# Patient Record
Sex: FEMALE | Race: WHITE | NOT HISPANIC OR LATINO | Employment: OTHER | URBAN - NONMETROPOLITAN AREA
[De-identification: names, ages, dates, MRNs, and addresses within clinical notes are randomized per-mention and may not be internally consistent; named-entity substitution may affect disease eponyms.]

---

## 2017-01-01 ENCOUNTER — TELEPHONE (OUTPATIENT)
Dept: FAMILY MEDICINE CLINIC | Facility: CLINIC | Age: 62
End: 2017-01-01

## 2017-01-01 ENCOUNTER — OFFICE VISIT (OUTPATIENT)
Dept: FAMILY MEDICINE CLINIC | Facility: CLINIC | Age: 62
End: 2017-01-01

## 2017-01-01 ENCOUNTER — TRANSCRIBE ORDERS (OUTPATIENT)
Dept: LAB | Facility: HOSPITAL | Age: 62
End: 2017-01-01

## 2017-01-01 ENCOUNTER — HOSPITAL ENCOUNTER (OUTPATIENT)
Dept: PULMONOLOGY | Facility: HOSPITAL | Age: 62
Discharge: HOME | End: 2017-11-13
Attending: INTERNAL MEDICINE | Admitting: INTERNAL MEDICINE

## 2017-01-01 ENCOUNTER — OFFICE VISIT (OUTPATIENT)
Dept: CARDIOLOGY | Facility: CLINIC | Age: 62
End: 2017-01-01

## 2017-01-01 ENCOUNTER — APPOINTMENT (OUTPATIENT)
Dept: LAB | Facility: HOSPITAL | Age: 62
End: 2017-01-01

## 2017-01-01 VITALS
TEMPERATURE: 97.5 F | BODY MASS INDEX: 45.36 KG/M2 | DIASTOLIC BLOOD PRESSURE: 80 MMHG | SYSTOLIC BLOOD PRESSURE: 130 MMHG | HEIGHT: 63 IN | OXYGEN SATURATION: 91 % | WEIGHT: 256 LBS

## 2017-01-01 VITALS
HEIGHT: 63 IN | OXYGEN SATURATION: 98 % | DIASTOLIC BLOOD PRESSURE: 78 MMHG | BODY MASS INDEX: 43.23 KG/M2 | WEIGHT: 244 LBS | HEART RATE: 58 BPM | SYSTOLIC BLOOD PRESSURE: 128 MMHG

## 2017-01-01 VITALS
WEIGHT: 253 LBS | SYSTOLIC BLOOD PRESSURE: 110 MMHG | BODY MASS INDEX: 44.83 KG/M2 | DIASTOLIC BLOOD PRESSURE: 68 MMHG | HEIGHT: 63 IN

## 2017-01-01 VITALS
BODY MASS INDEX: 45.84 KG/M2 | DIASTOLIC BLOOD PRESSURE: 74 MMHG | OXYGEN SATURATION: 90 % | HEART RATE: 78 BPM | HEIGHT: 63 IN | SYSTOLIC BLOOD PRESSURE: 130 MMHG | WEIGHT: 258.7 LBS

## 2017-01-01 DIAGNOSIS — I25.10 CORONARY ARTERY DISEASE INVOLVING NATIVE CORONARY ARTERY OF NATIVE HEART WITHOUT ANGINA PECTORIS: ICD-10-CM

## 2017-01-01 DIAGNOSIS — I10 ESSENTIAL HYPERTENSION: ICD-10-CM

## 2017-01-01 DIAGNOSIS — J20.9 ACUTE BRONCHITIS, UNSPECIFIED ORGANISM: Primary | ICD-10-CM

## 2017-01-01 DIAGNOSIS — I27.20 PULMONARY HYPERTENSION (HCC): Primary | ICD-10-CM

## 2017-01-01 DIAGNOSIS — E11.22 TYPE 2 DIABETES MELLITUS WITH STAGE 3 CHRONIC KIDNEY DISEASE, WITHOUT LONG-TERM CURRENT USE OF INSULIN (HCC): ICD-10-CM

## 2017-01-01 DIAGNOSIS — R21 RASH, SKIN: Primary | ICD-10-CM

## 2017-01-01 DIAGNOSIS — I25.10 ATHEROSCLEROSIS OF NATIVE CORONARY ARTERY OF NATIVE HEART WITHOUT ANGINA PECTORIS: ICD-10-CM

## 2017-01-01 DIAGNOSIS — Z23 FLU VACCINE NEED: ICD-10-CM

## 2017-01-01 DIAGNOSIS — N18.30 STAGE 3 CHRONIC KIDNEY DISEASE (HCC): ICD-10-CM

## 2017-01-01 DIAGNOSIS — R06.09 DYSPNEA ON EXERTION: ICD-10-CM

## 2017-01-01 DIAGNOSIS — N18.30 TYPE 2 DIABETES MELLITUS WITH STAGE 3 CHRONIC KIDNEY DISEASE, WITHOUT LONG-TERM CURRENT USE OF INSULIN (HCC): ICD-10-CM

## 2017-01-01 DIAGNOSIS — R21 RASH: Primary | ICD-10-CM

## 2017-01-01 DIAGNOSIS — N18.30 CHRONIC KIDNEY DISEASE, STAGE III (MODERATE) (HCC): Primary | ICD-10-CM

## 2017-01-01 DIAGNOSIS — R05.9 COUGH: ICD-10-CM

## 2017-01-01 LAB
DEPRECATED RDW RBC AUTO: 49 FL (ref 36.4–46.3)
ERYTHROCYTE [DISTWIDTH] IN BLOOD BY AUTOMATED COUNT: 14.4 % (ref 11.5–14.5)
HCT VFR BLD AUTO: 44.6 % (ref 35–45)
HGB BLD-MCNC: 14.1 G/DL (ref 12–15.5)
MCH RBC QN AUTO: 29.6 PG (ref 26.5–34)
MCHC RBC AUTO-ENTMCNC: 31.6 G/DL (ref 31.4–36)
MCV RBC AUTO: 93.5 FL (ref 80–98)
NT-PROBNP SERPL-MCNC: 3350 PG/ML (ref 0–900)
PLATELET # BLD AUTO: 215 10*3/MM3 (ref 150–450)
PMV BLD AUTO: 12.3 FL (ref 8–12)
RBC # BLD AUTO: 4.77 10*6/MM3 (ref 3.77–5.16)
WBC NRBC COR # BLD: 10.45 10*3/MM3 (ref 3.2–9.8)

## 2017-01-01 PROCEDURE — 85027 COMPLETE CBC AUTOMATED: CPT | Performed by: INTERNAL MEDICINE

## 2017-01-01 PROCEDURE — 94010 BREATHING CAPACITY TEST: CPT

## 2017-01-01 PROCEDURE — 93000 ELECTROCARDIOGRAM COMPLETE: CPT | Performed by: INTERNAL MEDICINE

## 2017-01-01 PROCEDURE — 94727 GAS DIL/WSHOT DETER LNG VOL: CPT

## 2017-01-01 PROCEDURE — 36415 COLL VENOUS BLD VENIPUNCTURE: CPT | Performed by: INTERNAL MEDICINE

## 2017-01-01 PROCEDURE — 94010 BREATHING CAPACITY TEST: CPT | Performed by: INTERNAL MEDICINE

## 2017-01-01 PROCEDURE — 90686 IIV4 VACC NO PRSV 0.5 ML IM: CPT | Performed by: FAMILY MEDICINE

## 2017-01-01 PROCEDURE — 94729 DIFFUSING CAPACITY: CPT | Performed by: INTERNAL MEDICINE

## 2017-01-01 PROCEDURE — 83880 ASSAY OF NATRIURETIC PEPTIDE: CPT | Performed by: INTERNAL MEDICINE

## 2017-01-01 PROCEDURE — 94729 DIFFUSING CAPACITY: CPT

## 2017-01-01 PROCEDURE — 94727 GAS DIL/WSHOT DETER LNG VOL: CPT | Performed by: INTERNAL MEDICINE

## 2017-01-01 PROCEDURE — 90471 IMMUNIZATION ADMIN: CPT | Performed by: FAMILY MEDICINE

## 2017-01-01 PROCEDURE — 99214 OFFICE O/P EST MOD 30 MIN: CPT | Performed by: INTERNAL MEDICINE

## 2017-01-01 PROCEDURE — 96372 THER/PROPH/DIAG INJ SC/IM: CPT | Performed by: NURSE PRACTITIONER

## 2017-01-01 PROCEDURE — 99214 OFFICE O/P EST MOD 30 MIN: CPT | Performed by: FAMILY MEDICINE

## 2017-01-01 PROCEDURE — 99213 OFFICE O/P EST LOW 20 MIN: CPT | Performed by: NURSE PRACTITIONER

## 2017-01-01 RX ORDER — FLUCONAZOLE 150 MG/1
150 TABLET ORAL ONCE
Qty: 2 TABLET | Refills: 0 | Status: SHIPPED | OUTPATIENT
Start: 2017-01-01 | End: 2017-01-01

## 2017-01-01 RX ORDER — TRIAMCINOLONE ACETONIDE 40 MG/ML
60 INJECTION, SUSPENSION INTRA-ARTICULAR; INTRAMUSCULAR ONCE
Status: COMPLETED | OUTPATIENT
Start: 2017-01-01 | End: 2017-01-01

## 2017-01-01 RX ORDER — BISOPROLOL FUMARATE 5 MG/1
2.5 TABLET, FILM COATED ORAL DAILY
Qty: 30 TABLET | Refills: 6 | Status: SHIPPED | OUTPATIENT
Start: 2017-01-01 | End: 2018-01-01 | Stop reason: SDUPTHER

## 2017-01-01 RX ORDER — SILDENAFIL CITRATE 20 MG/1
20 TABLET ORAL 3 TIMES DAILY
Qty: 90 TABLET | Refills: 6 | Status: SHIPPED | OUTPATIENT
Start: 2017-01-01 | End: 2018-01-01 | Stop reason: SDUPTHER

## 2017-01-01 RX ORDER — GUAIFENESIN 1200 MG/1
TABLET, EXTENDED RELEASE ORAL
Qty: 60 EACH | Refills: 1 | Status: SHIPPED | OUTPATIENT
Start: 2017-01-01 | End: 2017-01-01 | Stop reason: SDUPTHER

## 2017-01-01 RX ORDER — ALBUTEROL SULFATE 90 UG/1
AEROSOL, METERED RESPIRATORY (INHALATION)
Qty: 18 G | Refills: 11 | Status: SHIPPED | OUTPATIENT
Start: 2017-01-01 | End: 2018-01-01 | Stop reason: SDUPTHER

## 2017-01-01 RX ORDER — FUROSEMIDE 80 MG
80 TABLET ORAL DAILY
Qty: 30 TABLET | Refills: 5 | Status: SHIPPED | OUTPATIENT
Start: 2017-01-01 | End: 2018-01-01 | Stop reason: SDUPTHER

## 2017-01-01 RX ORDER — LEVOFLOXACIN 750 MG/1
750 TABLET ORAL DAILY
Qty: 10 TABLET | Refills: 0 | Status: SHIPPED | OUTPATIENT
Start: 2017-01-01 | End: 2017-01-01

## 2017-01-01 RX ORDER — HYDROCORTISONE VALERATE CREAM 2 MG/G
CREAM TOPICAL 2 TIMES DAILY
Qty: 60 G | Refills: 2 | Status: SHIPPED | OUTPATIENT
Start: 2017-01-01 | End: 2018-01-01

## 2017-01-01 RX ORDER — SERTRALINE HYDROCHLORIDE 100 MG/1
50 TABLET, FILM COATED ORAL DAILY
Qty: 30 TABLET | Refills: 11 | Status: SHIPPED | OUTPATIENT
Start: 2017-01-01 | End: 2018-01-01

## 2017-01-01 RX ORDER — GUAIFENESIN 1200 MG/1
TABLET, EXTENDED RELEASE ORAL
Qty: 60 EACH | Refills: 1 | Status: SHIPPED | OUTPATIENT
Start: 2017-01-01 | End: 2018-01-01 | Stop reason: SDUPTHER

## 2017-01-01 RX ADMIN — TRIAMCINOLONE ACETONIDE 60 MG: 40 INJECTION, SUSPENSION INTRA-ARTICULAR; INTRAMUSCULAR at 15:15

## 2017-01-03 RX ORDER — AZITHROMYCIN 250 MG/1
TABLET, FILM COATED ORAL
Qty: 6 TABLET | Refills: 0 | Status: SHIPPED | OUTPATIENT
Start: 2017-01-03 | End: 2017-02-15

## 2017-01-11 ENCOUNTER — TELEPHONE (OUTPATIENT)
Dept: FAMILY MEDICINE CLINIC | Facility: CLINIC | Age: 62
End: 2017-01-11

## 2017-01-11 RX ORDER — GUAIFENESIN 600 MG/1
1200 TABLET, EXTENDED RELEASE ORAL 2 TIMES DAILY
Qty: 40 TABLET | Refills: 0 | Status: SHIPPED | OUTPATIENT
Start: 2017-01-11 | End: 2017-02-15 | Stop reason: SDUPTHER

## 2017-01-11 NOTE — TELEPHONE ENCOUNTER
----- Message from Whitley Prescott sent at 1/11/2017  1:30 PM CST -----  Regarding: JOHNY BENSON  Contact: [phone]  KEELY CABRERA IS NEEDING REFILL ON MUSINEX TO BE SENT TO [facility] PLEASE

## 2017-02-15 ENCOUNTER — OFFICE VISIT (OUTPATIENT)
Dept: FAMILY MEDICINE CLINIC | Facility: CLINIC | Age: 62
End: 2017-02-15

## 2017-02-15 ENCOUNTER — LAB (OUTPATIENT)
Dept: LAB | Facility: HOSPITAL | Age: 62
End: 2017-02-15

## 2017-02-15 VITALS
DIASTOLIC BLOOD PRESSURE: 82 MMHG | OXYGEN SATURATION: 98 % | HEART RATE: 70 BPM | WEIGHT: 260.2 LBS | BODY MASS INDEX: 47.59 KG/M2 | SYSTOLIC BLOOD PRESSURE: 130 MMHG

## 2017-02-15 DIAGNOSIS — F41.1 GENERALIZED ANXIETY DISORDER: ICD-10-CM

## 2017-02-15 DIAGNOSIS — I10 ESSENTIAL HYPERTENSION: ICD-10-CM

## 2017-02-15 DIAGNOSIS — K75.81 NASH (NONALCOHOLIC STEATOHEPATITIS): ICD-10-CM

## 2017-02-15 DIAGNOSIS — I25.10 ATHEROSCLEROSIS OF NATIVE CORONARY ARTERY OF NATIVE HEART WITHOUT ANGINA PECTORIS: ICD-10-CM

## 2017-02-15 DIAGNOSIS — E11.9 TYPE 2 DIABETES MELLITUS WITHOUT COMPLICATION, WITHOUT LONG-TERM CURRENT USE OF INSULIN (HCC): ICD-10-CM

## 2017-02-15 DIAGNOSIS — E78.00 PURE HYPERCHOLESTEROLEMIA: Primary | ICD-10-CM

## 2017-02-15 DIAGNOSIS — R74.8 ELEVATED LIVER ENZYMES: ICD-10-CM

## 2017-02-15 LAB
ALBUMIN SERPL-MCNC: 4 G/DL (ref 3.4–4.8)
ALBUMIN SERPL-MCNC: 4 G/DL (ref 3.4–4.8)
ALBUMIN UR-MCNC: 1.6 MG/L
ALBUMIN/GLOB SERPL: 1.2 G/DL (ref 1.1–1.8)
ALP SERPL-CCNC: 117 U/L (ref 38–126)
ALP SERPL-CCNC: 117 U/L (ref 38–126)
ALT SERPL W P-5'-P-CCNC: 31 U/L (ref 9–52)
ALT SERPL W P-5'-P-CCNC: 31 U/L (ref 9–52)
ANION GAP SERPL CALCULATED.3IONS-SCNC: 12 MMOL/L (ref 5–15)
ARTICHOKE IGE QN: 58 MG/DL (ref 1–129)
AST SERPL-CCNC: 74 U/L (ref 14–36)
AST SERPL-CCNC: 74 U/L (ref 14–36)
BILIRUB CONJ SERPL-MCNC: 0 MG/DL (ref 0–0.3)
BILIRUB INDIRECT SERPL-MCNC: 0.5 MG/DL (ref 0–1.1)
BILIRUB SERPL-MCNC: 1 MG/DL (ref 0.2–1.3)
BILIRUB SERPL-MCNC: 1 MG/DL (ref 0.2–1.3)
BUN BLD-MCNC: 21 MG/DL (ref 7–21)
BUN/CREAT SERPL: 15.8 (ref 7–25)
CALCIUM SPEC-SCNC: 9 MG/DL (ref 8.4–10.2)
CHLORIDE SERPL-SCNC: 100 MMOL/L (ref 95–110)
CHOLEST SERPL-MCNC: 131 MG/DL (ref 0–199)
CO2 SERPL-SCNC: 27 MMOL/L (ref 22–31)
CREAT BLD-MCNC: 1.33 MG/DL (ref 0.5–1)
CREAT UR-MCNC: 49.5 MG/DL
GFR SERPL CREATININE-BSD FRML MDRD: 41 ML/MIN/1.73 (ref 45–104)
GLOBULIN UR ELPH-MCNC: 3.4 GM/DL (ref 2.3–3.5)
GLUCOSE BLD-MCNC: 113 MG/DL (ref 60–100)
HBA1C MFR BLD: 6.41 % (ref 4–5.6)
HDLC SERPL-MCNC: 43 MG/DL (ref 60–200)
LDLC/HDLC SERPL: 1.67 {RATIO} (ref 0–3.22)
MICROALBUMIN/CREAT UR: 32.3 MG/G (ref 0–30)
POTASSIUM BLD-SCNC: 3.5 MMOL/L (ref 3.5–5.1)
PROT SERPL-MCNC: 7.4 G/DL (ref 6.3–8.6)
PROT SERPL-MCNC: 7.4 G/DL (ref 6.3–8.6)
SODIUM BLD-SCNC: 139 MMOL/L (ref 137–145)
TRIGL SERPL-MCNC: 81 MG/DL (ref 20–199)

## 2017-02-15 PROCEDURE — 82465 ASSAY BLD/SERUM CHOLESTEROL: CPT | Performed by: PHYSICIAN ASSISTANT

## 2017-02-15 PROCEDURE — 82247 BILIRUBIN TOTAL: CPT | Performed by: PHYSICIAN ASSISTANT

## 2017-02-15 PROCEDURE — 84450 TRANSFERASE (AST) (SGOT): CPT | Performed by: PHYSICIAN ASSISTANT

## 2017-02-15 PROCEDURE — 84478 ASSAY OF TRIGLYCERIDES: CPT | Performed by: PHYSICIAN ASSISTANT

## 2017-02-15 PROCEDURE — 83010 ASSAY OF HAPTOGLOBIN QUANT: CPT | Performed by: PHYSICIAN ASSISTANT

## 2017-02-15 PROCEDURE — 82977 ASSAY OF GGT: CPT | Performed by: PHYSICIAN ASSISTANT

## 2017-02-15 PROCEDURE — 36415 COLL VENOUS BLD VENIPUNCTURE: CPT | Performed by: FAMILY MEDICINE

## 2017-02-15 PROCEDURE — 83036 HEMOGLOBIN GLYCOSYLATED A1C: CPT | Performed by: FAMILY MEDICINE

## 2017-02-15 PROCEDURE — 80076 HEPATIC FUNCTION PANEL: CPT | Performed by: FAMILY MEDICINE

## 2017-02-15 PROCEDURE — 82947 ASSAY GLUCOSE BLOOD QUANT: CPT | Performed by: PHYSICIAN ASSISTANT

## 2017-02-15 PROCEDURE — 82570 ASSAY OF URINE CREATININE: CPT | Performed by: FAMILY MEDICINE

## 2017-02-15 PROCEDURE — 99214 OFFICE O/P EST MOD 30 MIN: CPT | Performed by: FAMILY MEDICINE

## 2017-02-15 PROCEDURE — 83883 ASSAY NEPHELOMETRY NOT SPEC: CPT | Performed by: PHYSICIAN ASSISTANT

## 2017-02-15 PROCEDURE — 80053 COMPREHEN METABOLIC PANEL: CPT | Performed by: FAMILY MEDICINE

## 2017-02-15 PROCEDURE — 82043 UR ALBUMIN QUANTITATIVE: CPT | Performed by: FAMILY MEDICINE

## 2017-02-15 PROCEDURE — 84460 ALANINE AMINO (ALT) (SGPT): CPT | Performed by: PHYSICIAN ASSISTANT

## 2017-02-15 PROCEDURE — 80061 LIPID PANEL: CPT | Performed by: FAMILY MEDICINE

## 2017-02-15 RX ORDER — CLOPIDOGREL BISULFATE 75 MG/1
75 TABLET ORAL DAILY
Qty: 30 TABLET | Refills: 5 | Status: SHIPPED | OUTPATIENT
Start: 2017-02-15 | End: 2017-05-05 | Stop reason: SDUPTHER

## 2017-02-15 RX ORDER — GUAIFENESIN 600 MG/1
1200 TABLET, EXTENDED RELEASE ORAL 2 TIMES DAILY
Qty: 40 TABLET | Refills: 1 | Status: SHIPPED | OUTPATIENT
Start: 2017-02-15 | End: 2017-03-31 | Stop reason: SDUPTHER

## 2017-02-15 RX ORDER — ALLOPURINOL 100 MG/1
100 TABLET ORAL DAILY
Qty: 30 TABLET | Refills: 5 | Status: SHIPPED | OUTPATIENT
Start: 2017-02-15 | End: 2017-08-15 | Stop reason: SDUPTHER

## 2017-02-15 RX ORDER — ISOSORBIDE MONONITRATE 30 MG/1
30 TABLET, EXTENDED RELEASE ORAL DAILY
Qty: 30 TABLET | Refills: 5 | Status: SHIPPED | OUTPATIENT
Start: 2017-02-15 | End: 2017-06-16 | Stop reason: ALTCHOICE

## 2017-02-15 NOTE — PROGRESS NOTES
Subjective   Cameron Bowie is a 61 y.o. female.     Hypertension   This is a chronic problem. The current episode started more than 1 year ago. The problem is unchanged. The problem is controlled. Associated symptoms include anxiety, peripheral edema and shortness of breath. Pertinent negatives include no chest pain, orthopnea, palpitations or PND.   Diabetes   She presents for her follow-up diabetic visit. She has type 2 diabetes mellitus. Pertinent negatives for hypoglycemia include no nervousness/anxiousness. Associated symptoms include fatigue. Pertinent negatives for diabetes include no chest pain, no foot paresthesias, no foot ulcerations, no polydipsia, no polyphagia and no polyuria. Symptoms are stable. Her breakfast blood glucose is taken between 6-7 am. Her breakfast blood glucose range is generally <70 mg/dl. Her lunch blood glucose is taken between 11-12 pm. Her lunch blood glucose range is generally  mg/dl. Her highest blood glucose is 140-180 mg/dl. Her overall blood glucose range is  mg/dl. (Usually checks at least BID, recently sick  ) An ACE inhibitor/angiotensin II receptor blocker is contraindicated. Eye exam is not current (been more than a year, patient says she would schedule).   COPD   This is a chronic problem. The current episode started more than 1 year ago. The problem occurs intermittently. The problem has been unchanged. Associated symptoms include congestion, coughing and fatigue. Pertinent negatives include no chest pain, chills, diaphoresis, fever, nausea, numbness or vomiting.   Coronary Artery Disease   Presents for follow-up visit. Symptoms include leg swelling and shortness of breath. Pertinent negatives include no chest pain, chest pressure, chest tightness or palpitations. The symptoms have been stable.   Anxiety   Presents for follow-up visit. Symptoms include insomnia and shortness of breath. Patient reports no chest pain, decreased concentration, depressed  mood, excessive worry, irritability, nausea, nervous/anxious behavior, palpitations, panic, restlessness or suicidal ideas. Symptoms occur occasionally. The quality of sleep is good.     Her past medical history is significant for CAD.        The following portions of the patient's history were reviewed and updated as appropriate: allergies, current medications, past family history, past medical history, past social history, past surgical history and problem list.    Review of Systems   Constitutional: Positive for fatigue. Negative for activity change, appetite change, chills, diaphoresis, fever and irritability.   HENT: Positive for congestion, postnasal drip, rhinorrhea, sinus pressure and sneezing. Negative for ear discharge, ear pain, facial swelling, hearing loss and mouth sores.    Respiratory: Positive for cough and shortness of breath. Negative for chest tightness.    Cardiovascular: Positive for leg swelling. Negative for chest pain, palpitations, orthopnea and PND.   Gastrointestinal: Negative for nausea and vomiting.   Endocrine: Negative for polydipsia, polyphagia and polyuria.   Neurological: Negative for numbness.   Psychiatric/Behavioral: Negative for decreased concentration and suicidal ideas. The patient has insomnia. The patient is not nervous/anxious.        Objective   Physical Exam   Constitutional: She is oriented to person, place, and time. She appears well-developed and well-nourished. No distress.   HENT:   Right Ear: Hearing and ear canal normal. No drainage, swelling or tenderness. Tympanic membrane is retracted.   Left Ear: Hearing and ear canal normal. No drainage, swelling or tenderness. Tympanic membrane is retracted.   Cardiovascular: Normal rate, regular rhythm and intact distal pulses.  Exam reveals distant heart sounds. Exam reveals no gallop, no S4 and no friction rub.    No murmur heard.  Pulmonary/Chest: She has decreased breath sounds. She has wheezes. She has no rhonchi. She  has no rales.   Musculoskeletal: She exhibits edema.    Cameron had a diabetic foot exam performed (callus left heel) today.   During the foot exam she had a monofilament test performed (normal).    Vascular Status -  Her exam exhibits right foot vasculature normal and right foot edema. Her exam exhibits left foot vasculature normal and left foot edema.  Neurological: She is alert and oriented to person, place, and time.   Skin: Skin is warm and dry. Lesion and rash noted. Rash is maculopapular. She is not diaphoretic. There is erythema.        Psychiatric: She has a normal mood and affect. Her behavior is normal. Judgment and thought content normal.   Nursing note and vitals reviewed.      Assessment/Plan   Problems Addressed this Visit        Cardiovascular and Mediastinum    Hyperlipidemia - Primary    Essential hypertension    Coronary atherosclerosis of native coronary artery       Endocrine    Type 2 diabetes mellitus       Other    Generalized anxiety disorder

## 2017-02-16 ENCOUNTER — TELEPHONE (OUTPATIENT)
Dept: FAMILY MEDICINE CLINIC | Facility: CLINIC | Age: 62
End: 2017-02-16

## 2017-02-16 PROBLEM — N18.30 TYPE 2 DIABETES MELLITUS WITH STAGE 3 CHRONIC KIDNEY DISEASE, WITHOUT LONG-TERM CURRENT USE OF INSULIN (HCC): Status: ACTIVE | Noted: 2017-02-16

## 2017-02-16 PROBLEM — E11.22 TYPE 2 DIABETES MELLITUS WITH STAGE 3 CHRONIC KIDNEY DISEASE, WITHOUT LONG-TERM CURRENT USE OF INSULIN (HCC): Status: ACTIVE | Noted: 2017-02-16

## 2017-02-16 NOTE — PROGRESS NOTES
RESULTS & RECOMMENDATIONS RELAYED pr Dr. Corley's instruction  Labs look good, Glucose & Cholesterol are OK,  Renal Function Studies are Stable.  Continue to watch your diet, stay on your current medications and follow-up as recommended or sooner if she has any problems

## 2017-02-16 NOTE — TELEPHONE ENCOUNTER
RESULTS & RECOMMENDATIONS RELAYED pr Dr. Corley's instruction  Labs look good, Glucose & Cholesterol are OK,  Renal Function Studies are Stable.  Continue to watch your diet, stay on your current medications and follow-up as recommended or sooner if she has any problems    ----- Message from Roby Corley MD sent at 2/16/2017  9:48 AM CST -----  Glucose and cholesterol okay.  Renal function stable.

## 2017-02-18 LAB
A2 MACROGLOB SERPL-MCNC: 245 MG/DL (ref 110–276)
ALT SERPL W P-5'-P-CCNC: 22 IU/L (ref 0–40)
APO A-I SERPL-MCNC: 135 MG/DL (ref 116–209)
AST SERPL W P-5'-P-CCNC: 25 IU/L (ref 0–40)
BILIRUB SERPL-MCNC: 0.6 MG/DL (ref 0–1.2)
CHOLEST SERPL-MCNC: 137 MG/DL (ref 100–199)
FIBROSIS SCORING:: ABNORMAL
FIBROSIS STAGE SERPL QL: ABNORMAL
GGT SERPL-CCNC: 27 IU/L (ref 0–60)
GLUCOSE SERPL-MCNC: 114 MG/DL (ref 65–99)
HAPTOGLOB SERPL-MCNC: 200 MG/DL (ref 34–200)
INTERPRETATIONS: (REFERENCE): ABNORMAL
LABORATORY COMMENT REPORT: ABNORMAL
LIMITATIONS: (REFERENCE): ABNORMAL
LIVER FIBR SCORE SERPL CALC.FIBROSURE: 0.3 (ref 0–0.21)
NASH GRADE (REFERENCE): ABNORMAL
NASH SCORE (REFERENCE): 0.5
NASH SCORING (REFERENCE): ABNORMAL
STEATOSIS GRADE (REFERENCE): ABNORMAL
STEATOSIS GRADING (REFERENCE): ABNORMAL
STEATOSIS SCORE (REFERENCE): 0.9 (ref 0–0.3)
TRIGL SERPL-MCNC: 95 MG/DL (ref 0–149)
WEIGHT: (REFERENCE): 260 LBS

## 2017-03-06 ENCOUNTER — TRANSCRIBE ORDERS (OUTPATIENT)
Dept: LAB | Facility: HOSPITAL | Age: 62
End: 2017-03-06

## 2017-03-06 DIAGNOSIS — N18.30 CHRONIC KIDNEY DISEASE, STAGE III (MODERATE) (HCC): Primary | ICD-10-CM

## 2017-03-31 RX ORDER — GUAIFENESIN 600 MG/1
1200 TABLET, EXTENDED RELEASE ORAL 2 TIMES DAILY
Qty: 40 TABLET | Refills: 1 | Status: SHIPPED | OUTPATIENT
Start: 2017-03-31 | End: 2017-04-20 | Stop reason: SDUPTHER

## 2017-04-07 ENCOUNTER — LAB (OUTPATIENT)
Dept: LAB | Facility: HOSPITAL | Age: 62
End: 2017-04-07

## 2017-04-07 DIAGNOSIS — N18.30 CHRONIC KIDNEY DISEASE, STAGE III (MODERATE) (HCC): ICD-10-CM

## 2017-04-07 LAB
25(OH)D3 SERPL-MCNC: 38.8 NG/ML (ref 30–100)
ALBUMIN SERPL-MCNC: 4.3 G/DL (ref 3.4–4.8)
ANION GAP SERPL CALCULATED.3IONS-SCNC: 14 MMOL/L (ref 5–15)
BUN BLD-MCNC: 21 MG/DL (ref 7–21)
BUN/CREAT SERPL: 14.5 (ref 7–25)
CALCIUM SPEC-SCNC: 9.1 MG/DL (ref 8.4–10.2)
CHLORIDE SERPL-SCNC: 102 MMOL/L (ref 95–110)
CO2 SERPL-SCNC: 24 MMOL/L (ref 22–31)
CREAT BLD-MCNC: 1.45 MG/DL (ref 0.5–1)
CREAT UR-MCNC: 181.3 MG/DL
GFR SERPL CREATININE-BSD FRML MDRD: 37 ML/MIN/1.73 (ref 45–104)
GLUCOSE BLD-MCNC: 124 MG/DL (ref 60–100)
PHOSPHATE SERPL-MCNC: 4.2 MG/DL (ref 2.4–4.4)
POTASSIUM BLD-SCNC: 4.1 MMOL/L (ref 3.5–5.1)
PROT UR-MCNC: 5.8 MG/DL
PROT/CREAT UR: 32 MG/G CREA (ref 0–200)
SODIUM BLD-SCNC: 140 MMOL/L (ref 137–145)

## 2017-04-07 PROCEDURE — 36415 COLL VENOUS BLD VENIPUNCTURE: CPT

## 2017-04-07 PROCEDURE — 82306 VITAMIN D 25 HYDROXY: CPT | Performed by: INTERNAL MEDICINE

## 2017-04-07 PROCEDURE — 82570 ASSAY OF URINE CREATININE: CPT | Performed by: INTERNAL MEDICINE

## 2017-04-07 PROCEDURE — 84156 ASSAY OF PROTEIN URINE: CPT | Performed by: INTERNAL MEDICINE

## 2017-04-07 PROCEDURE — 80069 RENAL FUNCTION PANEL: CPT | Performed by: INTERNAL MEDICINE

## 2017-04-20 ENCOUNTER — TELEPHONE (OUTPATIENT)
Dept: FAMILY MEDICINE CLINIC | Facility: CLINIC | Age: 62
End: 2017-04-20

## 2017-04-20 RX ORDER — GUAIFENESIN 600 MG/1
1200 TABLET, EXTENDED RELEASE ORAL 2 TIMES DAILY
Qty: 40 TABLET | Refills: 1 | Status: SHIPPED | OUTPATIENT
Start: 2017-04-20 | End: 2017-04-24 | Stop reason: DRUGHIGH

## 2017-04-20 NOTE — TELEPHONE ENCOUNTER
Requested Refills Sent      ----- Message from Whitley Prescott sent at 4/20/2017  1:52 PM CDT -----  Regarding: JOHNY BENSON  Contact: [phone]  KEELY CABRERA IS NEEDING REFILL ON MUSINEX TO BE SENT TO [facility]

## 2017-04-24 ENCOUNTER — OFFICE VISIT (OUTPATIENT)
Dept: CARDIOLOGY | Facility: CLINIC | Age: 62
End: 2017-04-24

## 2017-04-24 VITALS
SYSTOLIC BLOOD PRESSURE: 110 MMHG | HEART RATE: 81 BPM | HEIGHT: 63 IN | WEIGHT: 265 LBS | OXYGEN SATURATION: 92 % | BODY MASS INDEX: 46.95 KG/M2 | DIASTOLIC BLOOD PRESSURE: 80 MMHG

## 2017-04-24 DIAGNOSIS — I25.10 ATHEROSCLEROSIS OF NATIVE CORONARY ARTERY OF NATIVE HEART WITHOUT ANGINA PECTORIS: ICD-10-CM

## 2017-04-24 DIAGNOSIS — I10 ESSENTIAL HYPERTENSION: ICD-10-CM

## 2017-04-24 DIAGNOSIS — I73.9 PVD (PERIPHERAL VASCULAR DISEASE) (HCC): ICD-10-CM

## 2017-04-24 DIAGNOSIS — I27.20 PULMONARY HYPERTENSION (HCC): Primary | ICD-10-CM

## 2017-04-24 PROCEDURE — 99214 OFFICE O/P EST MOD 30 MIN: CPT | Performed by: INTERNAL MEDICINE

## 2017-04-24 RX ORDER — GUAIFENESIN 1200 MG/1
TABLET, EXTENDED RELEASE ORAL
Refills: 1 | COMMUNITY
Start: 2017-03-15 | End: 2017-05-12 | Stop reason: SDUPTHER

## 2017-04-24 NOTE — PROGRESS NOTES
Chief complaint : Coronary artery disease    History of Present Illness this is a very pleasant 61-year-old female who comes today for follow-up visit.  Patient has no chest pain or chest discomfort.  She continues to have dyspnea on moderate exertion.  She continues to have trace bilateral lower extremity edema.    Subjective      Review of Systems   Constitution: Negative. Negative for decreased appetite, diaphoresis, weakness, night sweats, weight gain and weight loss.   HENT: Negative for headaches, hearing loss, nosebleeds and sore throat.    Eyes: Negative.  Negative for blurred vision and photophobia.   Cardiovascular: Positive for dyspnea on exertion and leg swelling. Negative for chest pain, claudication, irregular heartbeat, palpitations, paroxysmal nocturnal dyspnea and syncope.   Respiratory: Positive for shortness of breath. Negative for cough, hemoptysis and wheezing.    Endocrine: Negative for cold intolerance, heat intolerance, polydipsia, polyphagia and polyuria.   Hematologic/Lymphatic: Negative.    Skin: Negative for color change, dry skin, flushing, itching and rash.   Musculoskeletal: Negative.  Negative for muscle cramps, muscle weakness and myalgias.   Gastrointestinal: Negative for abdominal pain, change in bowel habit, diarrhea, hematemesis, melena, nausea and vomiting.   Genitourinary: Negative for dysuria, frequency and hematuria.   Neurological: Negative for dizziness, focal weakness, light-headedness, loss of balance, numbness and seizures.   Psychiatric/Behavioral: Negative.  Negative for substance abuse, suicidal ideas and thoughts of violence.   Allergic/Immunologic: Negative.        Past Medical History:   Diagnosis Date   • Abnormal weight gain    • Asthenia    • Breast screening    • Carpal tunnel syndrome    • Chronic kidney disease     Stage 3   • COPD (chronic obstructive pulmonary disease)    • Coronary arteriosclerosis     2007: PCI RCA 2011: PCI: LCX      • Coronary  atherosclerosis of native coronary artery    • DJD (degenerative joint disease)     involving multiple joints   • Edema of lower extremity    • Encounter for screening for osteoporosis    • Encounter for screening mammogram for malignant neoplasm of breast    • Essential hypertension    • Foot pain    • Generalized anxiety disorder    • Gout    • History of tobacco use    • Hyperlipidemia    • Light tobacco smoker    • Obesity    • Peripheral edema    • Pulmonary hypertension    • PVD (peripheral vascular disease)     LATOYA R .96 L 1.0 aortobifem 1/2012      • Strain of neck muscle    • Type 2 diabetes mellitus     diet controlled       Family History   Problem Relation Age of Onset   • Cancer Other    • Diabetes Other    • Heart disease Other    • Hypertension Other    • Lung disease Other    • Stroke Other        Lisinopril; Nsaids; Tramadol; Penicillins; and Wellbutrin [bupropion]     reports that she has been smoking.  She has never used smokeless tobacco. She reports that she does not drink alcohol or use illicit drugs.    Objective     Vital Signs  Heart Rate:  [81] 81  BP: (110)/(80) 110/80    Physical Exam   Constitutional: She is oriented to person, place, and time. She appears well-developed and well-nourished.   HENT:   Head: Normocephalic and atraumatic.   Eyes: Conjunctivae and EOM are normal. Pupils are equal, round, and reactive to light.   Neck: Neck supple. No JVD present. Carotid bruit is not present. No tracheal deviation and no edema present.   Cardiovascular: Normal rate, regular rhythm, S1 normal, S2 normal, normal heart sounds and intact distal pulses.  Exam reveals no gallop, no S3, no S4 and no friction rub.    No murmur heard.  Pulmonary/Chest: Effort normal and breath sounds normal. She has no wheezes. She has no rales. She exhibits no tenderness.   Abdominal: Bowel sounds are normal. She exhibits no abdominal bruit and no pulsatile midline mass. There is no rebound and no guarding.    Musculoskeletal: Normal range of motion. She exhibits no edema.   Neurological: She is alert and oriented to person, place, and time.   Skin: Skin is warm and dry.   Psychiatric: She has a normal mood and affect.       Procedures    Assessment/Plan     Patient Active Problem List   Diagnosis   • PVD (peripheral vascular disease)   • Pulmonary hypertension   • Obesity   • Hyperlipidemia   • Gout   • Generalized anxiety disorder   • Essential hypertension   • Edema of lower extremity   • DJD (degenerative joint disease)   • Coronary atherosclerosis of native coronary artery   • Coronary arteriosclerosis   • Chronic kidney disease   • Psoriasis   • Moderate episode of recurrent major depressive disorder   • Acute suppurative otitis media of left ear without spontaneous rupture of tympanic membrane   • Seasonal allergic rhinitis due to pollen   • Type 2 diabetes mellitus with stage 3 chronic kidney disease, without long-term current use of insulin     1. Pulmonary hypertension  We will obtain repeat 2-D echocardiogram to evaluate patient's right-sided pressures.  Also evaluate patient's right ventricular function.  If the echo reveals that her right-sided pressures are elevated we will consider right-sided right heart catheterization.    2. Essential hypertension  Patient's blood pressure is well-controlled at this time.  Plan:  · Continue with current medications.  · Continue with low sodium diet.    3. Atherosclerosis of native coronary artery of native heart without angina pectoris  December 4, 2007: 2.5 x 23 mm pixel stent to the mid RCA  October 4, 2011: 3.0 x 28 mm xience V stent to the proximal to mid left circumflex, the stent in the right coronary artery was noted to be patent at that time.  Plan:  · Continue with guideline direct medical therapy.  · Continue with risk factor modification.      4. PVD (peripheral vascular disease)  Aortobifemoral bypass performed by CT surgery.  Patient has no claudication at  this time.  Plan:  · Continue with risk factor modification  · Continue with guideline direct medical therapy    Component      Latest Ref Rng & Units 6/11/2014 12/30/2014 7/13/2015 1/13/2016                Total Cholesterol      0 - 199 mg/dL 125 140 125 142   Triglycerides      20 - 199 mg/dL 169 165 127 141   HDL Cholesterol      60 - 200 mg/dL 30 (L) 34 (L) 38 (L) 41 (L)   LDL Cholesterol      1 - 129 mg/dL 61 73 62 73   LDL/HDL Ratio      0.00 - 3.22         Component      Latest Ref Rng & Units 7/18/2016 9/23/2016 2/15/2017 2/15/2017             2:45 PM  2:45 PM   Total Cholesterol      0 - 199 mg/dL 138 122 131    Triglycerides      20 - 199 mg/dL 119 96 81 95   HDL Cholesterol      60 - 200 mg/dL 33 (L)  43 (L)    LDL Cholesterol      1 - 129 mg/dL 81  58    LDL/HDL Ratio      0.00 - 3.22   1.67      Component      Latest Ref Rng & Units 2/15/2017 2/15/2017           2:45 PM  2:45 PM   ALT (SGPT)      9 - 52 U/L 31 22   AST (SGOT)      14 - 36 U/L 74 (H)      Component      Latest Ref Rng & Units 2/15/2017 4/7/2017           2:45 PM    BUN      7 - 21 mg/dL 21 21   Creatinine      0.50 - 1.00 mg/dL 1.33 (H) 1.45 (H)       I have counseled patient regarding tobacco smoking cessation.  Patient has agreed to start Chantix.  She does not want Wellbutrin.    I discussed the patients findings and my recommendations with patient.    Ignacio Connelly MD  04/24/17  4:00 PM    EMR Dragon/Transcription disclaimer:   Much of this encounter note is an electronic transcription/translation of spoken language to printed text. The electronic translation of spoken language may permit erroneous, or at times, nonsensical words or phrases to be inadvertently transcribed; Although I have reviewed the note for such errors, some may still exist.

## 2017-05-04 RX ORDER — ATORVASTATIN CALCIUM 80 MG/1
TABLET, FILM COATED ORAL
Qty: 30 TABLET | Refills: 11 | Status: ON HOLD | OUTPATIENT
Start: 2017-05-04 | End: 2017-05-22 | Stop reason: SDUPTHER

## 2017-05-04 RX ORDER — ERGOCALCIFEROL 1.25 MG/1
CAPSULE ORAL
Qty: 2 CAPSULE | Refills: 11 | Status: ON HOLD | OUTPATIENT
Start: 2017-05-04 | End: 2017-05-22 | Stop reason: SDUPTHER

## 2017-05-05 ENCOUNTER — PREP FOR SURGERY (OUTPATIENT)
Dept: CARDIOLOGY | Facility: CLINIC | Age: 62
End: 2017-05-05

## 2017-05-05 ENCOUNTER — HOSPITAL ENCOUNTER (OUTPATIENT)
Dept: GENERAL RADIOLOGY | Facility: HOSPITAL | Age: 62
Discharge: HOME | End: 2017-05-05
Admitting: INTERNAL MEDICINE

## 2017-05-05 ENCOUNTER — HOSPITAL ENCOUNTER (OUTPATIENT)
Dept: NUCLEAR MEDICINE | Facility: HOSPITAL | Age: 62
Discharge: HOME | End: 2017-05-05

## 2017-05-05 ENCOUNTER — TELEPHONE (OUTPATIENT)
Dept: FAMILY MEDICINE CLINIC | Facility: CLINIC | Age: 62
End: 2017-05-05

## 2017-05-05 DIAGNOSIS — I26.09 OTHER PULMONARY EMBOLISM WITH ACUTE COR PULMONALE (HCC): ICD-10-CM

## 2017-05-05 DIAGNOSIS — I26.09 OTHER PULMONARY EMBOLISM WITH ACUTE COR PULMONALE (HCC): Primary | ICD-10-CM

## 2017-05-05 LAB
BH CV ECHO MEAS - ACS: 1.5 CM
BH CV ECHO MEAS - AO ISTHMUS: 2.3 CM
BH CV ECHO MEAS - AO MAX PG (FULL): 2.4 MMHG
BH CV ECHO MEAS - AO MAX PG: 6.2 MMHG
BH CV ECHO MEAS - AO MEAN PG (FULL): 1 MMHG
BH CV ECHO MEAS - AO MEAN PG: 3 MMHG
BH CV ECHO MEAS - AO ROOT AREA (BSA CORRECTED): 1.5
BH CV ECHO MEAS - AO ROOT AREA: 8 CM^2
BH CV ECHO MEAS - AO ROOT DIAM: 3.2 CM
BH CV ECHO MEAS - AO V2 MAX: 124 CM/SEC
BH CV ECHO MEAS - AO V2 MEAN: 82.9 CM/SEC
BH CV ECHO MEAS - AO V2 VTI: 16.1 CM
BH CV ECHO MEAS - ASC AORTA: 3.2 CM
BH CV ECHO MEAS - AVA(I,A): 3.1 CM^2
BH CV ECHO MEAS - AVA(I,D): 3.1 CM^2
BH CV ECHO MEAS - AVA(V,A): 2.7 CM^2
BH CV ECHO MEAS - AVA(V,D): 2.7 CM^2
BH CV ECHO MEAS - BSA(HAYCOCK): 2.4 M^2
BH CV ECHO MEAS - BSA: 2.2 M^2
BH CV ECHO MEAS - BZI_BMI: 46.9 KILOGRAMS/M^2
BH CV ECHO MEAS - BZI_METRIC_HEIGHT: 160 CM
BH CV ECHO MEAS - BZI_METRIC_WEIGHT: 120.2 KG
BH CV ECHO MEAS - EDV(CUBED): 13.8 ML
BH CV ECHO MEAS - EDV(TEICH): 20.2 ML
BH CV ECHO MEAS - EF(CUBED): 75.6 %
BH CV ECHO MEAS - EF(TEICH): 70 %
BH CV ECHO MEAS - ESV(CUBED): 3.4 ML
BH CV ECHO MEAS - ESV(TEICH): 6.1 ML
BH CV ECHO MEAS - FS: 37.5 %
BH CV ECHO MEAS - IVS/LVPW: 1
BH CV ECHO MEAS - IVSD: 1 CM
BH CV ECHO MEAS - LA DIMENSION: 3.7 CM
BH CV ECHO MEAS - LA/AO: 1.2
BH CV ECHO MEAS - LV MASS(C)D: 60 GRAMS
BH CV ECHO MEAS - LV MASS(C)DI: 27.5 GRAMS/M^2
BH CV ECHO MEAS - LV MAX PG: 3.7 MMHG
BH CV ECHO MEAS - LV MEAN PG: 2 MMHG
BH CV ECHO MEAS - LV V1 MAX: 96.3 CM/SEC
BH CV ECHO MEAS - LV V1 MEAN: 68.8 CM/SEC
BH CV ECHO MEAS - LV V1 VTI: 14.2 CM
BH CV ECHO MEAS - LVIDD: 2.4 CM
BH CV ECHO MEAS - LVIDS: 1.5 CM
BH CV ECHO MEAS - LVOT AREA (M): 3.5 CM^2
BH CV ECHO MEAS - LVOT AREA: 3.5 CM^2
BH CV ECHO MEAS - LVOT DIAM: 2.1 CM
BH CV ECHO MEAS - LVPWD: 1 CM
BH CV ECHO MEAS - MV A MAX VEL: 107 CM/SEC
BH CV ECHO MEAS - MV DEC SLOPE: 955 CM/SEC^2
BH CV ECHO MEAS - MV E MAX VEL: 57.8 CM/SEC
BH CV ECHO MEAS - MV E/A: 0.54
BH CV ECHO MEAS - MV MAX PG: 5.9 MMHG
BH CV ECHO MEAS - MV MEAN PG: 2 MMHG
BH CV ECHO MEAS - MV P1/2T MAX VEL: 123 CM/SEC
BH CV ECHO MEAS - MV P1/2T: 37.7 MSEC
BH CV ECHO MEAS - MV V2 MAX: 121 CM/SEC
BH CV ECHO MEAS - MV V2 MEAN: 68.9 CM/SEC
BH CV ECHO MEAS - MV V2 VTI: 19.4 CM
BH CV ECHO MEAS - MVA P1/2T LCG: 1.8 CM^2
BH CV ECHO MEAS - MVA(P1/2T): 5.8 CM^2
BH CV ECHO MEAS - MVA(VTI): 2.5 CM^2
BH CV ECHO MEAS - PA MAX PG: 4.8 MMHG
BH CV ECHO MEAS - PA V2 MAX: 109 CM/SEC
BH CV ECHO MEAS - PI END-D VEL: 48.9 CM/SEC
BH CV ECHO MEAS - RAP SYSTOLE: 5 MMHG
BH CV ECHO MEAS - RVDD: 5.8 CM
BH CV ECHO MEAS - RVSP: 96.4 MMHG
BH CV ECHO MEAS - SI(AO): 59.4 ML/M^2
BH CV ECHO MEAS - SI(CUBED): 4.8 ML/M^2
BH CV ECHO MEAS - SI(LVOT): 22.6 ML/M^2
BH CV ECHO MEAS - SI(TEICH): 6.5 ML/M^2
BH CV ECHO MEAS - SV(AO): 129.5 ML
BH CV ECHO MEAS - SV(CUBED): 10.4 ML
BH CV ECHO MEAS - SV(LVOT): 49.2 ML
BH CV ECHO MEAS - SV(TEICH): 14.1 ML
BH CV ECHO MEAS - TR MAX VEL: 478 CM/SEC

## 2017-05-05 PROCEDURE — 0 TECHNETIUM ALBUMIN AGGREGATED: Performed by: INTERNAL MEDICINE

## 2017-05-05 PROCEDURE — 71020 HC CHEST PA AND LATERAL: CPT

## 2017-05-05 PROCEDURE — A9540 TC99M MAA: HCPCS | Performed by: INTERNAL MEDICINE

## 2017-05-05 PROCEDURE — 78580 LUNG PERFUSION IMAGING: CPT

## 2017-05-05 RX ORDER — CLOPIDOGREL BISULFATE 75 MG/1
75 TABLET ORAL DAILY
Qty: 30 TABLET | Refills: 5 | Status: SHIPPED | OUTPATIENT
Start: 2017-05-05 | End: 2017-08-15 | Stop reason: SDUPTHER

## 2017-05-05 RX ADMIN — Medication 1 DOSE: at 14:41

## 2017-05-08 ENCOUNTER — PREP FOR SURGERY (OUTPATIENT)
Dept: CARDIOLOGY | Facility: CLINIC | Age: 62
End: 2017-05-08

## 2017-05-08 DIAGNOSIS — I27.29 PULMONARY HYPERTENSION ASSOC WITH UNCLEAR MULTI-FACTORIAL MECHANISMS (HCC): Primary | ICD-10-CM

## 2017-05-08 RX ORDER — ACETAMINOPHEN 325 MG/1
650 TABLET ORAL EVERY 4 HOURS PRN
Status: CANCELLED | OUTPATIENT
Start: 2017-05-22

## 2017-05-08 RX ORDER — SODIUM CHLORIDE 9 MG/ML
50 INJECTION, SOLUTION INTRAVENOUS CONTINUOUS
Status: CANCELLED | OUTPATIENT
Start: 2017-05-22

## 2017-05-08 RX ORDER — ONDANSETRON 2 MG/ML
4 INJECTION INTRAMUSCULAR; INTRAVENOUS EVERY 6 HOURS PRN
Status: CANCELLED | OUTPATIENT
Start: 2017-05-22

## 2017-05-12 ENCOUNTER — TELEPHONE (OUTPATIENT)
Dept: FAMILY MEDICINE CLINIC | Facility: CLINIC | Age: 62
End: 2017-05-12

## 2017-05-12 RX ORDER — GUAIFENESIN 1200 MG/1
TABLET, EXTENDED RELEASE ORAL
Qty: 14 EACH | Refills: 1 | Status: SHIPPED | OUTPATIENT
Start: 2017-05-12 | End: 2017-05-26 | Stop reason: SDUPTHER

## 2017-05-15 ENCOUNTER — OFFICE VISIT (OUTPATIENT)
Dept: FAMILY MEDICINE CLINIC | Facility: CLINIC | Age: 62
End: 2017-05-15

## 2017-05-15 VITALS
OXYGEN SATURATION: 97 % | SYSTOLIC BLOOD PRESSURE: 126 MMHG | WEIGHT: 264.6 LBS | BODY MASS INDEX: 46.87 KG/M2 | DIASTOLIC BLOOD PRESSURE: 80 MMHG | HEART RATE: 68 BPM

## 2017-05-15 DIAGNOSIS — N18.30 CHRONIC KIDNEY DISEASE, STAGE 3 (MODERATE): ICD-10-CM

## 2017-05-15 DIAGNOSIS — I10 ESSENTIAL HYPERTENSION: ICD-10-CM

## 2017-05-15 DIAGNOSIS — I87.2 VENOUS INSUFFICIENCY: Primary | ICD-10-CM

## 2017-05-15 DIAGNOSIS — E66.01 MORBID OBESITY DUE TO EXCESS CALORIES (HCC): ICD-10-CM

## 2017-05-15 DIAGNOSIS — I25.10 ATHEROSCLEROSIS OF NATIVE CORONARY ARTERY OF NATIVE HEART WITHOUT ANGINA PECTORIS: ICD-10-CM

## 2017-05-15 DIAGNOSIS — N18.30 TYPE 2 DIABETES MELLITUS WITH STAGE 3 CHRONIC KIDNEY DISEASE, WITHOUT LONG-TERM CURRENT USE OF INSULIN (HCC): ICD-10-CM

## 2017-05-15 DIAGNOSIS — E11.22 TYPE 2 DIABETES MELLITUS WITH STAGE 3 CHRONIC KIDNEY DISEASE, WITHOUT LONG-TERM CURRENT USE OF INSULIN (HCC): ICD-10-CM

## 2017-05-15 PROCEDURE — 99214 OFFICE O/P EST MOD 30 MIN: CPT | Performed by: FAMILY MEDICINE

## 2017-05-15 RX ORDER — NITROGLYCERIN 0.4 MG/1
0.4 TABLET SUBLINGUAL
Qty: 30 TABLET | Refills: 2 | Status: SHIPPED | OUTPATIENT
Start: 2017-05-15 | End: 2017-06-16 | Stop reason: ALTCHOICE

## 2017-05-15 RX ORDER — GLUCOSAM/CHON-MSM1/C/MANG/BOSW 500-416.6
1 TABLET ORAL DAILY
Qty: 100 EACH | Refills: 3 | Status: SHIPPED | OUTPATIENT
Start: 2017-05-15

## 2017-05-22 ENCOUNTER — HOSPITAL ENCOUNTER (OUTPATIENT)
Facility: HOSPITAL | Age: 62
Setting detail: HOSPITAL OUTPATIENT SURGERY
Discharge: HOME | End: 2017-05-22
Attending: INTERNAL MEDICINE | Admitting: INTERNAL MEDICINE

## 2017-05-22 VITALS
DIASTOLIC BLOOD PRESSURE: 58 MMHG | TEMPERATURE: 98 F | OXYGEN SATURATION: 94 % | HEART RATE: 77 BPM | HEIGHT: 63 IN | BODY MASS INDEX: 46.99 KG/M2 | WEIGHT: 265.21 LBS | RESPIRATION RATE: 18 BRPM | SYSTOLIC BLOOD PRESSURE: 106 MMHG

## 2017-05-22 DIAGNOSIS — I27.29 PULMONARY HYPERTENSION ASSOC WITH UNCLEAR MULTI-FACTORIAL MECHANISMS (HCC): ICD-10-CM

## 2017-05-22 LAB
ALBUMIN SERPL-MCNC: 3.4 G/DL (ref 3.4–4.8)
ALBUMIN/GLOB SERPL: 1.1 G/DL (ref 1.1–1.8)
ALP SERPL-CCNC: 100 U/L (ref 38–126)
ALT SERPL W P-5'-P-CCNC: 44 U/L (ref 9–52)
ANION GAP SERPL CALCULATED.3IONS-SCNC: 10 MMOL/L (ref 5–15)
AST SERPL-CCNC: 19 U/L (ref 14–36)
BILIRUB SERPL-MCNC: 0.8 MG/DL (ref 0.2–1.3)
BUN BLD-MCNC: 28 MG/DL (ref 7–21)
BUN/CREAT SERPL: 19.6 (ref 7–25)
CALCIUM SPEC-SCNC: 8.5 MG/DL (ref 8.4–10.2)
CHLORIDE SERPL-SCNC: 102 MMOL/L (ref 95–110)
CO2 SERPL-SCNC: 27 MMOL/L (ref 22–31)
CREAT BLD-MCNC: 1.43 MG/DL (ref 0.5–1)
DEPRECATED RDW RBC AUTO: 49.6 FL (ref 36.4–46.3)
ERYTHROCYTE [DISTWIDTH] IN BLOOD BY AUTOMATED COUNT: 14.9 % (ref 11.5–14.5)
GFR SERPL CREATININE-BSD FRML MDRD: 37 ML/MIN/1.73 (ref 45–104)
GLOBULIN UR ELPH-MCNC: 3.1 GM/DL (ref 2.3–3.5)
GLUCOSE BLD-MCNC: 93 MG/DL (ref 60–100)
HCT VFR BLD AUTO: 43.4 % (ref 35–45)
HGB BLD-MCNC: 14 G/DL (ref 12–15.5)
INR PPP: 1.08 (ref 0.8–1.2)
MCH RBC QN AUTO: 29.5 PG (ref 26.5–34)
MCHC RBC AUTO-ENTMCNC: 32.3 G/DL (ref 31.4–36)
MCV RBC AUTO: 91.4 FL (ref 80–98)
OXYGEN SATURATION, PULMONARY ARTERY: 51.7 % (ref 94–100)
OXYGEN SATURATION, RIGHT ATRIUM: 49.3 % (ref 94–100)
OXYGEN SATURATION, RIGHT VENTRICLE: 46.9 % (ref 94–100)
OXYGEN SATURATION, WEDGE: 50.8 % (ref 94–100)
PLATELET # BLD AUTO: 226 10*3/MM3 (ref 150–450)
PMV BLD AUTO: 10.9 FL (ref 8–12)
POTASSIUM BLD-SCNC: 3.6 MMOL/L (ref 3.5–5.1)
PROT SERPL-MCNC: 6.5 G/DL (ref 6.3–8.6)
PROTHROMBIN TIME: 13.9 SECONDS (ref 11.1–15.3)
RBC # BLD AUTO: 4.75 10*6/MM3 (ref 3.77–5.16)
SAO2 % BLDA: 82 % (ref 94–100)
SODIUM BLD-SCNC: 139 MMOL/L (ref 137–145)
WBC NRBC COR # BLD: 9.79 10*3/MM3 (ref 3.2–9.8)

## 2017-05-22 PROCEDURE — 0 IOPAMIDOL PER 1 ML: Performed by: INTERNAL MEDICINE

## 2017-05-22 PROCEDURE — 82810 BLOOD GASES O2 SAT ONLY: CPT | Performed by: INTERNAL MEDICINE

## 2017-05-22 PROCEDURE — 80053 COMPREHEN METABOLIC PANEL: CPT | Performed by: INTERNAL MEDICINE

## 2017-05-22 PROCEDURE — C1894 INTRO/SHEATH, NON-LASER: HCPCS | Performed by: INTERNAL MEDICINE

## 2017-05-22 PROCEDURE — C1769 GUIDE WIRE: HCPCS | Performed by: INTERNAL MEDICINE

## 2017-05-22 PROCEDURE — 93453 R&L HRT CATH W/VENTRICLGRPHY: CPT | Performed by: INTERNAL MEDICINE

## 2017-05-22 PROCEDURE — 85610 PROTHROMBIN TIME: CPT | Performed by: INTERNAL MEDICINE

## 2017-05-22 PROCEDURE — 25010000002 PROTAMINE SULFATE PER 10 MG: Performed by: INTERNAL MEDICINE

## 2017-05-22 PROCEDURE — 25010000002 HEPARIN (PORCINE) PER 1000 UNITS: Performed by: INTERNAL MEDICINE

## 2017-05-22 PROCEDURE — 85027 COMPLETE CBC AUTOMATED: CPT | Performed by: INTERNAL MEDICINE

## 2017-05-22 PROCEDURE — 93463 DRUG ADMIN & HEMODYNMIC MEAS: CPT | Performed by: INTERNAL MEDICINE

## 2017-05-22 PROCEDURE — 25010000002 ADENOSINE (DIAGNOSTIC) PER 30 MG: Performed by: INTERNAL MEDICINE

## 2017-05-22 PROCEDURE — 25010000002 FENTANYL CITRATE (PF) 100 MCG/2ML SOLUTION: Performed by: INTERNAL MEDICINE

## 2017-05-22 RX ORDER — ACETAMINOPHEN 325 MG/1
650 TABLET ORAL EVERY 4 HOURS PRN
Status: DISCONTINUED | OUTPATIENT
Start: 2017-05-22 | End: 2017-05-22 | Stop reason: HOSPADM

## 2017-05-22 RX ORDER — LIDOCAINE HYDROCHLORIDE 20 MG/ML
INJECTION, SOLUTION INFILTRATION; PERINEURAL AS NEEDED
Status: DISCONTINUED | OUTPATIENT
Start: 2017-05-22 | End: 2017-05-22 | Stop reason: HOSPADM

## 2017-05-22 RX ORDER — PROTAMINE SULFATE 10 MG/ML
INJECTION, SOLUTION INTRAVENOUS AS NEEDED
Status: DISCONTINUED | OUTPATIENT
Start: 2017-05-22 | End: 2017-05-22 | Stop reason: HOSPADM

## 2017-05-22 RX ORDER — NITROGLYCERIN 5 MG/ML
INJECTION, SOLUTION INTRAVENOUS AS NEEDED
Status: DISCONTINUED | OUTPATIENT
Start: 2017-05-22 | End: 2017-05-22 | Stop reason: HOSPADM

## 2017-05-22 RX ORDER — ONDANSETRON 2 MG/ML
4 INJECTION INTRAMUSCULAR; INTRAVENOUS EVERY 6 HOURS PRN
Status: DISCONTINUED | OUTPATIENT
Start: 2017-05-22 | End: 2017-05-22 | Stop reason: HOSPADM

## 2017-05-22 RX ORDER — ATORVASTATIN CALCIUM 80 MG/1
80 TABLET, FILM COATED ORAL NIGHTLY
COMMUNITY
End: 2018-01-01 | Stop reason: SDUPTHER

## 2017-05-22 RX ORDER — FENTANYL CITRATE 50 UG/ML
INJECTION, SOLUTION INTRAMUSCULAR; INTRAVENOUS AS NEEDED
Status: DISCONTINUED | OUTPATIENT
Start: 2017-05-22 | End: 2017-05-22 | Stop reason: HOSPADM

## 2017-05-22 RX ORDER — ALBUTEROL SULFATE 90 UG/1
2 AEROSOL, METERED RESPIRATORY (INHALATION) EVERY 4 HOURS PRN
COMMUNITY
End: 2017-01-01 | Stop reason: SDUPTHER

## 2017-05-22 RX ORDER — SODIUM CHLORIDE 9 MG/ML
50 INJECTION, SOLUTION INTRAVENOUS CONTINUOUS
Status: DISCONTINUED | OUTPATIENT
Start: 2017-05-22 | End: 2017-05-22 | Stop reason: HOSPADM

## 2017-05-22 RX ORDER — ERGOCALCIFEROL 1.25 MG/1
50000 CAPSULE ORAL
COMMUNITY
End: 2018-01-01 | Stop reason: SDUPTHER

## 2017-05-22 RX ADMIN — SODIUM CHLORIDE 50 ML/HR: 900 INJECTION, SOLUTION INTRAVENOUS at 09:10

## 2017-05-26 ENCOUNTER — TELEPHONE (OUTPATIENT)
Dept: FAMILY MEDICINE CLINIC | Facility: CLINIC | Age: 62
End: 2017-05-26

## 2017-05-26 RX ORDER — GUAIFENESIN 1200 MG/1
TABLET, EXTENDED RELEASE ORAL
Qty: 14 EACH | Refills: 1 | Status: SHIPPED | OUTPATIENT
Start: 2017-05-26 | End: 2017-06-12 | Stop reason: SDUPTHER

## 2017-06-12 ENCOUNTER — TELEPHONE (OUTPATIENT)
Dept: FAMILY MEDICINE CLINIC | Facility: CLINIC | Age: 62
End: 2017-06-12

## 2017-06-12 RX ORDER — GUAIFENESIN 1200 MG/1
TABLET, EXTENDED RELEASE ORAL
Qty: 14 EACH | Refills: 1 | Status: SHIPPED | OUTPATIENT
Start: 2017-06-12 | End: 2017-06-26 | Stop reason: SDUPTHER

## 2017-06-15 DIAGNOSIS — I27.20 PULMONARY HTN (HCC): Primary | ICD-10-CM

## 2017-06-16 ENCOUNTER — OFFICE VISIT (OUTPATIENT)
Dept: CARDIOLOGY | Facility: CLINIC | Age: 62
End: 2017-06-16

## 2017-06-16 ENCOUNTER — TELEPHONE (OUTPATIENT)
Dept: CARDIOLOGY | Facility: CLINIC | Age: 62
End: 2017-06-16

## 2017-06-16 VITALS
SYSTOLIC BLOOD PRESSURE: 110 MMHG | HEIGHT: 63 IN | BODY MASS INDEX: 46.78 KG/M2 | HEART RATE: 66 BPM | DIASTOLIC BLOOD PRESSURE: 58 MMHG | WEIGHT: 264 LBS | OXYGEN SATURATION: 93 %

## 2017-06-16 DIAGNOSIS — I25.10 ATHEROSCLEROSIS OF NATIVE CORONARY ARTERY OF NATIVE HEART WITHOUT ANGINA PECTORIS: ICD-10-CM

## 2017-06-16 DIAGNOSIS — I10 ESSENTIAL HYPERTENSION: ICD-10-CM

## 2017-06-16 DIAGNOSIS — I27.20 PULMONARY HYPERTENSION (HCC): Primary | ICD-10-CM

## 2017-06-16 DIAGNOSIS — E78.00 PURE HYPERCHOLESTEROLEMIA: ICD-10-CM

## 2017-06-16 PROCEDURE — 99213 OFFICE O/P EST LOW 20 MIN: CPT | Performed by: INTERNAL MEDICINE

## 2017-06-16 RX ORDER — SILDENAFIL CITRATE 20 MG/1
20 TABLET ORAL 3 TIMES DAILY
Qty: 90 TABLET | Refills: 6 | Status: SHIPPED | OUTPATIENT
Start: 2017-06-16 | End: 2017-01-01 | Stop reason: SDUPTHER

## 2017-06-16 NOTE — TELEPHONE ENCOUNTER
----- Message from Ella Suazo sent at 6/16/2017  1:20 PM CDT -----  Regarding: call   Contact: [phone]  Her medication is not at the pharmacy.   And also regarding an appt   Can you call her     Thank you     6 week appointment is fine, per Dr. Connelly. Aga was called. The medication needs a PA.

## 2017-06-19 NOTE — PROGRESS NOTES
Chief complaint : Pulmonary hypertension    History of Present Illness very pleasant 61-year-old female who comes today for follow-up visit after recent right side and left side cardiac catheterization.  Patient continues to have shortness of breath.  Patient has shortness of breath on minimal exertion.  Her NYHA functional class is 3.  She has no chest pain or chest discomfort.     6 Minute walk test was performed patient was able to walk 164.7 m over 3 minutes and 58 seconds.  Patient stopped secondary to worsening shortness of breath.         Review of Systems   Constitution: Negative. Negative for decreased appetite, diaphoresis, weakness, night sweats, weight gain and weight loss.   HENT: Negative for headaches, hearing loss, nosebleeds and sore throat.    Eyes: Negative.  Negative for blurred vision and photophobia.   Cardiovascular: Positive for dyspnea on exertion. Negative for chest pain, claudication, irregular heartbeat, leg swelling, palpitations and syncope.   Respiratory: Positive for shortness of breath. Negative for cough, hemoptysis and wheezing.    Endocrine: Negative for cold intolerance, heat intolerance, polydipsia, polyphagia and polyuria.   Hematologic/Lymphatic: Negative.    Skin: Negative for color change, dry skin, flushing, itching and rash.   Musculoskeletal: Negative.  Negative for muscle cramps, muscle weakness and myalgias.   Gastrointestinal: Negative for abdominal pain, change in bowel habit, diarrhea, hematemesis, melena, nausea and vomiting.   Genitourinary: Negative for dysuria, frequency and hematuria.   Neurological: Negative for dizziness, focal weakness, light-headedness, loss of balance, numbness and seizures.   Psychiatric/Behavioral: Negative.  Negative for substance abuse, suicidal ideas and thoughts of violence.   Allergic/Immunologic: Negative.        Past Medical History:   Diagnosis Date   • Abnormal weight gain    • Asthenia    • Breast screening    • Carpal tunnel  "syndrome    • Chronic kidney disease     Stage 3   • COPD (chronic obstructive pulmonary disease)    • Coronary arteriosclerosis     2007: PCI RCA 2011: PCI: LCX      • Coronary atherosclerosis of native coronary artery    • DJD (degenerative joint disease)     involving multiple joints   • Edema of lower extremity    • Encounter for screening for osteoporosis    • Encounter for screening mammogram for malignant neoplasm of breast    • Essential hypertension    • Foot pain    • Generalized anxiety disorder    • Gout    • History of tobacco use    • Hyperlipidemia    • Light tobacco smoker    • Obesity    • Peripheral edema    • Pulmonary hypertension    • PVD (peripheral vascular disease)     LATOYA R .96 L 1.0 aortobifem 1/2012      • Strain of neck muscle    • Type 2 diabetes mellitus     diet controlled       Family History   Problem Relation Age of Onset   • Cancer Other    • Diabetes Other    • Heart disease Other    • Hypertension Other    • Lung disease Other    • Stroke Other        Lisinopril; Nsaids; Tramadol; Penicillins; and Wellbutrin [bupropion]     reports that she has been smoking.  She has been smoking about 0.25 packs per day. She has never used smokeless tobacco. She reports that she does not drink alcohol or use illicit drugs.    Objective     Vital Signs  /58   Heart Rate 66   Resp    Temp    SpO2 93 %   Weight 264 lb (120 kg)   Height 62.5\" (158.8 cm)   BMI (Calculated) 47.5   BSA (Calculated - sq m) 2.16 sq meters   Pain Score Zero         Physical Exam   Constitutional: She is oriented to person, place, and time. She appears well-developed and well-nourished.   HENT:   Head: Normocephalic and atraumatic.   Eyes: Conjunctivae and EOM are normal. Pupils are equal, round, and reactive to light.   Neck: Neck supple. No JVD present. Carotid bruit is not present. No tracheal deviation and no edema present.   Cardiovascular: Normal rate, regular rhythm, S1 normal, S2 normal, normal heart sounds " and intact distal pulses.  Exam reveals no gallop, no S3, no S4 and no friction rub.    No murmur heard.  Pulmonary/Chest: Effort normal and breath sounds normal. She has no wheezes. She has no rales. She exhibits no tenderness.   Abdominal: Bowel sounds are normal. She exhibits no abdominal bruit and no pulsatile midline mass. There is no rebound and no guarding.   Musculoskeletal: Normal range of motion. She exhibits no edema.   Neurological: She is alert and oriented to person, place, and time.   Skin: Skin is warm and dry.   Psychiatric: She has a normal mood and affect.       Procedures    Assessment/Plan     Patient Active Problem List   Diagnosis   • Venous insufficiency   • Pulmonary hypertension severe   • Obesity   • Hyperlipidemia   • Gout   • Generalized anxiety disorder   • Essential hypertension   • Edema of lower extremity   • DJD (degenerative joint disease)   • Coronary atherosclerosis of native coronary artery   • Coronary arteriosclerosis   • Chronic kidney disease   • Psoriasis   • Moderate episode of recurrent major depressive disorder   • Acute suppurative otitis media of left ear without spontaneous rupture of tympanic membrane   • Seasonal allergic rhinitis due to pollen   • Type 2 diabetes mellitus with stage 3 chronic kidney disease, without long-term current use of insulin     1. Pulmonary hypertension severe  WHO Group I     · Right heart catheterization;  Pulmonary artery pressure: 91/35 mmHg mean 56 mmHg.  Pulmonary capillary wedge pressure at end expiration: 25/23 mmHg mean: 21 mmHg  RV: 102/7 mmHg, mean of 16 mmHg.  Right atrium: 21/18 , 16 mmHg  Cardiac output by thermodilution method: 4.47 L/m  Transpulmonary gradient: 35  Pulmonary vascular resistance: 7.82 with significant  Saturation: On room air  Right atrium 49%,   Right ventricle 47%  Pulmonary artery 52%    · Left heart catheterization:  /6 mmHg, LVEDP: 15 mmHg  No gradient across the aortic valve    · 6 MWT: 164.7 m  over 3 minutes and 58 seconds  · VQ scan:  No evidence to suggest the presence of acute pulmonary         embolism  Plan:  · We will start the patient on Macitentan (Opsumit) 10 mg by mouth daily  · We will also start patient on sildenafil 20 mg 3 times a day  · I have discontinued the patient's nitrates such as Imdur and nitroglycerin    2. Essential hypertension  Patient's systolic blood pressure is well-controlled.  We will continue with risk factor modification.  Continue with low sodium diet    3. Pure hypercholesterolemia  Component      Latest Ref Rng & Units 9/23/2016 2/15/2017            2:45 PM   Total Cholesterol      0 - 199 mg/dL 122 131   Triglycerides      20 - 199 mg/dL 96 81   HDL Cholesterol      60 - 200 mg/dL  43 (L)   LDL Cholesterol      1 - 129 mg/dL  58   LDL/HDL Ratio      0.00 - 3.22  1.67     Patient currently is on atorvastatin which is tolerating very well.  Continue with diet modification.    4. Atherosclerosis of native coronary artery of native heart without angina pectoris  December 4, 2004: 2.5 x 23 mm Pixel stent  to RCA.  · Continue with risk factor modification.  · Continue with guideline direct medical therapy.    I discussed the patients findings and my recommendations with patient.          This document has been electronically signed by Ignacio Connelly MD on June 19, 2017 3:41 PM     Ignacio Connelly MD  06/19/17  3:30 PM

## 2017-06-26 RX ORDER — GUAIFENESIN 1200 MG/1
TABLET, EXTENDED RELEASE ORAL
Qty: 14 EACH | Refills: 1 | Status: SHIPPED | OUTPATIENT
Start: 2017-06-26 | End: 2017-07-07 | Stop reason: SDUPTHER

## 2017-06-27 ENCOUNTER — TELEPHONE (OUTPATIENT)
Dept: CARDIOLOGY | Facility: CLINIC | Age: 62
End: 2017-06-27

## 2017-06-27 NOTE — TELEPHONE ENCOUNTER
MsZahida Jamir was notified that her PA was approved for the Revatio. She states the other medication came in the mail. She took them both starting today.

## 2017-06-29 ENCOUNTER — TELEPHONE (OUTPATIENT)
Dept: FAMILY MEDICINE CLINIC | Facility: CLINIC | Age: 62
End: 2017-06-29

## 2017-06-29 RX ORDER — FUROSEMIDE 80 MG
80 TABLET ORAL DAILY
Qty: 30 TABLET | Refills: 5 | Status: SHIPPED | OUTPATIENT
Start: 2017-06-29 | End: 2017-01-01 | Stop reason: SDUPTHER

## 2017-06-29 RX ORDER — FUROSEMIDE 80 MG
TABLET ORAL
Qty: 30 TABLET | Refills: 5 | Status: CANCELLED | OUTPATIENT
Start: 2017-06-29

## 2017-07-07 RX ORDER — GUAIFENESIN 1200 MG/1
TABLET, EXTENDED RELEASE ORAL
Qty: 14 EACH | Refills: 1 | Status: SHIPPED | OUTPATIENT
Start: 2017-07-07 | End: 2017-07-20 | Stop reason: SDUPTHER

## 2017-07-11 ENCOUNTER — TELEPHONE (OUTPATIENT)
Dept: CARDIOLOGY | Facility: CLINIC | Age: 62
End: 2017-07-11

## 2017-07-11 NOTE — TELEPHONE ENCOUNTER
Pt was informed that [facility] denied her Opsumit. I filed an appeal on behalf of Dr. Connelly for her and requested an answer within 72 hours.

## 2017-07-20 ENCOUNTER — TELEPHONE (OUTPATIENT)
Dept: FAMILY MEDICINE CLINIC | Facility: CLINIC | Age: 62
End: 2017-07-20

## 2017-07-20 RX ORDER — GUAIFENESIN 1200 MG/1
TABLET, EXTENDED RELEASE ORAL
Qty: 14 EACH | Refills: 1 | Status: SHIPPED | OUTPATIENT
Start: 2017-07-20 | End: 2017-08-07 | Stop reason: SDUPTHER

## 2017-07-20 RX ORDER — GUAIFENESIN 1200 MG/1
TABLET, EXTENDED RELEASE ORAL
Refills: 1 | Status: CANCELLED | OUTPATIENT
Start: 2017-07-20

## 2017-07-20 NOTE — TELEPHONE ENCOUNTER
MUCINEX MAXIMUM STRENGTH 1200 MG tablet sustained-release 12 hour  NEEDS REFILL SENT TO [facility]. SHE SAID SHE WILL BE TAKING HER LAST ONE ON Monday. PLEASE CALL HER [phone]

## 2017-07-27 DIAGNOSIS — I27.20 PULMONARY HTN (HCC): Primary | ICD-10-CM

## 2017-07-31 ENCOUNTER — OFFICE VISIT (OUTPATIENT)
Dept: CARDIOLOGY | Facility: CLINIC | Age: 62
End: 2017-07-31

## 2017-07-31 VITALS
OXYGEN SATURATION: 92 % | WEIGHT: 258 LBS | BODY MASS INDEX: 45.71 KG/M2 | DIASTOLIC BLOOD PRESSURE: 68 MMHG | SYSTOLIC BLOOD PRESSURE: 120 MMHG | HEIGHT: 63 IN | HEART RATE: 64 BPM

## 2017-07-31 DIAGNOSIS — I27.20 PULMONARY HYPERTENSION (HCC): Primary | ICD-10-CM

## 2017-07-31 DIAGNOSIS — I10 ESSENTIAL HYPERTENSION: ICD-10-CM

## 2017-07-31 DIAGNOSIS — I25.10 ATHEROSCLEROSIS OF NATIVE CORONARY ARTERY OF NATIVE HEART WITHOUT ANGINA PECTORIS: ICD-10-CM

## 2017-07-31 DIAGNOSIS — E78.00 PURE HYPERCHOLESTEROLEMIA: ICD-10-CM

## 2017-07-31 PROCEDURE — 99214 OFFICE O/P EST MOD 30 MIN: CPT | Performed by: INTERNAL MEDICINE

## 2017-07-31 RX ORDER — MACITENTAN 10 MG/1
1 TABLET, FILM COATED ORAL DAILY
COMMUNITY
Start: 2017-07-19 | End: 2017-01-01

## 2017-07-31 NOTE — PROGRESS NOTES
Chief complaint : Shortness of breath    History of Present Illness very pleasant 61-year-old female who comes today for a follow-up visit.  Patient was recently started on treatment for her pulmonary hypertension which includes sildenafil 20 mg 3 times a day along with Opsumit .  Patient stated that she is feeling much better and her dyspnea has improved.  Patient states that she is able to accomplish a lot at home without being short of breath.  She has no chest pain or chest discomfort.         Review of Systems   Cardiovascular: Positive for dyspnea on exertion.   Respiratory: Positive for shortness of breath.        Past Medical History:   Diagnosis Date   • Abnormal weight gain    • Asthenia    • Breast screening    • Carpal tunnel syndrome    • Chronic kidney disease     Stage 3   • COPD (chronic obstructive pulmonary disease)    • Coronary arteriosclerosis     2007: PCI RCA 2011: PCI: LCX      • Coronary atherosclerosis of native coronary artery    • DJD (degenerative joint disease)     involving multiple joints   • Edema of lower extremity    • Encounter for screening for osteoporosis    • Encounter for screening mammogram for malignant neoplasm of breast    • Essential hypertension    • Foot pain    • Generalized anxiety disorder    • Gout    • History of tobacco use    • Hyperlipidemia    • Light tobacco smoker    • Obesity    • Peripheral edema    • Pulmonary hypertension    • PVD (peripheral vascular disease)     LATOYA R .96 L 1.0 aortobifem 1/2012      • Strain of neck muscle    • Type 2 diabetes mellitus     diet controlled       Family History   Problem Relation Age of Onset   • Cancer Other    • Diabetes Other    • Heart disease Other    • Hypertension Other    • Lung disease Other    • Stroke Other        Lisinopril; Nsaids; Tramadol; Penicillins; and Wellbutrin [bupropion]     reports that she has been smoking.  She has been smoking about 0.25 packs per day. She has never used smokeless tobacco. She  reports that she does not drink alcohol or use illicit drugs.    Objective     Vital Signs  Heart Rate:  [64] 64  BP: (120)/(68) 120/68    Physical Exam   Constitutional: She is oriented to person, place, and time. She appears well-developed and well-nourished.   HENT:   Head: Normocephalic and atraumatic.   Eyes: Conjunctivae and EOM are normal. Pupils are equal, round, and reactive to light.   Neck: Neck supple. No JVD present. Carotid bruit is not present. No tracheal deviation and no edema present.   Cardiovascular: Normal rate, regular rhythm, S1 normal, S2 normal, normal heart sounds and intact distal pulses.  Exam reveals no gallop, no S3, no S4 and no friction rub.    No murmur heard.  Pulmonary/Chest: Effort normal and breath sounds normal. She has no wheezes. She has no rales. She exhibits no tenderness.   Abdominal: Bowel sounds are normal. She exhibits no abdominal bruit and no pulsatile midline mass. There is no rebound and no guarding.   Musculoskeletal: Normal range of motion. She exhibits no edema.   Neurological: She is alert and oriented to person, place, and time.   Skin: Skin is warm and dry.   Psychiatric: She has a normal mood and affect.       Procedures    Assessment/Plan     Patient Active Problem List   Diagnosis   • Venous insufficiency   • Pulmonary hypertension severe   • Obesity   • Hyperlipidemia   • Gout   • Generalized anxiety disorder   • Essential hypertension   • Edema of lower extremity   • DJD (degenerative joint disease)   • Coronary atherosclerosis of native coronary artery   • Coronary arteriosclerosis   • Chronic kidney disease   • Psoriasis   • Moderate episode of recurrent major depressive disorder   • Acute suppurative otitis media of left ear without spontaneous rupture of tympanic membrane   • Seasonal allergic rhinitis due to pollen   • Type 2 diabetes mellitus with stage 3 chronic kidney disease, without long-term current use of insulin     1. Pulmonary hypertension  severe  6 minutes walk test:  June 16, 2017 patient was able to walk 164.7 m over 3 minutes and 58 seconds  July 31, 2017 atrial fibrillation and was able to walk for 198.8 m over 3 minutes and 17 seconds.  Reason for stopping was shortness of breath and fatigue    Plan:  · We'll continue with current management  · We'll obtain blood work to monitor her liver function and CBC.  · We'll obtain a 2-D echocardiogram in approximately 3 months    2. Pure hypercholesterolemia  Component      Latest Ref Rng & Units 9/23/2016 2/15/2017            2:45 PM   Total Cholesterol      0 - 199 mg/dL 122 131   Triglycerides      20 - 199 mg/dL 96 81   HDL Cholesterol      60 - 200 mg/dL  43 (L)   LDL Cholesterol      1 - 129 mg/dL  58   LDL/HDL Ratio      0.00 - 3.22  1.67     We will continue with current dose of atorvastatin.  We'll continue to monitor liver function and lipid panel  3. Essential hypertension  Patient's systemic pressure is within normal limits.  Continue with risk factor modification.  We'll continue with current medications.    4. Atherosclerosis of native coronary artery of native heart without angina pectoris  December 4, 2004 patient had ST segment elevation myocardial infarction and stent was placed to the right coronary artery.  Plan is to continue with risk factor modification and guideline direct medical therapy.  5.  Peripheral arterial disease  Continue with risk factor modification  I discussed the patients findings and my recommendations with patient.    Ignacio Connelly MD  07/31/17  4:36 PM

## 2017-08-07 ENCOUNTER — TELEPHONE (OUTPATIENT)
Dept: FAMILY MEDICINE CLINIC | Facility: CLINIC | Age: 62
End: 2017-08-07

## 2017-08-07 RX ORDER — GUAIFENESIN 1200 MG/1
TABLET, EXTENDED RELEASE ORAL
Qty: 14 EACH | Refills: 1 | Status: SHIPPED | OUTPATIENT
Start: 2017-08-07 | End: 2017-08-07 | Stop reason: SDUPTHER

## 2017-08-07 RX ORDER — GUAIFENESIN 1200 MG/1
TABLET, EXTENDED RELEASE ORAL
Qty: 60 EACH | Refills: 1 | Status: SHIPPED | OUTPATIENT
Start: 2017-08-07 | End: 2017-01-01 | Stop reason: SDUPTHER

## 2017-08-07 NOTE — TELEPHONE ENCOUNTER
Dr. Corley Approved the 1 month script with refills, New Script sent to [facility].     Patient has been called and advised.          Dr. Corley,    I have been sending refills on her Mucinex Maximum Strength 1200 mg 12 hour tablet.  Take 1 tablet by mouth BID PRN.  She is asking for a script for 60 tablets, a 1 month supply.  Today the pharmacy requested 14 tablets which is a week with 1 refill.  She has an appt on 8/15/17 for a 3 month ramandeep & refill.   Do you want to discuss the #60 tablet refill script at that time??      Please advise.          ----- Message from Viry Walker sent at 8/7/2017 11:02 AM CDT -----  Contact: PT CALLED  Pt called and needs refill of Mucinex 1 mo supply sent to [facility]. She wanted to see if he would do the 1 mo supply instead of 2 wk supply.

## 2017-08-10 RX ORDER — IPRATROPIUM BROMIDE AND ALBUTEROL SULFATE 2.5; .5 MG/3ML; MG/3ML
SOLUTION RESPIRATORY (INHALATION)
Qty: 360 ML | Refills: 11 | Status: SHIPPED | OUTPATIENT
Start: 2017-08-10 | End: 2018-01-01 | Stop reason: ALTCHOICE

## 2017-08-15 ENCOUNTER — APPOINTMENT (OUTPATIENT)
Dept: LAB | Facility: HOSPITAL | Age: 62
End: 2017-08-15

## 2017-08-15 ENCOUNTER — OFFICE VISIT (OUTPATIENT)
Dept: FAMILY MEDICINE CLINIC | Facility: CLINIC | Age: 62
End: 2017-08-15

## 2017-08-15 VITALS
HEART RATE: 88 BPM | OXYGEN SATURATION: 92 % | WEIGHT: 254 LBS | BODY MASS INDEX: 45 KG/M2 | DIASTOLIC BLOOD PRESSURE: 74 MMHG | HEIGHT: 63 IN | SYSTOLIC BLOOD PRESSURE: 130 MMHG

## 2017-08-15 DIAGNOSIS — E11.22 TYPE 2 DIABETES MELLITUS WITH STAGE 3 CHRONIC KIDNEY DISEASE, WITHOUT LONG-TERM CURRENT USE OF INSULIN (HCC): ICD-10-CM

## 2017-08-15 DIAGNOSIS — E78.00 PURE HYPERCHOLESTEROLEMIA: Primary | ICD-10-CM

## 2017-08-15 DIAGNOSIS — N18.30 TYPE 2 DIABETES MELLITUS WITH STAGE 3 CHRONIC KIDNEY DISEASE, WITHOUT LONG-TERM CURRENT USE OF INSULIN (HCC): ICD-10-CM

## 2017-08-15 DIAGNOSIS — I10 ESSENTIAL HYPERTENSION: ICD-10-CM

## 2017-08-15 DIAGNOSIS — I25.10 ATHEROSCLEROSIS OF NATIVE CORONARY ARTERY OF NATIVE HEART WITHOUT ANGINA PECTORIS: ICD-10-CM

## 2017-08-15 LAB
ALBUMIN SERPL-MCNC: 4.2 G/DL (ref 3.4–4.8)
ALBUMIN UR-MCNC: 0.6 MG/L
ALBUMIN/GLOB SERPL: 1.3 G/DL (ref 1.1–1.8)
ALP SERPL-CCNC: 120 U/L (ref 38–126)
ALT SERPL W P-5'-P-CCNC: 33 U/L (ref 9–52)
ANION GAP SERPL CALCULATED.3IONS-SCNC: 10 MMOL/L (ref 5–15)
ARTICHOKE IGE QN: 63 MG/DL (ref 1–129)
AST SERPL-CCNC: 69 U/L (ref 14–36)
BILIRUB SERPL-MCNC: 0.8 MG/DL (ref 0.2–1.3)
BUN BLD-MCNC: 33 MG/DL (ref 7–21)
BUN/CREAT SERPL: 23.4 (ref 7–25)
CALCIUM SPEC-SCNC: 8.9 MG/DL (ref 8.4–10.2)
CHLORIDE SERPL-SCNC: 100 MMOL/L (ref 95–110)
CHOLEST SERPL-MCNC: 126 MG/DL (ref 0–199)
CO2 SERPL-SCNC: 29 MMOL/L (ref 22–31)
CREAT BLD-MCNC: 1.41 MG/DL (ref 0.5–1)
CREAT UR-MCNC: 23.5 MG/DL
GFR SERPL CREATININE-BSD FRML MDRD: 38 ML/MIN/1.73 (ref 45–104)
GLOBULIN UR ELPH-MCNC: 3.2 GM/DL (ref 2.3–3.5)
GLUCOSE BLD-MCNC: 94 MG/DL (ref 60–100)
HBA1C MFR BLD: 6.3 % (ref 4–5.6)
HDLC SERPL-MCNC: 44 MG/DL (ref 60–200)
LDLC/HDLC SERPL: 1.4 {RATIO} (ref 0–3.22)
MICROALBUMIN/CREAT UR: 25.5 MG/G (ref 0–30)
POTASSIUM BLD-SCNC: 3.8 MMOL/L (ref 3.5–5.1)
PROT SERPL-MCNC: 7.4 G/DL (ref 6.3–8.6)
SODIUM BLD-SCNC: 139 MMOL/L (ref 137–145)
TRIGL SERPL-MCNC: 103 MG/DL (ref 20–199)

## 2017-08-15 PROCEDURE — 82570 ASSAY OF URINE CREATININE: CPT | Performed by: FAMILY MEDICINE

## 2017-08-15 PROCEDURE — 80061 LIPID PANEL: CPT | Performed by: FAMILY MEDICINE

## 2017-08-15 PROCEDURE — 82043 UR ALBUMIN QUANTITATIVE: CPT | Performed by: FAMILY MEDICINE

## 2017-08-15 PROCEDURE — 83036 HEMOGLOBIN GLYCOSYLATED A1C: CPT | Performed by: FAMILY MEDICINE

## 2017-08-15 PROCEDURE — 80053 COMPREHEN METABOLIC PANEL: CPT | Performed by: FAMILY MEDICINE

## 2017-08-15 PROCEDURE — 99214 OFFICE O/P EST MOD 30 MIN: CPT | Performed by: FAMILY MEDICINE

## 2017-08-15 PROCEDURE — 36415 COLL VENOUS BLD VENIPUNCTURE: CPT | Performed by: FAMILY MEDICINE

## 2017-08-15 RX ORDER — AZELASTINE 1 MG/ML
2 SPRAY, METERED NASAL 2 TIMES DAILY
Qty: 30 ML | Refills: 11 | Status: SHIPPED | OUTPATIENT
Start: 2017-08-15

## 2017-08-15 RX ORDER — NEBULIZER ACCESSORIES
KIT MISCELLANEOUS
Qty: 1 EACH | Refills: 11 | Status: SHIPPED | OUTPATIENT
Start: 2017-08-15

## 2017-08-15 RX ORDER — CLOPIDOGREL BISULFATE 75 MG/1
75 TABLET ORAL DAILY
Qty: 30 TABLET | Refills: 5 | Status: SHIPPED | OUTPATIENT
Start: 2017-08-15 | End: 2018-01-01 | Stop reason: SDUPTHER

## 2017-08-15 RX ORDER — SERTRALINE HYDROCHLORIDE 100 MG/1
100 TABLET, FILM COATED ORAL DAILY
Qty: 30 TABLET | Refills: 11 | Status: SHIPPED | OUTPATIENT
Start: 2017-08-15 | End: 2017-01-01

## 2017-08-15 RX ORDER — ALLOPURINOL 100 MG/1
100 TABLET ORAL DAILY
Qty: 30 TABLET | Refills: 5 | Status: SHIPPED | OUTPATIENT
Start: 2017-08-15 | End: 2018-01-01 | Stop reason: SDUPTHER

## 2017-08-15 NOTE — PROGRESS NOTES
Subjective   Cameron Bowie is a 62 y.o. female.     Hypertension   This is a chronic problem. The current episode started more than 1 year ago. The problem is unchanged. The problem is controlled. Associated symptoms include anxiety, peripheral edema and shortness of breath. Pertinent negatives include no chest pain, orthopnea, palpitations or PND.   Diabetes   She presents for her follow-up diabetic visit. She has type 2 diabetes mellitus. Hypoglycemia symptoms include nervousness/anxiousness. Associated symptoms include fatigue. Pertinent negatives for diabetes include no chest pain, no foot paresthesias, no foot ulcerations, no polydipsia, no polyphagia and no polyuria. Symptoms are stable. Her breakfast blood glucose is taken between 6-7 am. Her breakfast blood glucose range is generally 110-130 mg/dl. Her lunch blood glucose is taken between 11-12 pm. Her lunch blood glucose range is generally  mg/dl. Her highest blood glucose is 140-180 mg/dl. (Usually checks at least BID, recently sick  ) An ACE inhibitor/angiotensin II receptor blocker is contraindicated. Eye exam is not current (been more than a year, patient says she would schedule).   COPD   This is a chronic problem. The current episode started more than 1 year ago. The problem occurs intermittently. The problem has been unchanged. Associated symptoms include congestion, coughing and fatigue. Pertinent negatives include no chest pain, chills, diaphoresis, fever, nausea, numbness or vomiting.   Coronary Artery Disease   Presents for follow-up visit. Symptoms include leg swelling and shortness of breath. Pertinent negatives include no chest pain, chest pressure, chest tightness or palpitations. The symptoms have been stable.   Anxiety   Presents for follow-up visit. Symptoms include depressed mood, excessive worry, insomnia, irritability, nervous/anxious behavior and shortness of breath. Patient reports no chest pain, decreased concentration,  nausea, palpitations, panic, restlessness or suicidal ideas. Symptoms occur occasionally. The quality of sleep is good.     Her past medical history is significant for CAD.        The following portions of the patient's history were reviewed and updated as appropriate: allergies, current medications, past family history, past medical history, past social history, past surgical history and problem list.    Review of Systems   Constitutional: Positive for fatigue and irritability. Negative for activity change, appetite change, chills, diaphoresis and fever.   HENT: Positive for congestion, postnasal drip, rhinorrhea, sinus pressure and sneezing. Negative for ear discharge, ear pain, facial swelling, hearing loss and mouth sores.    Respiratory: Positive for cough and shortness of breath. Negative for chest tightness.    Cardiovascular: Positive for leg swelling. Negative for chest pain, palpitations, orthopnea and PND.   Gastrointestinal: Negative for nausea and vomiting.   Endocrine: Negative for polydipsia, polyphagia and polyuria.   Neurological: Negative for numbness.   Psychiatric/Behavioral: Negative for decreased concentration and suicidal ideas. The patient is nervous/anxious and has insomnia.        Objective   Physical Exam   Constitutional: She is oriented to person, place, and time. She appears well-developed and well-nourished. No distress.   HENT:   Right Ear: Hearing and ear canal normal. No drainage, swelling or tenderness. Tympanic membrane is retracted.   Left Ear: Hearing and ear canal normal. No drainage, swelling or tenderness. Tympanic membrane is retracted.   Cardiovascular: Normal rate, regular rhythm and intact distal pulses.  Exam reveals distant heart sounds. Exam reveals no gallop, no S4 and no friction rub.    No murmur heard.  Pulmonary/Chest: She has decreased breath sounds. She has no wheezes. She has no rhonchi. She has no rales.   Musculoskeletal: She exhibits edema.    Cameron had a  diabetic foot exam performed (callus left heel) today.   During the foot exam she had a monofilament test performed (normal).    Vascular Status -  Her exam exhibits right foot vasculature normal and right foot edema. Her exam exhibits left foot vasculature normal and left foot edema.  Neurological: She is alert and oriented to person, place, and time.   Skin: Skin is warm and dry. Lesion noted. She is not diaphoretic.        Psychiatric: She has a normal mood and affect. Her behavior is normal. Judgment and thought content normal.   Nursing note and vitals reviewed.      Assessment/Plan   Problems Addressed this Visit        Cardiovascular and Mediastinum    Hyperlipidemia - Primary    Relevant Orders    Lipid Panel    Essential hypertension    Relevant Orders    Comprehensive Metabolic Panel    Coronary atherosclerosis of native coronary artery    Relevant Medications    clopidogrel (PLAVIX) 75 MG tablet       Endocrine    Type 2 diabetes mellitus with stage 3 chronic kidney disease, without long-term current use of insulin    Relevant Orders    Hemoglobin A1c    Microalbumin / Creatinine Urine Ratio

## 2017-09-06 PROBLEM — R21 RASH, SKIN: Status: ACTIVE | Noted: 2017-01-01

## 2017-09-06 NOTE — PROGRESS NOTES
"  Chief Complaint   Patient presents with   • Rash     left arm and back     Subjective   Cameron Bowie is a 62 y.o. female.     Rash   This is a recurrent problem. The current episode started in the past 7 days. The problem has been gradually worsening since onset. The rash is diffuse. The rash is characterized by redness, scaling and itchiness. She was exposed to nothing. Associated symptoms include fatigue and joint pain. Pertinent negatives include no anorexia, congestion, cough, diarrhea, eye pain, facial edema, fever, nail changes, rhinorrhea, shortness of breath, sore throat or vomiting. Past treatments include antibiotic cream, antihistamine and anti-itch cream. The treatment provided no relief. There is no history of allergies, asthma, eczema or varicella.        The following portions of the patient's history were reviewed and updated as appropriate: allergies, current medications, past social history and problem list.    Review of Systems   Constitutional: Positive for fatigue. Negative for fever.   HENT: Negative for congestion, rhinorrhea and sore throat.    Eyes: Negative.  Negative for pain.   Respiratory: Negative.  Negative for cough and shortness of breath.    Cardiovascular: Negative.    Gastrointestinal: Negative for anorexia, diarrhea and vomiting.   Endocrine: Negative.    Genitourinary: Negative.    Musculoskeletal: Positive for joint pain.   Skin: Positive for color change and rash. Negative for nail changes.        Diffuse rash to back and right arm no changes in meds or environment    Allergic/Immunologic: Negative.    Neurological: Negative.    Hematological: Negative.    Psychiatric/Behavioral: Negative.    All other systems reviewed and are negative.      Objective   /68  Ht 62.5\" (158.8 cm)  Wt 253 lb (115 kg)  BMI 45.54 kg/m2  Physical Exam   Constitutional: She appears well-developed and well-nourished.   HENT:   Head: Atraumatic.   Eyes: EOM are normal. Pupils are " equal, round, and reactive to light.   Pulmonary/Chest: No respiratory distress. She has no wheezes. She has no rales. She exhibits no tenderness.   Skin: Rash noted. No erythema. No pallor.   Diffuse rash to back red and irritated-irritated    Nursing note and vitals reviewed.      Assessment/Plan   Problem List Items Addressed This Visit        Musculoskeletal and Integument    Rash, skin - Primary    Relevant Medications    hydrocortisone (WESTCORT) 0.2 % cream    triamcinolone acetonide (KENALOG-40) injection 60 mg (Completed) (Start on 9/6/2017  3:45 PM)           New Medications Ordered This Visit   Medications   • hydrocortisone (WESTCORT) 0.2 % cream     Sig: Apply  topically 2 (Two) Times a Day.     Dispense:  60 g     Refill:  2   • triamcinolone acetonide (KENALOG-40) injection 60 mg      ice to area meds as directed

## 2017-09-11 PROBLEM — R05.9 COUGH: Status: ACTIVE | Noted: 2017-01-01

## 2017-09-11 PROBLEM — J20.9 ACUTE BRONCHITIS: Status: ACTIVE | Noted: 2017-01-01

## 2017-09-11 NOTE — PROGRESS NOTES
Chief Complaint   Patient presents with   • Cough   • URI     Subjective   Cameron Bowie is a 62 y.o. female.     Cough   This is a recurrent problem. The current episode started 1 to 4 weeks ago. The problem has been gradually worsening. The problem occurs constantly. The cough is productive of sputum. Associated symptoms include nasal congestion, postnasal drip, rhinorrhea, shortness of breath and wheezing. Pertinent negatives include no chest pain, chills, ear congestion, ear pain, fever, headaches, hemoptysis, myalgias, rash, sore throat, sweats or weight loss. She has tried cool air for the symptoms. The treatment provided mild relief. Her past medical history is significant for asthma, bronchitis and pneumonia. There is no history of bronchiectasis, COPD, emphysema or environmental allergies.   URI    Associated symptoms include coughing, rhinorrhea and wheezing. Pertinent negatives include no chest pain, ear pain, headaches, rash or sore throat.        The following portions of the patient's history were reviewed and updated as appropriate: allergies, current medications, past social history and problem list.    Review of Systems   Constitutional: Negative.  Negative for chills, fever and weight loss.   HENT: Positive for postnasal drip and rhinorrhea. Negative for ear pain, hearing loss, mouth sores and sore throat.    Eyes: Negative.    Respiratory: Positive for cough, shortness of breath and wheezing. Negative for apnea, hemoptysis, choking, chest tightness and stridor.    Cardiovascular: Negative.  Negative for chest pain.   Gastrointestinal: Negative.    Endocrine: Negative.    Genitourinary: Negative.    Musculoskeletal: Negative.  Negative for myalgias.   Skin: Negative.  Negative for rash.   Allergic/Immunologic: Negative.  Negative for environmental allergies.   Neurological: Negative.  Negative for headaches.   Hematological: Negative.    Psychiatric/Behavioral: Negative.        Objective  "  /80  Temp 97.5 °F (36.4 °C) (Tympanic)   Ht 62.5\" (158.8 cm)  Wt 256 lb (116 kg)  SpO2 91%  BMI 46.08 kg/m2  Physical Exam   Constitutional: She is oriented to person, place, and time. She appears well-developed and well-nourished.   HENT:   Head: Atraumatic.   Eyes: EOM are normal. Pupils are equal, round, and reactive to light.   Neck: Normal range of motion.   Pulmonary/Chest: Effort normal. No respiratory distress. She has wheezes. She has no rales. She exhibits no tenderness.   Abdominal: Soft.   Neurological: She is alert and oriented to person, place, and time. She displays normal reflexes. No cranial nerve deficit. She exhibits normal muscle tone. Coordination normal.   Skin: Skin is warm and dry. No rash noted. No erythema. No pallor.   Nursing note and vitals reviewed.      Assessment/Plan   Problem List Items Addressed This Visit        Respiratory    Acute bronchitis - Primary    Relevant Orders    XR Chest 2 View    Cough           New Medications Ordered This Visit   Medications   • levoFLOXacin (LEVAQUIN) 750 MG tablet     Sig: Take 1 tablet by mouth Daily.     Dispense:  10 tablet     Refill:  0   • fluconazole (DIFLUCAN) 150 MG tablet     Sig: Take 1 tablet by mouth 1 (One) Time for 1 dose.     Dispense:  2 tablet     Refill:  0   use inhaler as directed      "

## 2017-10-06 NOTE — TELEPHONE ENCOUNTER
----- Message from Roby Corely MD sent at 10/3/2017  9:53 AM CDT -----  Okay, medium compression knee-high stockings  ----- Message -----     From: Wilber Bermudez MA     Sent: 10/3/2017   7:48 AM       To: Roby Corley MD    Need a written Rx for compression stockings please.  Thank you    ----- Message -----     From: Doris Olguin     Sent: 10/2/2017   3:19 PM       To: Wilber Bermudez MA    Pt would like more compression stockings called into [facility] pharmacy.

## 2017-10-17 NOTE — TELEPHONE ENCOUNTER
Requested Refills Sent    ----- Message from Viry Walker sent at 10/17/2017  3:43 PM CDT -----  Contact: PT CALLED  Pt called and needs refill of Mucinex sent to [facility].

## 2017-10-31 PROBLEM — I36.1 NON-RHEUMATIC TRICUSPID VALVE INSUFFICIENCY: Status: ACTIVE | Noted: 2017-01-01

## 2017-11-07 NOTE — PROGRESS NOTES
Cardiovascular Medicine   David Snowden M.D., Ph.D., [facility]      The patient returns to cardiology clinic for follow-up of the following cardiac problems:    PROBLEM LIST:    1. HFpEF-PVH  Right heart catheterization by Dr. Connelly  Pulmonary artery pressure:  91/35 mmHg mean 56 mmHg.  Pulmonary capillary wedge pressure at end expiration: 25/23 mmHg mean: 21 mmHg  RV: 102/7 mmHg,  mean of 16 mmHg.  Right atrium: 21/18 , 16 mmHg  Cardiac output by thermodilution method: 4.47 L/m  Transpulmonary gradient: 35  Pulmonary vascular resistance: 7.82 with significant  Saturation: On room air  Right atrium 49%,   Right ventricle 47%  Pulmonary artery 52%  2. ASCAD  A. December 4, 2004 STEMI  A. PCI RCA  3. Smoker  4. Moderate TR  5. HFpEF         Pulmonary hypertension/HFpEF  This is a former patient of Dr. Connelly.  She was having significant dyspnea and was referred for an echocardiogram.  Apparently, her PA systolic pressure was markedly elevated with evidence of likely cor pulmonale.  Dr. Connelly performed a LHC and RHC.  He interpreted the information has group 1 PAH.  VQ scan showed no evidence of CTEPH.  She has not seen sleep medicine, nor has she had PFTs.  Of note, on her RHC her wedge was elevated at 21.  Her mean PA pressure was 56. DPG was 14.  She apparently was diagnosed with Group 1 PAH, despite her clearly elevated LVEDP.  She apparently was started on Opsumit and Revatio. She believes her breathing hs improved. She has had chronic LE edema.     VHD  Her last TTE showed moderate to severe TR.     ASCAD  She had a STEMI in 2004 and is s/p a PCI to RCA. She is now on ASA, Lipitor, Plavix. She has had bruising, but no bleeding. She has had no recent exertional,     Review of Systems - History obtained from chart review and the patient  General ROS: negative  Respiratory ROS: Dyspnea  Cardiovascular ROS: no chest pain or dyspnea on exertion.  All other systems were reviewed and were negative.  family history  includes Cancer in her other; Diabetes in her other; Heart disease in her other; Hypertension in her other; Lung disease in her other; Stroke in her other.   reports that she has been smoking.  She has been smoking about 0.25 packs per day. She has never used smokeless tobacco. She reports that she does not drink alcohol or use illicit drugs.  Allergies   Allergen Reactions   • Lisinopril Shortness Of Breath and Cough   • Nsaids      Due to decreased kidney function    • Tramadol      unknown   • Penicillins Rash   • Wellbutrin [Bupropion] Palpitations       Current Outpatient Prescriptions:   •  allopurinol (ZYLOPRIM) 100 MG tablet, Take 1 tablet by mouth Daily., Disp: 30 tablet, Rfl: 5  •  aspirin 325 MG tablet, Take 325 mg by mouth daily., Disp: , Rfl:   •  atorvastatin (LIPITOR) 80 MG tablet, Take 80 mg by mouth Every Night., Disp: , Rfl:   •  azelastine (ASTELIN) 0.1 % nasal spray, 2 sprays into each nostril 2 (Two) Times a Day. Use in each nostril as directed, Disp: 30 mL, Rfl: 11  •  Calcium-Magnesium-Vitamin D (CALCIUM 500 PO), Take 500 mg by mouth 2 (two) times a day., Disp: , Rfl:   •  clopidogrel (PLAVIX) 75 MG tablet, Take 1 tablet by mouth Daily., Disp: 30 tablet, Rfl: 5  •  furosemide (LASIX) 80 MG tablet, Take 1 tablet by mouth Daily., Disp: 30 tablet, Rfl: 5  •  glucose blood test strip, Use as instructed, Disp: 50 each, Rfl: 12  •  GuaiFENesin ER (MUCINEX MAXIMUM STRENGTH) 1200 MG tablet sustained-release 12 hour, Take 1 tablet BID as needed., Disp: 60 each, Rfl: 1  •  hydrocortisone (WESTCORT) 0.2 % cream, Apply  topically 2 (Two) Times a Day., Disp: 60 g, Rfl: 2  •  ipratropium-albuterol (DUO-NEB) 0.5-2.5 mg/mL nebulizer, USE CONTENTS OF 1 VIAL PER NEBULIZER FOUR TIMES DAILY, Disp: 360 mL, Rfl: 11  •  OPSUMIT 10 MG tablet, Take 1 tablet by mouth Daily., Disp: , Rfl:   •  Respiratory Therapy Supplies (NEBULIZER/TUBING/MOUTHPIECE) kit, Tubing, Disp: 1 each, Rfl: 11  •  senna (SENOKOT) 8.6 MG tablet  tablet, Take 1 tablet by mouth 2 (two) times a day., Disp: , Rfl:   •  sertraline (ZOLOFT) 100 MG tablet, Take 1 tablet by mouth Daily., Disp: 30 tablet, Rfl: 11  •  sildenafil (REVATIO) 20 MG tablet, Take 1 tablet by mouth 3 (Three) Times a Day., Disp: 90 tablet, Rfl: 6  •  triamcinolone (KENALOG) 0.1 % ointment, Apply  topically 2 (Two) Times a Day. Max 2 weeks per treatment (Patient taking differently: Apply 1 application topically 2 (Two) Times a Day. Max 2 weeks per treatment), Disp: 30 g, Rfl: 2  •  TRUEPLUS LANCETS 33G misc, 1 each Daily., Disp: 100 each, Rfl: 3  •  VENTOLIN  (90 Base) MCG/ACT inhaler, USE 2 PUFFS FOUR TIMES DAILY AND AS NEEDED, Disp: 18 g, Rfl: 11  •  vitamin D (ERGOCALCIFEROL) 43942 UNITS capsule capsule, Take 50,000 Units by mouth Every 14 (Fourteen) Days., Disp: , Rfl:     Physical Exam:  Vitals:    11/07/17 1532   BP: 130/74   Pulse: 78   SpO2: 90%     Body mass index is 46.56 kg/(m^2).    GEN: alert, appears stated age and cooperative  Body Habitus: overweight  JVP: 6 cm of water at 45 degrees HJR: absent      Carotid:  Upstroke: easily palpated bilaterally Volume: Normal.    Carotid Bruit:  None  Subclavian Bruit: Not present.    Lymph: No overt LAD.   Back: Normal.  Chest:  Normal Excursion: Good    I:E: Good  Pulmonary:clear to auscultation, no wheezes, rales or rhonchi, symmetric air entry. Equal chest excursion. Chest physical exam is normal. No tenderness.        Precordium:  No palpable heaves or thrusts. P2 is not palpable.   [place]:  normal size and placement Palpable S4: Not present.   Heart rate: normal  Heart Rhythm: regular     Heart Sounds: S1: normal intensity  S2: increased intensity  S3: absent   S4: absent  Opening Snap: absent  A2-OS:  N/A  Pericardial rub: absent    Ejection click: None      Murmurs: Systolic: II/VI LSB  Diastolic: none  Extremity: no edema, cyanosis, peripheral pulses 2+ and symmetric        DATA REVIEWED:     [facility]  [address] [place] [id number] [phone]             Cameron Bowie   Echocardiogram   Order# [id number]   Reading physician: Ignacio Connelly MD Ordering physician: Ignacio Connelly MD Study date: 17   Patient Information   Patient Name MRN Sex  (Age)   Cameron Bowie [id number] Female 1955 (62 y.o.)   Sedation Narrator Report   Sedation Narrator Report   Interpretation Summary   · Left ventricular function is normal.  · Left ventricular diastolic dysfunction (grade I) consistent with impaired relaxation.  · Right ventricular cavity is severely dilated.  · Severely reduced right ventricular systolic function noted.  · Moderate tricuspid valve regurgitation is present.  · Estimated EF appears to be in the range of 56 - 60%.  · Systolic flattening of the interventricular septum consistent with right ventricle pressure overload.         Findings:     Right heart catheterization;  Pulmonary artery pressure:  91/35 mmHg mean 56 mmHg.  Pulmonary capillary wedge pressure at end expiration: 25/23 mmHg mean: 21 mmHg  RV: 102/7 mmHg,  mean of 16 mmHg.  Right atrium: 21/18 , 16 mmHg  Cardiac output by thermodilution method: 4.47 L/m  Transpulmonary gradient: 35  Pulmonary vascular resistance: 7.82 with significant  Saturation: On room air  Right atrium 49%,   Right ventricle 47%  Pulmonary artery 52%     Aortic sat there appears to be artifact with 82%     Left heart catheterization:  /6 mmHg, LVEDP: 15 mmHg  No gradient across the aortic valve    [facility]  [address] [place] [id number] [phone]             Cameron Bowie   Echocardiogram   Order# [id number]   Reading physician: David Snowden MD PhD Ordering physician: Ignacio Connelly MD Study date: 10/30/17   Patient Information   Patient Name MRN Sex  (Age)   Cameron Bowie [id number] Female 1955 (62 y.o.)   Sedation Narrator Report   Sedation  Narrator Report   Interpretation Summary   · Left Ventricle: Left ventricular systolic function is normal. Estimated EF appears to be in the range of 56 - 60%.  · Left ventricular wall thickness is consistent with mild concentric hypertrophy  · Right Ventricle: Normal right ventricular wall thickness noted. Right ventricular cavity is moderate-to-severely dilated  · Abnormal septal motion. Systolic flattening of interventricular septum consistent with right ventricle pressure overload.  · Tricuspid Valve: The tricuspid valve has poor leaflet coaptation and is abnormal  · Moderate to severe tricuspid valve regurgitation is present  · Estimated right ventricular systolic pressure from tricuspid regurgitation is markedly elevated (>55 mmHg)  · Severe pulmonary hypertension is present.  · There is no evidence of pericardial effusion     Total Cholesterol 0 - 199 mg/dL 126   Triglycerides 20 - 199 mg/dL 103   HDL Cholesterol 60 - 200 mg/dL 44 (L)   LDL Cholesterol  1 - 129 mg/dL 63   LDL/HDL Ratio 0.00 - 3.22 1.40     EKG: Sinus with first degree AVB. .      Assessment/Plan        1. PAH/PVH/HFpEF.  WHO Group Likely 2/3; FC: Class 3 - comfortable at rest but have symptoms with less-than-ordinary effort..    RV status: Adversely adapted. The patient is in an euvolemic  and well-perfused physiologic state.  I spent some time with Mrs. Bowie explaining that I disagree with the interpretation of the RHC that was performed by Dr. Connelly.  I specifically went over that 3 components that are required for the diagnosis of WHO group 1 PAH.  Specifically, the patient may not have an elevated PCWP, especially one high as 21.  This is consistent with HFpEF with a PAH that is out-of-proportion to her left-sided disease.  She also had her LVEDP checked at 15, so she clearly has elevated left-sided filling pressures. I suspect there is a significant element of pulmonary disease.  Unfortunately, she's had an inappropriate workup for  secondary causes. She is a heavy smoker. She has not seen sleep medicine. Today, I discussed with her that I will not prescribe Opsumit. I also spent some time discussing with her about the use of  ERAs with HFpEF (BADDHY-Trial) with clinical trial showing no benefit.  I also discussed with her that most specialists in PAH never prescribe ERAs for a left-sided heart disease.  There is certainly some of us who certainly would use a PDE 5.  Overall, I discussed with her that I suspect she has multifactorial pulmonary hypertension.  She's already has an element of HFpEF.  I'm also suspicious that she has a pulmonary disease like COPD.  I'm also suspicious she has JESSICA.  Because of her degree of right ventricular dysfunction I will leave her on Revatio for the time being.  I've asked her to obtain additional studies.  She was also very concerned because of the cost of OPSUMIT.  She tells me it apparently is scheduled to be delivered tomorrow.  I told her we would send a letter to the company stating that she does not have group 1 PAH.  I've also asked her to refuse the package so that she will be able to send him back to the .  -6MWT prior to next appt  -PDE5-Inhibitor: Continue Revatio for now  -ERA: Discontinue OPSUMIT  -Sleep referral for JESSICA  -Pulm referral for likely COPD, Dr. Thompson  -Start Bisoprolol for RV dysfunction    2. ASCAD. No anginal symptoms. The patient has been revascularized with PCI to RCA.   Currently not on BB for unclear habits  ASA and Clopidogrel (Plavix)  Lipitor (atorvastatin)    3. Cardiac Risk Assessment and need for statin therapy: High Risk.   -Recommended moderate-intensity statin therapy  -Lipid-lowering medications: Lipitor (atorvastatin)  -Recommended ASA  -Smoking cessation    4. Tobacco status: Current smoker.  I advised patient to quit, and offered support.  Educational material distributed.     5. JESSICA. There is concern for JESSICA. Screening questionnaire was concerning. No  prior nocturnal polysomnography. The risks of untreated JESSICA were explained, as well as the need for sleep study.   -Avoid  weight reduction  -Treatment of HTN  -Referral to sleep medicine    6. Moderate TR. NYHA stage B.   -TTE in one year      Plan for follow-up: in 3 months          This document has been electronically signed by David Snowden MD PhD on November 7, 2017 3:48 PM

## 2018-01-01 ENCOUNTER — TELEPHONE (OUTPATIENT)
Dept: PULMONOLOGY | Facility: CLINIC | Age: 63
End: 2018-01-01

## 2018-01-01 ENCOUNTER — LAB (OUTPATIENT)
Dept: LAB | Facility: HOSPITAL | Age: 63
End: 2018-01-01

## 2018-01-01 ENCOUNTER — TELEPHONE (OUTPATIENT)
Dept: FAMILY MEDICINE CLINIC | Facility: CLINIC | Age: 63
End: 2018-01-01

## 2018-01-01 ENCOUNTER — APPOINTMENT (OUTPATIENT)
Dept: GENERAL RADIOLOGY | Facility: HOSPITAL | Age: 63
End: 2018-01-01

## 2018-01-01 ENCOUNTER — TRANSCRIBE ORDERS (OUTPATIENT)
Dept: LAB | Facility: HOSPITAL | Age: 63
End: 2018-01-01

## 2018-01-01 ENCOUNTER — OFFICE VISIT (OUTPATIENT)
Dept: PULMONOLOGY | Facility: CLINIC | Age: 63
End: 2018-01-01

## 2018-01-01 ENCOUNTER — OFFICE VISIT (OUTPATIENT)
Dept: CARDIOLOGY | Facility: CLINIC | Age: 63
End: 2018-01-01

## 2018-01-01 ENCOUNTER — HOSPITAL ENCOUNTER (INPATIENT)
Facility: HOSPITAL | Age: 63
LOS: 3 days | Discharge: HOME HEALTH | End: 2018-03-07
Attending: EMERGENCY MEDICINE | Admitting: INTERNAL MEDICINE

## 2018-01-01 ENCOUNTER — LAB (OUTPATIENT)
Dept: LAB | Facility: HOSPITAL | Age: 63
End: 2018-01-01
Attending: OBSTETRICS & GYNECOLOGY

## 2018-01-01 ENCOUNTER — APPOINTMENT (OUTPATIENT)
Dept: CT IMAGING | Facility: HOSPITAL | Age: 63
End: 2018-01-01

## 2018-01-01 ENCOUNTER — PREP FOR SURGERY (OUTPATIENT)
Dept: OTHER | Facility: HOSPITAL | Age: 63
End: 2018-01-01

## 2018-01-01 ENCOUNTER — APPOINTMENT (OUTPATIENT)
Dept: CARDIOLOGY | Facility: HOSPITAL | Age: 63
End: 2018-01-01
Attending: INTERNAL MEDICINE

## 2018-01-01 ENCOUNTER — OFFICE VISIT (OUTPATIENT)
Dept: FAMILY MEDICINE CLINIC | Facility: CLINIC | Age: 63
End: 2018-01-01

## 2018-01-01 ENCOUNTER — ANESTHESIA (OUTPATIENT)
Dept: PERIOP | Facility: HOSPITAL | Age: 63
End: 2018-01-01

## 2018-01-01 ENCOUNTER — APPOINTMENT (OUTPATIENT)
Dept: INTERVENTIONAL RADIOLOGY/VASCULAR | Facility: HOSPITAL | Age: 63
End: 2018-01-01

## 2018-01-01 ENCOUNTER — ANESTHESIA EVENT (OUTPATIENT)
Dept: PERIOP | Facility: HOSPITAL | Age: 63
End: 2018-01-01

## 2018-01-01 ENCOUNTER — DOCUMENTATION (OUTPATIENT)
Dept: PULMONOLOGY | Facility: CLINIC | Age: 63
End: 2018-01-01

## 2018-01-01 ENCOUNTER — APPOINTMENT (OUTPATIENT)
Dept: LAB | Facility: HOSPITAL | Age: 63
End: 2018-01-01

## 2018-01-01 ENCOUNTER — APPOINTMENT (OUTPATIENT)
Dept: ULTRASOUND IMAGING | Facility: HOSPITAL | Age: 63
End: 2018-01-01

## 2018-01-01 ENCOUNTER — HOSPITAL ENCOUNTER (INPATIENT)
Facility: HOSPITAL | Age: 63
LOS: 8 days | End: 2018-08-24
Attending: FAMILY MEDICINE | Admitting: HOSPITALIST

## 2018-01-01 ENCOUNTER — HOSPITAL ENCOUNTER (OUTPATIENT)
Dept: CT IMAGING | Facility: HOSPITAL | Age: 63
Discharge: HOME | End: 2018-05-07
Admitting: INTERNAL MEDICINE

## 2018-01-01 VITALS
SYSTOLIC BLOOD PRESSURE: 138 MMHG | DIASTOLIC BLOOD PRESSURE: 72 MMHG | HEART RATE: 72 BPM | HEIGHT: 63 IN | WEIGHT: 252.7 LBS | OXYGEN SATURATION: 96 % | BODY MASS INDEX: 44.77 KG/M2

## 2018-01-01 VITALS
WEIGHT: 255.1 LBS | SYSTOLIC BLOOD PRESSURE: 120 MMHG | HEART RATE: 61 BPM | HEIGHT: 63 IN | OXYGEN SATURATION: 97 % | DIASTOLIC BLOOD PRESSURE: 66 MMHG | BODY MASS INDEX: 45.2 KG/M2

## 2018-01-01 VITALS
WEIGHT: 255.9 LBS | HEART RATE: 73 BPM | SYSTOLIC BLOOD PRESSURE: 130 MMHG | BODY MASS INDEX: 45.34 KG/M2 | HEIGHT: 63 IN | DIASTOLIC BLOOD PRESSURE: 78 MMHG | OXYGEN SATURATION: 97 %

## 2018-01-01 VITALS
DIASTOLIC BLOOD PRESSURE: 80 MMHG | WEIGHT: 261.13 LBS | TEMPERATURE: 99.1 F | HEIGHT: 62 IN | HEART RATE: 73 BPM | BODY MASS INDEX: 48.05 KG/M2 | SYSTOLIC BLOOD PRESSURE: 138 MMHG | OXYGEN SATURATION: 94 %

## 2018-01-01 VITALS
HEART RATE: 63 BPM | BODY MASS INDEX: 45.27 KG/M2 | HEIGHT: 63 IN | DIASTOLIC BLOOD PRESSURE: 78 MMHG | SYSTOLIC BLOOD PRESSURE: 130 MMHG | WEIGHT: 255.5 LBS | OXYGEN SATURATION: 98 %

## 2018-01-01 VITALS
HEART RATE: 74 BPM | SYSTOLIC BLOOD PRESSURE: 120 MMHG | OXYGEN SATURATION: 95 % | WEIGHT: 250.4 LBS | BODY MASS INDEX: 44.37 KG/M2 | HEIGHT: 63 IN | DIASTOLIC BLOOD PRESSURE: 78 MMHG

## 2018-01-01 VITALS
BODY MASS INDEX: 43.94 KG/M2 | HEIGHT: 63 IN | OXYGEN SATURATION: 94 % | HEART RATE: 56 BPM | SYSTOLIC BLOOD PRESSURE: 126 MMHG | DIASTOLIC BLOOD PRESSURE: 78 MMHG | WEIGHT: 248 LBS

## 2018-01-01 VITALS
BODY MASS INDEX: 44.6 KG/M2 | SYSTOLIC BLOOD PRESSURE: 118 MMHG | HEIGHT: 63 IN | HEART RATE: 65 BPM | DIASTOLIC BLOOD PRESSURE: 78 MMHG | OXYGEN SATURATION: 97 % | WEIGHT: 251.7 LBS

## 2018-01-01 VITALS
HEIGHT: 63 IN | SYSTOLIC BLOOD PRESSURE: 133 MMHG | HEART RATE: 68 BPM | BODY MASS INDEX: 45.98 KG/M2 | DIASTOLIC BLOOD PRESSURE: 81 MMHG | OXYGEN SATURATION: 93 % | WEIGHT: 259.5 LBS

## 2018-01-01 VITALS
HEART RATE: 73 BPM | OXYGEN SATURATION: 92 % | RESPIRATION RATE: 20 BRPM | SYSTOLIC BLOOD PRESSURE: 142 MMHG | DIASTOLIC BLOOD PRESSURE: 90 MMHG | BODY MASS INDEX: 47.36 KG/M2 | HEIGHT: 63 IN | TEMPERATURE: 98.1 F | WEIGHT: 267.3 LBS

## 2018-01-01 VITALS
WEIGHT: 289.46 LBS | TEMPERATURE: 100.8 F | HEIGHT: 62 IN | DIASTOLIC BLOOD PRESSURE: 38 MMHG | SYSTOLIC BLOOD PRESSURE: 60 MMHG | BODY MASS INDEX: 53.27 KG/M2 | OXYGEN SATURATION: 61 % | RESPIRATION RATE: 26 BRPM

## 2018-01-01 VITALS
DIASTOLIC BLOOD PRESSURE: 74 MMHG | HEIGHT: 63 IN | OXYGEN SATURATION: 95 % | SYSTOLIC BLOOD PRESSURE: 116 MMHG | BODY MASS INDEX: 44 KG/M2 | WEIGHT: 248.3 LBS | HEART RATE: 74 BPM

## 2018-01-01 VITALS
HEART RATE: 68 BPM | WEIGHT: 253.4 LBS | HEIGHT: 63 IN | OXYGEN SATURATION: 96 % | SYSTOLIC BLOOD PRESSURE: 100 MMHG | DIASTOLIC BLOOD PRESSURE: 70 MMHG | BODY MASS INDEX: 44.9 KG/M2

## 2018-01-01 VITALS
OXYGEN SATURATION: 94 % | BODY MASS INDEX: 44.26 KG/M2 | DIASTOLIC BLOOD PRESSURE: 80 MMHG | WEIGHT: 249.8 LBS | SYSTOLIC BLOOD PRESSURE: 118 MMHG | HEART RATE: 72 BPM | HEIGHT: 63 IN

## 2018-01-01 VITALS
WEIGHT: 256.5 LBS | BODY MASS INDEX: 47.2 KG/M2 | DIASTOLIC BLOOD PRESSURE: 80 MMHG | HEART RATE: 85 BPM | HEIGHT: 62 IN | OXYGEN SATURATION: 95 % | SYSTOLIC BLOOD PRESSURE: 130 MMHG

## 2018-01-01 VITALS
OXYGEN SATURATION: 92 % | HEIGHT: 63 IN | HEART RATE: 58 BPM | SYSTOLIC BLOOD PRESSURE: 110 MMHG | BODY MASS INDEX: 44.24 KG/M2 | DIASTOLIC BLOOD PRESSURE: 58 MMHG | WEIGHT: 249.7 LBS

## 2018-01-01 VITALS
DIASTOLIC BLOOD PRESSURE: 68 MMHG | HEIGHT: 62 IN | OXYGEN SATURATION: 98 % | HEART RATE: 71 BPM | SYSTOLIC BLOOD PRESSURE: 120 MMHG | BODY MASS INDEX: 44.68 KG/M2 | WEIGHT: 242.8 LBS

## 2018-01-01 VITALS
WEIGHT: 260.7 LBS | DIASTOLIC BLOOD PRESSURE: 76 MMHG | OXYGEN SATURATION: 93 % | HEART RATE: 74 BPM | BODY MASS INDEX: 47.97 KG/M2 | SYSTOLIC BLOOD PRESSURE: 122 MMHG | HEIGHT: 62 IN

## 2018-01-01 VITALS
SYSTOLIC BLOOD PRESSURE: 148 MMHG | HEIGHT: 63 IN | DIASTOLIC BLOOD PRESSURE: 92 MMHG | BODY MASS INDEX: 43.41 KG/M2 | WEIGHT: 245 LBS | HEART RATE: 68 BPM | OXYGEN SATURATION: 97 %

## 2018-01-01 DIAGNOSIS — I10 ESSENTIAL HYPERTENSION: Primary | ICD-10-CM

## 2018-01-01 DIAGNOSIS — I27.20 PULMONARY HYPERTENSION (HCC): Primary | ICD-10-CM

## 2018-01-01 DIAGNOSIS — E11.22 TYPE 2 DIABETES MELLITUS WITH STAGE 3 CHRONIC KIDNEY DISEASE, WITHOUT LONG-TERM CURRENT USE OF INSULIN (HCC): ICD-10-CM

## 2018-01-01 DIAGNOSIS — I25.10 CORONARY ARTERIOSCLEROSIS: ICD-10-CM

## 2018-01-01 DIAGNOSIS — I25.10 ATHEROSCLEROSIS OF NATIVE CORONARY ARTERY OF NATIVE HEART WITHOUT ANGINA PECTORIS: ICD-10-CM

## 2018-01-01 DIAGNOSIS — Z87.891 HISTORY OF TOBACCO USE: ICD-10-CM

## 2018-01-01 DIAGNOSIS — J18.9 PNEUMONIA OF RIGHT MIDDLE LOBE DUE TO INFECTIOUS ORGANISM: Primary | ICD-10-CM

## 2018-01-01 DIAGNOSIS — R93.89 ABNORMAL CT OF THE CHEST: ICD-10-CM

## 2018-01-01 DIAGNOSIS — E78.2 MIXED HYPERLIPIDEMIA: ICD-10-CM

## 2018-01-01 DIAGNOSIS — J96.11 CHRONIC RESPIRATORY FAILURE WITH HYPOXIA (HCC): ICD-10-CM

## 2018-01-01 DIAGNOSIS — I50.810 RVF (RIGHT VENTRICULAR FAILURE) (HCC): ICD-10-CM

## 2018-01-01 DIAGNOSIS — J44.9 CHRONIC OBSTRUCTIVE PULMONARY DISEASE, UNSPECIFIED COPD TYPE (HCC): ICD-10-CM

## 2018-01-01 DIAGNOSIS — N18.30 TYPE 2 DIABETES MELLITUS WITH STAGE 3 CHRONIC KIDNEY DISEASE, WITHOUT LONG-TERM CURRENT USE OF INSULIN (HCC): ICD-10-CM

## 2018-01-01 DIAGNOSIS — E78.00 PURE HYPERCHOLESTEROLEMIA: ICD-10-CM

## 2018-01-01 DIAGNOSIS — N18.30 STAGE 3 CHRONIC KIDNEY DISEASE (HCC): ICD-10-CM

## 2018-01-01 DIAGNOSIS — N18.30 CHRONIC KIDNEY DISEASE, STAGE III (MODERATE) (HCC): Primary | ICD-10-CM

## 2018-01-01 DIAGNOSIS — I25.10 ATHEROSCLEROSIS OF NATIVE CORONARY ARTERY OF NATIVE HEART WITHOUT ANGINA PECTORIS: Primary | ICD-10-CM

## 2018-01-01 DIAGNOSIS — I10 ESSENTIAL HYPERTENSION: ICD-10-CM

## 2018-01-01 DIAGNOSIS — I27.81 COR PULMONALE (HCC): ICD-10-CM

## 2018-01-01 DIAGNOSIS — I27.20 PULMONARY HYPERTENSION (HCC): ICD-10-CM

## 2018-01-01 DIAGNOSIS — F17.200 TOBACCO USE DISORDER: ICD-10-CM

## 2018-01-01 DIAGNOSIS — K75.81 NASH (NONALCOHOLIC STEATOHEPATITIS): ICD-10-CM

## 2018-01-01 DIAGNOSIS — Z78.9 IMPAIRED MOBILITY AND ACTIVITIES OF DAILY LIVING: ICD-10-CM

## 2018-01-01 DIAGNOSIS — R53.81 PHYSICAL DECONDITIONING: ICD-10-CM

## 2018-01-01 DIAGNOSIS — IMO0001 CLASS 3 OBESITY DUE TO EXCESS CALORIES WITH SERIOUS COMORBIDITY AND BODY MASS INDEX (BMI) OF 45.0 TO 49.9 IN ADULT: ICD-10-CM

## 2018-01-01 DIAGNOSIS — J18.9 RECURRENT PNEUMONIA: Primary | ICD-10-CM

## 2018-01-01 DIAGNOSIS — E66.01 MORBID OBESITY WITH BMI OF 40.0-44.9, ADULT (HCC): ICD-10-CM

## 2018-01-01 DIAGNOSIS — F33.1 MODERATE EPISODE OF RECURRENT MAJOR DEPRESSIVE DISORDER (HCC): ICD-10-CM

## 2018-01-01 DIAGNOSIS — I50.810 RVF (RIGHT VENTRICULAR FAILURE) (HCC): Primary | ICD-10-CM

## 2018-01-01 DIAGNOSIS — J44.1 ACUTE EXACERBATION OF CHRONIC OBSTRUCTIVE PULMONARY DISEASE (COPD) (HCC): Primary | ICD-10-CM

## 2018-01-01 DIAGNOSIS — K46.0 HERNIA WITH OBSTRUCTION: Primary | ICD-10-CM

## 2018-01-01 DIAGNOSIS — I87.2 VENOUS INSUFFICIENCY: ICD-10-CM

## 2018-01-01 DIAGNOSIS — Z13.820 ENCOUNTER FOR SCREENING FOR OSTEOPOROSIS: ICD-10-CM

## 2018-01-01 DIAGNOSIS — G47.33 OSA (OBSTRUCTIVE SLEEP APNEA): ICD-10-CM

## 2018-01-01 DIAGNOSIS — E66.01 MORBID OBESITY WITH BMI OF 45.0-49.9, ADULT (HCC): ICD-10-CM

## 2018-01-01 DIAGNOSIS — Z74.09 IMPAIRED MOBILITY AND ENDURANCE: ICD-10-CM

## 2018-01-01 DIAGNOSIS — IMO0001 CLASS 3 OBESITY DUE TO EXCESS CALORIES WITH SERIOUS COMORBIDITY AND BODY MASS INDEX (BMI) OF 40.0 TO 44.9 IN ADULT: ICD-10-CM

## 2018-01-01 DIAGNOSIS — N17.9 ACUTE RENAL FAILURE, UNSPECIFIED ACUTE RENAL FAILURE TYPE (HCC): Primary | ICD-10-CM

## 2018-01-01 DIAGNOSIS — L40.9 PSORIASIS: ICD-10-CM

## 2018-01-01 DIAGNOSIS — Z74.09 IMPAIRED MOBILITY AND ACTIVITIES OF DAILY LIVING: ICD-10-CM

## 2018-01-01 DIAGNOSIS — I73.9 PVD (PERIPHERAL VASCULAR DISEASE) (HCC): ICD-10-CM

## 2018-01-01 DIAGNOSIS — R53.1 ASTHENIA: ICD-10-CM

## 2018-01-01 DIAGNOSIS — K46.0 INCARCERATED HERNIA: Primary | ICD-10-CM

## 2018-01-01 DIAGNOSIS — I36.1 NON-RHEUMATIC TRICUSPID VALVE INSUFFICIENCY: ICD-10-CM

## 2018-01-01 DIAGNOSIS — R93.89 ABNORMAL CXR: Primary | ICD-10-CM

## 2018-01-01 DIAGNOSIS — R05.9 COUGH: ICD-10-CM

## 2018-01-01 DIAGNOSIS — F41.1 GENERALIZED ANXIETY DISORDER: ICD-10-CM

## 2018-01-01 DIAGNOSIS — K46.0 INCARCERATED HERNIA: ICD-10-CM

## 2018-01-01 DIAGNOSIS — J18.9 PNEUMONIA OF RIGHT MIDDLE LOBE DUE TO INFECTIOUS ORGANISM: ICD-10-CM

## 2018-01-01 DIAGNOSIS — E66.01 MORBID OBESITY (HCC): ICD-10-CM

## 2018-01-01 DIAGNOSIS — R93.89 ABNORMAL CHEST X-RAY: Primary | ICD-10-CM

## 2018-01-01 DIAGNOSIS — J44.9 CHRONIC OBSTRUCTIVE PULMONARY DISEASE, UNSPECIFIED COPD TYPE (HCC): Primary | ICD-10-CM

## 2018-01-01 DIAGNOSIS — Z12.39 BREAST SCREENING: ICD-10-CM

## 2018-01-01 DIAGNOSIS — F17.210 CIGARETTE NICOTINE DEPENDENCE, UNCOMPLICATED: ICD-10-CM

## 2018-01-01 DIAGNOSIS — F17.200 LIGHT TOBACCO SMOKER: ICD-10-CM

## 2018-01-01 DIAGNOSIS — R09.02 HYPOXIA: ICD-10-CM

## 2018-01-01 LAB
25(OH)D3 SERPL-MCNC: 41.3 NG/ML (ref 30–100)
25(OH)D3 SERPL-MCNC: 42.6 NG/ML (ref 30–100)
ABO + RH BLD: NORMAL
ABO GROUP BLD: NORMAL
ALBUMIN SERPL-MCNC: 2.2 G/DL (ref 3.4–4.8)
ALBUMIN SERPL-MCNC: 2.3 G/DL (ref 3.4–4.8)
ALBUMIN SERPL-MCNC: 2.4 G/DL (ref 3.4–4.8)
ALBUMIN SERPL-MCNC: 2.6 G/DL (ref 3.4–4.8)
ALBUMIN SERPL-MCNC: 2.8 G/DL (ref 3.4–4.8)
ALBUMIN SERPL-MCNC: 2.9 G/DL (ref 3.4–4.8)
ALBUMIN SERPL-MCNC: 3.1 G/DL (ref 3.4–4.8)
ALBUMIN SERPL-MCNC: 3.2 G/DL (ref 3.4–4.8)
ALBUMIN SERPL-MCNC: 3.3 G/DL (ref 3.4–4.8)
ALBUMIN SERPL-MCNC: 3.3 G/DL (ref 3.4–4.8)
ALBUMIN SERPL-MCNC: 3.5 G/DL (ref 3.4–4.8)
ALBUMIN SERPL-MCNC: 3.5 G/DL (ref 3.4–4.8)
ALBUMIN SERPL-MCNC: 3.9 G/DL (ref 3.4–4.8)
ALBUMIN SERPL-MCNC: 4 G/DL (ref 3.4–4.8)
ALBUMIN SERPL-MCNC: 4 G/DL (ref 3.4–4.8)
ALBUMIN UR-MCNC: 0.6 MG/L
ALBUMIN UR-MCNC: <0.6 MG/L
ALBUMIN/GLOB SERPL: 0.9 G/DL (ref 1.1–1.8)
ALBUMIN/GLOB SERPL: 1 G/DL (ref 1.1–1.8)
ALBUMIN/GLOB SERPL: 1.1 G/DL (ref 1.1–1.8)
ALBUMIN/GLOB SERPL: 1.2 G/DL (ref 1.1–1.8)
ALP SERPL-CCNC: 106 U/L (ref 38–126)
ALP SERPL-CCNC: 62 U/L (ref 38–126)
ALP SERPL-CCNC: 65 U/L (ref 38–126)
ALP SERPL-CCNC: 69 U/L (ref 38–126)
ALP SERPL-CCNC: 72 U/L (ref 38–126)
ALP SERPL-CCNC: 73 U/L (ref 38–126)
ALP SERPL-CCNC: 79 U/L (ref 38–126)
ALP SERPL-CCNC: 82 U/L (ref 38–126)
ALP SERPL-CCNC: 83 U/L (ref 38–126)
ALP SERPL-CCNC: 84 U/L (ref 38–126)
ALP SERPL-CCNC: 88 U/L (ref 38–126)
ALP SERPL-CCNC: 88 U/L (ref 38–126)
ALP SERPL-CCNC: 89 U/L (ref 38–126)
ALP SERPL-CCNC: 92 U/L (ref 38–126)
ALP SERPL-CCNC: 92 U/L (ref 38–126)
ALP SERPL-CCNC: 94 U/L (ref 38–126)
ALP SERPL-CCNC: 98 U/L (ref 38–126)
ALT SERPL W P-5'-P-CCNC: 109 U/L (ref 9–52)
ALT SERPL W P-5'-P-CCNC: 21 U/L (ref 9–52)
ALT SERPL W P-5'-P-CCNC: 234 U/L (ref 9–52)
ALT SERPL W P-5'-P-CCNC: 27 U/L (ref 9–52)
ALT SERPL W P-5'-P-CCNC: 27 U/L (ref 9–52)
ALT SERPL W P-5'-P-CCNC: 28 U/L (ref 9–52)
ALT SERPL W P-5'-P-CCNC: 317 U/L (ref 9–52)
ALT SERPL W P-5'-P-CCNC: 39 U/L (ref 9–52)
ALT SERPL W P-5'-P-CCNC: 40 U/L (ref 9–52)
ALT SERPL W P-5'-P-CCNC: 42 U/L (ref 9–52)
ALT SERPL W P-5'-P-CCNC: 44 U/L (ref 9–52)
ALT SERPL W P-5'-P-CCNC: 44 U/L (ref 9–52)
ALT SERPL W P-5'-P-CCNC: 453 U/L (ref 9–52)
ALT SERPL W P-5'-P-CCNC: 46 U/L (ref 9–52)
ALT SERPL W P-5'-P-CCNC: 47 U/L (ref 9–52)
ALT SERPL W P-5'-P-CCNC: 59 U/L (ref 9–52)
ALT SERPL W P-5'-P-CCNC: 87 U/L (ref 9–52)
ANION GAP SERPL CALCULATED.3IONS-SCNC: 10 MMOL/L (ref 5–15)
ANION GAP SERPL CALCULATED.3IONS-SCNC: 10 MMOL/L (ref 5–15)
ANION GAP SERPL CALCULATED.3IONS-SCNC: 11 MMOL/L (ref 5–15)
ANION GAP SERPL CALCULATED.3IONS-SCNC: 12 MMOL/L (ref 5–15)
ANION GAP SERPL CALCULATED.3IONS-SCNC: 13 MMOL/L (ref 5–15)
ANION GAP SERPL CALCULATED.3IONS-SCNC: 13 MMOL/L (ref 5–15)
ANION GAP SERPL CALCULATED.3IONS-SCNC: 14 MMOL/L (ref 5–15)
ANION GAP SERPL CALCULATED.3IONS-SCNC: 15 MMOL/L (ref 5–15)
ANION GAP SERPL CALCULATED.3IONS-SCNC: 16 MMOL/L (ref 5–15)
ANION GAP SERPL CALCULATED.3IONS-SCNC: 8 MMOL/L (ref 5–15)
ANION GAP SERPL CALCULATED.3IONS-SCNC: 8 MMOL/L (ref 5–15)
ANION GAP SERPL CALCULATED.3IONS-SCNC: 9 MMOL/L (ref 5–15)
APTT PPP: 25.8 SECONDS (ref 20–40.3)
ARTERIAL PATENCY WRIST A: ABNORMAL
ARTICHOKE IGE QN: 52 MG/DL (ref 1–129)
ARTICHOKE IGE QN: 61 MG/DL (ref 1–129)
AST SERPL-CCNC: 1268 U/L (ref 14–36)
AST SERPL-CCNC: 179 U/L (ref 14–36)
AST SERPL-CCNC: 20 U/L (ref 14–36)
AST SERPL-CCNC: 20 U/L (ref 14–36)
AST SERPL-CCNC: 22 U/L (ref 14–36)
AST SERPL-CCNC: 23 U/L (ref 14–36)
AST SERPL-CCNC: 231 U/L (ref 14–36)
AST SERPL-CCNC: 24 U/L (ref 14–36)
AST SERPL-CCNC: 28 U/L (ref 14–36)
AST SERPL-CCNC: 29 U/L (ref 14–36)
AST SERPL-CCNC: 57 U/L (ref 14–36)
AST SERPL-CCNC: 58 U/L (ref 14–36)
AST SERPL-CCNC: 703 U/L (ref 14–36)
AST SERPL-CCNC: 711 U/L (ref 14–36)
AST SERPL-CCNC: 75 U/L (ref 14–36)
AST SERPL-CCNC: 76 U/L (ref 14–36)
AST SERPL-CCNC: 88 U/L (ref 14–36)
ATMOSPHERIC PRESS: 744 MMHG
ATMOSPHERIC PRESS: 745 MMHG
ATMOSPHERIC PRESS: 746 MMHG
ATMOSPHERIC PRESS: 747 MMHG
ATMOSPHERIC PRESS: 748 MMHG
ATMOSPHERIC PRESS: 748 MMHG
ATMOSPHERIC PRESS: 749 MMHG
ATMOSPHERIC PRESS: 750 MMHG
ATMOSPHERIC PRESS: 751 MMHG
ATMOSPHERIC PRESS: ABNORMAL MMHG
BACTERIA SPEC AEROBE CULT: NORMAL
BACTERIA SPEC RESP CULT: NORMAL
BACTERIA UR QL AUTO: ABNORMAL /HPF
BASE EXCESS BLDA CALC-SCNC: -10.5 MMOL/L (ref 0–2)
BASE EXCESS BLDA CALC-SCNC: -13.3 MMOL/L (ref 0–2)
BASE EXCESS BLDA CALC-SCNC: -4.3 MMOL/L (ref 0–2)
BASE EXCESS BLDA CALC-SCNC: -4.5 MMOL/L (ref 0–2)
BASE EXCESS BLDA CALC-SCNC: -4.6 MMOL/L (ref 0–2)
BASE EXCESS BLDA CALC-SCNC: -4.7 MMOL/L (ref 0–2)
BASE EXCESS BLDA CALC-SCNC: -4.7 MMOL/L (ref 0–2)
BASE EXCESS BLDA CALC-SCNC: -5 MMOL/L (ref 0–2)
BASE EXCESS BLDA CALC-SCNC: -5 MMOL/L (ref 0–2)
BASE EXCESS BLDA CALC-SCNC: -5.2 MMOL/L (ref 0–2)
BASE EXCESS BLDA CALC-SCNC: -5.4 MMOL/L (ref 0–2)
BASE EXCESS BLDA CALC-SCNC: -5.5 MMOL/L (ref 0–2)
BASE EXCESS BLDA CALC-SCNC: -5.7 MMOL/L (ref 0–2)
BASE EXCESS BLDA CALC-SCNC: -5.7 MMOL/L (ref 0–2)
BASE EXCESS BLDA CALC-SCNC: -7.2 MMOL/L (ref 0–2)
BASE EXCESS BLDA CALC-SCNC: -7.9 MMOL/L (ref 0–2)
BASE EXCESS BLDA CALC-SCNC: -8.4 MMOL/L (ref 0–2)
BASE EXCESS BLDA CALC-SCNC: -9.1 MMOL/L (ref 0–2)
BASE EXCESS BLDA CALC-SCNC: 2.2 MMOL/L (ref -2.4–2.4)
BASOPHILS # BLD AUTO: 0.01 10*3/MM3 (ref 0–0.2)
BASOPHILS # BLD AUTO: 0.02 10*3/MM3 (ref 0–0.2)
BASOPHILS # BLD AUTO: 0.02 10*3/MM3 (ref 0–0.2)
BASOPHILS # BLD AUTO: 0.03 10*3/MM3 (ref 0–0.2)
BASOPHILS # BLD AUTO: 0.03 10*3/MM3 (ref 0–0.2)
BASOPHILS # BLD AUTO: 0.48 10*3/MM3 (ref 0–0.2)
BASOPHILS NFR BLD AUTO: 0.1 % (ref 0–2)
BASOPHILS NFR BLD AUTO: 0.2 % (ref 0–2)
BASOPHILS NFR BLD AUTO: 2.2 % (ref 0–2)
BDY SITE: ABNORMAL
BH BB BLOOD EXPIRATION DATE: NORMAL
BH BB BLOOD TYPE BARCODE: 6200
BH BB DISPENSE STATUS: NORMAL
BH BB PRODUCT CODE: NORMAL
BH BB UNIT NUMBER: NORMAL
BH CV ECHO MEAS - ACS: 1.8 CM
BH CV ECHO MEAS - AO MAX PG (FULL): 7.9 MMHG
BH CV ECHO MEAS - AO MAX PG: 14.4 MMHG
BH CV ECHO MEAS - AO MEAN PG (FULL): 4 MMHG
BH CV ECHO MEAS - AO MEAN PG: 8 MMHG
BH CV ECHO MEAS - AO ROOT AREA (BSA CORRECTED): 1.3
BH CV ECHO MEAS - AO ROOT AREA: 6.6 CM^2
BH CV ECHO MEAS - AO ROOT DIAM: 2.9 CM
BH CV ECHO MEAS - AO V2 MAX: 190 CM/SEC
BH CV ECHO MEAS - AO V2 MEAN: 128 CM/SEC
BH CV ECHO MEAS - AO V2 VTI: 27.8 CM
BH CV ECHO MEAS - BSA(HAYCOCK): 2.4 M^2
BH CV ECHO MEAS - BSA: 2.2 M^2
BH CV ECHO MEAS - BZI_BMI: 51.2 KILOGRAMS/M^2
BH CV ECHO MEAS - BZI_METRIC_HEIGHT: 157.5 CM
BH CV ECHO MEAS - BZI_METRIC_WEIGHT: 127 KG
BH CV ECHO MEAS - EDV(CUBED): 36.3 ML
BH CV ECHO MEAS - EDV(TEICH): 44.5 ML
BH CV ECHO MEAS - EF(CUBED): 85.7 %
BH CV ECHO MEAS - EF(TEICH): 80.3 %
BH CV ECHO MEAS - ESV(CUBED): 5.2 ML
BH CV ECHO MEAS - ESV(TEICH): 8.8 ML
BH CV ECHO MEAS - FS: 47.7 %
BH CV ECHO MEAS - IVS/LVPW: 0.86
BH CV ECHO MEAS - IVSD: 0.77 CM
BH CV ECHO MEAS - LV MASS(C)D: 72.3 GRAMS
BH CV ECHO MEAS - LV MASS(C)DI: 32.8 GRAMS/M^2
BH CV ECHO MEAS - LV MAX PG: 6.6 MMHG
BH CV ECHO MEAS - LV MEAN PG: 4 MMHG
BH CV ECHO MEAS - LV V1 MAX: 128 CM/SEC
BH CV ECHO MEAS - LV V1 MEAN: 94.5 CM/SEC
BH CV ECHO MEAS - LV V1 VTI: 19.6 CM
BH CV ECHO MEAS - LVIDD: 3.3 CM
BH CV ECHO MEAS - LVIDS: 1.7 CM
BH CV ECHO MEAS - LVPWD: 0.89 CM
BH CV ECHO MEAS - MV A MAX VEL: 140 CM/SEC
BH CV ECHO MEAS - MV DEC SLOPE: 532 CM/SEC^2
BH CV ECHO MEAS - MV E MAX VEL: 61.1 CM/SEC
BH CV ECHO MEAS - MV E/A: 0.44
BH CV ECHO MEAS - MV P1/2T MAX VEL: 84.3 CM/SEC
BH CV ECHO MEAS - MV P1/2T: 46.4 MSEC
BH CV ECHO MEAS - MVA P1/2T LCG: 2.6 CM^2
BH CV ECHO MEAS - MVA(P1/2T): 4.7 CM^2
BH CV ECHO MEAS - RAP SYSTOLE: 15 MMHG
BH CV ECHO MEAS - RVDD: 5.4 CM
BH CV ECHO MEAS - RVSP: 119.9 MMHG
BH CV ECHO MEAS - SI(AO): 83.3 ML/M^2
BH CV ECHO MEAS - SI(CUBED): 14.1 ML/M^2
BH CV ECHO MEAS - SI(TEICH): 16.2 ML/M^2
BH CV ECHO MEAS - SV(AO): 183.6 ML
BH CV ECHO MEAS - SV(CUBED): 31.1 ML
BH CV ECHO MEAS - SV(TEICH): 35.7 ML
BH CV ECHO MEAS - TR MAX VEL: 512 CM/SEC
BILIRUB CONJ SERPL-MCNC: 0 MG/DL (ref 0–0.3)
BILIRUB INDIRECT SERPL-MCNC: 0.2 MG/DL (ref 0–1.1)
BILIRUB SERPL-MCNC: 0.3 MG/DL (ref 0.2–1.3)
BILIRUB SERPL-MCNC: 0.3 MG/DL (ref 0.2–1.3)
BILIRUB SERPL-MCNC: 0.5 MG/DL (ref 0.2–1.3)
BILIRUB SERPL-MCNC: 0.7 MG/DL (ref 0.2–1.3)
BILIRUB SERPL-MCNC: 0.7 MG/DL (ref 0.2–1.3)
BILIRUB SERPL-MCNC: 1.2 MG/DL (ref 0.2–1.3)
BILIRUB SERPL-MCNC: 2 MG/DL (ref 0.2–1.3)
BILIRUB SERPL-MCNC: 2.2 MG/DL (ref 0.2–1.3)
BILIRUB SERPL-MCNC: 2.3 MG/DL (ref 0.2–1.3)
BILIRUB SERPL-MCNC: 3.2 MG/DL (ref 0.2–1.3)
BILIRUB SERPL-MCNC: 3.5 MG/DL (ref 0.2–1.3)
BILIRUB SERPL-MCNC: 3.6 MG/DL (ref 0.2–1.3)
BILIRUB SERPL-MCNC: 4.2 MG/DL (ref 0.2–1.3)
BILIRUB SERPL-MCNC: 4.5 MG/DL (ref 0.2–1.3)
BILIRUB SERPL-MCNC: 6.1 MG/DL (ref 0.2–1.3)
BILIRUB SERPL-MCNC: 6.8 MG/DL (ref 0.2–1.3)
BILIRUB SERPL-MCNC: 8.9 MG/DL (ref 0.2–1.3)
BILIRUB UR QL STRIP: ABNORMAL
BILIRUB UR QL STRIP: ABNORMAL
BILIRUB UR QL STRIP: NEGATIVE
BLD GP AB SCN SERPL QL: NEGATIVE
BUN BLD-MCNC: 20 MG/DL (ref 7–21)
BUN BLD-MCNC: 20 MG/DL (ref 7–21)
BUN BLD-MCNC: 21 MG/DL (ref 7–21)
BUN BLD-MCNC: 23 MG/DL (ref 7–21)
BUN BLD-MCNC: 27 MG/DL (ref 7–21)
BUN BLD-MCNC: 27 MG/DL (ref 7–21)
BUN BLD-MCNC: 28 MG/DL (ref 7–21)
BUN BLD-MCNC: 29 MG/DL (ref 7–21)
BUN BLD-MCNC: 30 MG/DL (ref 7–21)
BUN BLD-MCNC: 33 MG/DL (ref 7–21)
BUN BLD-MCNC: 33 MG/DL (ref 7–21)
BUN BLD-MCNC: 34 MG/DL (ref 7–21)
BUN BLD-MCNC: 36 MG/DL (ref 7–21)
BUN BLD-MCNC: 37 MG/DL (ref 7–21)
BUN BLD-MCNC: 38 MG/DL (ref 7–21)
BUN BLD-MCNC: 40 MG/DL (ref 7–21)
BUN BLD-MCNC: 43 MG/DL (ref 7–21)
BUN BLD-MCNC: 43 MG/DL (ref 7–21)
BUN BLD-MCNC: 44 MG/DL (ref 7–21)
BUN BLD-MCNC: 46 MG/DL (ref 7–21)
BUN BLD-MCNC: 52 MG/DL (ref 7–21)
BUN/CREAT SERPL: 12.9 (ref 7–25)
BUN/CREAT SERPL: 16 (ref 7–25)
BUN/CREAT SERPL: 16.8 (ref 7–25)
BUN/CREAT SERPL: 17.5 (ref 7–25)
BUN/CREAT SERPL: 17.9 (ref 7–25)
BUN/CREAT SERPL: 20 (ref 7–25)
BUN/CREAT SERPL: 20 (ref 7–25)
BUN/CREAT SERPL: 20.8 (ref 7–25)
BUN/CREAT SERPL: 20.9 (ref 7–25)
BUN/CREAT SERPL: 22.3 (ref 7–25)
BUN/CREAT SERPL: 22.5 (ref 7–25)
BUN/CREAT SERPL: 24.5 (ref 7–25)
BUN/CREAT SERPL: 24.8 (ref 7–25)
BUN/CREAT SERPL: 25.5 (ref 7–25)
BUN/CREAT SERPL: 27 (ref 7–25)
BUN/CREAT SERPL: 27.2 (ref 7–25)
BUN/CREAT SERPL: 27.6 (ref 7–25)
BUN/CREAT SERPL: 27.7 (ref 7–25)
BUN/CREAT SERPL: 31.2 (ref 7–25)
BUN/CREAT SERPL: 32.1 (ref 7–25)
BUN/CREAT SERPL: 33.3 (ref 7–25)
CA-I BLD-MCNC: 3.58 MG/DL (ref 4.6–5.6)
CA-I BLD-MCNC: 3.62 MG/DL (ref 4.6–5.6)
CA-I BLD-MCNC: 3.64 MG/DL (ref 4.6–5.6)
CA-I BLD-MCNC: 4.06 MG/DL (ref 4.6–5.6)
CA-I BLD-MCNC: 4.3 MG/DL (ref 4.6–5.6)
CA-I BLD-MCNC: 4.39 MG/DL (ref 4.6–5.6)
CA-I BLD-MCNC: 4.4 MG/DL (ref 4.5–4.9)
CA-I BLD-MCNC: 4.87 MG/DL (ref 4.6–5.6)
CALCIUM SPEC-SCNC: 7.8 MG/DL (ref 8.4–10.2)
CALCIUM SPEC-SCNC: 8 MG/DL (ref 8.4–10.2)
CALCIUM SPEC-SCNC: 8 MG/DL (ref 8.4–10.2)
CALCIUM SPEC-SCNC: 8.1 MG/DL (ref 8.4–10.2)
CALCIUM SPEC-SCNC: 8.1 MG/DL (ref 8.4–10.2)
CALCIUM SPEC-SCNC: 8.2 MG/DL (ref 8.4–10.2)
CALCIUM SPEC-SCNC: 8.3 MG/DL (ref 8.4–10.2)
CALCIUM SPEC-SCNC: 8.4 MG/DL (ref 8.4–10.2)
CALCIUM SPEC-SCNC: 8.4 MG/DL (ref 8.4–10.2)
CALCIUM SPEC-SCNC: 8.5 MG/DL (ref 8.4–10.2)
CALCIUM SPEC-SCNC: 8.6 MG/DL (ref 8.4–10.2)
CALCIUM SPEC-SCNC: 8.6 MG/DL (ref 8.4–10.2)
CALCIUM SPEC-SCNC: 8.7 MG/DL (ref 8.4–10.2)
CALCIUM SPEC-SCNC: 8.8 MG/DL (ref 8.4–10.2)
CALCIUM SPEC-SCNC: 9.2 MG/DL (ref 8.4–10.2)
CHLORIDE SERPL-SCNC: 100 MMOL/L (ref 95–110)
CHLORIDE SERPL-SCNC: 101 MMOL/L (ref 95–110)
CHLORIDE SERPL-SCNC: 102 MMOL/L (ref 95–110)
CHLORIDE SERPL-SCNC: 103 MMOL/L (ref 95–110)
CHLORIDE SERPL-SCNC: 103 MMOL/L (ref 95–110)
CHLORIDE SERPL-SCNC: 104 MMOL/L (ref 95–110)
CHLORIDE SERPL-SCNC: 107 MMOL/L (ref 95–110)
CHLORIDE SERPL-SCNC: 110 MMOL/L (ref 95–110)
CHLORIDE SERPL-SCNC: 112 MMOL/L (ref 95–110)
CHLORIDE SERPL-SCNC: 113 MMOL/L (ref 95–110)
CHLORIDE SERPL-SCNC: 114 MMOL/L (ref 95–110)
CHLORIDE SERPL-SCNC: 119 MMOL/L (ref 95–110)
CHLORIDE SERPL-SCNC: 119 MMOL/L (ref 95–110)
CHLORIDE SERPL-SCNC: 120 MMOL/L (ref 95–110)
CHLORIDE SERPL-SCNC: 96 MMOL/L (ref 95–110)
CHLORIDE SERPL-SCNC: 96 MMOL/L (ref 95–110)
CHLORIDE SERPL-SCNC: 98 MMOL/L (ref 95–110)
CHOLEST SERPL-MCNC: 104 MG/DL (ref 0–199)
CHOLEST SERPL-MCNC: 114 MG/DL (ref 0–199)
CK MB SERPL-CCNC: 1.24 NG/ML (ref 0–5)
CK MB SERPL-CCNC: 2.96 NG/ML (ref 0–5)
CK SERPL-CCNC: 101 U/L (ref 30–135)
CLARITY UR: ABNORMAL
CLARITY UR: ABNORMAL
CLARITY UR: CLEAR
CO2 BLDA-SCNC: 28.1 MMOL/L (ref 23–27)
CO2 SERPL-SCNC: 15 MMOL/L (ref 22–31)
CO2 SERPL-SCNC: 16 MMOL/L (ref 22–31)
CO2 SERPL-SCNC: 17 MMOL/L (ref 22–31)
CO2 SERPL-SCNC: 19 MMOL/L (ref 22–31)
CO2 SERPL-SCNC: 23 MMOL/L (ref 22–31)
CO2 SERPL-SCNC: 23 MMOL/L (ref 22–31)
CO2 SERPL-SCNC: 24 MMOL/L (ref 22–31)
CO2 SERPL-SCNC: 25 MMOL/L (ref 22–31)
CO2 SERPL-SCNC: 26 MMOL/L (ref 22–31)
CO2 SERPL-SCNC: 27 MMOL/L (ref 22–31)
CO2 SERPL-SCNC: 27 MMOL/L (ref 22–31)
CO2 SERPL-SCNC: 28 MMOL/L (ref 22–31)
CO2 SERPL-SCNC: 29 MMOL/L (ref 22–31)
CO2 SERPL-SCNC: 30 MMOL/L (ref 22–31)
CO2 SERPL-SCNC: 34 MMOL/L (ref 22–31)
COHGB MFR BLD: 0.8 % (ref 0–5)
COHGB MFR BLD: 0.9 % (ref 0–5)
COHGB MFR BLD: 1 % (ref 0–5)
COHGB MFR BLD: 1 % (ref 0–5)
COHGB MFR BLD: 1.2 % (ref 0–5)
COHGB MFR BLD: 1.3 % (ref 0–5)
COHGB MFR BLD: 1.4 % (ref 0–5)
COLOR UR: ABNORMAL
COLOR UR: ABNORMAL
COLOR UR: YELLOW
CREAT BLD-MCNC: 1.14 MG/DL (ref 0.5–1)
CREAT BLD-MCNC: 1.2 MG/DL (ref 0.5–1)
CREAT BLD-MCNC: 1.25 MG/DL (ref 0.5–1)
CREAT BLD-MCNC: 1.3 MG/DL (ref 0.5–1)
CREAT BLD-MCNC: 1.33 MG/DL (ref 0.5–1)
CREAT BLD-MCNC: 1.36 MG/DL (ref 0.5–1)
CREAT BLD-MCNC: 1.37 MG/DL (ref 0.5–1)
CREAT BLD-MCNC: 1.38 MG/DL (ref 0.5–1)
CREAT BLD-MCNC: 1.4 MG/DL (ref 0.5–1)
CREAT BLD-MCNC: 1.41 MG/DL (ref 0.5–1)
CREAT BLD-MCNC: 1.41 MG/DL (ref 0.5–1)
CREAT BLD-MCNC: 1.44 MG/DL (ref 0.5–1)
CREAT BLD-MCNC: 1.44 MG/DL (ref 0.5–1)
CREAT BLD-MCNC: 1.51 MG/DL (ref 0.5–1)
CREAT BLD-MCNC: 1.55 MG/DL (ref 0.5–1)
CREAT BLD-MCNC: 1.56 MG/DL (ref 0.5–1)
CREAT BLD-MCNC: 1.59 MG/DL (ref 0.5–1)
CREAT BLD-MCNC: 1.62 MG/DL (ref 0.5–1)
CREAT BLD-MCNC: 1.63 MG/DL (ref 0.5–1)
CREAT BLD-MCNC: 1.63 MG/DL (ref 0.5–1)
CREAT BLD-MCNC: 1.65 MG/DL (ref 0.5–1)
CREAT UR-MCNC: 22.4 MG/DL
CREAT UR-MCNC: 24.6 MG/DL
CREAT UR-MCNC: 40.7 MG/DL
CREAT UR-MCNC: 40.7 MG/DL
D-DIMER, QUANTITATIVE (MAD,POW, STR): 1580 NG/ML (FEU) (ref 0–470)
D-LACTATE SERPL-SCNC: 1.2 MMOL/L (ref 0.5–2)
D-LACTATE SERPL-SCNC: 1.4 MMOL/L (ref 0.5–2)
D-LACTATE SERPL-SCNC: 1.6 MMOL/L (ref 0.5–2)
DEPRECATED RDW RBC AUTO: 44.3 FL (ref 36.4–46.3)
DEPRECATED RDW RBC AUTO: 46.2 FL (ref 36.4–46.3)
DEPRECATED RDW RBC AUTO: 46.5 FL (ref 36.4–46.3)
DEPRECATED RDW RBC AUTO: 47 FL (ref 36.4–46.3)
DEPRECATED RDW RBC AUTO: 47.1 FL (ref 36.4–46.3)
DEPRECATED RDW RBC AUTO: 49 FL (ref 36.4–46.3)
DEPRECATED RDW RBC AUTO: 50.4 FL (ref 36.4–46.3)
DEPRECATED RDW RBC AUTO: 51.1 FL (ref 36.4–46.3)
DEPRECATED RDW RBC AUTO: 51.2 FL (ref 36.4–46.3)
DEPRECATED RDW RBC AUTO: 51.8 FL (ref 36.4–46.3)
DEPRECATED RDW RBC AUTO: 52.3 FL (ref 36.4–46.3)
DEPRECATED RDW RBC AUTO: 52.7 FL (ref 36.4–46.3)
DEPRECATED RDW RBC AUTO: 54.2 FL (ref 36.4–46.3)
EOSINOPHIL # BLD AUTO: 0 10*3/MM3 (ref 0–0.7)
EOSINOPHIL # BLD AUTO: 0.02 10*3/MM3 (ref 0–0.7)
EOSINOPHIL # BLD AUTO: 0.03 10*3/MM3 (ref 0–0.7)
EOSINOPHIL # BLD AUTO: 0.04 10*3/MM3 (ref 0–0.7)
EOSINOPHIL # BLD AUTO: 0.04 10*3/MM3 (ref 0–0.7)
EOSINOPHIL NFR BLD AUTO: 0 % (ref 0–7)
EOSINOPHIL NFR BLD AUTO: 0.1 % (ref 0–7)
EOSINOPHIL NFR BLD AUTO: 0.2 % (ref 0–7)
EOSINOPHIL NFR BLD AUTO: 0.2 % (ref 0–7)
EOSINOPHIL NFR BLD AUTO: 0.6 % (ref 0–7)
ERYTHROCYTE [DISTWIDTH] IN BLOOD BY AUTOMATED COUNT: 13.5 % (ref 11.5–14.5)
ERYTHROCYTE [DISTWIDTH] IN BLOOD BY AUTOMATED COUNT: 13.8 % (ref 11.5–14.5)
ERYTHROCYTE [DISTWIDTH] IN BLOOD BY AUTOMATED COUNT: 13.9 % (ref 11.5–14.5)
ERYTHROCYTE [DISTWIDTH] IN BLOOD BY AUTOMATED COUNT: 13.9 % (ref 11.5–14.5)
ERYTHROCYTE [DISTWIDTH] IN BLOOD BY AUTOMATED COUNT: 14.1 % (ref 11.5–14.5)
ERYTHROCYTE [DISTWIDTH] IN BLOOD BY AUTOMATED COUNT: 14.1 % (ref 11.5–14.5)
ERYTHROCYTE [DISTWIDTH] IN BLOOD BY AUTOMATED COUNT: 14.9 % (ref 11.5–14.5)
ERYTHROCYTE [DISTWIDTH] IN BLOOD BY AUTOMATED COUNT: 15 % (ref 11.5–14.5)
ERYTHROCYTE [DISTWIDTH] IN BLOOD BY AUTOMATED COUNT: 15.1 % (ref 11.5–14.5)
ERYTHROCYTE [DISTWIDTH] IN BLOOD BY AUTOMATED COUNT: 15.2 % (ref 11.5–14.5)
ERYTHROCYTE [DISTWIDTH] IN BLOOD BY AUTOMATED COUNT: 15.3 % (ref 11.5–14.5)
ERYTHROCYTE [DISTWIDTH] IN BLOOD BY AUTOMATED COUNT: 15.4 % (ref 11.5–14.5)
ERYTHROCYTE [DISTWIDTH] IN BLOOD BY AUTOMATED COUNT: 15.7 % (ref 11.5–14.5)
GFR SERPL CREATININE-BSD FRML MDRD: 32 ML/MIN/1.73 (ref 45–104)
GFR SERPL CREATININE-BSD FRML MDRD: 33 ML/MIN/1.73 (ref 45–104)
GFR SERPL CREATININE-BSD FRML MDRD: 34 ML/MIN/1.73 (ref 45–104)
GFR SERPL CREATININE-BSD FRML MDRD: 34 ML/MIN/1.73 (ref 45–104)
GFR SERPL CREATININE-BSD FRML MDRD: 35 ML/MIN/1.73 (ref 45–104)
GFR SERPL CREATININE-BSD FRML MDRD: 37 ML/MIN/1.73 (ref 45–104)
GFR SERPL CREATININE-BSD FRML MDRD: 37 ML/MIN/1.73 (ref 45–104)
GFR SERPL CREATININE-BSD FRML MDRD: 38 ML/MIN/1.73 (ref 45–104)
GFR SERPL CREATININE-BSD FRML MDRD: 38 ML/MIN/1.73 (ref 45–104)
GFR SERPL CREATININE-BSD FRML MDRD: 38 ML/MIN/1.73 (ref 60–104)
GFR SERPL CREATININE-BSD FRML MDRD: 39 ML/MIN/1.73 (ref 45–104)
GFR SERPL CREATININE-BSD FRML MDRD: 40 ML/MIN/1.73 (ref 60–104)
GFR SERPL CREATININE-BSD FRML MDRD: 42 ML/MIN/1.73 (ref 45–104)
GFR SERPL CREATININE-BSD FRML MDRD: 43 ML/MIN/1.73 (ref 45–104)
GFR SERPL CREATININE-BSD FRML MDRD: 46 ML/MIN/1.73 (ref 45–104)
GFR SERPL CREATININE-BSD FRML MDRD: 48 ML/MIN/1.73 (ref 45–104)
GLOBULIN UR ELPH-MCNC: 2.4 GM/DL (ref 2.3–3.5)
GLOBULIN UR ELPH-MCNC: 2.5 GM/DL (ref 2.3–3.5)
GLOBULIN UR ELPH-MCNC: 2.6 GM/DL (ref 2.3–3.5)
GLOBULIN UR ELPH-MCNC: 2.6 GM/DL (ref 2.3–3.5)
GLOBULIN UR ELPH-MCNC: 2.7 GM/DL (ref 2.3–3.5)
GLOBULIN UR ELPH-MCNC: 2.8 GM/DL (ref 2.3–3.5)
GLOBULIN UR ELPH-MCNC: 2.8 GM/DL (ref 2.3–3.5)
GLOBULIN UR ELPH-MCNC: 2.9 GM/DL (ref 2.3–3.5)
GLOBULIN UR ELPH-MCNC: 3.2 GM/DL (ref 2.3–3.5)
GLUCOSE BLD-MCNC: 107 MG/DL (ref 60–100)
GLUCOSE BLD-MCNC: 111 MG/DL (ref 60–100)
GLUCOSE BLD-MCNC: 113 MG/DL (ref 60–100)
GLUCOSE BLD-MCNC: 114 MG/DL (ref 60–100)
GLUCOSE BLD-MCNC: 114 MG/DL (ref 60–100)
GLUCOSE BLD-MCNC: 117 MG/DL (ref 60–100)
GLUCOSE BLD-MCNC: 118 MG/DL (ref 60–100)
GLUCOSE BLD-MCNC: 122 MG/DL (ref 60–100)
GLUCOSE BLD-MCNC: 126 MG/DL (ref 60–100)
GLUCOSE BLD-MCNC: 128 MG/DL (ref 60–100)
GLUCOSE BLD-MCNC: 130 MG/DL (ref 60–100)
GLUCOSE BLD-MCNC: 132 MG/DL (ref 60–100)
GLUCOSE BLD-MCNC: 135 MG/DL (ref 60–100)
GLUCOSE BLD-MCNC: 137 MG/DL (ref 60–100)
GLUCOSE BLD-MCNC: 147 MG/DL (ref 60–100)
GLUCOSE BLD-MCNC: 150 MG/DL (ref 60–100)
GLUCOSE BLD-MCNC: 178 MG/DL (ref 60–100)
GLUCOSE BLD-MCNC: 190 MG/DL (ref 60–100)
GLUCOSE BLD-MCNC: 212 MG/DL (ref 60–100)
GLUCOSE BLD-MCNC: 260 MG/DL (ref 60–100)
GLUCOSE BLD-MCNC: 268 MG/DL (ref 60–100)
GLUCOSE BLDA-MCNC: 112 MMOL/L (ref 65–95)
GLUCOSE BLDA-MCNC: 113 MMOL/L (ref 65–95)
GLUCOSE BLDA-MCNC: 117 MMOL/L (ref 65–95)
GLUCOSE BLDA-MCNC: 118 MMOL/L (ref 65–95)
GLUCOSE BLDA-MCNC: 119 MMOL/L (ref 65–95)
GLUCOSE BLDA-MCNC: 128 MMOL/L (ref 65–95)
GLUCOSE BLDA-MCNC: 134 MMOL/L
GLUCOSE BLDA-MCNC: 207 MMOL/L (ref 65–95)
GLUCOSE BLDC GLUCOMTR-MCNC: 108 MG/DL (ref 70–130)
GLUCOSE BLDC GLUCOMTR-MCNC: 113 MG/DL (ref 70–130)
GLUCOSE BLDC GLUCOMTR-MCNC: 113 MG/DL (ref 70–130)
GLUCOSE BLDC GLUCOMTR-MCNC: 118 MG/DL (ref 70–130)
GLUCOSE BLDC GLUCOMTR-MCNC: 122 MG/DL (ref 70–130)
GLUCOSE BLDC GLUCOMTR-MCNC: 125 MG/DL (ref 70–130)
GLUCOSE BLDC GLUCOMTR-MCNC: 129 MG/DL (ref 70–130)
GLUCOSE BLDC GLUCOMTR-MCNC: 129 MG/DL (ref 70–130)
GLUCOSE BLDC GLUCOMTR-MCNC: 132 MG/DL (ref 70–130)
GLUCOSE BLDC GLUCOMTR-MCNC: 133 MG/DL (ref 70–130)
GLUCOSE BLDC GLUCOMTR-MCNC: 135 MG/DL (ref 70–130)
GLUCOSE BLDC GLUCOMTR-MCNC: 137 MG/DL (ref 70–130)
GLUCOSE BLDC GLUCOMTR-MCNC: 139 MG/DL (ref 70–130)
GLUCOSE BLDC GLUCOMTR-MCNC: 147 MG/DL (ref 70–130)
GLUCOSE BLDC GLUCOMTR-MCNC: 160 MG/DL (ref 70–130)
GLUCOSE BLDC GLUCOMTR-MCNC: 164 MG/DL (ref 70–130)
GLUCOSE BLDC GLUCOMTR-MCNC: 166 MG/DL (ref 70–130)
GLUCOSE BLDC GLUCOMTR-MCNC: 168 MG/DL (ref 70–130)
GLUCOSE BLDC GLUCOMTR-MCNC: 172 MG/DL (ref 70–130)
GLUCOSE BLDC GLUCOMTR-MCNC: 175 MG/DL (ref 70–130)
GLUCOSE BLDC GLUCOMTR-MCNC: 177 MG/DL (ref 70–130)
GLUCOSE BLDC GLUCOMTR-MCNC: 188 MG/DL (ref 70–130)
GLUCOSE BLDC GLUCOMTR-MCNC: 190 MG/DL (ref 70–130)
GLUCOSE BLDC GLUCOMTR-MCNC: 192 MG/DL (ref 70–130)
GLUCOSE BLDC GLUCOMTR-MCNC: 195 MG/DL (ref 70–130)
GLUCOSE BLDC GLUCOMTR-MCNC: 199 MG/DL (ref 70–130)
GLUCOSE BLDC GLUCOMTR-MCNC: 214 MG/DL (ref 70–130)
GLUCOSE UR STRIP-MCNC: NEGATIVE MG/DL
GRAM STN SPEC: NORMAL
HBA1C MFR BLD: 6.6 % (ref 4–5.6)
HBA1C MFR BLD: 6.9 % (ref 4–5.6)
HCO3 BLDA-SCNC: 14.2 MMOL/L (ref 20–26)
HCO3 BLDA-SCNC: 16.4 MMOL/L (ref 20–26)
HCO3 BLDA-SCNC: 17.3 MMOL/L (ref 20–26)
HCO3 BLDA-SCNC: 17.7 MMOL/L (ref 20–26)
HCO3 BLDA-SCNC: 18.4 MMOL/L (ref 20–26)
HCO3 BLDA-SCNC: 18.9 MMOL/L (ref 20–26)
HCO3 BLDA-SCNC: 20.1 MMOL/L (ref 20–26)
HCO3 BLDA-SCNC: 20.1 MMOL/L (ref 20–26)
HCO3 BLDA-SCNC: 20.7 MMOL/L (ref 20–26)
HCO3 BLDA-SCNC: 21 MMOL/L (ref 20–26)
HCO3 BLDA-SCNC: 21.3 MMOL/L (ref 20–26)
HCO3 BLDA-SCNC: 22 MMOL/L (ref 20–26)
HCO3 BLDA-SCNC: 22.1 MMOL/L (ref 20–26)
HCO3 BLDA-SCNC: 22.2 MMOL/L (ref 20–26)
HCO3 BLDA-SCNC: 22.4 MMOL/L (ref 20–26)
HCO3 BLDA-SCNC: 23.3 MMOL/L (ref 20–26)
HCO3 BLDA-SCNC: 26.8 MMOL/L (ref 22–26)
HCT VFR BLD AUTO: 33.2 % (ref 35–45)
HCT VFR BLD AUTO: 33.6 % (ref 35–45)
HCT VFR BLD AUTO: 33.6 % (ref 35–45)
HCT VFR BLD AUTO: 34.1 % (ref 35–45)
HCT VFR BLD AUTO: 34.8 % (ref 35–45)
HCT VFR BLD AUTO: 37.6 % (ref 35–45)
HCT VFR BLD AUTO: 38.6 % (ref 35–45)
HCT VFR BLD AUTO: 39.2 % (ref 35–45)
HCT VFR BLD AUTO: 41.3 % (ref 35–45)
HCT VFR BLD AUTO: 41.4 % (ref 35–45)
HCT VFR BLD AUTO: 44.9 % (ref 35–45)
HCT VFR BLD AUTO: 45 % (ref 35–45)
HCT VFR BLD AUTO: 45.7 % (ref 35–45)
HCT VFR BLD CALC: 34.3 % (ref 38–51)
HCT VFR BLD CALC: 35.2 % (ref 38–51)
HCT VFR BLD CALC: 35.8 % (ref 38–51)
HCT VFR BLD CALC: 37.2 % (ref 38–51)
HCT VFR BLD CALC: 40.3 % (ref 38–51)
HCT VFR BLD CALC: 40.6 % (ref 38–51)
HCT VFR BLD CALC: 42 % (ref 38–47)
HCT VFR BLD CALC: 42.2 % (ref 38–51)
HDLC SERPL-MCNC: 33 MG/DL (ref 60–200)
HDLC SERPL-MCNC: 35 MG/DL (ref 60–200)
HGB BLD-MCNC: 10.9 G/DL (ref 12–15.5)
HGB BLD-MCNC: 10.9 G/DL (ref 12–15.5)
HGB BLD-MCNC: 11.1 G/DL (ref 12–15.5)
HGB BLD-MCNC: 11.2 G/DL (ref 12–15.5)
HGB BLD-MCNC: 11.5 G/DL (ref 12–15.5)
HGB BLD-MCNC: 12.1 G/DL (ref 12–15.5)
HGB BLD-MCNC: 12.4 G/DL (ref 12–15.5)
HGB BLD-MCNC: 12.7 G/DL (ref 12–15.5)
HGB BLD-MCNC: 13.1 G/DL (ref 12–15.5)
HGB BLD-MCNC: 13.3 G/DL (ref 12–15.5)
HGB BLD-MCNC: 14.4 G/DL (ref 12–15.5)
HGB BLD-MCNC: 14.8 G/DL (ref 12–15.5)
HGB BLD-MCNC: 15 G/DL (ref 12–15.5)
HGB BLDA-MCNC: 11.2 G/DL (ref 13.5–17.5)
HGB BLDA-MCNC: 11.5 G/DL (ref 13.5–17.5)
HGB BLDA-MCNC: 11.7 G/DL (ref 13.5–17.5)
HGB BLDA-MCNC: 12.1 G/DL (ref 13.5–17.5)
HGB BLDA-MCNC: 13.1 G/DL (ref 13.5–17.5)
HGB BLDA-MCNC: 13.3 G/DL (ref 13.5–17.5)
HGB BLDA-MCNC: 13.8 G/DL (ref 13.5–17.5)
HGB BLDA-MCNC: 14.4 G/DL (ref 12–16)
HGB UR QL STRIP.AUTO: ABNORMAL
HGB UR QL STRIP.AUTO: NEGATIVE
HGB UR QL STRIP.AUTO: NEGATIVE
HOLD SPECIMEN: NORMAL
HOROWITZ INDEX BLD+IHG-RTO: 100 %
HOROWITZ INDEX BLD+IHG-RTO: 65 %
HOROWITZ INDEX BLD+IHG-RTO: 70 %
HOROWITZ INDEX BLD+IHG-RTO: 75 %
HOROWITZ INDEX BLD+IHG-RTO: 75 %
HOROWITZ INDEX BLD+IHG-RTO: 90 %
HYALINE CASTS UR QL AUTO: ABNORMAL /LPF
IMM GRANULOCYTES # BLD: 0.02 10*3/MM3 (ref 0–0.02)
IMM GRANULOCYTES # BLD: 0.03 10*3/MM3 (ref 0–0.02)
IMM GRANULOCYTES # BLD: 0.03 10*3/MM3 (ref 0–0.02)
IMM GRANULOCYTES # BLD: 0.04 10*3/MM3 (ref 0–0.02)
IMM GRANULOCYTES # BLD: 0.05 10*3/MM3 (ref 0–0.02)
IMM GRANULOCYTES # BLD: 0.07 10*3/MM3 (ref 0–0.02)
IMM GRANULOCYTES # BLD: 0.09 10*3/MM3 (ref 0–0.02)
IMM GRANULOCYTES # BLD: 0.12 10*3/MM3 (ref 0–0.02)
IMM GRANULOCYTES # BLD: 0.14 10*3/MM3 (ref 0–0.02)
IMM GRANULOCYTES # BLD: 0.27 10*3/MM3 (ref 0–0.02)
IMM GRANULOCYTES # BLD: 2.21 10*3/MM3 (ref 0–0.02)
IMM GRANULOCYTES NFR BLD: 0.2 % (ref 0–0.5)
IMM GRANULOCYTES NFR BLD: 0.3 % (ref 0–0.5)
IMM GRANULOCYTES NFR BLD: 0.4 % (ref 0–0.5)
IMM GRANULOCYTES NFR BLD: 0.5 % (ref 0–0.5)
IMM GRANULOCYTES NFR BLD: 0.5 % (ref 0–0.5)
IMM GRANULOCYTES NFR BLD: 0.7 % (ref 0–0.5)
IMM GRANULOCYTES NFR BLD: 0.7 % (ref 0–0.5)
IMM GRANULOCYTES NFR BLD: 1.6 % (ref 0–0.5)
IMM GRANULOCYTES NFR BLD: 10.4 % (ref 0–0.5)
INR PPP: 1.09 (ref 0.8–1.2)
KETONES UR QL STRIP: ABNORMAL
KETONES UR QL STRIP: ABNORMAL
KETONES UR QL STRIP: NEGATIVE
L PNEUMO1 AG UR QL IA: NEGATIVE
LDLC/HDLC SERPL: 1.23 {RATIO} (ref 0–3.22)
LDLC/HDLC SERPL: 1.78 {RATIO} (ref 0–3.22)
LEUKOCYTE ESTERASE UR QL STRIP.AUTO: ABNORMAL
LIPASE SERPL-CCNC: <10 U/L (ref 23–300)
LYMPHOCYTES # BLD AUTO: 0.45 10*3/MM3 (ref 0.6–4.2)
LYMPHOCYTES # BLD AUTO: 0.61 10*3/MM3 (ref 0.6–4.2)
LYMPHOCYTES # BLD AUTO: 0.63 10*3/MM3 (ref 0.6–4.2)
LYMPHOCYTES # BLD AUTO: 0.75 10*3/MM3 (ref 0.6–4.2)
LYMPHOCYTES # BLD AUTO: 0.96 10*3/MM3 (ref 0.6–4.2)
LYMPHOCYTES # BLD AUTO: 0.97 10*3/MM3 (ref 0.6–4.2)
LYMPHOCYTES # BLD AUTO: 1.06 10*3/MM3 (ref 0.6–4.2)
LYMPHOCYTES # BLD AUTO: 1.07 10*3/MM3 (ref 0.6–4.2)
LYMPHOCYTES # BLD AUTO: 1.28 10*3/MM3 (ref 0.6–4.2)
LYMPHOCYTES # BLD AUTO: 1.46 10*3/MM3 (ref 0.6–4.2)
LYMPHOCYTES # BLD AUTO: 3.06 10*3/MM3 (ref 0.6–4.2)
LYMPHOCYTES # BLD MANUAL: 1.12 10*3/MM3 (ref 0.6–4.2)
LYMPHOCYTES NFR BLD AUTO: 10.5 % (ref 10–50)
LYMPHOCYTES NFR BLD AUTO: 14.3 % (ref 10–50)
LYMPHOCYTES NFR BLD AUTO: 5.3 % (ref 10–50)
LYMPHOCYTES NFR BLD AUTO: 6.1 % (ref 10–50)
LYMPHOCYTES NFR BLD AUTO: 6.3 % (ref 10–50)
LYMPHOCYTES NFR BLD AUTO: 6.5 % (ref 10–50)
LYMPHOCYTES NFR BLD AUTO: 6.6 % (ref 10–50)
LYMPHOCYTES NFR BLD AUTO: 6.6 % (ref 10–50)
LYMPHOCYTES NFR BLD AUTO: 6.7 % (ref 10–50)
LYMPHOCYTES NFR BLD AUTO: 7.1 % (ref 10–50)
LYMPHOCYTES NFR BLD AUTO: 7.6 % (ref 10–50)
LYMPHOCYTES NFR BLD MANUAL: 6 % (ref 0–12)
LYMPHOCYTES NFR BLD MANUAL: 7 % (ref 10–50)
Lab: ABNORMAL
Lab: NORMAL
MAGNESIUM SERPL-MCNC: 1.7 MG/DL (ref 1.6–2.3)
MAGNESIUM SERPL-MCNC: 1.8 MG/DL (ref 1.6–2.3)
MAGNESIUM SERPL-MCNC: 1.9 MG/DL (ref 1.6–2.3)
MAGNESIUM SERPL-MCNC: 2 MG/DL (ref 1.6–2.3)
MAXIMAL PREDICTED HEART RATE: 157 BPM
MCH RBC QN AUTO: 29.2 PG (ref 26.5–34)
MCH RBC QN AUTO: 29.3 PG (ref 26.5–34)
MCH RBC QN AUTO: 29.6 PG (ref 26.5–34)
MCH RBC QN AUTO: 29.8 PG (ref 26.5–34)
MCH RBC QN AUTO: 29.9 PG (ref 26.5–34)
MCH RBC QN AUTO: 30.3 PG (ref 26.5–34)
MCH RBC QN AUTO: 30.3 PG (ref 26.5–34)
MCH RBC QN AUTO: 30.4 PG (ref 26.5–34)
MCH RBC QN AUTO: 30.5 PG (ref 26.5–34)
MCH RBC QN AUTO: 30.8 PG (ref 26.5–34)
MCH RBC QN AUTO: 30.9 PG (ref 26.5–34)
MCHC RBC AUTO-ENTMCNC: 31.6 G/DL (ref 31.4–36)
MCHC RBC AUTO-ENTMCNC: 31.7 G/DL (ref 31.4–36)
MCHC RBC AUTO-ENTMCNC: 32 G/DL (ref 31.4–36)
MCHC RBC AUTO-ENTMCNC: 32.1 G/DL (ref 31.4–36)
MCHC RBC AUTO-ENTMCNC: 32.2 G/DL (ref 31.4–36)
MCHC RBC AUTO-ENTMCNC: 32.4 G/DL (ref 31.4–36)
MCHC RBC AUTO-ENTMCNC: 32.4 G/DL (ref 31.4–36)
MCHC RBC AUTO-ENTMCNC: 32.6 G/DL (ref 31.4–36)
MCHC RBC AUTO-ENTMCNC: 32.8 G/DL (ref 31.4–36)
MCHC RBC AUTO-ENTMCNC: 32.9 G/DL (ref 31.4–36)
MCHC RBC AUTO-ENTMCNC: 33 G/DL (ref 31.4–36)
MCHC RBC AUTO-ENTMCNC: 33.3 G/DL (ref 31.4–36)
MCHC RBC AUTO-ENTMCNC: 33.4 G/DL (ref 31.4–36)
MCV RBC AUTO: 90.8 FL (ref 80–98)
MCV RBC AUTO: 91 FL (ref 80–98)
MCV RBC AUTO: 91.2 FL (ref 80–98)
MCV RBC AUTO: 91.3 FL (ref 80–98)
MCV RBC AUTO: 91.6 FL (ref 80–98)
MCV RBC AUTO: 92 FL (ref 80–98)
MCV RBC AUTO: 92.3 FL (ref 80–98)
MCV RBC AUTO: 92.5 FL (ref 80–98)
MCV RBC AUTO: 93.2 FL (ref 80–98)
MCV RBC AUTO: 94.7 FL (ref 80–98)
MCV RBC AUTO: 94.9 FL (ref 80–98)
MCV RBC AUTO: 96.2 FL (ref 80–98)
MCV RBC AUTO: 96.4 FL (ref 80–98)
METAMYELOCYTES NFR BLD MANUAL: 5 % (ref 0–0)
METHGB BLD QL: 1 % (ref 0–3)
METHGB BLD QL: 1 % (ref 0–3)
METHGB BLD QL: 1.1 % (ref 0–3)
METHGB BLD QL: 1.1 % (ref 0–3)
METHGB BLD QL: 1.2 % (ref 0–3)
METHGB BLD QL: 1.2 % (ref 0–3)
METHGB BLD QL: 1.4 % (ref 0–3)
MICROALBUMIN/CREAT UR: 24.4 MG/G (ref 0–30)
MICROALBUMIN/CREAT UR: NORMAL MG/G (ref 0–30)
MODALITY: ABNORMAL
MONOCYTES # BLD AUTO: 0.16 10*3/MM3 (ref 0–0.9)
MONOCYTES # BLD AUTO: 0.36 10*3/MM3 (ref 0–0.9)
MONOCYTES # BLD AUTO: 0.42 10*3/MM3 (ref 0–0.9)
MONOCYTES # BLD AUTO: 0.5 10*3/MM3 (ref 0–0.9)
MONOCYTES # BLD AUTO: 0.95 10*3/MM3 (ref 0–0.9)
MONOCYTES # BLD AUTO: 0.96 10*3/MM3 (ref 0–0.9)
MONOCYTES # BLD AUTO: 0.99 10*3/MM3 (ref 0–0.9)
MONOCYTES # BLD AUTO: 1.1 10*3/MM3 (ref 0–0.9)
MONOCYTES # BLD AUTO: 1.19 10*3/MM3 (ref 0–0.9)
MONOCYTES # BLD AUTO: 1.23 10*3/MM3 (ref 0–0.9)
MONOCYTES # BLD AUTO: 1.43 10*3/MM3 (ref 0–0.9)
MONOCYTES # BLD AUTO: 1.62 10*3/MM3 (ref 0–0.9)
MONOCYTES NFR BLD AUTO: 2.7 % (ref 0–12)
MONOCYTES NFR BLD AUTO: 3.7 % (ref 0–12)
MONOCYTES NFR BLD AUTO: 3.8 % (ref 0–12)
MONOCYTES NFR BLD AUTO: 5.8 % (ref 0–12)
MONOCYTES NFR BLD AUTO: 5.9 % (ref 0–12)
MONOCYTES NFR BLD AUTO: 6.8 % (ref 0–12)
MONOCYTES NFR BLD AUTO: 7 % (ref 0–12)
MONOCYTES NFR BLD AUTO: 7 % (ref 0–12)
MONOCYTES NFR BLD AUTO: 7.2 % (ref 0–12)
MONOCYTES NFR BLD AUTO: 7.4 % (ref 0–12)
MONOCYTES NFR BLD AUTO: 7.4 % (ref 0–12)
NEUTROPHILS # BLD AUTO: 10.35 10*3/MM3 (ref 2–8.6)
NEUTROPHILS # BLD AUTO: 11.73 10*3/MM3 (ref 2–8.6)
NEUTROPHILS # BLD AUTO: 12.65 10*3/MM3 (ref 2–8.6)
NEUTROPHILS # BLD AUTO: 13.14 10*3/MM3 (ref 2–8.6)
NEUTROPHILS # BLD AUTO: 13.92 10*3/MM3 (ref 2–8.6)
NEUTROPHILS # BLD AUTO: 14.33 10*3/MM3 (ref 2–8.6)
NEUTROPHILS # BLD AUTO: 14.43 10*3/MM3 (ref 2–8.6)
NEUTROPHILS # BLD AUTO: 18.22 10*3/MM3 (ref 2–8.6)
NEUTROPHILS # BLD AUTO: 18.61 10*3/MM3 (ref 2–8.6)
NEUTROPHILS # BLD AUTO: 5.28 10*3/MM3 (ref 2–8.6)
NEUTROPHILS # BLD AUTO: 5.85 10*3/MM3 (ref 2–8.6)
NEUTROPHILS # BLD AUTO: 8.53 10*3/MM3 (ref 2–8.6)
NEUTROPHILS NFR BLD AUTO: 67.2 % (ref 37–80)
NEUTROPHILS NFR BLD AUTO: 81.5 % (ref 37–80)
NEUTROPHILS NFR BLD AUTO: 84.7 % (ref 37–80)
NEUTROPHILS NFR BLD AUTO: 85.2 % (ref 37–80)
NEUTROPHILS NFR BLD AUTO: 85.4 % (ref 37–80)
NEUTROPHILS NFR BLD AUTO: 85.5 % (ref 37–80)
NEUTROPHILS NFR BLD AUTO: 86.2 % (ref 37–80)
NEUTROPHILS NFR BLD AUTO: 86.5 % (ref 37–80)
NEUTROPHILS NFR BLD AUTO: 89 % (ref 37–80)
NEUTROPHILS NFR BLD AUTO: 89.1 % (ref 37–80)
NEUTROPHILS NFR BLD AUTO: 90.5 % (ref 37–80)
NEUTROPHILS NFR BLD MANUAL: 80 % (ref 37–80)
NEUTS BAND NFR BLD MANUAL: 2 % (ref 0–5)
NITRITE UR QL STRIP: NEGATIVE
NITRITE UR QL STRIP: POSITIVE
NITRITE UR QL STRIP: POSITIVE
NRBC BLD MANUAL-RTO: 0 /100 WBC (ref 0–0)
NRBC BLD MANUAL-RTO: 0 /100 WBC (ref 0–0)
NRBC BLD MANUAL-RTO: 0.1 /100 WBC (ref 0–0)
NRBC BLD MANUAL-RTO: 0.6 /100 WBC (ref 0–0)
NRBC SPEC MANUAL: 4 /100 WBC (ref 0–0)
NT-PROBNP SERPL-MCNC: 5840 PG/ML (ref 0–900)
OXYHGB MFR BLDV: 83.6 % (ref 94–99)
OXYHGB MFR BLDV: 88.2 % (ref 94–99)
OXYHGB MFR BLDV: 91.1 % (ref 94–99)
OXYHGB MFR BLDV: 92.6 % (ref 94–99)
OXYHGB MFR BLDV: 92.8 % (ref 94–99)
OXYHGB MFR BLDV: 93.4 % (ref 94–99)
OXYHGB MFR BLDV: 97.3 % (ref 94–99)
PAW @ PEAK INSP FLOW SETTING VENT: 27 CMH2O
PAW @ PEAK INSP FLOW SETTING VENT: 29 CMH2O
PAW @ PEAK INSP FLOW SETTING VENT: 31 CMH2O
PCO2 BLDA: 36.8 MM HG (ref 35–45)
PCO2 BLDA: 37.5 MM HG (ref 35–45)
PCO2 BLDA: 37.8 MM HG (ref 35–45)
PCO2 BLDA: 38.2 MM HG (ref 35–45)
PCO2 BLDA: 38.4 MM HG (ref 35–45)
PCO2 BLDA: 38.5 MM HG (ref 35–45)
PCO2 BLDA: 38.8 MM HG (ref 35–45)
PCO2 BLDA: 39.2 MM HG (ref 35–45)
PCO2 BLDA: 40.3 MM HG (ref 35–45)
PCO2 BLDA: 40.3 MM HG (ref 35–45)
PCO2 BLDA: 40.9 MM HG (ref 35–45)
PCO2 BLDA: 41.6 MM HG (ref 35–45)
PCO2 BLDA: 42.6 MM HG (ref 35–45)
PCO2 BLDA: 43.8 MM HG (ref 35–45)
PCO2 BLDA: 44.4 MM HG (ref 35–45)
PCO2 BLDA: 48.7 MM HG (ref 35–45)
PCO2 BLDA: 49.7 MM HG (ref 35–45)
PCO2 BLDA: 52.2 MM HG (ref 35–45)
PCO2 BLDA: 54 MM HG (ref 35–45)
PCO2 TEMP ADJ BLD: ABNORMAL MM HG (ref 35–45)
PEEP RESPIRATORY: 5 CM[H2O]
PEEP RESPIRATORY: 8 CM[H2O]
PH BLDA: 7.18 PH UNITS (ref 7.35–7.45)
PH BLDA: 7.23 PH UNITS (ref 7.35–7.45)
PH BLDA: 7.24 PH UNITS (ref 7.35–7.45)
PH BLDA: 7.24 PH UNITS (ref 7.35–7.45)
PH BLDA: 7.26 PH UNITS (ref 7.35–7.45)
PH BLDA: 7.27 PH UNITS (ref 7.35–7.45)
PH BLDA: 7.28 PH UNITS (ref 7.35–7.45)
PH BLDA: 7.28 PH UNITS (ref 7.35–7.45)
PH BLDA: 7.31 PH UNITS (ref 7.35–7.45)
PH BLDA: 7.31 PH UNITS (ref 7.35–7.45)
PH BLDA: 7.32 PH UNITS (ref 7.35–7.45)
PH BLDA: 7.32 PH UNITS (ref 7.35–7.45)
PH BLDA: 7.33 PH UNITS (ref 7.35–7.45)
PH BLDA: 7.34 PH UNITS (ref 7.35–7.45)
PH BLDA: 7.43 PH UNITS (ref 7.35–7.45)
PH UR STRIP.AUTO: 5.5 [PH] (ref 5–9)
PH UR STRIP.AUTO: 6 [PH] (ref 5–9)
PH UR STRIP.AUTO: <=5 [PH] (ref 5–9)
PH, TEMP CORRECTED: ABNORMAL PH UNITS
PHOSPHATE SERPL-MCNC: 3.9 MG/DL (ref 2.4–4.4)
PHOSPHATE SERPL-MCNC: 4.1 MG/DL (ref 2.4–4.4)
PHOSPHATE SERPL-MCNC: 4.3 MG/DL (ref 2.4–4.4)
PLAT MORPH BLD: NORMAL
PLATELET # BLD AUTO: 122 10*3/MM3 (ref 150–450)
PLATELET # BLD AUTO: 122 10*3/MM3 (ref 150–450)
PLATELET # BLD AUTO: 133 10*3/MM3 (ref 150–450)
PLATELET # BLD AUTO: 137 10*3/MM3 (ref 150–450)
PLATELET # BLD AUTO: 149 10*3/MM3 (ref 150–450)
PLATELET # BLD AUTO: 153 10*3/MM3 (ref 150–450)
PLATELET # BLD AUTO: 158 10*3/MM3 (ref 150–450)
PLATELET # BLD AUTO: 158 10*3/MM3 (ref 150–450)
PLATELET # BLD AUTO: 161 10*3/MM3 (ref 150–450)
PLATELET # BLD AUTO: 166 10*3/MM3 (ref 150–450)
PLATELET # BLD AUTO: 167 10*3/MM3 (ref 150–450)
PLATELET # BLD AUTO: 176 10*3/MM3 (ref 150–450)
PLATELET # BLD AUTO: 186 10*3/MM3 (ref 150–450)
PMV BLD AUTO: 11.6 FL (ref 8–12)
PMV BLD AUTO: 11.6 FL (ref 8–12)
PMV BLD AUTO: 11.7 FL (ref 8–12)
PMV BLD AUTO: 11.8 FL (ref 8–12)
PMV BLD AUTO: 12 FL (ref 8–12)
PMV BLD AUTO: 12.1 FL (ref 8–12)
PMV BLD AUTO: 12.1 FL (ref 8–12)
PMV BLD AUTO: 12.4 FL (ref 8–12)
PMV BLD AUTO: 12.4 FL (ref 8–12)
PMV BLD AUTO: 12.5 FL (ref 8–12)
PMV BLD AUTO: 12.5 FL (ref 8–12)
PO2 BLDA: 238 MM HG (ref 83–108)
PO2 BLDA: 54.2 MM HG (ref 83–108)
PO2 BLDA: 57 MM HG (ref 83–108)
PO2 BLDA: 57.6 MM HG (ref 83–108)
PO2 BLDA: 63.3 MM HG (ref 83–108)
PO2 BLDA: 65.3 MM HG (ref 83–108)
PO2 BLDA: 69 MM HG (ref 80–105)
PO2 BLDA: 71.5 MM HG (ref 83–108)
PO2 BLDA: 72.3 MM HG (ref 83–108)
PO2 BLDA: 73.5 MM HG (ref 83–108)
PO2 BLDA: 74.3 MM HG (ref 83–108)
PO2 BLDA: 74.5 MM HG (ref 83–108)
PO2 BLDA: 75.8 MM HG (ref 83–108)
PO2 BLDA: 77.6 MM HG (ref 83–108)
PO2 BLDA: 78.5 MM HG (ref 83–108)
PO2 BLDA: 81.3 MM HG (ref 83–108)
PO2 BLDA: 82.9 MM HG (ref 83–108)
PO2 BLDA: 83.3 MM HG (ref 83–108)
PO2 BLDA: 84.2 MM HG (ref 83–108)
PO2 TEMP ADJ BLD: ABNORMAL MM HG (ref 83–108)
POTASSIUM BLD-SCNC: 3.3 MMOL/L (ref 3.5–5.1)
POTASSIUM BLD-SCNC: 3.4 MMOL/L (ref 3.5–5.1)
POTASSIUM BLD-SCNC: 3.5 MMOL/L (ref 3.5–5.1)
POTASSIUM BLD-SCNC: 3.6 MMOL/L (ref 3.5–5.1)
POTASSIUM BLD-SCNC: 3.6 MMOL/L (ref 3.5–5.1)
POTASSIUM BLD-SCNC: 3.8 MMOL/L (ref 3.5–5.1)
POTASSIUM BLD-SCNC: 3.9 MMOL/L (ref 3.5–5.1)
POTASSIUM BLD-SCNC: 4 MMOL/L (ref 3.5–5.1)
POTASSIUM BLD-SCNC: 4 MMOL/L (ref 3.5–5.1)
POTASSIUM BLD-SCNC: 4.1 MMOL/L (ref 3.5–5.1)
POTASSIUM BLD-SCNC: 4.5 MMOL/L (ref 3.5–5.1)
POTASSIUM BLD-SCNC: 5 MMOL/L (ref 3.5–5.1)
POTASSIUM BLD-SCNC: 5.4 MMOL/L (ref 3.5–5.1)
POTASSIUM BLDA-SCNC: 3.3 MMOL/L (ref 3.4–4.5)
POTASSIUM BLDA-SCNC: 3.36 MMOL/L (ref 3.6–4.9)
POTASSIUM BLDA-SCNC: 3.5 MMOL/L (ref 3.4–4.5)
POTASSIUM BLDA-SCNC: 3.6 MMOL/L (ref 3.4–4.5)
POTASSIUM BLDA-SCNC: 3.6 MMOL/L (ref 3.4–4.5)
POTASSIUM BLDA-SCNC: 3.7 MMOL/L (ref 3.4–4.5)
POTASSIUM BLDA-SCNC: 3.7 MMOL/L (ref 3.4–4.5)
POTASSIUM BLDA-SCNC: 4.4 MMOL/L (ref 3.4–4.5)
PROT SERPL-MCNC: 4.7 G/DL (ref 6.3–8.6)
PROT SERPL-MCNC: 4.8 G/DL (ref 6.3–8.6)
PROT SERPL-MCNC: 4.8 G/DL (ref 6.3–8.6)
PROT SERPL-MCNC: 4.9 G/DL (ref 6.3–8.6)
PROT SERPL-MCNC: 4.9 G/DL (ref 6.3–8.6)
PROT SERPL-MCNC: 5 G/DL (ref 6.3–8.6)
PROT SERPL-MCNC: 5 G/DL (ref 6.3–8.6)
PROT SERPL-MCNC: 5.3 G/DL (ref 6.3–8.6)
PROT SERPL-MCNC: 5.5 G/DL (ref 6.3–8.6)
PROT SERPL-MCNC: 5.6 G/DL (ref 6.3–8.6)
PROT SERPL-MCNC: 5.9 G/DL (ref 6.3–8.6)
PROT SERPL-MCNC: 6.1 G/DL (ref 6.3–8.6)
PROT SERPL-MCNC: 6.1 G/DL (ref 6.3–8.6)
PROT SERPL-MCNC: 6.2 G/DL (ref 6.3–8.6)
PROT SERPL-MCNC: 6.4 G/DL (ref 6.3–8.6)
PROT SERPL-MCNC: 7.1 G/DL (ref 6.3–8.6)
PROT SERPL-MCNC: 7.2 G/DL (ref 6.3–8.6)
PROT UR QL STRIP: ABNORMAL
PROT UR QL STRIP: ABNORMAL
PROT UR QL STRIP: NEGATIVE
PROT UR-MCNC: 11.6 MG/DL
PROT UR-MCNC: 12.8 MG/DL
PROT/CREAT UR: 285 MG/G CREA (ref 0–200)
PROT/CREAT UR: 571.4 MG/G CREA (ref 0–200)
PROTHROMBIN TIME: 13.9 SECONDS (ref 11.1–15.3)
RBC # BLD AUTO: 3.54 10*6/MM3 (ref 3.77–5.16)
RBC # BLD AUTO: 3.6 10*6/MM3 (ref 3.77–5.16)
RBC # BLD AUTO: 3.65 10*6/MM3 (ref 3.77–5.16)
RBC # BLD AUTO: 3.7 10*6/MM3 (ref 3.77–5.16)
RBC # BLD AUTO: 3.8 10*6/MM3 (ref 3.77–5.16)
RBC # BLD AUTO: 3.91 10*6/MM3 (ref 3.77–5.16)
RBC # BLD AUTO: 4.18 10*6/MM3 (ref 3.77–5.16)
RBC # BLD AUTO: 4.24 10*6/MM3 (ref 3.77–5.16)
RBC # BLD AUTO: 4.43 10*6/MM3 (ref 3.77–5.16)
RBC # BLD AUTO: 4.54 10*6/MM3 (ref 3.77–5.16)
RBC # BLD AUTO: 4.67 10*6/MM3 (ref 3.77–5.16)
RBC # BLD AUTO: 4.92 10*6/MM3 (ref 3.77–5.16)
RBC # BLD AUTO: 4.97 10*6/MM3 (ref 3.77–5.16)
RBC # UR: ABNORMAL /HPF
RBC MORPH BLD: NORMAL
REF LAB TEST METHOD: ABNORMAL
RH BLD: POSITIVE
S PNEUM AG SPEC QL LA: NEGATIVE
SAO2 % BLDCOA: 85.7 % (ref 94–99)
SAO2 % BLDCOA: 87 % (ref 94–99)
SAO2 % BLDCOA: 87.8 % (ref 94–99)
SAO2 % BLDCOA: 90.1 % (ref 94–99)
SAO2 % BLDCOA: 90.8 % (ref 94–99)
SAO2 % BLDCOA: 92 % (ref 94–99)
SAO2 % BLDCOA: 92.1 % (ref 94–99)
SAO2 % BLDCOA: 92.8 % (ref 94–99)
SAO2 % BLDCOA: 93 % (ref 94–100)
SAO2 % BLDCOA: 93.1 % (ref 94–99)
SAO2 % BLDCOA: 93.4 % (ref 94–99)
SAO2 % BLDCOA: 93.4 % (ref 94–99)
SAO2 % BLDCOA: 93.5 % (ref 94–99)
SAO2 % BLDCOA: 93.7 % (ref 94–99)
SAO2 % BLDCOA: 93.8 % (ref 94–99)
SAO2 % BLDCOA: 94.4 % (ref 94–99)
SAO2 % BLDCOA: 95.3 % (ref 94–99)
SAO2 % BLDCOA: 95.7 % (ref 94–99)
SAO2 % BLDCOA: 99.5 % (ref 94–99)
SET MECH RESP RATE: 12
SET MECH RESP RATE: 16
SET MECH RESP RATE: 18
SET MECH RESP RATE: 18
SET MECH RESP RATE: 20
SET MECH RESP RATE: 22
SODIUM BLD-SCNC: 133 MMOL/L (ref 137–145)
SODIUM BLD-SCNC: 136 MMOL/L (ref 137–145)
SODIUM BLD-SCNC: 136 MMOL/L (ref 137–145)
SODIUM BLD-SCNC: 138 MMOL/L (ref 137–145)
SODIUM BLD-SCNC: 139 MMOL/L (ref 137–145)
SODIUM BLD-SCNC: 140 MMOL/L (ref 137–145)
SODIUM BLD-SCNC: 141 MMOL/L (ref 137–145)
SODIUM BLD-SCNC: 142 MMOL/L (ref 137–145)
SODIUM BLD-SCNC: 143 MMOL/L (ref 137–145)
SODIUM BLD-SCNC: 144 MMOL/L (ref 137–145)
SODIUM BLD-SCNC: 144 MMOL/L (ref 137–145)
SODIUM BLD-SCNC: 146 MMOL/L (ref 137–145)
SODIUM BLDA-SCNC: 135.3 MMOL/L (ref 138–146)
SODIUM BLDA-SCNC: 137 MMOL/L (ref 136–146)
SODIUM BLDA-SCNC: 138 MMOL/L (ref 136–146)
SODIUM BLDA-SCNC: 138 MMOL/L (ref 136–146)
SODIUM BLDA-SCNC: 139 MMOL/L (ref 136–146)
SODIUM BLDA-SCNC: 141 MMOL/L (ref 136–146)
SODIUM BLDA-SCNC: 141 MMOL/L (ref 136–146)
SODIUM BLDA-SCNC: 145 MMOL/L (ref 136–146)
SP GR UR STRIP: 1.01 (ref 1–1.03)
SP GR UR STRIP: 1.02 (ref 1–1.03)
SP GR UR STRIP: 1.02 (ref 1–1.03)
SQUAMOUS #/AREA URNS HPF: ABNORMAL /HPF
STRESS TARGET HR: 133 BPM
T&S EXPIRATION DATE: NORMAL
TRIGL SERPL-MCNC: 112 MG/DL (ref 20–199)
TRIGL SERPL-MCNC: 130 MG/DL (ref 20–199)
TROPONIN I SERPL-MCNC: 0.04 NG/ML
TROPONIN I SERPL-MCNC: 0.19 NG/ML
TROPONIN I SERPL-MCNC: 12 NG/ML
TROPONIN I SERPL-MCNC: 14.8 NG/ML
TROPONIN I SERPL-MCNC: 2.08 NG/ML
TROPONIN I SERPL-MCNC: 5.92 NG/ML
UNIT  ABO: NORMAL
UNIT  RH: NORMAL
UROBILINOGEN UR QL STRIP: ABNORMAL
VENTILATOR MODE: ABNORMAL
VENTILATOR MODE: AC
VT ON VENT VENT: 500 ML
VT ON VENT VENT: 500 ML
VT ON VENT VENT: 550 ML
VT ON VENT VENT: 550 ML
VT ON VENT VENT: 600 ML
VT ON VENT VENT: 650 ML
VT ON VENT VENT: 650 ML
WBC MORPH BLD: NORMAL
WBC NRBC COR # BLD: 11.44 10*3/MM3 (ref 3.2–9.8)
WBC NRBC COR # BLD: 13.73 10*3/MM3 (ref 3.2–9.8)
WBC NRBC COR # BLD: 14.79 10*3/MM3 (ref 3.2–9.8)
WBC NRBC COR # BLD: 16.02 10*3/MM3 (ref 3.2–9.8)
WBC NRBC COR # BLD: 16.1 10*3/MM3 (ref 3.2–9.8)
WBC NRBC COR # BLD: 17.02 10*3/MM3 (ref 3.2–9.8)
WBC NRBC COR # BLD: 21.12 10*3/MM3 (ref 3.2–9.8)
WBC NRBC COR # BLD: 21.34 10*3/MM3 (ref 3.2–9.8)
WBC NRBC COR # BLD: 21.84 10*3/MM3 (ref 3.2–9.8)
WBC NRBC COR # BLD: 23.68 10*3/MM3 (ref 3.2–9.8)
WBC NRBC COR # BLD: 5.93 10*3/MM3 (ref 3.2–9.8)
WBC NRBC COR # BLD: 7.17 10*3/MM3 (ref 3.2–9.8)
WBC NRBC COR # BLD: 9.57 10*3/MM3 (ref 3.2–9.8)
WBC UR QL AUTO: ABNORMAL /HPF
WHOLE BLOOD HOLD SPECIMEN: NORMAL
WHOLE BLOOD HOLD SPECIMEN: NORMAL

## 2018-01-01 PROCEDURE — 85025 COMPLETE CBC W/AUTO DIFF WBC: CPT | Performed by: INTERNAL MEDICINE

## 2018-01-01 PROCEDURE — 94799 UNLISTED PULMONARY SVC/PX: CPT

## 2018-01-01 PROCEDURE — 80053 COMPREHEN METABOLIC PANEL: CPT | Performed by: INTERNAL MEDICINE

## 2018-01-01 PROCEDURE — 87205 SMEAR GRAM STAIN: CPT | Performed by: NURSE PRACTITIONER

## 2018-01-01 PROCEDURE — 99232 SBSQ HOSP IP/OBS MODERATE 35: CPT | Performed by: INTERNAL MEDICINE

## 2018-01-01 PROCEDURE — 71045 X-RAY EXAM CHEST 1 VIEW: CPT

## 2018-01-01 PROCEDURE — 25010000002 SUCCINYLCHOLINE PER 20 MG: Performed by: NURSE ANESTHETIST, CERTIFIED REGISTERED

## 2018-01-01 PROCEDURE — 99406 BEHAV CHNG SMOKING 3-10 MIN: CPT | Performed by: INTERNAL MEDICINE

## 2018-01-01 PROCEDURE — 25010000002 LEVOFLOXACIN PER 250 MG: Performed by: INTERNAL MEDICINE

## 2018-01-01 PROCEDURE — 25010000002 MIDAZOLAM 50 MG/10ML SOLUTION 10 ML VIAL: Performed by: INTERNAL MEDICINE

## 2018-01-01 PROCEDURE — 82570 ASSAY OF URINE CREATININE: CPT | Performed by: INTERNAL MEDICINE

## 2018-01-01 PROCEDURE — 94640 AIRWAY INHALATION TREATMENT: CPT

## 2018-01-01 PROCEDURE — 87070 CULTURE OTHR SPECIMN AEROBIC: CPT | Performed by: HOSPITALIST

## 2018-01-01 PROCEDURE — 87070 CULTURE OTHR SPECIMN AEROBIC: CPT | Performed by: NURSE PRACTITIONER

## 2018-01-01 PROCEDURE — 93010 ELECTROCARDIOGRAM REPORT: CPT | Performed by: INTERNAL MEDICINE

## 2018-01-01 PROCEDURE — 94618 PULMONARY STRESS TESTING: CPT | Performed by: NURSE PRACTITIONER

## 2018-01-01 PROCEDURE — 99214 OFFICE O/P EST MOD 30 MIN: CPT | Performed by: NURSE PRACTITIONER

## 2018-01-01 PROCEDURE — 85007 BL SMEAR W/DIFF WBC COUNT: CPT | Performed by: INTERNAL MEDICINE

## 2018-01-01 PROCEDURE — 25010000002 ALBUMIN HUMAN 5% PER 50 ML: Performed by: NURSE ANESTHETIST, CERTIFIED REGISTERED

## 2018-01-01 PROCEDURE — 82805 BLOOD GASES W/O2 SATURATION: CPT

## 2018-01-01 PROCEDURE — P9046 ALBUMIN (HUMAN), 25%, 20 ML: HCPCS | Performed by: INTERNAL MEDICINE

## 2018-01-01 PROCEDURE — 25010000002 HEPARIN (PORCINE) PER 1000 UNITS: Performed by: INTERNAL MEDICINE

## 2018-01-01 PROCEDURE — 99214 OFFICE O/P EST MOD 30 MIN: CPT | Performed by: INTERNAL MEDICINE

## 2018-01-01 PROCEDURE — 84132 ASSAY OF SERUM POTASSIUM: CPT | Performed by: NURSE PRACTITIONER

## 2018-01-01 PROCEDURE — 82550 ASSAY OF CK (CPK): CPT | Performed by: INTERNAL MEDICINE

## 2018-01-01 PROCEDURE — 93306 TTE W/DOPPLER COMPLETE: CPT

## 2018-01-01 PROCEDURE — 87205 SMEAR GRAM STAIN: CPT | Performed by: INTERNAL MEDICINE

## 2018-01-01 PROCEDURE — 83880 ASSAY OF NATRIURETIC PEPTIDE: CPT | Performed by: PHYSICIAN ASSISTANT

## 2018-01-01 PROCEDURE — 82553 CREATINE MB FRACTION: CPT | Performed by: INTERNAL MEDICINE

## 2018-01-01 PROCEDURE — 82962 GLUCOSE BLOOD TEST: CPT

## 2018-01-01 PROCEDURE — 87899 AGENT NOS ASSAY W/OPTIC: CPT | Performed by: INTERNAL MEDICINE

## 2018-01-01 PROCEDURE — 63710000001 INSULIN ASPART PER 5 UNITS: Performed by: INTERNAL MEDICINE

## 2018-01-01 PROCEDURE — G8978 MOBILITY CURRENT STATUS: HCPCS

## 2018-01-01 PROCEDURE — 44320 COLOSTOMY: CPT | Performed by: SURGERY

## 2018-01-01 PROCEDURE — 80069 RENAL FUNCTION PANEL: CPT | Performed by: INTERNAL MEDICINE

## 2018-01-01 PROCEDURE — 94003 VENT MGMT INPAT SUBQ DAY: CPT

## 2018-01-01 PROCEDURE — 87086 URINE CULTURE/COLONY COUNT: CPT | Performed by: HOSPITALIST

## 2018-01-01 PROCEDURE — 83735 ASSAY OF MAGNESIUM: CPT | Performed by: INTERNAL MEDICINE

## 2018-01-01 PROCEDURE — G8417 CALC BMI ABV UP PARAM F/U: HCPCS | Performed by: INTERNAL MEDICINE

## 2018-01-01 PROCEDURE — 25010000002 METHYLPREDNISOLONE PER 125 MG: Performed by: INTERNAL MEDICINE

## 2018-01-01 PROCEDURE — 82805 BLOOD GASES W/O2 SATURATION: CPT | Performed by: SURGERY

## 2018-01-01 PROCEDURE — G8987 SELF CARE CURRENT STATUS: HCPCS

## 2018-01-01 PROCEDURE — 25010000002 AZITHROMYCIN PER 500 MG: Performed by: INTERNAL MEDICINE

## 2018-01-01 PROCEDURE — 96372 THER/PROPH/DIAG INJ SC/IM: CPT | Performed by: NURSE PRACTITIONER

## 2018-01-01 PROCEDURE — 83050 HGB METHEMOGLOBIN QUAN: CPT | Performed by: SURGERY

## 2018-01-01 PROCEDURE — 0DH60UZ INSERTION OF FEEDING DEVICE INTO STOMACH, OPEN APPROACH: ICD-10-PCS | Performed by: SURGERY

## 2018-01-01 PROCEDURE — 99214 OFFICE O/P EST MOD 30 MIN: CPT | Performed by: FAMILY MEDICINE

## 2018-01-01 PROCEDURE — 83050 HGB METHEMOGLOBIN QUAN: CPT

## 2018-01-01 PROCEDURE — 25010000002 LEVOFLOXACIN PER 250 MG: Performed by: HOSPITALIST

## 2018-01-01 PROCEDURE — 82375 ASSAY CARBOXYHB QUANT: CPT

## 2018-01-01 PROCEDURE — 86850 RBC ANTIBODY SCREEN: CPT | Performed by: SURGERY

## 2018-01-01 PROCEDURE — 85730 THROMBOPLASTIN TIME PARTIAL: CPT | Performed by: PHYSICIAN ASSISTANT

## 2018-01-01 PROCEDURE — 99213 OFFICE O/P EST LOW 20 MIN: CPT | Performed by: FAMILY MEDICINE

## 2018-01-01 PROCEDURE — 36415 COLL VENOUS BLD VENIPUNCTURE: CPT

## 2018-01-01 PROCEDURE — 97110 THERAPEUTIC EXERCISES: CPT

## 2018-01-01 PROCEDURE — 25010000002 CEFTRIAXONE PER 250 MG: Performed by: INTERNAL MEDICINE

## 2018-01-01 PROCEDURE — 80053 COMPREHEN METABOLIC PANEL: CPT | Performed by: PHYSICIAN ASSISTANT

## 2018-01-01 PROCEDURE — 82803 BLOOD GASES ANY COMBINATION: CPT

## 2018-01-01 PROCEDURE — 0WQF0ZZ REPAIR ABDOMINAL WALL, OPEN APPROACH: ICD-10-PCS | Performed by: SURGERY

## 2018-01-01 PROCEDURE — 87040 BLOOD CULTURE FOR BACTERIA: CPT | Performed by: FAMILY MEDICINE

## 2018-01-01 PROCEDURE — 80048 BASIC METABOLIC PNL TOTAL CA: CPT

## 2018-01-01 PROCEDURE — 84484 ASSAY OF TROPONIN QUANT: CPT | Performed by: INTERNAL MEDICINE

## 2018-01-01 PROCEDURE — 94760 N-INVAS EAR/PLS OXIMETRY 1: CPT

## 2018-01-01 PROCEDURE — 80069 RENAL FUNCTION PANEL: CPT | Performed by: FAMILY MEDICINE

## 2018-01-01 PROCEDURE — 82306 VITAMIN D 25 HYDROXY: CPT | Performed by: INTERNAL MEDICINE

## 2018-01-01 PROCEDURE — 25010000002 FUROSEMIDE PER 20 MG: Performed by: INTERNAL MEDICINE

## 2018-01-01 PROCEDURE — 88307 TISSUE EXAM BY PATHOLOGIST: CPT | Performed by: PATHOLOGY

## 2018-01-01 PROCEDURE — 25010000002 METHYLPREDNISOLONE PER 125 MG: Performed by: PHYSICIAN ASSISTANT

## 2018-01-01 PROCEDURE — 99223 1ST HOSP IP/OBS HIGH 75: CPT | Performed by: SURGERY

## 2018-01-01 PROCEDURE — 25010000002 HEPARIN (PORCINE) PER 1000 UNITS: Performed by: SURGERY

## 2018-01-01 PROCEDURE — 36600 WITHDRAWAL OF ARTERIAL BLOOD: CPT | Performed by: SURGERY

## 2018-01-01 PROCEDURE — 97166 OT EVAL MOD COMPLEX 45 MIN: CPT

## 2018-01-01 PROCEDURE — 36415 COLL VENOUS BLD VENIPUNCTURE: CPT | Performed by: FAMILY MEDICINE

## 2018-01-01 PROCEDURE — 82553 CREATINE MB FRACTION: CPT | Performed by: PHYSICIAN ASSISTANT

## 2018-01-01 PROCEDURE — 83605 ASSAY OF LACTIC ACID: CPT | Performed by: INTERNAL MEDICINE

## 2018-01-01 PROCEDURE — 86923 COMPATIBILITY TEST ELECTRIC: CPT

## 2018-01-01 PROCEDURE — 25010000002 CEFEPIME PER 500 MG: Performed by: HOSPITALIST

## 2018-01-01 PROCEDURE — 80061 LIPID PANEL: CPT | Performed by: FAMILY MEDICINE

## 2018-01-01 PROCEDURE — 0D1B0Z4 BYPASS ILEUM TO CUTANEOUS, OPEN APPROACH: ICD-10-PCS | Performed by: SURGERY

## 2018-01-01 PROCEDURE — 84484 ASSAY OF TROPONIN QUANT: CPT | Performed by: PHYSICIAN ASSISTANT

## 2018-01-01 PROCEDURE — 82570 ASSAY OF URINE CREATININE: CPT | Performed by: FAMILY MEDICINE

## 2018-01-01 PROCEDURE — 82043 UR ALBUMIN QUANTITATIVE: CPT | Performed by: FAMILY MEDICINE

## 2018-01-01 PROCEDURE — 86901 BLOOD TYPING SEROLOGIC RH(D): CPT | Performed by: SURGERY

## 2018-01-01 PROCEDURE — 25010000002 MORPHINE PER 10 MG: Performed by: INTERNAL MEDICINE

## 2018-01-01 PROCEDURE — 83735 ASSAY OF MAGNESIUM: CPT

## 2018-01-01 PROCEDURE — 85379 FIBRIN DEGRADATION QUANT: CPT | Performed by: INTERNAL MEDICINE

## 2018-01-01 PROCEDURE — 99204 OFFICE O/P NEW MOD 45 MIN: CPT | Performed by: INTERNAL MEDICINE

## 2018-01-01 PROCEDURE — 94002 VENT MGMT INPAT INIT DAY: CPT

## 2018-01-01 PROCEDURE — G9902 PT SCRN TBCO AND ID AS USER: HCPCS | Performed by: INTERNAL MEDICINE

## 2018-01-01 PROCEDURE — 76937 US GUIDE VASCULAR ACCESS: CPT

## 2018-01-01 PROCEDURE — 80076 HEPATIC FUNCTION PANEL: CPT | Performed by: FAMILY MEDICINE

## 2018-01-01 PROCEDURE — 82375 ASSAY CARBOXYHB QUANT: CPT | Performed by: SURGERY

## 2018-01-01 PROCEDURE — 85025 COMPLETE CBC W/AUTO DIFF WBC: CPT | Performed by: FAMILY MEDICINE

## 2018-01-01 PROCEDURE — 81001 URINALYSIS AUTO W/SCOPE: CPT | Performed by: HOSPITALIST

## 2018-01-01 PROCEDURE — 85025 COMPLETE CBC W/AUTO DIFF WBC: CPT | Performed by: PHYSICIAN ASSISTANT

## 2018-01-01 PROCEDURE — 25010000003 POTASSIUM CHLORIDE 10 MEQ/100ML SOLUTION: Performed by: INTERNAL MEDICINE

## 2018-01-01 PROCEDURE — 99285 EMERGENCY DEPT VISIT HI MDM: CPT

## 2018-01-01 PROCEDURE — 74176 CT ABD & PELVIS W/O CONTRAST: CPT

## 2018-01-01 PROCEDURE — 02HV33Z INSERTION OF INFUSION DEVICE INTO SUPERIOR VENA CAVA, PERCUTANEOUS APPROACH: ICD-10-PCS | Performed by: SURGERY

## 2018-01-01 PROCEDURE — 88307 TISSUE EXAM BY PATHOLOGIST: CPT | Performed by: SURGERY

## 2018-01-01 PROCEDURE — 0D9A00Z DRAINAGE OF JEJUNUM WITH DRAINAGE DEVICE, OPEN APPROACH: ICD-10-PCS | Performed by: SURGERY

## 2018-01-01 PROCEDURE — 84156 ASSAY OF PROTEIN URINE: CPT | Performed by: FAMILY MEDICINE

## 2018-01-01 PROCEDURE — 44140 PARTIAL REMOVAL OF COLON: CPT | Performed by: SURGERY

## 2018-01-01 PROCEDURE — C2627 CATH, SUPRAPUBIC/CYSTOSCOPIC: HCPCS | Performed by: SURGERY

## 2018-01-01 PROCEDURE — 97116 GAIT TRAINING THERAPY: CPT

## 2018-01-01 PROCEDURE — 87205 SMEAR GRAM STAIN: CPT | Performed by: HOSPITALIST

## 2018-01-01 PROCEDURE — 25010000002 ONDANSETRON PER 1 MG: Performed by: INTERNAL MEDICINE

## 2018-01-01 PROCEDURE — 83036 HEMOGLOBIN GLYCOSYLATED A1C: CPT | Performed by: FAMILY MEDICINE

## 2018-01-01 PROCEDURE — 84156 ASSAY OF PROTEIN URINE: CPT | Performed by: INTERNAL MEDICINE

## 2018-01-01 PROCEDURE — 86900 BLOOD TYPING SEROLOGIC ABO: CPT | Performed by: SURGERY

## 2018-01-01 PROCEDURE — G8988 SELF CARE GOAL STATUS: HCPCS

## 2018-01-01 PROCEDURE — 71250 CT THORAX DX C-: CPT

## 2018-01-01 PROCEDURE — 99497 ADVNCD CARE PLAN 30 MIN: CPT | Performed by: NURSE PRACTITIONER

## 2018-01-01 PROCEDURE — 93005 ELECTROCARDIOGRAM TRACING: CPT | Performed by: PHYSICIAN ASSISTANT

## 2018-01-01 PROCEDURE — 25010000002 MIDAZOLAM PER 1 MG: Performed by: NURSE ANESTHETIST, CERTIFIED REGISTERED

## 2018-01-01 PROCEDURE — 99215 OFFICE O/P EST HI 40 MIN: CPT | Performed by: NURSE PRACTITIONER

## 2018-01-01 PROCEDURE — 84100 ASSAY OF PHOSPHORUS: CPT | Performed by: FAMILY MEDICINE

## 2018-01-01 PROCEDURE — 36430 TRANSFUSION BLD/BLD COMPNT: CPT

## 2018-01-01 PROCEDURE — C1751 CATH, INF, PER/CENT/MIDLINE: HCPCS

## 2018-01-01 PROCEDURE — 99284 EMERGENCY DEPT VISIT MOD MDM: CPT

## 2018-01-01 PROCEDURE — 81001 URINALYSIS AUTO W/SCOPE: CPT | Performed by: FAMILY MEDICINE

## 2018-01-01 PROCEDURE — 99024 POSTOP FOLLOW-UP VISIT: CPT | Performed by: SURGERY

## 2018-01-01 PROCEDURE — 25010000002 FUROSEMIDE PER 20 MG: Performed by: PHYSICIAN ASSISTANT

## 2018-01-01 PROCEDURE — 85610 PROTHROMBIN TIME: CPT | Performed by: PHYSICIAN ASSISTANT

## 2018-01-01 PROCEDURE — P9041 ALBUMIN (HUMAN),5%, 50ML: HCPCS | Performed by: NURSE ANESTHETIST, CERTIFIED REGISTERED

## 2018-01-01 PROCEDURE — 81001 URINALYSIS AUTO W/SCOPE: CPT | Performed by: INTERNAL MEDICINE

## 2018-01-01 PROCEDURE — 80053 COMPREHEN METABOLIC PANEL: CPT | Performed by: FAMILY MEDICINE

## 2018-01-01 PROCEDURE — 25010000002 FENTANYL CITRATE (PF) 100 MCG/2ML SOLUTION: Performed by: NURSE ANESTHETIST, CERTIFIED REGISTERED

## 2018-01-01 PROCEDURE — 25010000003 POTASSIUM CHLORIDE 10 MEQ/100ML SOLUTION

## 2018-01-01 PROCEDURE — 82803 BLOOD GASES ANY COMBINATION: CPT | Performed by: PHYSICIAN ASSISTANT

## 2018-01-01 PROCEDURE — 36600 WITHDRAWAL OF ARTERIAL BLOOD: CPT

## 2018-01-01 PROCEDURE — 96372 THER/PROPH/DIAG INJ SC/IM: CPT | Performed by: FAMILY MEDICINE

## 2018-01-01 PROCEDURE — 36415 COLL VENOUS BLD VENIPUNCTURE: CPT | Performed by: INTERNAL MEDICINE

## 2018-01-01 PROCEDURE — B548ZZA ULTRASONOGRAPHY OF SUPERIOR VENA CAVA, GUIDANCE: ICD-10-PCS | Performed by: SURGERY

## 2018-01-01 PROCEDURE — P9037 PLATE PHERES LEUKOREDU IRRAD: HCPCS

## 2018-01-01 PROCEDURE — 25010000002 ALBUMIN HUMAN 25% PER 50 ML: Performed by: INTERNAL MEDICINE

## 2018-01-01 PROCEDURE — 25010000002 ONDANSETRON PER 1 MG: Performed by: FAMILY MEDICINE

## 2018-01-01 PROCEDURE — 0DBF0ZZ EXCISION OF RIGHT LARGE INTESTINE, OPEN APPROACH: ICD-10-PCS | Performed by: SURGERY

## 2018-01-01 PROCEDURE — 97535 SELF CARE MNGMENT TRAINING: CPT

## 2018-01-01 PROCEDURE — G8979 MOBILITY GOAL STATUS: HCPCS

## 2018-01-01 PROCEDURE — 97161 PT EVAL LOW COMPLEX 20 MIN: CPT

## 2018-01-01 PROCEDURE — 80053 COMPREHEN METABOLIC PANEL: CPT | Performed by: HOSPITALIST

## 2018-01-01 PROCEDURE — 25010000002 MORPHINE PER 10 MG: Performed by: FAMILY MEDICINE

## 2018-01-01 PROCEDURE — 49651 LAP ING HERNIA REPAIR RECUR: CPT | Performed by: SURGERY

## 2018-01-01 PROCEDURE — 44310 ILEOSTOMY/JEJUNOSTOMY: CPT | Performed by: SURGERY

## 2018-01-01 PROCEDURE — 25010000002 CEFTRIAXONE: Performed by: PHYSICIAN ASSISTANT

## 2018-01-01 PROCEDURE — 93005 ELECTROCARDIOGRAM TRACING: CPT | Performed by: HOSPITALIST

## 2018-01-01 PROCEDURE — 80048 BASIC METABOLIC PNL TOTAL CA: CPT | Performed by: FAMILY MEDICINE

## 2018-01-01 PROCEDURE — 43830 GSTRST OPEN WO CONSTJ TUBE: CPT | Performed by: SURGERY

## 2018-01-01 PROCEDURE — 25010000002 PROPOFOL 1000 MG/ML EMULSION: Performed by: SURGERY

## 2018-01-01 PROCEDURE — 87070 CULTURE OTHR SPECIMN AEROBIC: CPT | Performed by: INTERNAL MEDICINE

## 2018-01-01 PROCEDURE — 93005 ELECTROCARDIOGRAM TRACING: CPT | Performed by: INTERNAL MEDICINE

## 2018-01-01 PROCEDURE — 25010000002 PROPOFOL 10 MG/ML EMULSION: Performed by: NURSE ANESTHETIST, CERTIFIED REGISTERED

## 2018-01-01 PROCEDURE — 83690 ASSAY OF LIPASE: CPT | Performed by: FAMILY MEDICINE

## 2018-01-01 PROCEDURE — 97530 THERAPEUTIC ACTIVITIES: CPT

## 2018-01-01 PROCEDURE — 83605 ASSAY OF LACTIC ACID: CPT | Performed by: FAMILY MEDICINE

## 2018-01-01 RX ORDER — SODIUM CHLORIDE 9 MG/ML
INJECTION, SOLUTION INTRAVENOUS
Status: COMPLETED
Start: 2018-01-01 | End: 2018-01-01

## 2018-01-01 RX ORDER — ALBUTEROL SULFATE 90 UG/1
4 AEROSOL, METERED RESPIRATORY (INHALATION)
Status: DISCONTINUED | OUTPATIENT
Start: 2018-01-01 | End: 2018-01-01 | Stop reason: HOSPADM

## 2018-01-01 RX ORDER — ATORVASTATIN CALCIUM 80 MG/1
80 TABLET, FILM COATED ORAL NIGHTLY
Qty: 90 TABLET | Refills: 3 | Status: SHIPPED | OUTPATIENT
Start: 2018-01-01

## 2018-01-01 RX ORDER — ALLOPURINOL 100 MG/1
100 TABLET ORAL DAILY
Status: DISCONTINUED | OUTPATIENT
Start: 2018-01-01 | End: 2018-01-01 | Stop reason: HOSPADM

## 2018-01-01 RX ORDER — PANTOPRAZOLE SODIUM 40 MG/1
40 TABLET, DELAYED RELEASE ORAL
Status: DISCONTINUED | OUTPATIENT
Start: 2018-01-01 | End: 2018-01-01 | Stop reason: HOSPADM

## 2018-01-01 RX ORDER — POTASSIUM CHLORIDE 1.5 G/1.77G
40 POWDER, FOR SOLUTION ORAL AS NEEDED
Status: DISCONTINUED | OUTPATIENT
Start: 2018-01-01 | End: 2018-01-01 | Stop reason: HOSPADM

## 2018-01-01 RX ORDER — CALCIUM CHLORIDE 100 MG/ML
INJECTION INTRAVENOUS; INTRAVENTRICULAR AS NEEDED
Status: DISCONTINUED | OUTPATIENT
Start: 2018-01-01 | End: 2018-01-01 | Stop reason: SURG

## 2018-01-01 RX ORDER — ACETAMINOPHEN 325 MG/1
650 TABLET ORAL EVERY 4 HOURS PRN
Status: DISCONTINUED | OUTPATIENT
Start: 2018-01-01 | End: 2018-01-01 | Stop reason: HOSPADM

## 2018-01-01 RX ORDER — CHLORHEXIDINE GLUCONATE 0.12 MG/ML
15 RINSE ORAL EVERY 12 HOURS SCHEDULED
Status: DISCONTINUED | OUTPATIENT
Start: 2018-01-01 | End: 2018-01-01 | Stop reason: HOSPADM

## 2018-01-01 RX ORDER — HYDRALAZINE HYDROCHLORIDE 20 MG/ML
10 INJECTION INTRAMUSCULAR; INTRAVENOUS EVERY 6 HOURS PRN
Status: DISCONTINUED | OUTPATIENT
Start: 2018-01-01 | End: 2018-01-01 | Stop reason: HOSPADM

## 2018-01-01 RX ORDER — LEVOFLOXACIN 750 MG/1
TABLET ORAL
Qty: 5 TABLET | Refills: 0 | Status: ON HOLD | OUTPATIENT
Start: 2018-01-01 | End: 2018-01-01

## 2018-01-01 RX ORDER — TRIAMCINOLONE ACETONIDE 40 MG/ML
40 INJECTION, SUSPENSION INTRA-ARTICULAR; INTRAMUSCULAR ONCE
Status: DISCONTINUED | OUTPATIENT
Start: 2018-01-01 | End: 2018-01-01

## 2018-01-01 RX ORDER — METHYLPREDNISOLONE SODIUM SUCCINATE 125 MG/2ML
60 INJECTION, POWDER, LYOPHILIZED, FOR SOLUTION INTRAMUSCULAR; INTRAVENOUS EVERY 6 HOURS
Status: DISCONTINUED | OUTPATIENT
Start: 2018-01-01 | End: 2018-01-01 | Stop reason: HOSPADM

## 2018-01-01 RX ORDER — CLOPIDOGREL BISULFATE 75 MG/1
75 TABLET ORAL DAILY
Qty: 30 TABLET | Refills: 11 | Status: SHIPPED | OUTPATIENT
Start: 2018-01-01 | End: 2018-01-01 | Stop reason: SDUPTHER

## 2018-01-01 RX ORDER — 0.9 % SODIUM CHLORIDE 0.9 %
10 VIAL (ML) INJECTION EVERY 12 HOURS SCHEDULED
Status: DISCONTINUED | OUTPATIENT
Start: 2018-01-01 | End: 2018-01-01 | Stop reason: HOSPADM

## 2018-01-01 RX ORDER — PANTOPRAZOLE SODIUM 40 MG/10ML
40 INJECTION, POWDER, LYOPHILIZED, FOR SOLUTION INTRAVENOUS
Status: DISCONTINUED | OUTPATIENT
Start: 2018-01-01 | End: 2018-01-01 | Stop reason: HOSPADM

## 2018-01-01 RX ORDER — DOXYCYCLINE 100 MG/1
100 CAPSULE ORAL EVERY 12 HOURS SCHEDULED
Qty: 20 CAPSULE | Refills: 0 | Status: SHIPPED | OUTPATIENT
Start: 2018-01-01 | End: 2018-01-01

## 2018-01-01 RX ORDER — DEXTROMETHORPHAN POLISTIREX 30 MG/5ML
60 SUSPENSION ORAL EVERY 12 HOURS SCHEDULED
Status: DISCONTINUED | OUTPATIENT
Start: 2018-01-01 | End: 2018-01-01 | Stop reason: HOSPADM

## 2018-01-01 RX ORDER — LEVOFLOXACIN 5 MG/ML
500 INJECTION, SOLUTION INTRAVENOUS EVERY 24 HOURS
Status: DISCONTINUED | OUTPATIENT
Start: 2018-01-01 | End: 2018-01-01 | Stop reason: HOSPADM

## 2018-01-01 RX ORDER — ONDANSETRON 2 MG/ML
4 INJECTION INTRAMUSCULAR; INTRAVENOUS ONCE
Status: COMPLETED | OUTPATIENT
Start: 2018-01-01 | End: 2018-01-01

## 2018-01-01 RX ORDER — SODIUM CHLORIDE 9 MG/ML
100 INJECTION, SOLUTION INTRAVENOUS CONTINUOUS
Status: DISCONTINUED | OUTPATIENT
Start: 2018-01-01 | End: 2018-01-01

## 2018-01-01 RX ORDER — HEPARIN SODIUM 5000 [USP'U]/ML
5000 INJECTION, SOLUTION INTRAVENOUS; SUBCUTANEOUS EVERY 8 HOURS SCHEDULED
Status: DISCONTINUED | OUTPATIENT
Start: 2018-01-01 | End: 2018-01-01 | Stop reason: HOSPADM

## 2018-01-01 RX ORDER — FUROSEMIDE 80 MG
80 TABLET ORAL DAILY
Qty: 30 TABLET | Refills: 11 | Status: SHIPPED | OUTPATIENT
Start: 2018-01-01 | End: 2018-01-01 | Stop reason: SDUPTHER

## 2018-01-01 RX ORDER — FENTANYL CITRATE 50 UG/ML
INJECTION, SOLUTION INTRAMUSCULAR; INTRAVENOUS AS NEEDED
Status: DISCONTINUED | OUTPATIENT
Start: 2018-01-01 | End: 2018-01-01 | Stop reason: SURG

## 2018-01-01 RX ORDER — NICOTINE POLACRILEX 4 MG
15 LOZENGE BUCCAL
Status: DISCONTINUED | OUTPATIENT
Start: 2018-01-01 | End: 2018-01-01 | Stop reason: HOSPADM

## 2018-01-01 RX ORDER — ALLOPURINOL 100 MG/1
100 TABLET ORAL DAILY
Qty: 30 TABLET | Refills: 11 | Status: SHIPPED | OUTPATIENT
Start: 2018-01-01 | End: 2018-01-01 | Stop reason: SDUPTHER

## 2018-01-01 RX ORDER — ATORVASTATIN CALCIUM 80 MG/1
80 TABLET, FILM COATED ORAL NIGHTLY
Qty: 90 TABLET | Refills: 3 | Status: SHIPPED | OUTPATIENT
Start: 2018-01-01 | End: 2018-01-01 | Stop reason: SDUPTHER

## 2018-01-01 RX ORDER — FUROSEMIDE 10 MG/ML
40 INJECTION INTRAMUSCULAR; INTRAVENOUS 3 TIMES DAILY
Status: DISCONTINUED | OUTPATIENT
Start: 2018-01-01 | End: 2018-01-01 | Stop reason: HOSPADM

## 2018-01-01 RX ORDER — MORPHINE SULFATE 2 MG/ML
2 INJECTION, SOLUTION INTRAMUSCULAR; INTRAVENOUS EVERY 4 HOURS PRN
Status: DISCONTINUED | OUTPATIENT
Start: 2018-01-01 | End: 2018-01-01 | Stop reason: HOSPADM

## 2018-01-01 RX ORDER — IPRATROPIUM BROMIDE AND ALBUTEROL SULFATE 2.5; .5 MG/3ML; MG/3ML
3 SOLUTION RESPIRATORY (INHALATION) ONCE
Status: COMPLETED | OUTPATIENT
Start: 2018-01-01 | End: 2018-01-01

## 2018-01-01 RX ORDER — SUCCINYLCHOLINE CHLORIDE 20 MG/ML
INJECTION INTRAMUSCULAR; INTRAVENOUS AS NEEDED
Status: DISCONTINUED | OUTPATIENT
Start: 2018-01-01 | End: 2018-01-01 | Stop reason: SURG

## 2018-01-01 RX ORDER — SILDENAFIL CITRATE 20 MG/1
20 TABLET ORAL 3 TIMES DAILY
Status: DISCONTINUED | OUTPATIENT
Start: 2018-01-01 | End: 2018-01-01 | Stop reason: HOSPADM

## 2018-01-01 RX ORDER — POTASSIUM CHLORIDE 7.45 MG/ML
INJECTION INTRAVENOUS
Status: COMPLETED
Start: 2018-01-01 | End: 2018-01-01

## 2018-01-01 RX ORDER — SODIUM CHLORIDE, SODIUM GLUCONATE, SODIUM ACETATE, POTASSIUM CHLORIDE, AND MAGNESIUM CHLORIDE 526; 502; 368; 37; 30 MG/100ML; MG/100ML; MG/100ML; MG/100ML; MG/100ML
INJECTION, SOLUTION INTRAVENOUS CONTINUOUS PRN
Status: DISCONTINUED | OUTPATIENT
Start: 2018-01-01 | End: 2018-01-01 | Stop reason: SURG

## 2018-01-01 RX ORDER — ALBUMIN, HUMAN INJ 5% 5 %
SOLUTION INTRAVENOUS CONTINUOUS PRN
Status: DISCONTINUED | OUTPATIENT
Start: 2018-01-01 | End: 2018-01-01 | Stop reason: SURG

## 2018-01-01 RX ORDER — BLOOD-GLUCOSE METER
KIT MISCELLANEOUS
COMMUNITY
Start: 2018-01-01

## 2018-01-01 RX ORDER — ASPIRIN 81 MG/1
81 TABLET ORAL DAILY
Status: DISCONTINUED | OUTPATIENT
Start: 2018-01-01 | End: 2018-01-01 | Stop reason: HOSPADM

## 2018-01-01 RX ORDER — GUAIFENESIN 600 MG/1
1200 TABLET, EXTENDED RELEASE ORAL EVERY 12 HOURS SCHEDULED
Status: DISCONTINUED | OUTPATIENT
Start: 2018-01-01 | End: 2018-01-01 | Stop reason: HOSPADM

## 2018-01-01 RX ORDER — ALBUTEROL SULFATE 90 UG/1
2 AEROSOL, METERED RESPIRATORY (INHALATION)
Qty: 18 G | Refills: 11 | Status: SHIPPED | OUTPATIENT
Start: 2018-01-01

## 2018-01-01 RX ORDER — IPRATROPIUM BROMIDE AND ALBUTEROL SULFATE 2.5; .5 MG/3ML; MG/3ML
3 SOLUTION RESPIRATORY (INHALATION)
Status: DISCONTINUED | OUTPATIENT
Start: 2018-01-01 | End: 2018-01-01

## 2018-01-01 RX ORDER — ROCURONIUM BROMIDE 10 MG/ML
INJECTION, SOLUTION INTRAVENOUS AS NEEDED
Status: DISCONTINUED | OUTPATIENT
Start: 2018-01-01 | End: 2018-01-01 | Stop reason: SURG

## 2018-01-01 RX ORDER — GUAIFENESIN 1200 MG/1
TABLET, EXTENDED RELEASE ORAL
Qty: 60 EACH | Refills: 1 | Status: SHIPPED | OUTPATIENT
Start: 2018-01-01 | End: 2018-01-01 | Stop reason: SDUPTHER

## 2018-01-01 RX ORDER — SODIUM CHLORIDE 9 MG/ML
INJECTION, SOLUTION INTRAVENOUS
Status: DISPENSED
Start: 2018-01-01 | End: 2018-01-01

## 2018-01-01 RX ORDER — POTASSIUM CHLORIDE 7.45 MG/ML
10 INJECTION INTRAVENOUS
Status: DISCONTINUED | OUTPATIENT
Start: 2018-01-01 | End: 2018-01-01 | Stop reason: HOSPADM

## 2018-01-01 RX ORDER — ERGOCALCIFEROL 1.25 MG/1
50000 CAPSULE ORAL
Qty: 2 CAPSULE | Refills: 11 | Status: SHIPPED | OUTPATIENT
Start: 2018-01-01

## 2018-01-01 RX ORDER — MIDAZOLAM HYDROCHLORIDE 1 MG/ML
INJECTION INTRAMUSCULAR; INTRAVENOUS AS NEEDED
Status: DISCONTINUED | OUTPATIENT
Start: 2018-01-01 | End: 2018-01-01 | Stop reason: SURG

## 2018-01-01 RX ORDER — METHYLPREDNISOLONE SODIUM SUCCINATE 125 MG/2ML
125 INJECTION, POWDER, LYOPHILIZED, FOR SOLUTION INTRAMUSCULAR; INTRAVENOUS ONCE
Status: COMPLETED | OUTPATIENT
Start: 2018-01-01 | End: 2018-01-01

## 2018-01-01 RX ORDER — SODIUM CHLORIDE 0.9 % (FLUSH) 0.9 %
1-10 SYRINGE (ML) INJECTION AS NEEDED
Status: DISCONTINUED | OUTPATIENT
Start: 2018-01-01 | End: 2018-01-01 | Stop reason: HOSPADM

## 2018-01-01 RX ORDER — AZITHROMYCIN 500 MG/1
500 TABLET, FILM COATED ORAL DAILY
Qty: 5 TABLET | Refills: 0 | Status: SHIPPED | OUTPATIENT
Start: 2018-01-01 | End: 2018-01-01

## 2018-01-01 RX ORDER — BISOPROLOL FUMARATE 5 MG/1
2.5 TABLET ORAL DAILY
Status: DISCONTINUED | OUTPATIENT
Start: 2018-01-01 | End: 2018-01-01 | Stop reason: HOSPADM

## 2018-01-01 RX ORDER — BISACODYL 10 MG
10 SUPPOSITORY, RECTAL RECTAL DAILY PRN
Status: DISCONTINUED | OUTPATIENT
Start: 2018-01-01 | End: 2018-01-01 | Stop reason: HOSPADM

## 2018-01-01 RX ORDER — ERGOCALCIFEROL 1.25 MG/1
50000 CAPSULE ORAL
Qty: 2 CAPSULE | Refills: 11 | Status: SHIPPED | OUTPATIENT
Start: 2018-01-01 | End: 2018-01-01 | Stop reason: SDUPTHER

## 2018-01-01 RX ORDER — FUROSEMIDE 10 MG/ML
40 INJECTION INTRAMUSCULAR; INTRAVENOUS ONCE
Status: COMPLETED | OUTPATIENT
Start: 2018-01-01 | End: 2018-01-01

## 2018-01-01 RX ORDER — GUAIFENESIN 1200 MG/1
TABLET, EXTENDED RELEASE ORAL
Qty: 60 EACH | Refills: 3 | Status: SHIPPED | OUTPATIENT
Start: 2018-01-01 | End: 2018-01-01 | Stop reason: SDUPTHER

## 2018-01-01 RX ORDER — BISOPROLOL FUMARATE 5 MG/1
2.5 TABLET, FILM COATED ORAL DAILY
Qty: 30 TABLET | Refills: 6 | Status: SHIPPED | OUTPATIENT
Start: 2018-01-01

## 2018-01-01 RX ORDER — IPRATROPIUM BROMIDE AND ALBUTEROL SULFATE 2.5; .5 MG/3ML; MG/3ML
3 SOLUTION RESPIRATORY (INHALATION)
Status: DISCONTINUED | OUTPATIENT
Start: 2018-01-01 | End: 2018-01-01 | Stop reason: HOSPADM

## 2018-01-01 RX ORDER — ALLOPURINOL 100 MG/1
100 TABLET ORAL DAILY
Qty: 30 TABLET | Refills: 11 | Status: SHIPPED | OUTPATIENT
Start: 2018-01-01

## 2018-01-01 RX ORDER — ALBUMIN (HUMAN) 12.5 G/50ML
50 SOLUTION INTRAVENOUS ONCE
Status: COMPLETED | OUTPATIENT
Start: 2018-01-01 | End: 2018-01-01

## 2018-01-01 RX ORDER — TRIAMCINOLONE ACETONIDE 40 MG/ML
80 INJECTION, SUSPENSION INTRA-ARTICULAR; INTRAMUSCULAR ONCE
Status: COMPLETED | OUTPATIENT
Start: 2018-01-01 | End: 2018-01-01

## 2018-01-01 RX ORDER — BUMETANIDE 2 MG/1
2 TABLET ORAL DAILY
Qty: 45 TABLET | Refills: 6 | Status: SHIPPED | OUTPATIENT
Start: 2018-01-01

## 2018-01-01 RX ORDER — SILDENAFIL CITRATE 20 MG/1
20 TABLET ORAL 3 TIMES DAILY
Qty: 90 TABLET | Refills: 6 | Status: SHIPPED | OUTPATIENT
Start: 2018-01-01

## 2018-01-01 RX ORDER — SODIUM CHLORIDE 9 MG/ML
50 INJECTION, SOLUTION INTRAVENOUS CONTINUOUS
Status: DISCONTINUED | OUTPATIENT
Start: 2018-01-01 | End: 2018-01-01 | Stop reason: HOSPADM

## 2018-01-01 RX ORDER — CLOPIDOGREL BISULFATE 75 MG/1
75 TABLET ORAL DAILY
Status: DISCONTINUED | OUTPATIENT
Start: 2018-01-01 | End: 2018-01-01 | Stop reason: HOSPADM

## 2018-01-01 RX ORDER — ALBUTEROL SULFATE 2.5 MG/3ML
2.5 SOLUTION RESPIRATORY (INHALATION) 4 TIMES DAILY PRN
Qty: 125 VIAL | Refills: 11 | Status: SHIPPED | OUTPATIENT
Start: 2018-01-01

## 2018-01-01 RX ORDER — ACETYLCYSTEINE 100 MG/ML
2 SOLUTION ORAL; RESPIRATORY (INHALATION)
Status: DISCONTINUED | OUTPATIENT
Start: 2018-01-01 | End: 2018-01-01

## 2018-01-01 RX ORDER — CLOPIDOGREL BISULFATE 75 MG/1
75 TABLET ORAL DAILY
Qty: 30 TABLET | Refills: 11 | Status: SHIPPED | OUTPATIENT
Start: 2018-01-01

## 2018-01-01 RX ORDER — SODIUM CHLORIDE 0.9 % (FLUSH) 0.9 %
10 SYRINGE (ML) INJECTION AS NEEDED
Status: DISCONTINUED | OUTPATIENT
Start: 2018-01-01 | End: 2018-01-01 | Stop reason: HOSPADM

## 2018-01-01 RX ORDER — SILDENAFIL CITRATE 20 MG/1
20 TABLET ORAL 3 TIMES DAILY
Qty: 90 TABLET | Refills: 6 | Status: SHIPPED | OUTPATIENT
Start: 2018-01-01 | End: 2018-01-01 | Stop reason: SDUPTHER

## 2018-01-01 RX ORDER — ONDANSETRON 2 MG/ML
4 INJECTION INTRAMUSCULAR; INTRAVENOUS EVERY 6 HOURS PRN
Status: DISCONTINUED | OUTPATIENT
Start: 2018-01-01 | End: 2018-01-01 | Stop reason: HOSPADM

## 2018-01-01 RX ORDER — PROPOFOL 10 MG/ML
VIAL (ML) INTRAVENOUS AS NEEDED
Status: DISCONTINUED | OUTPATIENT
Start: 2018-01-01 | End: 2018-01-01 | Stop reason: SURG

## 2018-01-01 RX ORDER — GUAIFENESIN 1200 MG/1
TABLET, EXTENDED RELEASE ORAL
Qty: 60 EACH | Refills: 11 | Status: SHIPPED | OUTPATIENT
Start: 2018-01-01

## 2018-01-01 RX ORDER — FUROSEMIDE 80 MG
TABLET ORAL
Qty: 45 TABLET | Refills: 11 | Status: SHIPPED | OUTPATIENT
Start: 2018-01-01 | End: 2018-01-01

## 2018-01-01 RX ORDER — PREDNISONE 20 MG/1
40 TABLET ORAL DAILY
Qty: 10 TABLET | Refills: 0 | Status: SHIPPED | OUTPATIENT
Start: 2018-01-01 | End: 2018-01-01

## 2018-01-01 RX ORDER — NICOTINE 21 MG/24HR
1 PATCH, TRANSDERMAL 24 HOURS TRANSDERMAL EVERY 24 HOURS
Status: DISCONTINUED | OUTPATIENT
Start: 2018-01-01 | End: 2018-01-01 | Stop reason: HOSPADM

## 2018-01-01 RX ORDER — POTASSIUM CHLORIDE 750 MG/1
40 CAPSULE, EXTENDED RELEASE ORAL AS NEEDED
Status: DISCONTINUED | OUTPATIENT
Start: 2018-01-01 | End: 2018-01-01 | Stop reason: HOSPADM

## 2018-01-01 RX ORDER — DOCUSATE SODIUM 100 MG/1
200 CAPSULE, LIQUID FILLED ORAL 2 TIMES DAILY
Status: DISCONTINUED | OUTPATIENT
Start: 2018-01-01 | End: 2018-01-01 | Stop reason: HOSPADM

## 2018-01-01 RX ORDER — FUROSEMIDE 10 MG/ML
80 INJECTION INTRAMUSCULAR; INTRAVENOUS ONCE
Status: COMPLETED | OUTPATIENT
Start: 2018-01-01 | End: 2018-01-01

## 2018-01-01 RX ORDER — ATORVASTATIN CALCIUM 40 MG/1
80 TABLET, FILM COATED ORAL NIGHTLY
Status: DISCONTINUED | OUTPATIENT
Start: 2018-01-01 | End: 2018-01-01 | Stop reason: HOSPADM

## 2018-01-01 RX ORDER — DEXTROSE MONOHYDRATE 25 G/50ML
25 INJECTION, SOLUTION INTRAVENOUS
Status: DISCONTINUED | OUTPATIENT
Start: 2018-01-01 | End: 2018-01-01 | Stop reason: HOSPADM

## 2018-01-01 RX ADMIN — NOREPINEPHRINE BITARTRATE 0.3 MCG/KG/MIN: 1 INJECTION INTRAVENOUS at 15:51

## 2018-01-01 RX ADMIN — HEPARIN SODIUM 5000 UNITS: 5000 INJECTION, SOLUTION INTRAVENOUS; SUBCUTANEOUS at 22:19

## 2018-01-01 RX ADMIN — SILDENAFIL CITRATE 20 MG: 20 TABLET, FILM COATED ORAL at 21:10

## 2018-01-01 RX ADMIN — CLOPIDOGREL BISULFATE 75 MG: 75 TABLET ORAL at 09:06

## 2018-01-01 RX ADMIN — VASOPRESSIN 0.06 UNITS/MIN: 20 INJECTION INTRAVENOUS at 01:23

## 2018-01-01 RX ADMIN — SODIUM CHLORIDE, PRESERVATIVE FREE 10 ML: 5 INJECTION INTRAVENOUS at 09:21

## 2018-01-01 RX ADMIN — IPRATROPIUM BROMIDE AND ALBUTEROL SULFATE 3 ML: 2.5; .5 SOLUTION RESPIRATORY (INHALATION) at 16:11

## 2018-01-01 RX ADMIN — MIDAZOLAM 10 MG/HR: 5 INJECTION INTRAMUSCULAR; INTRAVENOUS at 04:21

## 2018-01-01 RX ADMIN — AZITHROMYCIN 500 MG: 500 INJECTION, POWDER, LYOPHILIZED, FOR SOLUTION INTRAVENOUS at 16:18

## 2018-01-01 RX ADMIN — HEPARIN SODIUM 5000 UNITS: 5000 INJECTION, SOLUTION INTRAVENOUS; SUBCUTANEOUS at 14:21

## 2018-01-01 RX ADMIN — ALBUTEROL SULFATE 4 PUFF: 90 AEROSOL, METERED RESPIRATORY (INHALATION) at 11:31

## 2018-01-01 RX ADMIN — PROPOFOL 100 MG: 10 INJECTION, EMULSION INTRAVENOUS at 11:22

## 2018-01-01 RX ADMIN — IPRATROPIUM BROMIDE AND ALBUTEROL SULFATE 3 ML: 2.5; .5 SOLUTION RESPIRATORY (INHALATION) at 06:46

## 2018-01-01 RX ADMIN — NOREPINEPHRINE BITARTRATE 0.3 MCG/KG/MIN: 1 INJECTION INTRAVENOUS at 23:26

## 2018-01-01 RX ADMIN — INSULIN ASPART 2 UNITS: 100 INJECTION, SOLUTION INTRAVENOUS; SUBCUTANEOUS at 17:27

## 2018-01-01 RX ADMIN — METRONIDAZOLE 500 MG: 500 INJECTION, SOLUTION INTRAVENOUS at 17:38

## 2018-01-01 RX ADMIN — MIDAZOLAM 0.5 MG: 1 INJECTION INTRAMUSCULAR; INTRAVENOUS at 11:11

## 2018-01-01 RX ADMIN — MORPHINE SULFATE 2 MG: 2 INJECTION, SOLUTION INTRAMUSCULAR; INTRAVENOUS at 23:41

## 2018-01-01 RX ADMIN — GUAIFENESIN 1200 MG: 600 TABLET, EXTENDED RELEASE ORAL at 08:36

## 2018-01-01 RX ADMIN — HEPARIN SODIUM 5000 UNITS: 5000 INJECTION, SOLUTION INTRAVENOUS; SUBCUTANEOUS at 14:00

## 2018-01-01 RX ADMIN — PANTOPRAZOLE SODIUM 40 MG: 40 INJECTION, POWDER, FOR SOLUTION INTRAVENOUS at 09:40

## 2018-01-01 RX ADMIN — SILDENAFIL CITRATE 20 MG: 20 TABLET, FILM COATED ORAL at 16:19

## 2018-01-01 RX ADMIN — DEXTROMETHORPHAN 60 MG: 30 SUSPENSION, EXTENDED RELEASE ORAL at 08:46

## 2018-01-01 RX ADMIN — POTASSIUM CHLORIDE 10 MEQ: 7.46 INJECTION, SOLUTION INTRAVENOUS at 06:24

## 2018-01-01 RX ADMIN — IPRATROPIUM BROMIDE AND ALBUTEROL SULFATE 3 ML: 2.5; .5 SOLUTION RESPIRATORY (INHALATION) at 11:19

## 2018-01-01 RX ADMIN — HEPARIN SODIUM 5000 UNITS: 5000 INJECTION, SOLUTION INTRAVENOUS; SUBCUTANEOUS at 21:10

## 2018-01-01 RX ADMIN — VASOPRESSIN 0.04 UNITS/MIN: 20 INJECTION INTRAVENOUS at 14:05

## 2018-01-01 RX ADMIN — FENTANYL CITRATE 50 MCG: 50 INJECTION, SOLUTION INTRAMUSCULAR; INTRAVENOUS at 12:01

## 2018-01-01 RX ADMIN — CHLORHEXIDINE GLUCONATE 0.12% ORAL RINSE 15 ML: 1.2 LIQUID ORAL at 20:17

## 2018-01-01 RX ADMIN — INSULIN ASPART 2 UNITS: 100 INJECTION, SOLUTION INTRAVENOUS; SUBCUTANEOUS at 17:15

## 2018-01-01 RX ADMIN — DEXTROMETHORPHAN 60 MG: 30 SUSPENSION, EXTENDED RELEASE ORAL at 21:10

## 2018-01-01 RX ADMIN — CEFEPIME HYDROCHLORIDE 2 G: 2 INJECTION, POWDER, FOR SOLUTION INTRAVENOUS at 00:36

## 2018-01-01 RX ADMIN — ASPIRIN 81 MG: 81 TABLET, COATED ORAL at 09:06

## 2018-01-01 RX ADMIN — ALBUTEROL SULFATE 4 PUFF: 90 AEROSOL, METERED RESPIRATORY (INHALATION) at 23:20

## 2018-01-01 RX ADMIN — HEPARIN SODIUM 5000 UNITS: 5000 INJECTION, SOLUTION INTRAVENOUS; SUBCUTANEOUS at 14:45

## 2018-01-01 RX ADMIN — PANTOPRAZOLE SODIUM 40 MG: 40 INJECTION, POWDER, FOR SOLUTION INTRAVENOUS at 10:33

## 2018-01-01 RX ADMIN — ASPIRIN 81 MG: 81 TABLET, COATED ORAL at 08:46

## 2018-01-01 RX ADMIN — GUAIFENESIN 1200 MG: 600 TABLET, EXTENDED RELEASE ORAL at 20:59

## 2018-01-01 RX ADMIN — HEPARIN SODIUM 5000 UNITS: 5000 INJECTION, SOLUTION INTRAVENOUS; SUBCUTANEOUS at 14:13

## 2018-01-01 RX ADMIN — METRONIDAZOLE 500 MG: 500 INJECTION, SOLUTION INTRAVENOUS at 03:10

## 2018-01-01 RX ADMIN — SODIUM CHLORIDE 1000 ML: 900 INJECTION, SOLUTION INTRAVENOUS at 02:22

## 2018-01-01 RX ADMIN — NOREPINEPHRINE BITARTRATE 0.3 MCG/KG/MIN: 1 INJECTION INTRAVENOUS at 12:40

## 2018-01-01 RX ADMIN — MORPHINE SULFATE 2 MG: 2 INJECTION, SOLUTION INTRAMUSCULAR; INTRAVENOUS at 02:39

## 2018-01-01 RX ADMIN — Medication 10 ML: at 02:29

## 2018-01-01 RX ADMIN — SODIUM BICARBONATE 25 MEQ: 84 INJECTION INTRAVENOUS at 11:25

## 2018-01-01 RX ADMIN — AZITHROMYCIN 500 MG: 500 INJECTION, POWDER, LYOPHILIZED, FOR SOLUTION INTRAVENOUS at 16:24

## 2018-01-01 RX ADMIN — METRONIDAZOLE 500 MG: 500 INJECTION, SOLUTION INTRAVENOUS at 18:03

## 2018-01-01 RX ADMIN — Medication 10 ML: at 19:44

## 2018-01-01 RX ADMIN — MIDAZOLAM 1 MG: 1 INJECTION INTRAMUSCULAR; INTRAVENOUS at 12:01

## 2018-01-01 RX ADMIN — METHYLPREDNISOLONE SODIUM SUCCINATE 60 MG: 125 INJECTION, POWDER, FOR SOLUTION INTRAMUSCULAR; INTRAVENOUS at 11:58

## 2018-01-01 RX ADMIN — SODIUM CHLORIDE, PRESERVATIVE FREE 10 ML: 5 INJECTION INTRAVENOUS at 10:35

## 2018-01-01 RX ADMIN — ALBUTEROL SULFATE 2.5 MG: 2.5 SOLUTION RESPIRATORY (INHALATION) at 13:27

## 2018-01-01 RX ADMIN — NOREPINEPHRINE BITARTRATE 0.3 MCG/KG/MIN: 1 INJECTION INTRAVENOUS at 21:18

## 2018-01-01 RX ADMIN — HEPARIN SODIUM 5000 UNITS: 5000 INJECTION, SOLUTION INTRAVENOUS; SUBCUTANEOUS at 14:18

## 2018-01-01 RX ADMIN — FUROSEMIDE 40 MG: 10 INJECTION, SOLUTION INTRAMUSCULAR; INTRAVENOUS at 16:18

## 2018-01-01 RX ADMIN — METRONIDAZOLE 500 MG: 500 INJECTION, SOLUTION INTRAVENOUS at 02:47

## 2018-01-01 RX ADMIN — NOREPINEPHRINE BITARTRATE 0.3 MCG/KG/MIN: 1 INJECTION INTRAVENOUS at 03:47

## 2018-01-01 RX ADMIN — MIDAZOLAM 8 MG/HR: 5 INJECTION INTRAMUSCULAR; INTRAVENOUS at 03:55

## 2018-01-01 RX ADMIN — HEPARIN SODIUM 5000 UNITS: 5000 INJECTION, SOLUTION INTRAVENOUS; SUBCUTANEOUS at 06:06

## 2018-01-01 RX ADMIN — ALBUTEROL SULFATE 4 PUFF: 90 AEROSOL, METERED RESPIRATORY (INHALATION) at 03:08

## 2018-01-01 RX ADMIN — VASOPRESSIN 0.08 UNITS/MIN: 20 INJECTION INTRAVENOUS at 21:42

## 2018-01-01 RX ADMIN — ALBUTEROL SULFATE 4 PUFF: 90 AEROSOL, METERED RESPIRATORY (INHALATION) at 23:02

## 2018-01-01 RX ADMIN — SODIUM BICARBONATE 50 MEQ: 84 INJECTION INTRAVENOUS at 14:58

## 2018-01-01 RX ADMIN — CHLORHEXIDINE GLUCONATE 0.12% ORAL RINSE 15 ML: 1.2 LIQUID ORAL at 10:33

## 2018-01-01 RX ADMIN — ALBUTEROL SULFATE 4 PUFF: 90 AEROSOL, METERED RESPIRATORY (INHALATION) at 07:06

## 2018-01-01 RX ADMIN — METRONIDAZOLE 500 MG: 500 INJECTION, SOLUTION INTRAVENOUS at 01:21

## 2018-01-01 RX ADMIN — METRONIDAZOLE 500 MG: 500 INJECTION, SOLUTION INTRAVENOUS at 09:20

## 2018-01-01 RX ADMIN — SODIUM CHLORIDE 500 ML: 9 INJECTION, SOLUTION INTRAVENOUS at 13:17

## 2018-01-01 RX ADMIN — NOREPINEPHRINE BITARTRATE 0.28 MCG/KG/MIN: 1 INJECTION INTRAVENOUS at 05:41

## 2018-01-01 RX ADMIN — FENTANYL CITRATE 50 MCG: 50 INJECTION, SOLUTION INTRAMUSCULAR; INTRAVENOUS at 12:03

## 2018-01-01 RX ADMIN — SODIUM CHLORIDE, PRESERVATIVE FREE 10 ML: 5 INJECTION INTRAVENOUS at 09:41

## 2018-01-01 RX ADMIN — SODIUM CHLORIDE 100 ML/HR: 900 INJECTION, SOLUTION INTRAVENOUS at 11:27

## 2018-01-01 RX ADMIN — MORPHINE SULFATE 2 MG: 2 INJECTION, SOLUTION INTRAMUSCULAR; INTRAVENOUS at 02:33

## 2018-01-01 RX ADMIN — METRONIDAZOLE 500 MG: 500 INJECTION, SOLUTION INTRAVENOUS at 17:58

## 2018-01-01 RX ADMIN — HEPARIN SODIUM 5000 UNITS: 5000 INJECTION, SOLUTION INTRAVENOUS; SUBCUTANEOUS at 16:29

## 2018-01-01 RX ADMIN — FENTANYL CITRATE 50 MCG: 50 INJECTION, SOLUTION INTRAMUSCULAR; INTRAVENOUS at 11:59

## 2018-01-01 RX ADMIN — SODIUM CHLORIDE 150 ML/HR: 900 INJECTION, SOLUTION INTRAVENOUS at 09:20

## 2018-01-01 RX ADMIN — Medication 2.5 MG: at 09:06

## 2018-01-01 RX ADMIN — METHYLPREDNISOLONE SODIUM SUCCINATE 125 MG: 125 INJECTION, POWDER, FOR SOLUTION INTRAMUSCULAR; INTRAVENOUS at 11:09

## 2018-01-01 RX ADMIN — METHYLPREDNISOLONE SODIUM SUCCINATE 60 MG: 125 INJECTION, POWDER, FOR SOLUTION INTRAMUSCULAR; INTRAVENOUS at 22:00

## 2018-01-01 RX ADMIN — POTASSIUM CHLORIDE 10 MEQ: 7.46 INJECTION, SOLUTION INTRAVENOUS at 11:38

## 2018-01-01 RX ADMIN — Medication 2.5 MG: at 08:37

## 2018-01-01 RX ADMIN — MIDAZOLAM 8 MG/HR: 5 INJECTION INTRAMUSCULAR; INTRAVENOUS at 18:31

## 2018-01-01 RX ADMIN — MIDAZOLAM 10 MG/HR: 5 INJECTION INTRAMUSCULAR; INTRAVENOUS at 03:41

## 2018-01-01 RX ADMIN — NOREPINEPHRINE BITARTRATE 0.28 MCG/KG/MIN: 1 INJECTION INTRAVENOUS at 00:35

## 2018-01-01 RX ADMIN — INSULIN ASPART 2 UNITS: 100 INJECTION, SOLUTION INTRAVENOUS; SUBCUTANEOUS at 06:08

## 2018-01-01 RX ADMIN — MIDAZOLAM 5 MG/HR: 5 INJECTION INTRAMUSCULAR; INTRAVENOUS at 12:04

## 2018-01-01 RX ADMIN — DOCUSATE SODIUM 200 MG: 100 CAPSULE, LIQUID FILLED ORAL at 21:51

## 2018-01-01 RX ADMIN — SODIUM BICARBONATE 100 ML/HR: 84 INJECTION, SOLUTION INTRAVENOUS at 02:28

## 2018-01-01 RX ADMIN — LEVOFLOXACIN 500 MG: 5 INJECTION, SOLUTION INTRAVENOUS at 03:27

## 2018-01-01 RX ADMIN — POTASSIUM CHLORIDE 40 MEQ: 750 CAPSULE, EXTENDED RELEASE ORAL at 11:58

## 2018-01-01 RX ADMIN — ALBUTEROL SULFATE 4 PUFF: 90 AEROSOL, METERED RESPIRATORY (INHALATION) at 02:51

## 2018-01-01 RX ADMIN — SODIUM CHLORIDE, PRESERVATIVE FREE 10 ML: 5 INJECTION INTRAVENOUS at 20:29

## 2018-01-01 RX ADMIN — Medication 10 ML: at 10:35

## 2018-01-01 RX ADMIN — SODIUM CHLORIDE: 900 INJECTION, SOLUTION INTRAVENOUS at 18:43

## 2018-01-01 RX ADMIN — IPRATROPIUM BROMIDE AND ALBUTEROL SULFATE 3 ML: 2.5; .5 SOLUTION RESPIRATORY (INHALATION) at 20:23

## 2018-01-01 RX ADMIN — SODIUM CHLORIDE, PRESERVATIVE FREE 10 ML: 5 INJECTION INTRAVENOUS at 09:56

## 2018-01-01 RX ADMIN — SODIUM CHLORIDE, PRESERVATIVE FREE 10 ML: 5 INJECTION INTRAVENOUS at 21:01

## 2018-01-01 RX ADMIN — SILDENAFIL CITRATE 20 MG: 20 TABLET, FILM COATED ORAL at 16:30

## 2018-01-01 RX ADMIN — Medication 10 ML: at 10:34

## 2018-01-01 RX ADMIN — VASOPRESSIN 0.06 UNITS/MIN: 20 INJECTION INTRAVENOUS at 06:24

## 2018-01-01 RX ADMIN — CHLORHEXIDINE GLUCONATE 0.12% ORAL RINSE 15 ML: 1.2 LIQUID ORAL at 21:49

## 2018-01-01 RX ADMIN — ALBUTEROL SULFATE 4 PUFF: 90 AEROSOL, METERED RESPIRATORY (INHALATION) at 23:09

## 2018-01-01 RX ADMIN — NOREPINEPHRINE BITARTRATE 0.3 MCG/KG/MIN: 1 INJECTION INTRAVENOUS at 20:59

## 2018-01-01 RX ADMIN — NOREPINEPHRINE BITARTRATE 0.28 MCG/KG/MIN: 1 INJECTION INTRAVENOUS at 20:14

## 2018-01-01 RX ADMIN — IPRATROPIUM BROMIDE AND ALBUTEROL SULFATE 3 ML: 2.5; .5 SOLUTION RESPIRATORY (INHALATION) at 23:41

## 2018-01-01 RX ADMIN — NOREPINEPHRINE BITARTRATE 0.21 MCG/KG/MIN: 1 INJECTION INTRAVENOUS at 23:25

## 2018-01-01 RX ADMIN — ONDANSETRON 4 MG: 2 INJECTION INTRAMUSCULAR; INTRAVENOUS at 05:21

## 2018-01-01 RX ADMIN — ALBUMIN HUMAN 50 G: 0.25 SOLUTION INTRAVENOUS at 15:43

## 2018-01-01 RX ADMIN — MIDAZOLAM 8 MG/HR: 5 INJECTION INTRAMUSCULAR; INTRAVENOUS at 11:00

## 2018-01-01 RX ADMIN — LEVOFLOXACIN 500 MG: 5 INJECTION, SOLUTION INTRAVENOUS at 04:09

## 2018-01-01 RX ADMIN — METHYLPREDNISOLONE SODIUM SUCCINATE 60 MG: 125 INJECTION, POWDER, FOR SOLUTION INTRAMUSCULAR; INTRAVENOUS at 16:56

## 2018-01-01 RX ADMIN — FUROSEMIDE 40 MG: 10 INJECTION, SOLUTION INTRAMUSCULAR; INTRAVENOUS at 21:52

## 2018-01-01 RX ADMIN — ACETYLCYSTEINE 2 ML: 100 INHALANT RESPIRATORY (INHALATION) at 13:27

## 2018-01-01 RX ADMIN — METRONIDAZOLE 500 MG: 500 INJECTION, SOLUTION INTRAVENOUS at 09:40

## 2018-01-01 RX ADMIN — CEFTRIAXONE SODIUM 1 G: 1 INJECTION, POWDER, FOR SOLUTION INTRAMUSCULAR; INTRAVENOUS at 14:13

## 2018-01-01 RX ADMIN — SODIUM CHLORIDE 100 ML/HR: 900 INJECTION, SOLUTION INTRAVENOUS at 20:59

## 2018-01-01 RX ADMIN — NOREPINEPHRINE BITARTRATE 0.3 MCG/KG/MIN: 1 INJECTION INTRAVENOUS at 05:30

## 2018-01-01 RX ADMIN — SODIUM CHLORIDE 100 ML/HR: 9 INJECTION, SOLUTION INTRAVENOUS at 03:39

## 2018-01-01 RX ADMIN — PANTOPRAZOLE SODIUM 40 MG: 40 INJECTION, POWDER, FOR SOLUTION INTRAVENOUS at 09:10

## 2018-01-01 RX ADMIN — CHLORHEXIDINE GLUCONATE 0.12% ORAL RINSE 15 ML: 1.2 LIQUID ORAL at 09:57

## 2018-01-01 RX ADMIN — CEFTRIAXONE 1 G: 1 INJECTION, POWDER, FOR SOLUTION INTRAMUSCULAR; INTRAVENOUS at 13:27

## 2018-01-01 RX ADMIN — SODIUM CHLORIDE 50 ML/HR: 900 INJECTION, SOLUTION INTRAVENOUS at 03:22

## 2018-01-01 RX ADMIN — PANTOPRAZOLE SODIUM 40 MG: 40 TABLET, DELAYED RELEASE ORAL at 05:52

## 2018-01-01 RX ADMIN — LEVOFLOXACIN 500 MG: 5 INJECTION, SOLUTION INTRAVENOUS at 04:35

## 2018-01-01 RX ADMIN — TRIAMCINOLONE ACETONIDE 80 MG: 40 INJECTION, SUSPENSION INTRA-ARTICULAR; INTRAMUSCULAR at 13:58

## 2018-01-01 RX ADMIN — HEPARIN SODIUM 5000 UNITS: 5000 INJECTION, SOLUTION INTRAVENOUS; SUBCUTANEOUS at 14:16

## 2018-01-01 RX ADMIN — NOREPINEPHRINE BITARTRATE 0.3 MCG/KG/MIN: 1 INJECTION INTRAVENOUS at 10:07

## 2018-01-01 RX ADMIN — CHLORHEXIDINE GLUCONATE 0.12% ORAL RINSE 15 ML: 1.2 LIQUID ORAL at 21:42

## 2018-01-01 RX ADMIN — NOREPINEPHRINE BITARTRATE 0.3 MCG/KG/MIN: 1 INJECTION INTRAVENOUS at 05:24

## 2018-01-01 RX ADMIN — VASOPRESSIN 0.06 UNITS/MIN: 20 INJECTION INTRAVENOUS at 20:02

## 2018-01-01 RX ADMIN — MIDAZOLAM 10 MG/HR: 5 INJECTION INTRAMUSCULAR; INTRAVENOUS at 16:36

## 2018-01-01 RX ADMIN — DEXTROMETHORPHAN 60 MG: 30 SUSPENSION, EXTENDED RELEASE ORAL at 21:52

## 2018-01-01 RX ADMIN — POTASSIUM CHLORIDE 10 MEQ: 7.46 INJECTION, SOLUTION INTRAVENOUS at 09:13

## 2018-01-01 RX ADMIN — ALBUTEROL SULFATE 4 PUFF: 90 AEROSOL, METERED RESPIRATORY (INHALATION) at 14:49

## 2018-01-01 RX ADMIN — METRONIDAZOLE 500 MG: 500 INJECTION, SOLUTION INTRAVENOUS at 10:53

## 2018-01-01 RX ADMIN — SODIUM CHLORIDE, SODIUM GLUCONATE, SODIUM ACETATE, POTASSIUM CHLORIDE, AND MAGNESIUM CHLORIDE: 526; 502; 368; 37; 30 INJECTION, SOLUTION INTRAVENOUS at 13:30

## 2018-01-01 RX ADMIN — PANTOPRAZOLE SODIUM 40 MG: 40 TABLET, DELAYED RELEASE ORAL at 05:56

## 2018-01-01 RX ADMIN — NOREPINEPHRINE BITARTRATE 0.3 MCG/KG/MIN: 1 INJECTION INTRAVENOUS at 08:23

## 2018-01-01 RX ADMIN — ROCURONIUM BROMIDE 50 MG: 10 INJECTION INTRAVENOUS at 13:44

## 2018-01-01 RX ADMIN — ALBUTEROL SULFATE 4 PUFF: 90 AEROSOL, METERED RESPIRATORY (INHALATION) at 18:45

## 2018-01-01 RX ADMIN — MORPHINE SULFATE 4 MG: 4 INJECTION, SOLUTION INTRAMUSCULAR; INTRAVENOUS at 19:45

## 2018-01-01 RX ADMIN — MIDAZOLAM 0.5 MG: 1 INJECTION INTRAMUSCULAR; INTRAVENOUS at 11:50

## 2018-01-01 RX ADMIN — ACETYLCYSTEINE 2 ML: 100 INHALANT RESPIRATORY (INHALATION) at 07:02

## 2018-01-01 RX ADMIN — MORPHINE SULFATE 2 MG: 2 INJECTION, SOLUTION INTRAMUSCULAR; INTRAVENOUS at 21:35

## 2018-01-01 RX ADMIN — ROCURONIUM BROMIDE 50 MG: 10 INJECTION INTRAVENOUS at 11:35

## 2018-01-01 RX ADMIN — SODIUM CHLORIDE, SODIUM GLUCONATE, SODIUM ACETATE, POTASSIUM CHLORIDE, AND MAGNESIUM CHLORIDE: 526; 502; 368; 37; 30 INJECTION, SOLUTION INTRAVENOUS at 12:10

## 2018-01-01 RX ADMIN — SODIUM CHLORIDE, PRESERVATIVE FREE 10 ML: 5 INJECTION INTRAVENOUS at 09:12

## 2018-01-01 RX ADMIN — SODIUM BICARBONATE 100 ML/HR: 84 INJECTION, SOLUTION INTRAVENOUS at 14:30

## 2018-01-01 RX ADMIN — ALBUTEROL SULFATE 4 PUFF: 90 AEROSOL, METERED RESPIRATORY (INHALATION) at 18:46

## 2018-01-01 RX ADMIN — SILDENAFIL CITRATE 20 MG: 20 TABLET, FILM COATED ORAL at 08:45

## 2018-01-01 RX ADMIN — VASOPRESSIN 0.06 UNITS/MIN: 20 INJECTION INTRAVENOUS at 17:18

## 2018-01-01 RX ADMIN — SODIUM CHLORIDE 100 ML/HR: 900 INJECTION, SOLUTION INTRAVENOUS at 16:30

## 2018-01-01 RX ADMIN — NOREPINEPHRINE BITARTRATE 0.3 MCG/KG/MIN: 1 INJECTION INTRAVENOUS at 19:08

## 2018-01-01 RX ADMIN — MIDAZOLAM 5 MG/HR: 5 INJECTION INTRAMUSCULAR; INTRAVENOUS at 00:29

## 2018-01-01 RX ADMIN — HEPARIN SODIUM 5000 UNITS: 5000 INJECTION, SOLUTION INTRAVENOUS; SUBCUTANEOUS at 05:51

## 2018-01-01 RX ADMIN — ACETYLCYSTEINE 2 ML: 100 INHALANT RESPIRATORY (INHALATION) at 00:36

## 2018-01-01 RX ADMIN — METHYLPREDNISOLONE SODIUM SUCCINATE 60 MG: 125 INJECTION, POWDER, FOR SOLUTION INTRAMUSCULAR; INTRAVENOUS at 16:19

## 2018-01-01 RX ADMIN — MORPHINE SULFATE 2 MG: 2 INJECTION, SOLUTION INTRAMUSCULAR; INTRAVENOUS at 11:02

## 2018-01-01 RX ADMIN — PANTOPRAZOLE SODIUM 40 MG: 40 INJECTION, POWDER, FOR SOLUTION INTRAVENOUS at 09:56

## 2018-01-01 RX ADMIN — Medication 1 TABLET: at 08:37

## 2018-01-01 RX ADMIN — MIDAZOLAM 10 MG/HR: 5 INJECTION INTRAMUSCULAR; INTRAVENOUS at 15:13

## 2018-01-01 RX ADMIN — SODIUM CHLORIDE: 900 INJECTION, SOLUTION INTRAVENOUS at 11:14

## 2018-01-01 RX ADMIN — SILDENAFIL CITRATE 20 MG: 20 TABLET, FILM COATED ORAL at 09:06

## 2018-01-01 RX ADMIN — Medication 1 TABLET: at 09:06

## 2018-01-01 RX ADMIN — HEPARIN SODIUM 5000 UNITS: 5000 INJECTION, SOLUTION INTRAVENOUS; SUBCUTANEOUS at 05:50

## 2018-01-01 RX ADMIN — SODIUM CHLORIDE 1000 ML: 9 INJECTION, SOLUTION INTRAVENOUS at 14:43

## 2018-01-01 RX ADMIN — HEPARIN SODIUM 5000 UNITS: 5000 INJECTION, SOLUTION INTRAVENOUS; SUBCUTANEOUS at 23:24

## 2018-01-01 RX ADMIN — MIDAZOLAM 10 MG/HR: 5 INJECTION INTRAMUSCULAR; INTRAVENOUS at 09:59

## 2018-01-01 RX ADMIN — SODIUM BICARBONATE 50 MEQ: 84 INJECTION INTRAVENOUS at 10:17

## 2018-01-01 RX ADMIN — ALBUTEROL SULFATE 4 PUFF: 90 AEROSOL, METERED RESPIRATORY (INHALATION) at 23:03

## 2018-01-01 RX ADMIN — METRONIDAZOLE 500 MG: 500 INJECTION, SOLUTION INTRAVENOUS at 17:17

## 2018-01-01 RX ADMIN — SODIUM CHLORIDE 100 ML/HR: 900 INJECTION, SOLUTION INTRAVENOUS at 23:41

## 2018-01-01 RX ADMIN — POTASSIUM CHLORIDE 10 MEQ: 10 INJECTION, SOLUTION INTRAVENOUS at 11:11

## 2018-01-01 RX ADMIN — NOREPINEPHRINE BITARTRATE 0.28 MCG/KG/MIN: 1 INJECTION INTRAVENOUS at 19:00

## 2018-01-01 RX ADMIN — GUAIFENESIN 1200 MG: 600 TABLET, EXTENDED RELEASE ORAL at 08:46

## 2018-01-01 RX ADMIN — ALBUTEROL SULFATE 4 PUFF: 90 AEROSOL, METERED RESPIRATORY (INHALATION) at 03:29

## 2018-01-01 RX ADMIN — SODIUM BICARBONATE 50 MEQ: 84 INJECTION INTRAVENOUS at 12:25

## 2018-01-01 RX ADMIN — IPRATROPIUM BROMIDE AND ALBUTEROL SULFATE 3 ML: 2.5; .5 SOLUTION RESPIRATORY (INHALATION) at 03:05

## 2018-01-01 RX ADMIN — SODIUM CHLORIDE, PRESERVATIVE FREE 10 ML: 5 INJECTION INTRAVENOUS at 20:38

## 2018-01-01 RX ADMIN — ALBUTEROL SULFATE 4 PUFF: 90 AEROSOL, METERED RESPIRATORY (INHALATION) at 19:02

## 2018-01-01 RX ADMIN — METRONIDAZOLE 500 MG: 500 INJECTION, SOLUTION INTRAVENOUS at 09:10

## 2018-01-01 RX ADMIN — NOREPINEPHRINE BITARTRATE 0.2 MCG/KG/MIN: 1 INJECTION INTRAVENOUS at 12:22

## 2018-01-01 RX ADMIN — SODIUM CHLORIDE 100 ML/HR: 900 INJECTION, SOLUTION INTRAVENOUS at 05:50

## 2018-01-01 RX ADMIN — HEPARIN SODIUM 5000 UNITS: 5000 INJECTION, SOLUTION INTRAVENOUS; SUBCUTANEOUS at 06:15

## 2018-01-01 RX ADMIN — GUAIFENESIN 1200 MG: 600 TABLET, EXTENDED RELEASE ORAL at 21:52

## 2018-01-01 RX ADMIN — PROPOFOL 5 MCG/KG/MIN: 10 INJECTION, EMULSION INTRAVENOUS at 16:00

## 2018-01-01 RX ADMIN — METRONIDAZOLE 500 MG: 500 INJECTION, SOLUTION INTRAVENOUS at 17:19

## 2018-01-01 RX ADMIN — SODIUM CHLORIDE 150 ML/HR: 9 INJECTION, SOLUTION INTRAVENOUS at 09:20

## 2018-01-01 RX ADMIN — SODIUM CHLORIDE, SODIUM GLUCONATE, SODIUM ACETATE, POTASSIUM CHLORIDE, AND MAGNESIUM CHLORIDE: 526; 502; 368; 37; 30 INJECTION, SOLUTION INTRAVENOUS at 11:33

## 2018-01-01 RX ADMIN — INSULIN ASPART 2 UNITS: 100 INJECTION, SOLUTION INTRAVENOUS; SUBCUTANEOUS at 07:30

## 2018-01-01 RX ADMIN — ALBUTEROL SULFATE 4 PUFF: 90 AEROSOL, METERED RESPIRATORY (INHALATION) at 15:51

## 2018-01-01 RX ADMIN — SILDENAFIL CITRATE 20 MG: 20 TABLET, FILM COATED ORAL at 21:52

## 2018-01-01 RX ADMIN — HEPARIN SODIUM 5000 UNITS: 5000 INJECTION, SOLUTION INTRAVENOUS; SUBCUTANEOUS at 22:13

## 2018-01-01 RX ADMIN — CEFEPIME HYDROCHLORIDE 2 G: 2 INJECTION, POWDER, FOR SOLUTION INTRAVENOUS at 14:16

## 2018-01-01 RX ADMIN — CALCIUM CHLORIDE 0.5 G: 100 INJECTION, SOLUTION INTRAVENOUS at 13:50

## 2018-01-01 RX ADMIN — SODIUM CHLORIDE, SODIUM GLUCONATE, SODIUM ACETATE, POTASSIUM CHLORIDE, AND MAGNESIUM CHLORIDE: 526; 502; 368; 37; 30 INJECTION, SOLUTION INTRAVENOUS at 12:31

## 2018-01-01 RX ADMIN — IPRATROPIUM BROMIDE AND ALBUTEROL SULFATE 3 ML: 2.5; .5 SOLUTION RESPIRATORY (INHALATION) at 14:49

## 2018-01-01 RX ADMIN — METRONIDAZOLE 500 MG: 500 INJECTION, SOLUTION INTRAVENOUS at 10:37

## 2018-01-01 RX ADMIN — CALCIUM CHLORIDE 0.5 G: 100 INJECTION, SOLUTION INTRAVENOUS at 13:04

## 2018-01-01 RX ADMIN — INSULIN ASPART 2 UNITS: 100 INJECTION, SOLUTION INTRAVENOUS; SUBCUTANEOUS at 17:18

## 2018-01-01 RX ADMIN — METHYLPREDNISOLONE SODIUM SUCCINATE 60 MG: 125 INJECTION, POWDER, FOR SOLUTION INTRAMUSCULAR; INTRAVENOUS at 05:51

## 2018-01-01 RX ADMIN — Medication 2.5 MG: at 08:46

## 2018-01-01 RX ADMIN — FUROSEMIDE 40 MG: 10 INJECTION, SOLUTION INTRAMUSCULAR; INTRAVENOUS at 21:00

## 2018-01-01 RX ADMIN — MORPHINE SULFATE 2 MG: 2 INJECTION, SOLUTION INTRAMUSCULAR; INTRAVENOUS at 00:38

## 2018-01-01 RX ADMIN — FENTANYL CITRATE 50 MCG: 50 INJECTION, SOLUTION INTRAMUSCULAR; INTRAVENOUS at 11:20

## 2018-01-01 RX ADMIN — MIDAZOLAM 8 MG/HR: 5 INJECTION INTRAMUSCULAR; INTRAVENOUS at 02:18

## 2018-01-01 RX ADMIN — HEPARIN SODIUM 5000 UNITS: 5000 INJECTION, SOLUTION INTRAVENOUS; SUBCUTANEOUS at 05:56

## 2018-01-01 RX ADMIN — MIDAZOLAM 10 MG/HR: 5 INJECTION INTRAMUSCULAR; INTRAVENOUS at 10:07

## 2018-01-01 RX ADMIN — VASOPRESSIN 0.06 UNITS/MIN: 20 INJECTION INTRAVENOUS at 12:14

## 2018-01-01 RX ADMIN — FENTANYL CITRATE 50 MCG: 50 INJECTION, SOLUTION INTRAMUSCULAR; INTRAVENOUS at 11:51

## 2018-01-01 RX ADMIN — Medication 1 TABLET: at 08:46

## 2018-01-01 RX ADMIN — MIDAZOLAM 8 MG/HR: 5 INJECTION INTRAMUSCULAR; INTRAVENOUS at 10:03

## 2018-01-01 RX ADMIN — ALBUTEROL SULFATE 2.5 MG: 2.5 SOLUTION RESPIRATORY (INHALATION) at 00:36

## 2018-01-01 RX ADMIN — VASOPRESSIN 0.06 UNITS/MIN: 20 INJECTION INTRAVENOUS at 13:30

## 2018-01-01 RX ADMIN — METRONIDAZOLE 500 MG: 500 INJECTION, SOLUTION INTRAVENOUS at 01:32

## 2018-01-01 RX ADMIN — IPRATROPIUM BROMIDE AND ALBUTEROL SULFATE 3 ML: 2.5; .5 SOLUTION RESPIRATORY (INHALATION) at 03:02

## 2018-01-01 RX ADMIN — SODIUM CHLORIDE 100 ML/HR: 900 INJECTION, SOLUTION INTRAVENOUS at 10:55

## 2018-01-01 RX ADMIN — ALLOPURINOL 100 MG: 100 TABLET ORAL at 08:46

## 2018-01-01 RX ADMIN — ALBUTEROL SULFATE 2.5 MG: 2.5 SOLUTION RESPIRATORY (INHALATION) at 18:32

## 2018-01-01 RX ADMIN — IPRATROPIUM BROMIDE AND ALBUTEROL SULFATE 3 ML: 2.5; .5 SOLUTION RESPIRATORY (INHALATION) at 20:52

## 2018-01-01 RX ADMIN — SODIUM CHLORIDE 1000 ML: 900 INJECTION, SOLUTION INTRAVENOUS at 19:45

## 2018-01-01 RX ADMIN — HEPARIN SODIUM 5000 UNITS: 5000 INJECTION, SOLUTION INTRAVENOUS; SUBCUTANEOUS at 21:49

## 2018-01-01 RX ADMIN — SODIUM CHLORIDE 150 ML/HR: 900 INJECTION, SOLUTION INTRAVENOUS at 01:16

## 2018-01-01 RX ADMIN — ALBUTEROL SULFATE 4 PUFF: 90 AEROSOL, METERED RESPIRATORY (INHALATION) at 06:57

## 2018-01-01 RX ADMIN — PANTOPRAZOLE SODIUM 40 MG: 40 INJECTION, POWDER, FOR SOLUTION INTRAVENOUS at 16:30

## 2018-01-01 RX ADMIN — Medication 10 ML: at 20:30

## 2018-01-01 RX ADMIN — LEVOFLOXACIN 500 MG: 5 INJECTION, SOLUTION INTRAVENOUS at 02:34

## 2018-01-01 RX ADMIN — SODIUM BICARBONATE 100 ML/HR: 84 INJECTION, SOLUTION INTRAVENOUS at 16:24

## 2018-01-01 RX ADMIN — IPRATROPIUM BROMIDE AND ALBUTEROL SULFATE 3 ML: 2.5; .5 SOLUTION RESPIRATORY (INHALATION) at 19:18

## 2018-01-01 RX ADMIN — SODIUM CHLORIDE, SODIUM GLUCONATE, SODIUM ACETATE, POTASSIUM CHLORIDE, AND MAGNESIUM CHLORIDE: 526; 502; 368; 37; 30 INJECTION, SOLUTION INTRAVENOUS at 13:21

## 2018-01-01 RX ADMIN — IPRATROPIUM BROMIDE AND ALBUTEROL SULFATE 3 ML: 2.5; .5 SOLUTION RESPIRATORY (INHALATION) at 07:01

## 2018-01-01 RX ADMIN — SODIUM BICARBONATE 50 MEQ: 84 INJECTION INTRAVENOUS at 13:00

## 2018-01-01 RX ADMIN — IPRATROPIUM BROMIDE AND ALBUTEROL SULFATE 3 ML: 2.5; .5 SOLUTION RESPIRATORY (INHALATION) at 11:50

## 2018-01-01 RX ADMIN — VASOPRESSIN 0.06 UNITS/MIN: 20 INJECTION INTRAVENOUS at 01:15

## 2018-01-01 RX ADMIN — FUROSEMIDE 40 MG: 10 INJECTION, SOLUTION INTRAMUSCULAR; INTRAVENOUS at 16:55

## 2018-01-01 RX ADMIN — SILDENAFIL CITRATE 20 MG: 20 TABLET, FILM COATED ORAL at 20:59

## 2018-01-01 RX ADMIN — CHLORHEXIDINE GLUCONATE 0.12% ORAL RINSE 15 ML: 1.2 LIQUID ORAL at 08:12

## 2018-01-01 RX ADMIN — METRONIDAZOLE 500 MG: 500 INJECTION, SOLUTION INTRAVENOUS at 17:14

## 2018-01-01 RX ADMIN — MIDAZOLAM 10 MG/HR: 5 INJECTION INTRAMUSCULAR; INTRAVENOUS at 16:29

## 2018-01-01 RX ADMIN — METRONIDAZOLE 500 MG: 500 INJECTION, SOLUTION INTRAVENOUS at 01:27

## 2018-01-01 RX ADMIN — MIDAZOLAM 10 MG/HR: 5 INJECTION INTRAMUSCULAR; INTRAVENOUS at 16:05

## 2018-01-01 RX ADMIN — AZITHROMYCIN 500 MG: 500 INJECTION, POWDER, LYOPHILIZED, FOR SOLUTION INTRAVENOUS at 16:55

## 2018-01-01 RX ADMIN — NOREPINEPHRINE BITARTRATE 0.3 MCG/KG/MIN: 1 INJECTION INTRAVENOUS at 01:03

## 2018-01-01 RX ADMIN — LEVOFLOXACIN 500 MG: 5 INJECTION, SOLUTION INTRAVENOUS at 02:35

## 2018-01-01 RX ADMIN — INSULIN ASPART 2 UNITS: 100 INJECTION, SOLUTION INTRAVENOUS; SUBCUTANEOUS at 20:28

## 2018-01-01 RX ADMIN — CHLORHEXIDINE GLUCONATE 0.12% ORAL RINSE 15 ML: 1.2 LIQUID ORAL at 09:40

## 2018-01-01 RX ADMIN — METHYLPREDNISOLONE SODIUM SUCCINATE 60 MG: 125 INJECTION, POWDER, FOR SOLUTION INTRAMUSCULAR; INTRAVENOUS at 12:31

## 2018-01-01 RX ADMIN — ATORVASTATIN CALCIUM 80 MG: 40 TABLET, FILM COATED ORAL at 20:59

## 2018-01-01 RX ADMIN — MIDAZOLAM 10 MG/HR: 5 INJECTION INTRAMUSCULAR; INTRAVENOUS at 22:10

## 2018-01-01 RX ADMIN — FUROSEMIDE 40 MG: 10 INJECTION, SOLUTION INTRAMUSCULAR; INTRAVENOUS at 16:25

## 2018-01-01 RX ADMIN — NOREPINEPHRINE BITARTRATE 0.25 MCG/KG/MIN: 1 INJECTION INTRAVENOUS at 00:31

## 2018-01-01 RX ADMIN — MIDAZOLAM 10 MG/HR: 5 INJECTION INTRAMUSCULAR; INTRAVENOUS at 03:31

## 2018-01-01 RX ADMIN — MIDAZOLAM 10 MG/HR: 5 INJECTION INTRAMUSCULAR; INTRAVENOUS at 22:18

## 2018-01-01 RX ADMIN — INSULIN ASPART 2 UNITS: 100 INJECTION, SOLUTION INTRAVENOUS; SUBCUTANEOUS at 21:36

## 2018-01-01 RX ADMIN — MIDAZOLAM 10 MG/HR: 5 INJECTION INTRAMUSCULAR; INTRAVENOUS at 20:54

## 2018-01-01 RX ADMIN — ALBUTEROL SULFATE 4 PUFF: 90 AEROSOL, METERED RESPIRATORY (INHALATION) at 11:02

## 2018-01-01 RX ADMIN — HEPARIN SODIUM 5000 UNITS: 5000 INJECTION, SOLUTION INTRAVENOUS; SUBCUTANEOUS at 06:30

## 2018-01-01 RX ADMIN — CLOPIDOGREL BISULFATE 75 MG: 75 TABLET ORAL at 08:46

## 2018-01-01 RX ADMIN — SODIUM CHLORIDE 100 ML/HR: 900 INJECTION, SOLUTION INTRAVENOUS at 07:20

## 2018-01-01 RX ADMIN — METRONIDAZOLE 500 MG: 500 INJECTION, SOLUTION INTRAVENOUS at 01:36

## 2018-01-01 RX ADMIN — SUCCINYLCHOLINE CHLORIDE 140 MG: 20 INJECTION, SOLUTION INTRAMUSCULAR; INTRAVENOUS at 11:22

## 2018-01-01 RX ADMIN — POTASSIUM CHLORIDE 10 MEQ: 7.46 INJECTION, SOLUTION INTRAVENOUS at 09:40

## 2018-01-01 RX ADMIN — MORPHINE SULFATE 2 MG: 2 INJECTION, SOLUTION INTRAMUSCULAR; INTRAVENOUS at 02:29

## 2018-01-01 RX ADMIN — DOCUSATE SODIUM 200 MG: 100 CAPSULE, LIQUID FILLED ORAL at 21:10

## 2018-01-01 RX ADMIN — SODIUM CHLORIDE 100 ML/HR: 9 INJECTION, SOLUTION INTRAVENOUS at 16:24

## 2018-01-01 RX ADMIN — MIDAZOLAM 10 MG/HR: 5 INJECTION INTRAMUSCULAR; INTRAVENOUS at 21:59

## 2018-01-01 RX ADMIN — POTASSIUM CHLORIDE 10 MEQ: 7.46 INJECTION, SOLUTION INTRAVENOUS at 08:05

## 2018-01-01 RX ADMIN — SODIUM CHLORIDE, SODIUM GLUCONATE, SODIUM ACETATE, POTASSIUM CHLORIDE, AND MAGNESIUM CHLORIDE: 526; 502; 368; 37; 30 INJECTION, SOLUTION INTRAVENOUS at 13:01

## 2018-01-01 RX ADMIN — ALLOPURINOL 100 MG: 100 TABLET ORAL at 08:37

## 2018-01-01 RX ADMIN — METRONIDAZOLE 500 MG: 500 INJECTION, SOLUTION INTRAVENOUS at 17:00

## 2018-01-01 RX ADMIN — SODIUM CHLORIDE 100 ML/HR: 900 INJECTION, SOLUTION INTRAVENOUS at 02:48

## 2018-01-01 RX ADMIN — LEVOFLOXACIN 500 MG: 5 INJECTION, SOLUTION INTRAVENOUS at 03:34

## 2018-01-01 RX ADMIN — Medication 10 ML: at 05:52

## 2018-01-01 RX ADMIN — METRONIDAZOLE 500 MG: 500 INJECTION, SOLUTION INTRAVENOUS at 01:08

## 2018-01-01 RX ADMIN — SODIUM CHLORIDE 500 ML: 9 INJECTION, SOLUTION INTRAVENOUS at 10:48

## 2018-01-01 RX ADMIN — SODIUM CHLORIDE, PRESERVATIVE FREE 10 ML: 5 INJECTION INTRAVENOUS at 21:03

## 2018-01-01 RX ADMIN — ALBUTEROL SULFATE 4 PUFF: 90 AEROSOL, METERED RESPIRATORY (INHALATION) at 14:38

## 2018-01-01 RX ADMIN — NOREPINEPHRINE BITARTRATE 0.3 MCG/KG/MIN: 1 INJECTION INTRAVENOUS at 01:19

## 2018-01-01 RX ADMIN — NOREPINEPHRINE BITARTRATE 0.3 MCG/KG/MIN: 1 INJECTION INTRAVENOUS at 08:04

## 2018-01-01 RX ADMIN — SODIUM CHLORIDE 1000 ML: 9 INJECTION, SOLUTION INTRAVENOUS at 11:09

## 2018-01-01 RX ADMIN — NOREPINEPHRINE BITARTRATE 0.2 MCG/KG/MIN: 1 INJECTION INTRAVENOUS at 18:10

## 2018-01-01 RX ADMIN — IPRATROPIUM BROMIDE AND ALBUTEROL SULFATE 3 ML: 2.5; .5 SOLUTION RESPIRATORY (INHALATION) at 11:36

## 2018-01-01 RX ADMIN — NOREPINEPHRINE BITARTRATE 0.3 MCG/KG/MIN: 1 INJECTION INTRAVENOUS at 12:15

## 2018-01-01 RX ADMIN — MORPHINE SULFATE 2 MG: 2 INJECTION, SOLUTION INTRAMUSCULAR; INTRAVENOUS at 02:31

## 2018-01-01 RX ADMIN — ALBUTEROL SULFATE 4 PUFF: 90 AEROSOL, METERED RESPIRATORY (INHALATION) at 07:09

## 2018-01-01 RX ADMIN — ROCURONIUM BROMIDE 5 MG: 10 INJECTION INTRAVENOUS at 11:22

## 2018-01-01 RX ADMIN — FUROSEMIDE 40 MG: 10 INJECTION, SOLUTION INTRAMUSCULAR; INTRAVENOUS at 08:46

## 2018-01-01 RX ADMIN — NOREPINEPHRINE BITARTRATE 0.3 MCG/KG/MIN: 1 INJECTION INTRAVENOUS at 14:34

## 2018-01-01 RX ADMIN — SODIUM CHLORIDE 100 ML/HR: 900 INJECTION, SOLUTION INTRAVENOUS at 04:19

## 2018-01-01 RX ADMIN — FUROSEMIDE 40 MG: 10 INJECTION, SOLUTION INTRAMUSCULAR; INTRAVENOUS at 21:10

## 2018-01-01 RX ADMIN — NOREPINEPHRINE BITARTRATE 0.28 MCG/KG/MIN: 1 INJECTION INTRAVENOUS at 03:56

## 2018-01-01 RX ADMIN — NOREPINEPHRINE BITARTRATE 0.3 MCG/KG/MIN: 1 INJECTION INTRAVENOUS at 17:40

## 2018-01-01 RX ADMIN — IPRATROPIUM BROMIDE AND ALBUTEROL SULFATE 3 ML: 2.5; .5 SOLUTION RESPIRATORY (INHALATION) at 06:31

## 2018-01-01 RX ADMIN — INSULIN ASPART 2 UNITS: 100 INJECTION, SOLUTION INTRAVENOUS; SUBCUTANEOUS at 06:31

## 2018-01-01 RX ADMIN — IPRATROPIUM BROMIDE AND ALBUTEROL SULFATE 3 ML: 2.5; .5 SOLUTION RESPIRATORY (INHALATION) at 00:12

## 2018-01-01 RX ADMIN — NOREPINEPHRINE BITARTRATE 0.2 MCG/KG/MIN: 1 INJECTION INTRAVENOUS at 16:25

## 2018-01-01 RX ADMIN — METHYLPREDNISOLONE SODIUM SUCCINATE 60 MG: 125 INJECTION, POWDER, FOR SOLUTION INTRAMUSCULAR; INTRAVENOUS at 22:30

## 2018-01-01 RX ADMIN — CLOPIDOGREL BISULFATE 75 MG: 75 TABLET ORAL at 08:37

## 2018-01-01 RX ADMIN — ALBUMIN HUMAN: 0.05 INJECTION, SOLUTION INTRAVENOUS at 11:54

## 2018-01-01 RX ADMIN — FUROSEMIDE 80 MG: 10 INJECTION INTRAMUSCULAR; INTRAVENOUS at 15:45

## 2018-01-01 RX ADMIN — MIDAZOLAM 10 MG/HR: 5 INJECTION INTRAMUSCULAR; INTRAVENOUS at 04:11

## 2018-01-01 RX ADMIN — INSULIN ASPART 2 UNITS: 100 INJECTION, SOLUTION INTRAVENOUS; SUBCUTANEOUS at 11:05

## 2018-01-01 RX ADMIN — POTASSIUM CHLORIDE 10 MEQ: 7.46 INJECTION, SOLUTION INTRAVENOUS at 10:42

## 2018-01-01 RX ADMIN — VASOPRESSIN 0.06 UNITS/MIN: 20 INJECTION INTRAVENOUS at 09:21

## 2018-01-01 RX ADMIN — ALBUTEROL SULFATE 4 PUFF: 90 AEROSOL, METERED RESPIRATORY (INHALATION) at 18:54

## 2018-01-01 RX ADMIN — ALBUMIN HUMAN: 0.05 INJECTION, SOLUTION INTRAVENOUS at 11:39

## 2018-01-01 RX ADMIN — FUROSEMIDE 40 MG: 10 INJECTION, SOLUTION INTRAMUSCULAR; INTRAVENOUS at 11:30

## 2018-01-01 RX ADMIN — SILDENAFIL CITRATE 20 MG: 20 TABLET, FILM COATED ORAL at 08:36

## 2018-01-01 RX ADMIN — ALBUTEROL SULFATE 4 PUFF: 90 AEROSOL, METERED RESPIRATORY (INHALATION) at 10:59

## 2018-01-01 RX ADMIN — IPRATROPIUM BROMIDE AND ALBUTEROL SULFATE 3 ML: 2.5; .5 SOLUTION RESPIRATORY (INHALATION) at 11:08

## 2018-01-01 RX ADMIN — ALBUTEROL SULFATE 2.5 MG: 2.5 SOLUTION RESPIRATORY (INHALATION) at 07:02

## 2018-01-01 RX ADMIN — IPRATROPIUM BROMIDE AND ALBUTEROL SULFATE 3 ML: 2.5; .5 SOLUTION RESPIRATORY (INHALATION) at 08:21

## 2018-01-01 RX ADMIN — LEVOFLOXACIN 500 MG: 5 INJECTION, SOLUTION INTRAVENOUS at 02:25

## 2018-01-01 RX ADMIN — MIDAZOLAM 8 MG/HR: 5 INJECTION INTRAMUSCULAR; INTRAVENOUS at 09:37

## 2018-01-01 RX ADMIN — FUROSEMIDE 40 MG: 10 INJECTION, SOLUTION INTRAMUSCULAR; INTRAVENOUS at 09:07

## 2018-01-01 RX ADMIN — POTASSIUM CHLORIDE 40 MEQ: 750 CAPSULE, EXTENDED RELEASE ORAL at 16:19

## 2018-01-01 RX ADMIN — NOREPINEPHRINE BITARTRATE 0.2 MCG/KG/MIN: 1 INJECTION INTRAVENOUS at 10:37

## 2018-01-01 RX ADMIN — ASPIRIN 81 MG: 81 TABLET, COATED ORAL at 08:38

## 2018-01-01 RX ADMIN — METRONIDAZOLE 500 MG: 500 INJECTION, SOLUTION INTRAVENOUS at 02:03

## 2018-01-01 RX ADMIN — ACETYLCYSTEINE 2 ML: 100 INHALANT RESPIRATORY (INHALATION) at 18:32

## 2018-01-01 RX ADMIN — SODIUM BICARBONATE 100 ML/HR: 84 INJECTION, SOLUTION INTRAVENOUS at 02:09

## 2018-01-01 RX ADMIN — CHLORHEXIDINE GLUCONATE 0.12% ORAL RINSE 15 ML: 1.2 LIQUID ORAL at 09:20

## 2018-01-01 RX ADMIN — GUAIFENESIN 1200 MG: 600 TABLET, EXTENDED RELEASE ORAL at 09:06

## 2018-01-01 RX ADMIN — ONDANSETRON HYDROCHLORIDE 4 MG: 2 INJECTION, SOLUTION INTRAMUSCULAR; INTRAVENOUS at 19:44

## 2018-01-01 RX ADMIN — MIDAZOLAM 2 MG: 1 INJECTION INTRAMUSCULAR; INTRAVENOUS at 15:10

## 2018-01-01 RX ADMIN — IPRATROPIUM BROMIDE AND ALBUTEROL SULFATE 3 ML: 2.5; .5 SOLUTION RESPIRATORY (INHALATION) at 15:59

## 2018-01-01 RX ADMIN — ONDANSETRON 4 MG: 2 INJECTION INTRAMUSCULAR; INTRAVENOUS at 23:42

## 2018-01-01 RX ADMIN — CHLORHEXIDINE GLUCONATE 0.12% ORAL RINSE 15 ML: 1.2 LIQUID ORAL at 21:02

## 2018-01-01 RX ADMIN — LEVOFLOXACIN 500 MG: 5 INJECTION, SOLUTION INTRAVENOUS at 03:44

## 2018-01-01 RX ADMIN — POTASSIUM CHLORIDE 10 MEQ: 7.46 INJECTION, SOLUTION INTRAVENOUS at 11:11

## 2018-01-01 RX ADMIN — SILDENAFIL CITRATE 20 MG: 20 TABLET, FILM COATED ORAL at 16:55

## 2018-01-01 RX ADMIN — GUAIFENESIN 1200 MG: 600 TABLET, EXTENDED RELEASE ORAL at 21:10

## 2018-01-01 RX ADMIN — IPRATROPIUM BROMIDE AND ALBUTEROL SULFATE 3 ML: 2.5; .5 SOLUTION RESPIRATORY (INHALATION) at 03:35

## 2018-01-01 RX ADMIN — HEPARIN SODIUM 5000 UNITS: 5000 INJECTION, SOLUTION INTRAVENOUS; SUBCUTANEOUS at 21:53

## 2018-01-01 RX ADMIN — METHYLPREDNISOLONE SODIUM SUCCINATE 60 MG: 125 INJECTION, POWDER, FOR SOLUTION INTRAMUSCULAR; INTRAVENOUS at 16:25

## 2018-01-01 RX ADMIN — DOCUSATE SODIUM 200 MG: 100 CAPSULE, LIQUID FILLED ORAL at 21:09

## 2018-01-01 RX ADMIN — CALCIUM CHLORIDE 1 G: 100 INJECTION, SOLUTION INTRAVENOUS at 14:58

## 2018-01-01 RX ADMIN — CHLORHEXIDINE GLUCONATE 0.12% ORAL RINSE 15 ML: 1.2 LIQUID ORAL at 20:59

## 2018-01-01 RX ADMIN — IPRATROPIUM BROMIDE AND ALBUTEROL SULFATE 3 ML: 2.5; .5 SOLUTION RESPIRATORY (INHALATION) at 23:36

## 2018-01-01 RX ADMIN — CEFTRIAXONE SODIUM 1 G: 1 INJECTION, POWDER, FOR SOLUTION INTRAMUSCULAR; INTRAVENOUS at 12:31

## 2018-01-01 RX ADMIN — FUROSEMIDE 40 MG: 10 INJECTION, SOLUTION INTRAMUSCULAR; INTRAVENOUS at 08:37

## 2018-01-01 RX ADMIN — INSULIN ASPART 2 UNITS: 100 INJECTION, SOLUTION INTRAVENOUS; SUBCUTANEOUS at 11:30

## 2018-01-01 RX ADMIN — DEXTROMETHORPHAN 60 MG: 30 SUSPENSION, EXTENDED RELEASE ORAL at 09:06

## 2018-01-01 RX ADMIN — HEPARIN SODIUM 5000 UNITS: 5000 INJECTION, SOLUTION INTRAVENOUS; SUBCUTANEOUS at 22:29

## 2018-01-01 RX ADMIN — PANTOPRAZOLE SODIUM 40 MG: 40 INJECTION, POWDER, FOR SOLUTION INTRAVENOUS at 09:20

## 2018-01-01 RX ADMIN — SODIUM CHLORIDE 500 ML: 9 INJECTION, SOLUTION INTRAVENOUS at 09:15

## 2018-01-01 RX ADMIN — ATORVASTATIN CALCIUM 80 MG: 40 TABLET, FILM COATED ORAL at 21:10

## 2018-01-01 RX ADMIN — ALBUTEROL SULFATE 4 PUFF: 90 AEROSOL, METERED RESPIRATORY (INHALATION) at 14:54

## 2018-01-01 RX ADMIN — SODIUM CHLORIDE 1000 ML: 900 INJECTION, SOLUTION INTRAVENOUS at 10:38

## 2018-01-01 RX ADMIN — ALBUTEROL SULFATE 4 PUFF: 90 AEROSOL, METERED RESPIRATORY (INHALATION) at 10:47

## 2018-01-01 RX ADMIN — SODIUM CHLORIDE, PRESERVATIVE FREE 10 ML: 5 INJECTION INTRAVENOUS at 20:17

## 2018-01-01 RX ADMIN — CHLORHEXIDINE GLUCONATE 0.12% ORAL RINSE 15 ML: 1.2 LIQUID ORAL at 09:10

## 2018-01-01 RX ADMIN — CHLORHEXIDINE GLUCONATE 0.12% ORAL RINSE 15 ML: 1.2 LIQUID ORAL at 20:38

## 2018-01-01 RX ADMIN — MORPHINE SULFATE 2 MG: 2 INJECTION, SOLUTION INTRAMUSCULAR; INTRAVENOUS at 14:51

## 2018-01-01 RX ADMIN — ATORVASTATIN CALCIUM 80 MG: 40 TABLET, FILM COATED ORAL at 21:52

## 2018-01-01 RX ADMIN — HEPARIN SODIUM 5000 UNITS: 5000 INJECTION, SOLUTION INTRAVENOUS; SUBCUTANEOUS at 22:34

## 2018-01-01 RX ADMIN — INSULIN ASPART 3 UNITS: 100 INJECTION, SOLUTION INTRAVENOUS; SUBCUTANEOUS at 21:06

## 2018-01-01 RX ADMIN — ALLOPURINOL 100 MG: 100 TABLET ORAL at 09:06

## 2018-01-01 RX ADMIN — METHYLPREDNISOLONE SODIUM SUCCINATE 60 MG: 125 INJECTION, POWDER, FOR SOLUTION INTRAMUSCULAR; INTRAVENOUS at 04:37

## 2018-01-01 RX ADMIN — SODIUM CHLORIDE 100 ML/HR: 900 INJECTION, SOLUTION INTRAVENOUS at 22:31

## 2018-01-01 RX ADMIN — NOREPINEPHRINE BITARTRATE 0.3 MCG/KG/MIN: 1 INJECTION INTRAVENOUS at 05:19

## 2018-01-01 RX ADMIN — SODIUM CHLORIDE 500 ML: 9 INJECTION, SOLUTION INTRAVENOUS at 07:59

## 2018-01-01 RX ADMIN — HEPARIN SODIUM 5000 UNITS: 5000 INJECTION, SOLUTION INTRAVENOUS; SUBCUTANEOUS at 16:26

## 2018-01-01 RX ADMIN — PANTOPRAZOLE SODIUM 40 MG: 40 INJECTION, POWDER, FOR SOLUTION INTRAVENOUS at 08:12

## 2018-01-01 RX ADMIN — CHLORHEXIDINE GLUCONATE 0.12% ORAL RINSE 15 ML: 1.2 LIQUID ORAL at 20:28

## 2018-01-01 RX ADMIN — VASOPRESSIN 0.06 UNITS/MIN: 20 INJECTION INTRAVENOUS at 19:46

## 2018-01-01 RX ADMIN — Medication 10 ML: at 20:29

## 2018-01-01 RX ADMIN — NOREPINEPHRINE BITARTRATE 0.04 MCG/KG/MIN: 1 INJECTION INTRAVENOUS at 06:03

## 2018-01-01 RX ADMIN — MIDAZOLAM 5 MG/HR: 5 INJECTION INTRAMUSCULAR; INTRAVENOUS at 21:03

## 2018-01-09 NOTE — TELEPHONE ENCOUNTER
PATIENT IS NEEDING REFILL ON HER GuaiFENesin ER (MUCINEX MAXIMUM STRENGTH) 1200 MG tablet sustained-release 12 hour. [facility].

## 2018-01-19 PROBLEM — J18.9 PNEUMONIA OF RIGHT MIDDLE LOBE DUE TO INFECTIOUS ORGANISM: Status: ACTIVE | Noted: 2018-01-01

## 2018-01-19 NOTE — PATIENT INSTRUCTIONS
Calorie Counting for Weight Loss  Calories are energy you get from the things you eat and drink. Your body uses this energy to keep you going throughout the day. The number of calories you eat affects your weight. When you eat more calories than your body needs, your body stores the extra calories as fat. When you eat fewer calories than your body needs, your body burns fat to get the energy it needs.  Calorie counting means keeping track of how many calories you eat and drink each day. If you make sure to eat fewer calories than your body needs, you should lose weight. In order for calorie counting to work, you will need to eat the number of calories that are right for you in a day to lose a healthy amount of weight per week. A healthy amount of weight to lose per week is usually 1-2 lb (0.5-0.9 kg). A dietitian can determine how many calories you need in a day and give you suggestions on how to reach your calorie goal.   WHAT IS MY MY PLAN?  My goal is to have __________ calories per day.   If I have this many calories per day, I should lose around __________ pounds per week.  WHAT DO I NEED TO KNOW ABOUT CALORIE COUNTING?  In order to meet your daily calorie goal, you will need to:  · Find out how many calories are in each food you would like to eat. Try to do this before you eat.  · Decide how much of the food you can eat.  · Write down what you ate and how many calories it had. Doing this is called keeping a food log.  WHERE DO I FIND CALORIE INFORMATION?  The number of calories in a food can be found on a Nutrition Facts label. Note that all the information on a label is based on a specific serving of the food. If a food does not have a Nutrition Facts label, try to look up the calories online or ask your dietitian for help.  HOW DO I DECIDE HOW MUCH TO EAT?  To decide how much of the food you can eat, you will need to consider both the number of calories in one serving and the size of one serving. This  information can be found on the Nutrition Facts label. If a food does not have a Nutrition Facts label, look up the information online or ask your dietitian for help.  Remember that calories are listed per serving. If you choose to have more than one serving of a food, you will have to multiply the calories per serving by the amount of servings you plan to eat. For example, the label on a package of bread might say that a serving size is 1 slice and that there are 90 calories in a serving. If you eat 1 slice, you will have eaten 90 calories. If you eat 2 slices, you will have eaten 180 calories.  HOW DO I KEEP A FOOD LOG?  After each meal, record the following information in your food log:  · What you ate.  · How much of it you ate.  · How many calories it had.  · Then, add up your calories.  Keep your food log near you, such as in a small notebook in your pocket. Another option is to use a mobile ginger or website. Some programs will calculate calories for you and show you how many calories you have left each time you add an item to the log.  WHAT ARE SOME CALORIE COUNTING TIPS?  · Use your calories on foods and drinks that will fill you up and not leave you hungry. Some examples of this include foods like nuts and nut butters, vegetables, lean proteins, and high-fiber foods (more than 5 g fiber per serving).  · Eat nutritious foods and avoid empty calories. Empty calories are calories you get from foods or beverages that do not have many nutrients, such as candy and soda. It is better to have a nutritious high-calorie food (such as an avocado) than a food with few nutrients (such as a bag of chips).  · Know how many calories are in the foods you eat most often. This way, you do not have to look up how many calories they have each time you eat them.  · Look out for foods that may seem like low-calorie foods but are really high-calorie foods, such as baked goods, soda, and fat-free candy.  · Pay attention to calories  in drinks. Drinks such as sodas, specialty coffee drinks, alcohol, and juices have a lot of calories yet do not fill you up. Choose low-calorie drinks like water and diet drinks.  · Focus your calorie counting efforts on higher calorie items. Logging the calories in a garden salad that contains only vegetables is less important than calculating the calories in a milk shake.  · Find a way of tracking calories that works for you. Get creative. Most people who are successful find ways to keep track of how much they eat in a day, even if they do not count every calorie.  WHAT ARE SOME PORTION CONTROL TIPS?  · Know how many calories are in a serving. This will help you know how many servings of a certain food you can have.  · Use a measuring cup to measure serving sizes. This is helpful when you start out. With time, you will be able to estimate serving sizes for some foods.  · Take some time to put servings of different foods on your favorite plates, bowls, and cups so you know what a serving looks like.  · Try not to eat straight from a bag or box. Doing this can lead to overeating. Put the amount you would like to eat in a cup or on a plate to make sure you are eating the right portion.  · Use smaller plates, glasses, and bowls to prevent overeating. This is a quick and easy way to practice portion control. If your plate is smaller, less food can fit on it.  · Try not to multitask while eating, such as watching TV or using your computer. If it is time to eat, sit down at a table and enjoy your food. Doing this will help you to start recognizing when you are full. It will also make you more aware of what and how much you are eating.  HOW CAN I CALORIE COUNT WHEN EATING OUT?  · Ask for smaller portion sizes or child-sized portions.  · Consider sharing an entree and sides instead of getting your own entree.  · If you get your own entree, eat only half. Ask for a box at the beginning of your meal and put the rest of your  "entree in it so you are not tempted to eat it.  · Look for the calories on the menu. If calories are listed, choose the lower calorie options.  · Choose dishes that include vegetables, fruits, whole grains, low-fat dairy products, and lean protein. Focusing on smart food choices from each of the 5 food groups can help you stay on track at restaurants.  · Choose items that are boiled, broiled, grilled, or steamed.  · Choose water, milk, unsweetened iced tea, or other drinks without added sugars. If you want an alcoholic beverage, choose a lower calorie option. For example, a regular shilpa can have up to 700 calories and a glass of wine has around 150.  · Stay away from items that are buttered, battered, fried, or served with cream sauce. Items labeled \"crispy\" are usually fried, unless stated otherwise.  · Ask for dressings, sauces, and syrups on the side. These are usually very high in calories, so do not eat much of them.  · Watch out for salads. Many people think salads are a healthy option, but this is often not the case. Many salads come with [place]ott, fried chicken, lots of cheese, fried chips, and dressing. All of these items have a lot of calories. If you want a salad, choose a garden salad and ask for grilled meats or steak. Ask for the dressing on the side, or ask for olive oil and vinegar or lemon to use as dressing.  · Estimate how many servings of a food you are given. For example, a serving of cooked rice is ½ cup or about the size of half a tennis ball or one cupcake wrapper. Knowing serving sizes will help you be aware of how much food you are eating at restaurants. The list below tells you how big or small some common portion sizes are based on everyday objects.  ¨ 1 oz--4 stacked dice.  ¨ 3 oz--1 deck of cards.  ¨ 1 tsp--1 dice.  ¨ 1 Tbsp--½ a Ping-Pong ball.  ¨ 2 Tbsp--1 Ping-Pong ball.  ¨ ½ cup--1 tennis ball or 1 cupcake wrapper.  ¨ 1 cup--1 baseball.  This information is not intended to replace " advice given to you by your health care provider. Make sure you discuss any questions you have with your health care provider.  Document Released: 12/18/2006 Document Revised: 01/08/2016 Document Reviewed: 10/23/2014  Elsevier Interactive Patient Education © 2017 [employer]

## 2018-01-19 NOTE — PROGRESS NOTES
Subjective   Cameron Bowie is a 62 y.o. female.     Pneumonia   She complains of cough, frequent throat clearing, shortness of breath, sputum production and wheezing. There is no chest tightness, hemoptysis or hoarse voice. This is a new problem. The current episode started in the past 7 days. The problem occurs constantly. The problem has been gradually improving. The cough is productive of sputum. Pertinent negatives include no fever or sore throat.        The following portions of the patient's history were reviewed and updated as appropriate: allergies, current medications, past family history, past medical history, past social history, past surgical history and problem list.    Review of Systems   Constitutional: Negative for fever.   HENT: Negative for hoarse voice and sore throat.    Respiratory: Positive for cough, sputum production, shortness of breath and wheezing. Negative for hemoptysis.        Objective   Physical Exam   Constitutional: She is oriented to person, place, and time. She appears well-developed and well-nourished. No distress.   HENT:   Head: Normocephalic and atraumatic.   Cardiovascular: Normal rate, regular rhythm and normal heart sounds.    Pulmonary/Chest: Effort normal. She has decreased breath sounds. She has wheezes in the right middle field and the right lower field. She has rhonchi in the right middle field and the right lower field. She has no rales.   Neurological: She is alert and oriented to person, place, and time.   Skin: Skin is warm and dry. She is not diaphoretic.   Psychiatric: She has a normal mood and affect. Her behavior is normal. Judgment and thought content normal.   Nursing note and vitals reviewed.      Assessment/Plan   Problems Addressed this Visit        Respiratory    Pneumonia of right middle lobe due to infectious organism - Primary    Relevant Medications    GuaiFENesin ER (MUCINEX MAXIMUM STRENGTH) 1200 MG tablet sustained-release 12 hour     doxycycline (MONODOX) 100 MG capsule    triamcinolone acetonide (KENALOG-40) injection 40 mg (Start on 1/19/2018  2:30 PM)                Current outpatient and discharge medications have been reconciled for the patient.  Roby Corley MD

## 2018-02-16 PROBLEM — R21 RASH, SKIN: Status: RESOLVED | Noted: 2017-01-01 | Resolved: 2018-01-01

## 2018-02-16 PROBLEM — R05.9 COUGH: Status: RESOLVED | Noted: 2017-01-01 | Resolved: 2018-01-01

## 2018-02-16 PROBLEM — J20.9 ACUTE BRONCHITIS: Status: RESOLVED | Noted: 2017-01-01 | Resolved: 2018-01-01

## 2018-02-16 NOTE — PROGRESS NOTES
Subjective   Cameron Bowie is a 62 y.o. female.     Pneumonia   She complains of cough, frequent throat clearing, shortness of breath, sputum production and wheezing. There is no chest tightness, hemoptysis or hoarse voice. This is a new problem. The current episode started in the past 7 days. The problem occurs constantly. The problem has been gradually improving. The cough is productive of sputum. Pertinent negatives include no fever or sore throat.        The following portions of the patient's history were reviewed and updated as appropriate: allergies, current medications, past family history, past medical history, past social history, past surgical history and problem list.    Review of Systems   Constitutional: Negative for fever.   HENT: Negative for hoarse voice and sore throat.    Respiratory: Positive for cough, sputum production, shortness of breath and wheezing. Negative for hemoptysis.        Objective   Physical Exam   Constitutional: She is oriented to person, place, and time. She appears well-developed and well-nourished. No distress.   HENT:   Head: Normocephalic and atraumatic.   Cardiovascular: Normal rate, regular rhythm and normal heart sounds.    Pulmonary/Chest: Effort normal. She has decreased breath sounds. She has wheezes in the right middle field and the right lower field. She has rhonchi in the right middle field and the right lower field. She has no rales.   Neurological: She is alert and oriented to person, place, and time.   Skin: Skin is warm and dry. She is not diaphoretic.   Psychiatric: She has a normal mood and affect. Her behavior is normal. Judgment and thought content normal.   Nursing note and vitals reviewed.      Assessment/Plan   Problems Addressed this Visit     None                Current outpatient and discharge medications have been reconciled for the patient.  Roby Corley MD

## 2018-02-16 NOTE — PROGRESS NOTES
Subjective   Cameron Bowie is a 62 y.o. female.     Hyperlipidemia   This is a chronic problem. The current episode started more than 1 year ago. The problem is controlled. Recent lipid tests were reviewed and are normal. Associated symptoms include myalgias and shortness of breath. Pertinent negatives include no chest pain.   Hypertension   This is a chronic problem. The current episode started more than 1 year ago. The problem is unchanged. The problem is controlled. Associated symptoms include anxiety, peripheral edema and shortness of breath. Pertinent negatives include no chest pain, orthopnea, palpitations or PND.   Diabetes   She presents for her follow-up diabetic visit. She has type 2 diabetes mellitus. Pertinent negatives for hypoglycemia include no nervousness/anxiousness. Associated symptoms include fatigue. Pertinent negatives for diabetes include no chest pain, no foot paresthesias, no foot ulcerations, no polydipsia, no polyphagia and no polyuria. Symptoms are stable. Her breakfast blood glucose is taken between 6-7 am. Her breakfast blood glucose range is generally 70-90 mg/dl. Her lunch blood glucose is taken between 11-12 pm. Her lunch blood glucose range is generally  mg/dl. Her highest blood glucose is 110-130 mg/dl. (Usually checks at least BID, recently sick  ) An ACE inhibitor/angiotensin II receptor blocker is contraindicated. Eye exam is not current (been more than a year, patient says she would schedule).   COPD   This is a chronic problem. The current episode started more than 1 year ago. The problem occurs intermittently. The problem has been unchanged. Associated symptoms include congestion, coughing, fatigue and myalgias. Pertinent negatives include no chest pain, chills, diaphoresis, fever, nausea, numbness or vomiting.   Coronary Artery Disease   Presents for follow-up visit. Symptoms include leg swelling and shortness of breath. Pertinent negatives include no chest pain,  chest pressure, chest tightness or palpitations. Risk factors include hyperlipidemia. The symptoms have been stable.   Anxiety   Presents for follow-up visit. Symptoms include shortness of breath. Patient reports no chest pain, decreased concentration, depressed mood, excessive worry, insomnia, irritability, nausea, nervous/anxious behavior, palpitations, panic, restlessness or suicidal ideas. Symptoms occur occasionally. The quality of sleep is good.     Her past medical history is significant for CAD.   Pneumonia   She complains of cough, difficulty breathing, shortness of breath and sputum production. There is no hemoptysis or hoarse voice. This is a recurrent problem. The current episode started more than 1 month ago. The problem occurs constantly. The problem has been waxing and waning. The cough is productive of sputum. Associated symptoms include myalgias, postnasal drip, rhinorrhea and sneezing. Pertinent negatives include no appetite change, chest pain, ear pain, fever or PND.        The following portions of the patient's history were reviewed and updated as appropriate: allergies, current medications, past family history, past medical history, past social history, past surgical history and problem list.    Review of Systems   Constitutional: Positive for fatigue. Negative for activity change, appetite change, chills, diaphoresis, fever and irritability.   HENT: Positive for congestion, postnasal drip, rhinorrhea, sinus pressure and sneezing. Negative for ear discharge, ear pain, facial swelling, hearing loss, hoarse voice and mouth sores.    Respiratory: Positive for cough, sputum production and shortness of breath. Negative for hemoptysis and chest tightness.    Cardiovascular: Positive for leg swelling. Negative for chest pain, palpitations, orthopnea and PND.   Gastrointestinal: Negative for nausea and vomiting.   Endocrine: Negative for polydipsia, polyphagia and polyuria.   Musculoskeletal: Positive  for myalgias.   Neurological: Negative for numbness.   Psychiatric/Behavioral: Negative for decreased concentration and suicidal ideas. The patient is not nervous/anxious and does not have insomnia.        Objective   Physical Exam   Constitutional: She is oriented to person, place, and time. She appears well-developed and well-nourished. No distress.   HENT:   Right Ear: Hearing and ear canal normal. No drainage, swelling or tenderness. Tympanic membrane is retracted.   Left Ear: Hearing and ear canal normal. No drainage, swelling or tenderness. Tympanic membrane is retracted.   Cardiovascular: Normal rate, regular rhythm and intact distal pulses.  Exam reveals distant heart sounds. Exam reveals no gallop, no S4 and no friction rub.    No murmur heard.  Pulmonary/Chest: She has decreased breath sounds. She has no wheezes. She has no rhonchi. She has no rales. She exhibits no tenderness.   Musculoskeletal: She exhibits edema.       Vascular Status -  Her exam exhibits right foot vasculature normal and right foot edema. Her exam exhibits left foot vasculature normal and left foot edema.  Neurological: She is alert and oriented to person, place, and time.   Skin: Skin is warm and dry. She is not diaphoretic.   Psychiatric: She has a normal mood and affect. Her behavior is normal. Judgment and thought content normal.   Nursing note and vitals reviewed.      Assessment/Plan   Problems Addressed this Visit        Cardiovascular and Mediastinum    Pulmonary hypertension severe    Hyperlipidemia    Relevant Medications    atorvastatin (LIPITOR) 80 MG tablet    Other Relevant Orders    Lipid Panel    Essential hypertension    Relevant Medications    furosemide (LASIX) 80 MG tablet    Coronary arteriosclerosis    Relevant Medications    clopidogrel (PLAVIX) 75 MG tablet    Other Relevant Orders    Comprehensive Metabolic Panel       Respiratory    Pneumonia of right middle lobe due to infectious organism - Primary     Relevant Medications    GuaiFENesin ER (MUCINEX MAXIMUM STRENGTH) 1200 MG tablet sustained-release 12 hour    Other Relevant Orders    XR Chest PA & Lateral       Endocrine    Type 2 diabetes mellitus with stage 3 chronic kidney disease, without long-term current use of insulin    Relevant Medications    furosemide (LASIX) 80 MG tablet    Other Relevant Orders    Hemoglobin A1c    Microalbumin / Creatinine Urine Ratio - Urine, Clean Catch       Other    Chronic kidney disease    Relevant Orders    Comprehensive Metabolic Panel

## 2018-02-19 NOTE — PROGRESS NOTES
Radiology thought the chest x-ray looked worse.  I recommend starting Levaquin 750 mg every 48 hours for 5 doses and an appointment with pulmonary for second opinion.  My concern about doing a CAT scan with contrast is she has chronic kidney disease stage III.  I one second opinion with pulmonary to see if they feel like a regular noncontrast CT would be adequate.

## 2018-02-19 NOTE — PROGRESS NOTES
Also, Ms. Bowie didn't get her blood tests that ordered in the office at her last visit.  Please have her come in and get those done thank you

## 2018-02-19 NOTE — PROGRESS NOTES
Pr Dr. Corley, Ms. Bowie has been called with her recent CXR results & recommendations.  Continue her current medications and follow-up as planned or sooner if any problems.    Script sent and Referral Sent

## 2018-02-19 NOTE — TELEPHONE ENCOUNTER
Pr Dr. Corley, Ms. Bowie has been called with her recent CXR results & recommendations.  Continue her current medications and follow-up as planned or sooner if any problems.    Script sent and Referral Sent      ----- Message from Roby Corley MD sent at 2/19/2018  4:16 PM CST -----  Radiology thought the chest x-ray looked worse.  I recommend starting Levaquin 750 mg every 48 hours for 5 doses and an appointment with pulmonary for second opinion.  My concern about doing a CAT scan with contrast is she has chronic kidney disease stage III.  I one second opinion with pulmonary to see if they feel like a regular noncontrast CT would be adequate.

## 2018-02-21 NOTE — PROGRESS NOTES
Pulmonary Consultation    Roby Corley MD,    Thank you for asking me to see Cameron Bowie for   Chief Complaint   Patient presents with   • Shortness of Breath     ref by Dr. Corley   • Abnormal Chest X-ray   .    Subjective     History of Present Illness  Cameron Bowie is a 62 y.o. female with a PMH significant for COPD, tobacco use, pulmonary hypertension, morbid obesity, HTN, ASCAD, DM, PAD s/p stenting, and CKD who presents for evaluation of an abnormal chest x-ray. Pt states she was seen at  twice and Dr. Corley twice in January for cough, headaches, and a URI. She was treated with several courses of antibiotics including Levaquin. Pt was told she had pneumonia. Dr. Corley repeated the CXR last week which was concerning for worsening pneumonia so she was referred to me. Pt admits to continued cough as well as chest tightness and dyspnea. She does have more dyspnea with coughing. Pt also admits to dizziness with walking. She was previously on opsumit by Dr. Connelly but it was stopped by Dr. Snowden as it was not indicated. Pt feels that her decline and illness was due to stopping the opsumit. She does have duonebs which she uses every 4hrs but it does not help significantly. Pt also uses albuterol at least once a day. She does take plavix for ASCAD and PAD. She smokes 0.5-1ppd (previously up to 4ppd) for the past 39yrs. Pt tried Chantix but she was scared of the side effects. She does not want to quit but she is trying to cut back. Pt worked as a NA. Her maternal grandfather had heart disease and possibly lung cancer.    Review of Systems: History obtained from chart review and the patient.  Review of Systems   Constitutional: Positive for unexpected weight change. Negative for fatigue and fever.   HENT: Positive for postnasal drip. Negative for congestion.    Respiratory: Positive for cough, chest tightness and shortness of breath.    Cardiovascular: Positive for leg swelling. Negative for  chest pain.   Gastrointestinal: Negative for abdominal pain.     As described in the HPI. Otherwise, remainder of ROS (14 systems) were negative.    Patient Active Problem List   Diagnosis   • Venous insufficiency   • Pulmonary hypertension severe   • Obesity   • Hyperlipidemia   • Gout   • Generalized anxiety disorder   • Essential hypertension   • DJD (degenerative joint disease)   • Coronary atherosclerosis of native coronary artery   • Coronary arteriosclerosis   • Chronic kidney disease   • Psoriasis   • Moderate episode of recurrent major depressive disorder   • Seasonal allergic rhinitis due to pollen   • Type 2 diabetes mellitus with stage 3 chronic kidney disease, without long-term current use of insulin   • Non-rheumatic tricuspid valve insufficiency   • Pneumonia of right middle lobe due to infectious organism   • PVD (peripheral vascular disease)   • Light tobacco smoker   • History of tobacco use   • Foot pain   • Encounter for screening for osteoporosis   • COPD (chronic obstructive pulmonary disease)   • Carpal tunnel syndrome   • Breast screening   • Asthenia         Current Outpatient Prescriptions:   •  allopurinol (ZYLOPRIM) 100 MG tablet, Take 1 tablet by mouth Daily., Disp: 30 tablet, Rfl: 11  •  aspirin 81 MG tablet, Take 1 tablet by mouth Daily., Disp: 30 tablet, Rfl: 11  •  atorvastatin (LIPITOR) 80 MG tablet, Take 1 tablet by mouth Every Night., Disp: 90 tablet, Rfl: 3  •  azelastine (ASTELIN) 0.1 % nasal spray, 2 sprays into each nostril 2 (Two) Times a Day. Use in each nostril as directed, Disp: 30 mL, Rfl: 11  •  bisoprolol (ZEBeta) 5 MG tablet, Take 0.5 tablets by mouth Daily., Disp: 30 tablet, Rfl: 6  •  Blood Glucose Monitoring Suppl (ONE TOUCH ULTRA MINI) w/Device kit, , Disp: , Rfl:   •  Calcium-Magnesium-Vitamin D (CALCIUM 500 PO), Take 500 mg by mouth 2 (two) times a day., Disp: , Rfl:   •  clopidogrel (PLAVIX) 75 MG tablet, Take 1 tablet by mouth Daily., Disp: 30 tablet, Rfl:  11  •  furosemide (LASIX) 80 MG tablet, Take 1 tablet by mouth Daily., Disp: 30 tablet, Rfl: 11  •  glucose blood test strip, Use as instructed, Disp: 50 each, Rfl: 12  •  GuaiFENesin ER (MUCINEX MAXIMUM STRENGTH) 1200 MG tablet sustained-release 12 hour, Take 1 tablet BID as needed., Disp: 60 each, Rfl: 11  •  hydrocortisone (WESTCORT) 0.2 % cream, Apply  topically 2 (Two) Times a Day., Disp: 60 g, Rfl: 2  •  hydrocortisone-bacitracin-zinc oxide-nystatin (MAGIC BARRIER), Apply 1 application topically As Needed (Rash)., Disp: 60 g, Rfl: 0  •  levoFLOXacin (LEVAQUIN) 750 MG tablet, Take 1 tablet every 48 hours for 5 doses., Disp: 5 tablet, Rfl: 0  •  promethazine-dextromethorphan (PROMETHAZINE-DM) 6.25-15 MG/5ML syrup, Take 5 mL by mouth At Night As Needed for Cough., Disp: 120 mL, Rfl: 1  •  Respiratory Therapy Supplies (NEBULIZER/TUBING/MOUTHPIECE) kit, Tubing, Disp: 1 each, Rfl: 11  •  senna (SENOKOT) 8.6 MG tablet tablet, Take 1 tablet by mouth 2 (two) times a day., Disp: , Rfl:   •  sertraline (ZOLOFT) 100 MG tablet, Take 0.5 tablets by mouth Daily., Disp: 30 tablet, Rfl: 11  •  sildenafil (REVATIO) 20 MG tablet, Take 1 tablet by mouth 3 (Three) Times a Day., Disp: 90 tablet, Rfl: 6  •  triamcinolone (KENALOG) 0.1 % ointment, Apply  topically 2 (Two) Times a Day. Max 2 weeks per treatment (Patient taking differently: Apply 1 application topically 2 (Two) Times a Day. Max 2 weeks per treatment), Disp: 30 g, Rfl: 2  •  TRUEPLUS LANCETS 33G misc, 1 each Daily., Disp: 100 each, Rfl: 3  •  VENTOLIN  (90 Base) MCG/ACT inhaler, USE 2 PUFFS FOUR TIMES DAILY AND AS NEEDED, Disp: 18 g, Rfl: 11  •  vitamin D (ERGOCALCIFEROL) 84655 units capsule capsule, Take 1 capsule by mouth Every 14 (Fourteen) Days., Disp: 2 capsule, Rfl: 11  •  albuterol (PROVENTIL) (2.5 MG/3ML) 0.083% nebulizer solution, Take 2.5 mg by nebulization 4 (Four) Times a Day As Needed for Wheezing., Disp: 125 vial, Rfl: 11  •  Tiotropium Bromide  Monohydrate 2.5 MCG/ACT aerosol solution, Inhale 2 puffs Daily., Disp: 1 inhaler, Rfl: 11    Past Medical History:   Diagnosis Date   • Abnormal weight gain    • Asthenia    • Breast screening    • Carpal tunnel syndrome    • Chronic kidney disease     Stage 3   • COPD (chronic obstructive pulmonary disease)    • Coronary arteriosclerosis     2007: PCI RCA 2011: PCI: LCX      • Coronary atherosclerosis of native coronary artery    • DJD (degenerative joint disease)     involving multiple joints   • Edema of lower extremity    • Encounter for screening for osteoporosis    • Encounter for screening mammogram for malignant neoplasm of breast    • Essential hypertension    • Foot pain    • Generalized anxiety disorder    • Gout    • History of tobacco use    • Hyperlipidemia    • Light tobacco smoker    • Obesity    • Peripheral edema    • Pulmonary hypertension    • PVD (peripheral vascular disease)     LATOYA R .96 L 1.0 aortobifem 1/2012      • Strain of neck muscle    • Type 2 diabetes mellitus     diet controlled     Past Surgical History:   Procedure Laterality Date   • AXILLARY SURGERY  06/03/2007    Right axillary cellulitis and abscess. Right axillary debridement   • BREAST BIOPSY  03/10/2008    Left breast mass. Left breast excisional biopsy   • CARDIAC CATHETERIZATION  10/04/2011    significant circumflex disease responsible for patient's symptoms. Patent right coronary artery stent. Peripheral arterial disease with completely occluded right common iliac and completely occluded left enternal iliac.   • CARDIAC CATHETERIZATION N/A 5/22/2017    Procedure: Right Heart Cath/ with vasodilator challenge;  Surgeon: Ignacio Connelly MD;  Location: [facility] CATH INVASIVE LOCATION;  Service:    • CARDIAC CATHETERIZATION N/A 5/22/2017    Procedure: Left ventriculography/ LV pressures only;  Surgeon: Ignacio Connelly MD;  Location: [facility] CATH INVASIVE LOCATION;  Service:    • COLONOSCOPY  10/2015    Declined   • CORONARY ARTERY  "BYPASS GRAFT  2012    Aortobifemoral bypass. Repair of spleen   • CYST REMOVAL  1979    Right, recurrent   • DILATATION AND CURETTAGE  09/10/1976    Incomplete .   • ENDOSCOPY AND COLONOSCOPY  2005    Single small polyp in the rectum. The polyp was removed by snare cautery. Colonoscopy, otherwise normal.   • INCISION AND DRAINAGE ABSCESS  2007    Abdominal wall abscess. Incision and drainage and debridement of abdominal wall abscess   • INJECTION OF MEDICATION  2013    Kenalog   • SPLENECTOMY  2012    Lacerated spleen. Posterior laceration of the capsule with a Grade I injury   • TRANSESOPHAGEAL ECHOCARDIOGRAM (SRINIVASA)  2016    With color flow-Normal LV function with Ef of 65 to 70%.Severely dilated right ventricle with impaired systolic function.Impingement of LV cavity by the interventricular septum.Grade 1A diastolic dysfunction.Moderate tricuspid regurg   • TRANSESOPHAGEAL ECHOCARDIOGRAM (SRINIVASA)  2012    Without color flow-Mild right atrial enlargement with mild right ventricular enlargement with normal left atrial and left ventricular size. EF 55%   • TUBAL ABDOMINAL LIGATION  1987   • VAGINAL DELIVERY       Social History     Social History   • Marital status:      Spouse name: N/A   • Number of children: N/A   • Years of education: N/A     Social History Main Topics   • Smoking status: Current Every Day Smoker     Packs/day: 0.25     Years: 39.00     Types: Cigarettes   • Smokeless tobacco: Never Used   • Alcohol use No   • Drug use: No   • Sexual activity: No     Other Topics Concern   • None     Social History Narrative     Family History   Problem Relation Age of Onset   • Cancer Other    • Diabetes Other    • Heart disease Other    • Hypertension Other    • Lung disease Other    • Stroke Other           Objective     Blood pressure 122/76, pulse 74, height 157.5 cm (62\"), weight 118 kg (260 lb 11.2 oz), SpO2 93 %, not currently " breastfeeding.  Physical Exam   Constitutional: She is oriented to person, place, and time. Vital signs are normal. She appears well-developed and well-nourished.   HENT:   Head: Normocephalic and atraumatic.   Nose: Nose normal.   Mouth/Throat: Uvula is midline, oropharynx is clear and moist and mucous membranes are normal.   Mallampati 4   Eyes: Conjunctivae, EOM and lids are normal. Pupils are equal, round, and reactive to light.   Neck: Trachea normal and normal range of motion. No tracheal tenderness present. No thyroid mass present.   Cardiovascular: Normal rate, regular rhythm and normal heart sounds.  PMI is not displaced.  Exam reveals no gallop.    No murmur heard.  Pulmonary/Chest: Effort normal. No respiratory distress. She has no decreased breath sounds. She has no wheezes. She has no rhonchi. Chest wall is not dull to percussion. She exhibits deformity (thoracic kyphosis). She exhibits no tenderness.   Abdominal: Soft. Normal appearance and bowel sounds are normal. There is no hepatosplenomegaly. There is no tenderness.   obese   Musculoskeletal:   Walks with cane, BLE nonpitting edema   Lymphadenopathy:        Head (right side): No submandibular adenopathy present.        Head (left side): No submandibular adenopathy present.     She has no cervical adenopathy.        Right: No supraclavicular adenopathy present.        Left: No supraclavicular adenopathy present.   Neurological: She is alert and oriented to person, place, and time. Gait normal.   Skin: Skin is warm and dry. No rash noted. No cyanosis. Nails show no clubbing.   Psychiatric: She has a normal mood and affect. Her speech is normal and behavior is normal. Judgment normal.   Nursing note and vitals reviewed.      PFTs: 11/13/17 (independently reviewed and interpreted by me)  Ratio 49  FVC 1.57/58%  FEV1 0.77/35%  TLC 3.21/72%  DLCO 9.5/36%  Severe obstruction with moderate restriction and severely reduced diffusing capacity.  No  comparative data available.    Radiology (independently reviewed and interpreted by me): CXR 2/16/18 showed persistent right middle lobe wedge-shaped opacity consistent with atelectasis or pneumonia       Assessment/Plan     Cameron was seen today for shortness of breath and abnormal chest x-ray.    Diagnoses and all orders for this visit:    Abnormal CXR  -     CT Chest Without Contrast; Future    Chronic obstructive pulmonary disease, unspecified COPD type  -     Tiotropium Bromide Monohydrate 2.5 MCG/ACT aerosol solution; Inhale 2 puffs Daily.  -     albuterol (PROVENTIL) (2.5 MG/3ML) 0.083% nebulizer solution; Take 2.5 mg by nebulization 4 (Four) Times a Day As Needed for Wheezing.    Tobacco use disorder    Morbid obesity    Pulmonary hypertension severe    Stage 3 chronic kidney disease    Physical deconditioning       Discussion/ Recommendations:   PFTs consistent with mixed type COPD, but there is severe obstruction.  Chest imaging was reviewed and showed persistent right middle lobe opacity with wedge distribution on the lateral view.  It is possible she has a persistent pneumonia, but given her smoking history, is also concerning for possible malignancy causing obstruction of the right middle lobe bronchus and distal atelectasis.  Initial step will be to obtain CT chest to further elucidate the parenchymal abnormality and to help guide further workup, which may include bronchoscopy.  I think her dyspnea is multifactorial from underlying COPD, pulmonary hypertension, morbid obesity, and deconditioning.  Unfortunately, she does continue to smoke and is pre-contemplative regarding cessation.  Also, she has not proceeded with workup for probable JESSICA due to concerns about having to have an in lab sleep study.    -CT chest without contrast. F/u after CT to review results.  -Stop DuoNeb's.  Start Spiriva Respimat daily.  Sample provided and instructed on use.  -Use albuterol MDI or nebulizer as needed only.   Cautioned on risk of overuse.    -Counseled the patient that I agree that Opsumit and that is not indicated in the setting of pulmonary hypertension unless secondary causes have been ruled out and/or treated.  -Encouraged her to reschedule her consultation with Dr. Gaviria to discuss probable underlying JESSICA.  She does prefer to have a home sleep study if possible.  -I advised the patient of the risks in continuing to use tobacco, and I provided this patient with smoking cessation educational materials. During this visit, I spent > 3-10 minutes counseling the patient regarding smoking cessation.  -Patient's BMI is above normal parameters. Follow-up plan includes:  referral to primary care.      Discussed with patient that her Plavix will need to be held 7 days prior to any biopsies, including bronchoscopy.  Also, her underlying JESSICA and obesity put her increased risk for respiratory failure using MAC, so we will have to consider using GETA even if we only perform a routine bronchoscopy.       Return in about 2 weeks (around 3/7/2018) for F/u CT chest.      Thank you for allowing me to participate in the care of Cameron Bowie. Please do not hesitate to contact me with any questions.         This document has been electronically signed by Jeni Thompson MD on February 21, 2018 2:38 PM      Dictated using Dragon

## 2018-02-22 PROBLEM — K75.81 NASH (NONALCOHOLIC STEATOHEPATITIS): Status: ACTIVE | Noted: 2018-01-01

## 2018-02-22 NOTE — PROGRESS NOTES
Pr Dr. Corley, Ms. Bowie has been called with her recent lab results & recommendations.  Continue her current medications and follow-up as planned or sooner if any problems.

## 2018-02-22 NOTE — TELEPHONE ENCOUNTER
Pr Dr. Corley, Ms. Bowie has been called with her recent lab results & recommendations.  Continue her current medications and follow-up as planned or sooner if any problems.      ----- Message from Roby Corley MD sent at 2/22/2018  2:25 PM CST -----  Okay, hepatic and renal function stable

## 2018-03-04 PROBLEM — J18.9 RECURRENT PNEUMONIA: Status: ACTIVE | Noted: 2018-01-01

## 2018-03-04 PROBLEM — R09.02 HYPOXIA: Status: ACTIVE | Noted: 2018-01-01

## 2018-03-04 NOTE — H&P
[facility]  INPATIENT HISTORY AND PHYSICAL       Patient Care Team:  Roby Corley MD as PCP - General    Date of Admission March 4, 2018 3:09 PM      Chief complaint   Chief Complaint   Patient presents with   • Shortness of Breath   • Cough       Subjective     Patient is a 62 y.o. female presents with Complaint of having shortness of breath.  Patient states she's been having coughing spells ongoing for past 2 months.  Patient states she's been coughing up thick yellowish to brownish colored sputum.  Patient denies any fever at this point in time.  No nausea vomiting have been reported.  Patient states she's been having difficult breathing and has decreased excess tolerance.  Patient states she has not been able to sleep laying flat on bed.  Patient denies any chest or palpitation associated with it.  No diaphoresis have been reported.  Patient denies any headache or blurry vision.  Patient states she has been evaluated by pulmonology as an outpatient.    Review of Systems   Review of Systems   Constitutional: Positive for activity change, diaphoresis and fever. Negative for appetite change and chills.   HENT: Negative for congestion, rhinorrhea, sore throat and trouble swallowing.    Eyes: Negative for visual disturbance.   Respiratory: Positive for cough, chest tightness, shortness of breath and wheezing.    Cardiovascular: Negative for chest pain, palpitations and leg swelling.   Gastrointestinal: Negative for abdominal pain, blood in stool, diarrhea, nausea and vomiting.   Endocrine: Negative for cold intolerance and heat intolerance.   Genitourinary: Negative for decreased urine volume and difficulty urinating.   Musculoskeletal: Negative for back pain, gait problem and neck pain.   Skin: Negative for rash.   Neurological: Positive for weakness. Negative for dizziness, syncope, light-headedness, numbness and headaches.   Psychiatric/Behavioral:  The patient is not nervous/anxious.        History  Past Medical History:   Diagnosis Date   • Abnormal weight gain    • Asthenia    • Breast screening    • Carpal tunnel syndrome    • Chronic kidney disease     Stage 3   • COPD (chronic obstructive pulmonary disease)    • Coronary arteriosclerosis     2007: PCI RCA 2011: PCI: LCX      • Coronary atherosclerosis of native coronary artery    • DJD (degenerative joint disease)     involving multiple joints   • Edema of lower extremity    • Encounter for screening for osteoporosis    • Encounter for screening mammogram for malignant neoplasm of breast    • Essential hypertension    • Foot pain    • Generalized anxiety disorder    • Gout    • History of tobacco use    • Hyperlipidemia    • Light tobacco smoker    • Obesity    • Peripheral edema    • Pulmonary hypertension    • PVD (peripheral vascular disease)     LATOYA R .96 L 1.0 aortobifem 1/2012      • Strain of neck muscle    • Type 2 diabetes mellitus     diet controlled     Past Surgical History:   Procedure Laterality Date   • AXILLARY SURGERY  06/03/2007    Right axillary cellulitis and abscess. Right axillary debridement   • BREAST BIOPSY  03/10/2008    Left breast mass. Left breast excisional biopsy   • CARDIAC CATHETERIZATION  10/04/2011    significant circumflex disease responsible for patient's symptoms. Patent right coronary artery stent. Peripheral arterial disease with completely occluded right common iliac and completely occluded left enternal iliac.   • CARDIAC CATHETERIZATION N/A 5/22/2017    Procedure: Right Heart Cath/ with vasodilator challenge;  Surgeon: Ignacio Connelly MD;  Location: [facility] CATH INVASIVE LOCATION;  Service:    • CARDIAC CATHETERIZATION N/A 5/22/2017    Procedure: Left ventriculography/ LV pressures only;  Surgeon: Ignacio Connelly MD;  Location: [facility] CATH INVASIVE LOCATION;  Service:    • COLONOSCOPY  10/2015    Declined   • CORONARY ARTERY BYPASS GRAFT  04/25/2012    Aortobifemoral  bypass. Repair of spleen   • CYST REMOVAL  1979    Right, recurrent   • DILATATION AND CURETTAGE  09/10/1976    Incomplete .   • ENDOSCOPY AND COLONOSCOPY  2005    Single small polyp in the rectum. The polyp was removed by snare cautery. Colonoscopy, otherwise normal.   • INCISION AND DRAINAGE ABSCESS  2007    Abdominal wall abscess. Incision and drainage and debridement of abdominal wall abscess   • INJECTION OF MEDICATION  2013    Kenalog   • SPLENECTOMY  2012    Lacerated spleen. Posterior laceration of the capsule with a Grade I injury   • TRANSESOPHAGEAL ECHOCARDIOGRAM (SRINIVASA)  2016    With color flow-Normal LV function with Ef of 65 to 70%.Severely dilated right ventricle with impaired systolic function.Impingement of LV cavity by the interventricular septum.Grade 1A diastolic dysfunction.Moderate tricuspid regurg   • TRANSESOPHAGEAL ECHOCARDIOGRAM (SRINIVASA)  2012    Without color flow-Mild right atrial enlargement with mild right ventricular enlargement with normal left atrial and left ventricular size. EF 55%   • TUBAL ABDOMINAL LIGATION  1987   • VAGINAL DELIVERY       Family History   Problem Relation Age of Onset   • Cancer Other    • Diabetes Other    • Heart disease Other    • Hypertension Other    • Lung disease Other    • Stroke Other    • Hypertension Father      Social History   Substance Use Topics   • Smoking status: Current Every Day Smoker     Packs/day: 0.25     Years: 39.00     Types: Cigarettes   • Smokeless tobacco: Never Used   • Alcohol use No     Prescriptions Prior to Admission   Medication Sig Dispense Refill Last Dose   • albuterol (PROVENTIL) (2.5 MG/3ML) 0.083% nebulizer solution Take 2.5 mg by nebulization 4 (Four) Times a Day As Needed for Wheezing. 125 vial 11 Unknown at Unknown time   • allopurinol (ZYLOPRIM) 100 MG tablet Take 1 tablet by mouth Daily. 30 tablet 11 Unknown at Unknown time   • aspirin 81 MG tablet Take 1 tablet  by mouth Daily. 30 tablet 11 Unknown at Unknown time   • atorvastatin (LIPITOR) 80 MG tablet Take 1 tablet by mouth Every Night. 90 tablet 3 Taking   • azelastine (ASTELIN) 0.1 % nasal spray 2 sprays into each nostril 2 (Two) Times a Day. Use in each nostril as directed 30 mL 11 Taking   • bisoprolol (ZEBeta) 5 MG tablet Take 0.5 tablets by mouth Daily. 30 tablet 6 Taking   • Blood Glucose Monitoring Suppl (ONE TOUCH ULTRA MINI) w/Device kit    Taking   • Calcium-Magnesium-Vitamin D (CALCIUM 500 PO) Take 500 mg by mouth 2 (two) times a day.   Taking   • clopidogrel (PLAVIX) 75 MG tablet Take 1 tablet by mouth Daily. 30 tablet 11 Taking   • furosemide (LASIX) 80 MG tablet Take 1 tablet by mouth Daily. 30 tablet 11 Taking   • glucose blood test strip Use as instructed 50 each 12 Taking   • GuaiFENesin ER (MUCINEX MAXIMUM STRENGTH) 1200 MG tablet sustained-release 12 hour Take 1 tablet BID as needed. 60 each 11 Taking   • hydrocortisone (WESTCORT) 0.2 % cream Apply  topically 2 (Two) Times a Day. 60 g 2 Taking   • hydrocortisone-bacitracin-zinc oxide-nystatin (MAGIC BARRIER) Apply 1 application topically As Needed (Rash). 60 g 0 Taking   • promethazine-dextromethorphan (PROMETHAZINE-DM) 6.25-15 MG/5ML syrup Take 5 mL by mouth At Night As Needed for Cough. (Patient not taking: Reported on 3/4/2018) 120 mL 1 Not Taking at Unknown time   • Respiratory Therapy Supplies (NEBULIZER/TUBING/MOUTHPIECE) kit Tubing 1 each 11 Taking   • senna (SENOKOT) 8.6 MG tablet tablet Take 1 tablet by mouth 2 (two) times a day.   Taking   • sertraline (ZOLOFT) 100 MG tablet Take 0.5 tablets by mouth Daily. 30 tablet 11 Taking   • sildenafil (REVATIO) 20 MG tablet Take 1 tablet by mouth 3 (Three) Times a Day. 90 tablet 6 Taking   • Tiotropium Bromide Monohydrate 2.5 MCG/ACT aerosol solution Inhale 2 puffs Daily. 1 inhaler 11    • triamcinolone (KENALOG) 0.1 % ointment Apply  topically 2 (Two) Times a Day. Max 2 weeks per treatment (Patient  taking differently: Apply 1 application topically 2 (Two) Times a Day. Max 2 weeks per treatment) 30 g 2 Taking   • TRUEPLUS LANCETS 33G misc 1 each Daily. 100 each 3 Taking   • VENTOLIN  (90 Base) MCG/ACT inhaler USE 2 PUFFS FOUR TIMES DAILY AND AS NEEDED 18 g 11 Taking   • vitamin D (ERGOCALCIFEROL) 90564 units capsule capsule Take 1 capsule by mouth Every 14 (Fourteen) Days. 2 capsule 11 Taking     Allergies:  Lisinopril; Penicillins; Wellbutrin [bupropion]; Nsaids; and Tramadol  Prior to Admission medications    Medication Sig Start Date End Date Taking? Authorizing Provider   albuterol (PROVENTIL) (2.5 MG/3ML) 0.083% nebulizer solution Take 2.5 mg by nebulization 4 (Four) Times a Day As Needed for Wheezing. 2/21/18   Jeni Thompson MD   allopurinol (ZYLOPRIM) 100 MG tablet Take 1 tablet by mouth Daily. 2/16/18   Roby Corley MD   aspirin 81 MG tablet Take 1 tablet by mouth Daily. 11/7/17   David Snowden MD PhD   atorvastatin (LIPITOR) 80 MG tablet Take 1 tablet by mouth Every Night. 2/16/18   Roby Corley MD   azelastine (ASTELIN) 0.1 % nasal spray 2 sprays into each nostril 2 (Two) Times a Day. Use in each nostril as directed 8/15/17   Roby Corley MD   bisoprolol (ZEBeta) 5 MG tablet Take 0.5 tablets by mouth Daily. 11/7/17   David Snowden MD PhD   Blood Glucose Monitoring Suppl (ONE TOUCH ULTRA MINI) w/Device kit  2/1/18   Historical Provider, MD   Calcium-Magnesium-Vitamin D (CALCIUM 500 PO) Take 500 mg by mouth 2 (two) times a day.    Historical Provider, MD   clopidogrel (PLAVIX) 75 MG tablet Take 1 tablet by mouth Daily. 2/16/18   Roby Corley MD   furosemide (LASIX) 80 MG tablet Take 1 tablet by mouth Daily. 2/16/18   Roby Corley MD   glucose blood test strip Use as instructed 5/15/17   Roby Corley MD   GuaiFENesin ER (MUCINEX MAXIMUM STRENGTH) 1200 MG tablet sustained-release 12 hour Take 1 tablet BID as needed. 2/16/18   Roby Corley MD    hydrocortisone (WESTCORT) 0.2 % cream Apply  topically 2 (Two) Times a Day. 9/6/17   VERNON Allison   hydrocortisone-bacitracin-zinc oxide-nystatin (MAGIC BARRIER) Apply 1 application topically As Needed (Rash). 11/10/17   Roby Corley MD   promethazine-dextromethorphan (PROMETHAZINE-DM) 6.25-15 MG/5ML syrup Take 5 mL by mouth At Night As Needed for Cough.  Patient not taking: Reported on 3/4/2018 12/30/17   VERNON Rosales   Respiratory Therapy Supplies (NEBULIZER/TUBING/MOUTHPIECE) kit Tubing 8/15/17   Roby Corley MD   senna (SENOKOT) 8.6 MG tablet tablet Take 1 tablet by mouth 2 (two) times a day.    Nola Daigle MD   sertraline (ZOLOFT) 100 MG tablet Take 0.5 tablets by mouth Daily. 11/10/17   Roby Corley MD   sildenafil (REVATIO) 20 MG tablet Take 1 tablet by mouth 3 (Three) Times a Day. 1/19/18   Roby Corley MD   Tiotropium Bromide Monohydrate 2.5 MCG/ACT aerosol solution Inhale 2 puffs Daily. 2/21/18   Jeni Thompson MD   triamcinolone (KENALOG) 0.1 % ointment Apply  topically 2 (Two) Times a Day. Max 2 weeks per treatment  Patient taking differently: Apply 1 application topically 2 (Two) Times a Day. Max 2 weeks per treatment 10/17/16   Roby Corley MD   TRUEPLUS LANCETS 33G misc 1 each Daily. 5/15/17   Roby Corley MD   VENTOLIN  (90 Base) MCG/ACT inhaler USE 2 PUFFS FOUR TIMES DAILY AND AS NEEDED 10/10/17   Roby Corley MD   vitamin D (ERGOCALCIFEROL) 89490 units capsule capsule Take 1 capsule by mouth Every 14 (Fourteen) Days. 2/16/18   Roby Corley MD   levoFLOXacin (LEVAQUIN) 750 MG tablet Take 1 tablet every 48 hours for 5 doses.  Patient not taking: Reported on 3/4/2018 2/19/18 3/4/18  Roby Corley MD       Objective     Vital Signs    Temp:  [98.3 °F (36.8 °C)] 98.3 °F (36.8 °C)  Heart Rate:  [62-70] 70  Resp:  [18-24] 18  BP: ()/(53-80) 119/60    Physical Exam:      Physical Exam   Constitutional: She is oriented to  person, place, and time. She appears well-developed and well-nourished.   HENT:   Head: Normocephalic and atraumatic.   Nose: Nose normal.   Eyes: Conjunctivae and EOM are normal. Pupils are equal, round, and reactive to light.   Neck: Normal range of motion. Neck supple. No JVD present. No tracheal deviation present. No thyromegaly present.   Cardiovascular: Normal rate, regular rhythm, normal heart sounds and intact distal pulses.    Pulmonary/Chest: She is in respiratory distress. She has no wheezes. She has rales. She exhibits no tenderness.   Abdominal: Soft. Bowel sounds are normal. She exhibits no distension. There is no tenderness. There is no rebound and no guarding.   Musculoskeletal: Normal range of motion. She exhibits no edema.   Lymphadenopathy:     She has no cervical adenopathy.   Neurological: She is alert and oriented to person, place, and time. She has normal reflexes. No cranial nerve deficit.   Skin: Skin is warm and dry.   Intact   Psychiatric: She has a normal mood and affect.   Nursing note and vitals reviewed.      Results Review:       Results from last 7 days  Lab Units 03/04/18  1140 03/04/18  1137   SODIUM mmol/L  --  139   SODIUM, ARTERIAL mmol/L 135.3*  --    POTASSIUM mmol/L  --  3.4*   CHLORIDE mmol/L  --  101   CO2 mmol/L  --  25.0   BUN mg/dL  --  29*   CREATININE mg/dL  --  1.30*   GLUCOSE mg/dL  --  118*   GLUCOSE, ARTERIAL mmol/L 134  --    CALCIUM mg/dL  --  8.4   BILIRUBIN mg/dL  --  1.2   ALK PHOS U/L  --  94   ALT (SGPT) U/L  --  46   AST (SGOT) U/L  --  22               Results from last 7 days  Lab Units 03/04/18  1057   WBC 10*3/mm3 7.17   HEMOGLOBIN g/dL 14.8   HEMATOCRIT % 45.7*   PLATELETS 10*3/mm3 166         Results from last 7 days  Lab Units 03/04/18  1144   INR  1.09     Imaging Results (last 7 days)     Procedure Component Value Units Date/Time    XR Chest 1 View [[id number]] Collected:  03/04/18 1038     Updated:  03/04/18 1115    Narrative:       [facility]    Patient Name: MS. KEELY CABRERA    ORDERING: JUDY WHITMAN     ATTENDING: PAM NUNEZ     REFERRING: JUDY WHITMAN    -----------------------    PROCEDURE: Chest Single View    TECHNIQUE: Single AP view of the chest    COMPARISON: 2/16/2018    HISTORY: shortness of breath    FINDINGS:     Life-support devices: None.    Lungs/pleura: There is increasing opacity within the right middle  lobe that may represent consolidating infiltrate in the proper  clinical setting. No other area of acute pulmonary abnormality  seen. No effusion or pneumothorax..    Heart, hilar and mediastinal structures: The heart size and  mediastinal contours are within limits of normal. The trachea is  midline.    Skeletal Structures: No acute findings. No free air beneath the  diaphragm.      Impression:       There is increasing opacity within the right middle lobe compared  to the examinations of January 11 and February 16, 2018. While in  the proper clinical setting this may represent consolidating  pneumonia, however, given the fact there is no resolution likely  with treatment recommend CT of the chest with contrast for  further evaluation to exclude the possibility of underlying  neoplasm.    Electronically signed by:  Charli Nicholson MD  3/4/2018 11:14 AM  CST Workstation: [id number]    CT Chest Without Contrast [[id number]] Collected:  03/04/18 1108     Updated:  03/04/18 1203    Narrative:           Patient Name: KEELY CABRERA    ORDERING: JUDY WHITMAN    ATTENDING: PAM NUNEZ     REFERRING: JUDY WHITMAN    -----------------------  EXAM DESCRIPTION: CT CHEST WO CONTRAST    CLINICAL HISTORY:  62-year-old female with shortness of air and  abnormal chest radiograph.      COMPARISON: Several prior chest radiographs, most recent dated  3/4/2018.    DOSE LENGTH PRODUCT: 802.    This exam was performed according to our departmental dose  optimization program, which includes automated exposure control,  adjustment of  the mA and/or KV according to patient size and/or  use of iterative reconstruction technique.      TECHNIQUE: Axial images were obtained and multiplanar  reconstructions were made.      FINDINGS:    LUNGS/PLEURA: There is moderate area of consolidation within the  right middle lobe containing air bronchograms. Consideration is  given to atelectasis versus consolidating pneumonia. No other  area of consolidation identified. There is some minimal  reticulonodularity within the left upper lobe posteriorly which  is nonspecific and may represent pneumonitis. There are a few  scattered noncalcified nodules most notable within the upper  lobes posteriorly these may be postinflammatory given that there  is areas of calcified granulomata present related to prior  granulomatous disease. No effusion or pneumothorax. No dominant  suspicious mass.  TRACHEA AND BRONCHI:  The trachea and bronchi are patent. No  obvious luminal mass centrally on the right to explain the right  middle lobe process. No fluid or debris identified within the  main airways.  MEDIASTINUM, ALINA AND LYMPH NODES: There are calcified lymph  nodes related to prior inflammation/infection. Otherwise there is  small mostly subcentimeter nodes present. No suspicious axillary,  hilar, or mediastinal nodes based on size or morphologic criteria  identified.  HEART AND PERICARDIUM: No pericardial effusion. There is coronary  vascular calcification present.  VASCULAR: There is vascular calcification without thoracic aortic  aneurysm.  UPPER ABDOMEN: There is evidence of a midline upper abdominal  wall hernia containing fat. There is extension of the left lobe  the liver towards the mouth of the defect. Inferior to this  hernia is a large midline abdominal wall hernia which contains  large bowel without obstruction or findings of incarceration.  There are multiple choleliths. No pericholecystic inflammatory  change seen.  OSSEOUS STRUCTURES: Degenerative changes are  present within the  spine. No acute finding.      Impression:       Moderate area of consolidation within the right middle lobe  corresponding to the radiographic finding. There is air  bronchograms present. No obvious central bronchial lesion  contributing this finding. Consideration is given to atelectasis  and/or consolidating pneumonia. Recommend treatment and follow-up  until clear. If there is no change on the follow-up study  pulmonary consultation would be recommended for possible  bronchoscopy.    Minimal reticulonodularity left upper lobe which may represent  pneumonitis.    There is a background of old granulomatous disease. There are  couple small 5 mm less noncalcified nodules that likely are  postinflammatory as well. These can be evaluated on follow-up  exams.    No suspicious mediastinal or hilar lymph nodes based on size or  morphologic criteria.    Cholelithiasis.    Upper midline abdominal hernia containing fat as well as  extension the left lobe the liver towards the mouth of the  defect. No findings of incarceration or obstruction.    Mid to lower midline abdominal wall hernia, larger in size  containing fat and bowel without findings of obstruction or  incarceration.    Electronically signed by:  Charli Nicholson MD  3/4/2018 12:02 PM  CST Workstation: [id number]          Assessment/Plan     Principal Problem:    Recurrent pneumonia  Active Problems:    Pulmonary hypertension severe    Obesity    Hyperlipidemia    Generalized anxiety disorder    Essential hypertension    Coronary atherosclerosis of native coronary artery    Coronary arteriosclerosis    Chronic kidney disease    Type 2 diabetes mellitus with stage 3 chronic kidney disease, without long-term current use of insulin    Cough    PVD (peripheral vascular disease)    COPD (chronic obstructive pulmonary disease)    Hypoxia      -Continue with IV antibiotics.  -We'll continue with incentive spirometry.  -We'll continue with nebulization  treatment and IV steroids.  -We'll recommend aggressive PT OT.  -We will resume patient's home medication as prior to coming to hospital.  -DVT and GI prophylaxis in place.  -We'll continue monitoring patient in hospital setting and treat patient as course dictates.    I discussed the patients findings and my recommendations with patient and nursing staff.         This document has been electronically signed by Nataliya Mittal MD on March 4, 2018 3:09 PM        Total Time Spent: 43 minutes.    EMR Dragon/Transcription disclaimer:   Dictated utilizing Dragon dictation.

## 2018-03-04 NOTE — ED NOTES
Pt has hx of COPD and arrived to ER with c/o chest pain.  Pt has scheduled CT for Monday 3/5/18 for possible lung mass.     Aby Romero RN  03/04/18 1111

## 2018-03-05 NOTE — PLAN OF CARE
Problem: Patient Care Overview (Adult)  Goal: Plan of Care Review  Outcome: Ongoing (interventions implemented as appropriate)   03/05/18 1406   Coping/Psychosocial Response Interventions   Plan Of Care Reviewed With patient   Outcome Evaluation   Outcome Summary/Follow up Plan PT evaluation completed today. Pt presents with decreased strength, endurance and pulm function. She ambulated 120 ft with RW and 3L O2 and SpO2 dropped to 87% at lowest point, SpO2 increased to 92% after ~15 sec sit rest break. She will benefit from cont skilled PT to achieve highest level of function prior to d/c home with  PT       Problem: Inpatient Physical Therapy  Goal: Gait Training Goal STG- PT  Outcome: Ongoing (interventions implemented as appropriate)   03/05/18 1406   Gait Training PT STG   Gait Training Goal PT STG, Date Established 03/05/18   Gait Training Goal PT STG, Time to Achieve 5 days   Gait Training Goal PT STG, [place] Level independent   Gait Training Goal PT STG, Assist Device cane, quad   Gait Training Goal PT STG, Distance to Achieve 200 ft with SpO2 >/= 92% and without LOB with vertical/horizontal head turns   Gait Training Goal PT STG, Additional Goal Pt will asc/desc ramp with modified indep     Goal: Physical Therapy Goal STG- PT  Outcome: Ongoing (interventions implemented as appropriate)   03/05/18 1406   Physical Therapy PT STG   Physical Therapy PT STG, Date Established 03/05/18   Physical Therapy PT STG, Time to Achieve 5 days   Physical Therapy PT STG, Activity Type Pt will score >/= 24/28 on Tinetti Balance Assessment   Physical Therapy PT STG, [place] Level independent

## 2018-03-05 NOTE — PROGRESS NOTES
Discharge Planning Assessment  [facility]     Patient Name: Cameron Bowie  MRN: [id number]  Today's Date: 3/5/2018    Admit Date: 3/4/2018          Discharge Needs Assessment       03/05/18 1614    Living Environment    Lives With alone    Living Arrangements house    Transportation Available car;family or friend will provide    Living Environment    Quality Of Family Relationships supportive    Able to Return to Prior Living Arrangements yes    Discharge Needs Assessment    Concerns To Be Addressed denies needs/concerns at this time    Equipment Currently Used at Home cane, quad;walker, standard    Equipment Needed After Discharge oxygen    Discharge Facility/Level Of Care Needs home with home health    Current Discharge Risk lives alone    Discharge Disposition home healthcare service    Discharge Planning Comments states lives by herseld, however good support,  transportation provided by family and friends,  has walker and quad cane,  would like o2 through [employer] if needed.   hh with [facility]            Discharge Plan       03/05/18 1616    Case Management/Social Work Plan    Plan home health    Patient/Family In Agreement With Plan yes    Additional Comments lace completed.  [facility],  lives alone, has family/friend support,  transportation from same,  has rx coverage. lucia corbin rn        Discharge Placement     No information found        Expected Discharge Date and Time     Expected Discharge Date Expected Discharge Time    Mar 7, 2018               Demographic Summary       03/05/18 1614    Referral Information    Admission Type inpatient    Arrived From home or self-care    Referral Source high risk screening    Reason For Consult discharge planning    Record Reviewed patient profile    Referral Information Comments confirmed with pt    Primary Care Physician Information    Name roland            Functional Status     None            Psychosocial     None             Abuse/Neglect     None            Legal     None            Substance Abuse     None            Patient Forms     None          Ria Alfredo

## 2018-03-05 NOTE — ED PROVIDER NOTES
Subjective   HPI Comments: Patient has had recurrent pneumonia since January. Worsening shortness of breath since yesterday. States that it is worse when she lays down. Saw Dr. Thompson on the 21st and she ordered a CT scan to rule out an obstructive mass verses pneumonia. CT scan was ordered for in the morning but patient states that her breathing just got really worse and she couldn't wait.     Patient is a 62 y.o. female presenting with shortness of breath.   History provided by:  Patient   used: No    Shortness of Breath   Severity:  Moderate  Onset quality:  Gradual  Duration:  2 days  Timing:  Constant  Progression:  Worsening  Chronicity:  Recurrent  Context: smoke exposure    Context: not activity, not animal exposure, not emotional upset, not fumes, not known allergens, not occupational exposure, not pollens, not strong odors, not URI and not weather changes    Relieved by:  Nothing  Worsened by:  Coughing and smoke exposure (laying down)  Ineffective treatments:  Sitting up  Associated symptoms: cough, sputum production and wheezing    Associated symptoms: no abdominal pain, no chest pain, no claudication, no diaphoresis, no ear pain, no fever, no headaches, no hemoptysis, no neck pain, no PND, no rash, no sore throat, no syncope, no swollen glands and no vomiting    Risk factors: obesity and tobacco use    Risk factors: no recent alcohol use, no family hx of DVT, no hx of cancer, no hx of PE/DVT, no oral contraceptive use, no prolonged immobilization and no recent surgery        Review of Systems   Constitutional: Negative.  Negative for diaphoresis and fever.   HENT: Negative.  Negative for ear pain and sore throat.    Eyes: Negative.    Respiratory: Positive for cough, sputum production, chest tightness, shortness of breath and wheezing. Negative for apnea, hemoptysis, choking and stridor.    Cardiovascular: Negative.  Negative for chest pain, claudication, syncope and PND.    Gastrointestinal: Negative.  Negative for abdominal pain and vomiting.   Endocrine: Negative.    Genitourinary: Negative.    Musculoskeletal: Negative.  Negative for neck pain.   Skin: Negative.  Negative for rash.   Allergic/Immunologic: Negative.    Neurological: Negative.  Negative for headaches.   Hematological: Negative.    Psychiatric/Behavioral: Negative.        Past Medical History:   Diagnosis Date   • Abnormal weight gain    • Asthenia    • Breast screening    • Carpal tunnel syndrome    • Chronic kidney disease     Stage 3   • COPD (chronic obstructive pulmonary disease)    • Coronary arteriosclerosis     2007: PCI RCA 2011: PCI: LCX      • Coronary atherosclerosis of native coronary artery    • DJD (degenerative joint disease)     involving multiple joints   • Edema of lower extremity    • Encounter for screening for osteoporosis    • Encounter for screening mammogram for malignant neoplasm of breast    • Essential hypertension    • Foot pain    • Generalized anxiety disorder    • Gout    • History of tobacco use    • Hyperlipidemia    • Light tobacco smoker    • Obesity    • Peripheral edema    • Pulmonary hypertension    • PVD (peripheral vascular disease)     LATOYA R .96 L 1.0 aortobifem 1/2012      • Strain of neck muscle    • Type 2 diabetes mellitus     diet controlled       Allergies   Allergen Reactions   • Lisinopril Shortness Of Breath and Cough   • Penicillins Rash   • Wellbutrin [Bupropion] Palpitations   • Nsaids      Due to decreased kidney function    • Tramadol      unknown       Past Surgical History:   Procedure Laterality Date   • AXILLARY SURGERY  06/03/2007    Right axillary cellulitis and abscess. Right axillary debridement   • BREAST BIOPSY  03/10/2008    Left breast mass. Left breast excisional biopsy   • CARDIAC CATHETERIZATION  10/04/2011    significant circumflex disease responsible for patient's symptoms. Patent right coronary artery stent. Peripheral arterial disease with  completely occluded right common iliac and completely occluded left enternal iliac.   • CARDIAC CATHETERIZATION N/A 2017    Procedure: Right Heart Cath/ with vasodilator challenge;  Surgeon: Ignacio Connelly MD;  Location: [facility] CATH INVASIVE LOCATION;  Service:    • CARDIAC CATHETERIZATION N/A 2017    Procedure: Left ventriculography/ LV pressures only;  Surgeon: Ignacio Connelly MD;  Location: [facility] INVASIVE LOCATION;  Service:    • COLONOSCOPY  10/2015    Declined   • CORONARY ARTERY BYPASS GRAFT  2012    Aortobifemoral bypass. Repair of spleen   • CYST REMOVAL  1979    Right, recurrent   • DILATATION AND CURETTAGE  09/10/1976    Incomplete .   • ENDOSCOPY AND COLONOSCOPY  2005    Single small polyp in the rectum. The polyp was removed by snare cautery. Colonoscopy, otherwise normal.   • INCISION AND DRAINAGE ABSCESS  2007    Abdominal wall abscess. Incision and drainage and debridement of abdominal wall abscess   • INJECTION OF MEDICATION  2013    Kenalog   • SPLENECTOMY  2012    Lacerated spleen. Posterior laceration of the capsule with a Grade I injury   • TRANSESOPHAGEAL ECHOCARDIOGRAM (SRINIVASA)  2016    With color flow-Normal LV function with Ef of 65 to 70%.Severely dilated right ventricle with impaired systolic function.Impingement of LV cavity by the interventricular septum.Grade 1A diastolic dysfunction.Moderate tricuspid regurg   • TRANSESOPHAGEAL ECHOCARDIOGRAM (SRINIVASA)  2012    Without color flow-Mild right atrial enlargement with mild right ventricular enlargement with normal left atrial and left ventricular size. EF 55%   • TUBAL ABDOMINAL LIGATION  1987   • VAGINAL DELIVERY         Family History   Problem Relation Age of Onset   • Cancer Other    • Diabetes Other    • Heart disease Other    • Hypertension Other    • Lung disease Other    • Stroke Other    • Hypertension Father        Social History     Social History   • Marital  status:      Social History Main Topics   • Smoking status: Current Every Day Smoker     Packs/day: 0.25     Years: 39.00     Types: Cigarettes   • Smokeless tobacco: Never Used   • Alcohol use No   • Drug use: No   • Sexual activity: No           Objective   Physical Exam   Constitutional: She is oriented to person, place, and time. She appears well-developed and well-nourished. She appears distressed. She is not intubated.   HENT:   Head: Normocephalic and atraumatic.   Eyes: Conjunctivae and EOM are normal. Pupils are equal, round, and reactive to light. Right eye exhibits no discharge. Left eye exhibits no discharge. No scleral icterus.   Neck: Normal range of motion. Neck supple. No JVD present. No tracheal deviation present. No thyromegaly present.   Cardiovascular: Normal rate, regular rhythm, normal heart sounds and intact distal pulses.  Exam reveals no gallop and no friction rub.    No murmur heard.  Pulmonary/Chest: Effort normal. No accessory muscle usage or stridor. No apnea, no tachypnea and no bradypnea. She is not intubated. No respiratory distress. She has decreased breath sounds. She has wheezes. She has rhonchi. She has no rales. She exhibits no tenderness.   Patient speaking in short sentences, two words at a time. Very short of breath.  Improved with oxygen, 3 Liters.      Abdominal: Soft. Bowel sounds are normal. She exhibits no distension and no mass. There is no tenderness. There is no rebound and no guarding. No hernia.   Musculoskeletal: Normal range of motion. She exhibits no edema, tenderness or deformity.   Lymphadenopathy:     She has no cervical adenopathy.   Neurological: She is alert and oriented to person, place, and time. She has normal reflexes.   Skin: Skin is warm and dry. No rash noted. She is not diaphoretic. No erythema. No pallor.   Psychiatric: She has a normal mood and affect. Her behavior is normal. Judgment and thought content normal.   Nursing note and vitals  reviewed.      Procedures         ED Course  ED Course      Labs Reviewed   COMPREHENSIVE METABOLIC PANEL - Abnormal; Notable for the following:        Result Value    Glucose 118 (*)     BUN 29 (*)     Creatinine 1.30 (*)     Potassium 3.4 (*)     Total Protein 6.2 (*)     Albumin 3.30 (*)     eGFR Non  Amer 42 (*)     All other components within normal limits   BNP (IN-HOUSE) - Abnormal; Notable for the following:     proBNP 5840.0 (*)     All other components within normal limits   TROPONIN (IN-HOUSE) - Abnormal; Notable for the following:     Troponin I 0.043 (*)     All other components within normal limits   BLOOD GAS, ARTERIAL - Abnormal; Notable for the following:     pO2, Arterial 69.0 (*)     HCO3, Arterial 26.8 (*)     O2 Saturation, Arterial 93.0 (*)     CO2 Content 28.1 (*)     Sodium, Arterial 135.3 (*)     Potassium, Arterial 3.36 (*)     Ionized Calcium 4.40 (*)     All other components within normal limits   CBC WITH AUTO DIFFERENTIAL - Abnormal; Notable for the following:     Hematocrit 45.7 (*)     RDW 15.4 (*)     RDW-SD 51.8 (*)     Neutrophil % 81.5 (*)     All other components within normal limits   URINALYSIS W/ CULTURE IF INDICATED - Abnormal; Notable for the following:     Leuk Esterase, UA Trace (*)     All other components within normal limits   URINALYSIS, MICROSCOPIC ONLY - Abnormal; Notable for the following:     RBC, UA 0-2 (*)     Squamous Epithelial Cells, UA 3-5 (*)     All other components within normal limits   STREP PNEUMO AG, URINE OR CSF - Normal   LEGIONELLA ANTIGEN, URINE - Normal   PROTIME-INR - Normal    Narrative:     Therapeutic range for most indications is 2.0-3.0 INR,  or 2.5-3.5 for mechanical heart valves.   APTT - Normal    Narrative:     The recommended Heparin therapeutic range is 68-97 seconds.   CK MB - Normal   CBC AND DIFFERENTIAL    Narrative:     The following orders were created for panel order CBC & Differential.  Procedure                                Abnormality         Status                     ---------                               -----------         ------                     CBC Auto Differential[[id number]]        Abnormal            Final result                 Please view results for these tests on the individual orders.     Ct Chest Without Contrast    Result Date: 3/4/2018  Narrative: Patient Name: KEELY CABRERA ORDERING: JUDY WHITMAN ATTENDING: PAM NUNEZ REFERRING: JUDY WHITMAN ----------------------- EXAM DESCRIPTION: CT CHEST WO CONTRAST CLINICAL HISTORY:  62-year-old female with shortness of air and abnormal chest radiograph.  COMPARISON: Several prior chest radiographs, most recent dated 3/4/2018. DOSE LENGTH PRODUCT: 802. This exam was performed according to our departmental dose optimization program, which includes automated exposure control, adjustment of the mA and/or KV according to patient size and/or use of iterative reconstruction technique. TECHNIQUE: Axial images were obtained and multiplanar reconstructions were made.  FINDINGS: LUNGS/PLEURA: There is moderate area of consolidation within the right middle lobe containing air bronchograms. Consideration is given to atelectasis versus consolidating pneumonia. No other area of consolidation identified. There is some minimal reticulonodularity within the left upper lobe posteriorly which is nonspecific and may represent pneumonitis. There are a few scattered noncalcified nodules most notable within the upper lobes posteriorly these may be postinflammatory given that there is areas of calcified granulomata present related to prior granulomatous disease. No effusion or pneumothorax. No dominant suspicious mass. TRACHEA AND BRONCHI:  The trachea and bronchi are patent. No obvious luminal mass centrally on the right to explain the right middle lobe process. No fluid or debris identified within the main airways. MEDIASTINUM, ALINA AND LYMPH NODES: There are calcified lymph nodes related  to prior inflammation/infection. Otherwise there is small mostly subcentimeter nodes present. No suspicious axillary, hilar, or mediastinal nodes based on size or morphologic criteria identified. HEART AND PERICARDIUM: No pericardial effusion. There is coronary vascular calcification present. VASCULAR: There is vascular calcification without thoracic aortic aneurysm. UPPER ABDOMEN: There is evidence of a midline upper abdominal wall hernia containing fat. There is extension of the left lobe the liver towards the mouth of the defect. Inferior to this hernia is a large midline abdominal wall hernia which contains large bowel without obstruction or findings of incarceration. There are multiple choleliths. No pericholecystic inflammatory change seen. OSSEOUS STRUCTURES: Degenerative changes are present within the spine. No acute finding.     Impression: Moderate area of consolidation within the right middle lobe corresponding to the radiographic finding. There is air bronchograms present. No obvious central bronchial lesion contributing this finding. Consideration is given to atelectasis and/or consolidating pneumonia. Recommend treatment and follow-up until clear. If there is no change on the follow-up study pulmonary consultation would be recommended for possible bronchoscopy. Minimal reticulonodularity left upper lobe which may represent pneumonitis. There is a background of old granulomatous disease. There are couple small 5 mm less noncalcified nodules that likely are postinflammatory as well. These can be evaluated on follow-up exams. No suspicious mediastinal or hilar lymph nodes based on size or morphologic criteria. Cholelithiasis. Upper midline abdominal hernia containing fat as well as extension the left lobe the liver towards the mouth of the defect. No findings of incarceration or obstruction. Mid to lower midline abdominal wall hernia, larger in size containing fat and bowel without findings of obstruction  or incarceration. Electronically signed by:  Charli Nicholson MD  3/4/2018 12:02 PM CST Workstation: [id number]    Xr Chest 1 View    Result Date: 3/4/2018  Narrative: [facility] [place] Patient Name: MS. KEELY CABRERA ORDERING: JUDY WHITMAN ATTENDING: PAM NUNEZ REFERRING: JUDY WHITMAN ----------------------- PROCEDURE: Chest Single View TECHNIQUE: Single AP view of the chest COMPARISON: 2/16/2018 HISTORY: shortness of breath FINDINGS:  Life-support devices: None. Lungs/pleura: There is increasing opacity within the right middle lobe that may represent consolidating infiltrate in the proper clinical setting. No other area of acute pulmonary abnormality seen. No effusion or pneumothorax.. Heart, hilar and mediastinal structures: The heart size and mediastinal contours are within limits of normal. The trachea is midline. Skeletal Structures: No acute findings. No free air beneath the diaphragm.     Impression: There is increasing opacity within the right middle lobe compared to the examinations of January 11 and February 16, 2018. While in the proper clinical setting this may represent consolidating pneumonia, however, given the fact there is no resolution likely with treatment recommend CT of the chest with contrast for further evaluation to exclude the possibility of underlying neoplasm. Electronically signed by:  Charli Nicholson MD  3/4/2018 11:14 AM CST Workstation: [id number]    Xr Chest Pa & Lateral    Result Date: 2/16/2018  Narrative: Chest 2 view on  2/16/2018 CLINICAL INDICATION: Follow-up right middle lobe pneumonia COMPARISON: 1/11/2018 FINDINGS: There is persistent and actually worsened right middle lobe wedge-shaped opacity consistent with atelectasis and/or pneumonia. At this point would recommend follow-up chest CT with contrast to better exclude a central obstructing lesion. Mild cardiomegaly is noted. There is evidence of calcified granulomatous disease in the chest.     Impression:  Continued and actually worsened right middle lobe wedge-shaped opacity consistent with atelectasis and/or pneumonia. At this point recommend follow-up chest CT with contrast to exclude a central obstructing lesion. Electronically signed by:  Braeden Solano  2/16/2018 9:31 PM CST Workstation: [id number]      Patient admitted to hospital for recurrent pneumonia and hypoxia.           [facility]    Final diagnoses:   Recurrent pneumonia   Hypoxia   Cough            ZHAO Vang  03/04/18 2009

## 2018-03-05 NOTE — THERAPY EVALUATION
Acute Care - Occupational Therapy Initial Evaluation  [facility]     Patient Name: Cameron Bowie  : 1955  MRN: [id number]  Today's Date: 3/5/2018  Onset of Illness/Injury or Date of Surgery Date: 18  Date of Referral to OT: 18  Referring Physician: LARISSA Mittal.    Admit Date: 3/4/2018       ICD-10-CM ICD-9-CM   1. Recurrent pneumonia J18.9 486   2. Hypoxia R09.02 799.02   3. Cough R05 786.2   4. Impaired mobility and activities of daily living Z74.09 799.89     Patient Active Problem List   Diagnosis   • Venous insufficiency   • Pulmonary hypertension severe   • Obesity   • Hyperlipidemia   • Gout   • Generalized anxiety disorder   • Essential hypertension   • DJD (degenerative joint disease)   • Coronary atherosclerosis of native coronary artery   • Coronary arteriosclerosis   • Chronic kidney disease   • Psoriasis   • Moderate episode of recurrent major depressive disorder   • Seasonal allergic rhinitis due to pollen   • Type 2 diabetes mellitus with stage 3 chronic kidney disease, without long-term current use of insulin   • Cough   • Non-rheumatic tricuspid valve insufficiency   • Pneumonia of right middle lobe due to infectious organism   • PVD (peripheral vascular disease)   • Light tobacco smoker   • History of tobacco use   • Foot pain   • Encounter for screening for osteoporosis   • COPD (chronic obstructive pulmonary disease)   • Carpal tunnel syndrome   • Breast screening   • Asthenia   • ALVAREZ (nonalcoholic steatohepatitis)   • Recurrent pneumonia   • Hypoxia     Past Medical History:   Diagnosis Date   • Abnormal weight gain    • Asthenia    • Breast screening    • Carpal tunnel syndrome    • Chronic kidney disease     Stage 3   • COPD (chronic obstructive pulmonary disease)    • Coronary arteriosclerosis     2007: PCI RCA 2011: PCI: LCX      • Coronary atherosclerosis of native coronary artery    • DJD (degenerative joint disease)     involving multiple joints   • Edema of  lower extremity    • Encounter for screening for osteoporosis    • Encounter for screening mammogram for malignant neoplasm of breast    • Essential hypertension    • Foot pain    • Generalized anxiety disorder    • Gout    • History of tobacco use    • Hyperlipidemia    • Light tobacco smoker    • Obesity    • Peripheral edema    • Pulmonary hypertension    • PVD (peripheral vascular disease)     LATOYA R .96 L 1.0 aortobifem 2012      • Strain of neck muscle    • Type 2 diabetes mellitus     diet controlled     Past Surgical History:   Procedure Laterality Date   • AXILLARY SURGERY  2007    Right axillary cellulitis and abscess. Right axillary debridement   • BREAST BIOPSY  03/10/2008    Left breast mass. Left breast excisional biopsy   • CARDIAC CATHETERIZATION  10/04/2011    significant circumflex disease responsible for patient's symptoms. Patent right coronary artery stent. Peripheral arterial disease with completely occluded right common iliac and completely occluded left enternal iliac.   • CARDIAC CATHETERIZATION N/A 2017    Procedure: Right Heart Cath/ with vasodilator challenge;  Surgeon: Ignacio Connelly MD;  Location: [facility] CATH INVASIVE LOCATION;  Service:    • CARDIAC CATHETERIZATION N/A 2017    Procedure: Left ventriculography/ LV pressures only;  Surgeon: Ignacio Connelly MD;  Location: [facility] CATH INVASIVE LOCATION;  Service:    • COLONOSCOPY  10/2015    Declined   • CORONARY ARTERY BYPASS GRAFT  2012    Aortobifemoral bypass. Repair of spleen   • CYST REMOVAL  1979    Right, recurrent   • DILATATION AND CURETTAGE  09/10/1976    Incomplete .   • ENDOSCOPY AND COLONOSCOPY  2005    Single small polyp in the rectum. The polyp was removed by snare cautery. Colonoscopy, otherwise normal.   • INCISION AND DRAINAGE ABSCESS  2007    Abdominal wall abscess. Incision and drainage and debridement of abdominal wall abscess   • INJECTION OF MEDICATION  2013     Kenalog   • SPLENECTOMY  04/25/2012    Lacerated spleen. Posterior laceration of the capsule with a Grade I injury   • TRANSESOPHAGEAL ECHOCARDIOGRAM (SRINIVASA)  04/12/2016    With color flow-Normal LV function with Ef of 65 to 70%.Severely dilated right ventricle with impaired systolic function.Impingement of LV cavity by the interventricular septum.Grade 1A diastolic dysfunction.Moderate tricuspid regurg   • TRANSESOPHAGEAL ECHOCARDIOGRAM (SRINIVASA)  05/02/2012    Without color flow-Mild right atrial enlargement with mild right ventricular enlargement with normal left atrial and left ventricular size. EF 55%   • TUBAL ABDOMINAL LIGATION  11/06/1987   • VAGINAL DELIVERY  1987          OT ASSESSMENT FLOWSHEET (last 72 hours)      OT Evaluation       03/05/18 1006 03/04/18 1400             Rehab Evaluation    Document Type evaluation  -RB        Subjective Information agree to therapy;complains of;fatigue;dyspnea  -RB        Patient Effort, Rehab Treatment good  -RB        Symptoms Noted During/After Treatment fatigue;shortness of breath;significant change in vital signs  -RB        General Information    Patient Profile Review yes  -RB        Onset of Illness/Injury or Date of Surgery Date 03/04/18  -RB        Referring Physician LARISSA Mittal.  -RB        General Observations Supine in bed with IV and 3 L 02 canula.     -RB        Pertinent History Of Current Problem Hospitalized with recurrent pneumonia, hypoxia and cough.  -RB        Precautions/Limitations oxygen therapy device and L/min  -RB        Prior Level of Function independent:;gait;transfer;ADL's;home management;cooking;cleaning  -RB        Equipment Currently Used at Home cane, quad   Pt has S/W but does not use.  Uses quad cane occasionally.  -RB cane, straight;walker, rolling  -MJ       Plans/Goals Discussed With patient  -RB        Risks Reviewed patient:  -RB        Benefits Reviewed patient:  -RB        Barriers to Rehab previous functional deficit  -RB         Living Environment    Lives With alone  -RB alone  -MJ       Living Arrangements house  -RB house  -MJ       Home Accessibility stairs to enter home;ramps present at home  -RB no concerns  -MJ       Number of Stairs to Enter Home 1  -RB        Stair Railings at Home outside, present on right side  -RB none  -MJ       Type of Financial/Environmental Concern  none  -MJ       Transportation Available family or friend will provide  -RB none  -MJ       Clinical Impression    Date of Referral to OT 03/04/18  -RB        OT Diagnosis Impaired mobility and ADLs  -RB        Functional Level At Time Of Evaluation Pt was supervision with mobility and ADLs  but 02 drops to mid 80's with mobility.    -RB        Impairments Found (describe specific impairments) gait, locomotion, and balance;aerobic capacity/endurance  -RB        Patient/Family Goals Statement Home.  -RB        Criteria for Skilled Therapeutic Interventions Met yes;treatment indicated  -RB        Rehab Potential good, to achieve stated therapy goals  -RB        Therapy Frequency --   3-14x/wk  -RB        Predicted Duration of Therapy Intervention (days/wks) Until D/C or goals met.  -RB        Anticipated Discharge Disposition home with home health  -RB        Functional Level Prior    Ambulation 0-->independent  -RB 0-->independent  -MJ       Transferring 0-->independent  -RB 0-->independent  -MJ       Toileting 0-->independent  -RB 0-->independent  -MJ       Bathing 0-->independent  -RB 0-->independent  -MJ       Dressing 0-->independent  -RB 0-->independent  -MJ       Eating 0-->independent  -RB 0-->independent  -MJ       Communication 0-->understands/communicates without difficulty  -RB 0-->understands/communicates without difficulty  -MJ       Swallowing 0-->swallows foods/liquids without difficulty  -RB 0-->swallows foods/liquids without difficulty  -MJ       Prior Functional Level Comment  n/a  -MJ       Vital Signs    Pre Systolic BP Rehab 113  -RB         Pre Treatment Diastolic BP 75  -RB        Post Systolic BP Rehab 129  -RB        Post Treatment Diastolic BP 77  -RB        Pretreatment Heart Rate (beats/min) 82  -RB        Intratreatment Heart Rate (beats/min) 101  -RB        Posttreatment Heart Rate (beats/min) 82  -RB        Pre SpO2 (%) 89  -RB        O2 Delivery Pre Treatment supplemental O2   3 L.  -RB        Intra SpO2 (%) 84  -RB        O2 Delivery Intra Treatment supplemental O2  -RB        Post SpO2 (%) 91  -RB        O2 Delivery Post Treatment supplemental O2  -RB        Pre Patient Position Supine  -RB        Intra Patient Position Standing  -RB        Post Patient Position Supine  -RB        Pain Assessment    Pain Assessment No/denies pain  -RB        Vision Assessment/Intervention    Visual Impairment WFL with corrective lenses  -RB        Cognitive Assessment/Intervention    Current Cognitive/Communication Assessment functional  -RB        Orientation Status oriented x 4  -RB        Follows Commands/Answers Questions 100% of the time  -RB        Personal Safety WNL/WFL  -RB        Personal Safety Interventions gait belt;nonskid shoes/slippers when out of bed;supervised activity  -RB        ROM (Range of Motion)    General ROM no range of motion deficits identified  -RB        MMT (Manual Muscle Testing)    General MMT Assessment Detail 4+/5 B UEs.  -RB        Bed Mobility, Assessment/Treatment    Bed Mobility, Assistive Device bed rails;head of bed elevated  -RB        Bed Mob, Supine to Sit, [place] conditional independence  -RB        Bed Mob, Sit to Supine, [place] conditional independence  -RB        Transfer Assessment/Treatment    Transfers, Sit-Stand [place] supervision required  -RB        Transfers, Stand-Sit [place] supervision required  -RB        Functional Mobility    Functional Mobility- Ind. Level supervision required;conditional independence  -RB        Functional Mobility- Device rolling walker  -RB         Functional Mobility-Distance (Feet) 50   25', rest and 25' more  -RB        Sensory Assessment/Intervention    Light Touch LUE;RUE  -RB        LUE Light Touch WNL  -RB        RUE Light Touch WNL  -RB        General Therapy Interventions    Planned Therapy Interventions activity intolerance;energy conservation;ADL retraining  -RB        Activity Intolerance fair to poor.  -RB        Positioning and Restraints    Pre-Treatment Position in bed  -RB        Post Treatment Position bed  -RB        In Bed supine;call light within reach  -RB          User Key  (r) = Recorded By, (t) = Taken By, (c) = Cosigned By    Initials Name Effective Dates    RB Otto Shoemaker OT 06/15/16 -     JERRY Wray RN 10/17/16 -            Occupational Therapy Education     Title: PT OT SLP Therapies (Active)     Topic: Occupational Therapy (Active)     Point: Precautions (Done)    Description: Instruct learner(s) on prescribed precautions during self-care and functional transfers.    Learning Progress Summary    Learner Readiness Method Response Comment Documented by Status   Patient Acceptance E VU Edu on use of gait belt and non skid socks when OOB. RB 03/05/18 1311 Done                      User Key     Initials Effective Dates Name Provider Type Discipline    RB 06/15/16 -  Otto Shoemaker OT Occupational Therapist OT                  OT Recommendation and Plan  Anticipated Discharge Disposition: home with home health  Planned Therapy Interventions: activity intolerance, energy conservation, ADL retraining  Therapy Frequency:  (3-14x/wk)  Plan of Care Review  Plan Of Care Reviewed With: patient  Outcome Summary/Follow up Plan: OT eval complete on this date.  Pt on 3L 02 and needs supervision for mobility and transfers.   02 drops to 84% with mobility.  Pt could benefit from OT services to increase endurance with independence with ADLs and functional mobility.  Edu needed for EC/WS techniques when performing ADLs.  Pt may need home 02 @  D/C.            OT Goals       03/05/18 1312          Dynamic Standing Balance OT LTG    Dynamic Standing Balance OT LTG, Date Established 03/05/18  -RB      Dynamic Standing Balance OT LTG, Time to Achieve by discharge  -RB      Dynamic Standing Balance OT LTG, [place] Level independent;conditional independence   With 02 SATS 92% or above.  -RB      Dynamic Standing Balance OT LTG, Assist Device assistive Device   R/W if needed.  -RB      Patient Education OT LTG    Patient Education OT LTG, Date Established 03/05/18  -RB      Patient Education OT LTG, Time to Achieve by discharge  -RB      Patient Education OT LTG, Education Type home safety;energy conservation;work simplification;adaptive breathing  -RB      Patient Education OT LTG, Education Understanding verbalizes understanding;demonstrates adequately  -RB      ADL OT LTG    ADL OT LTG, Date Established 03/05/18  -RB      ADL OT LTG, Time to Achieve by discharge  -RB      ADL OT LTG, Activity Type ADL skills   Sponge bath and dress or walk-in shower.  -RB      ADL OT LTG, [place] Level independent with device;assistive device   R/W and shower chair if needed & 02 SATS 92% or above.  -RB        User Key  (r) = Recorded By, (t) = Taken By, (c) = Cosigned By    Initials Name Provider Type    RB Otto Shoemaker, OT Occupational Therapist                Outcome Measures       03/05/18 1006          How much help from another is currently needed...    Putting on and taking off regular lower body clothing? 4  -RB      Bathing (including washing, rinsing, and drying) 3  -RB      Toileting (which includes using toilet bed pan or urinal) 3  -RB      Putting on and taking off regular upper body clothing 4  -RB      Taking care of personal grooming (such as brushing teeth) 4  -RB      Eating meals 4  -RB      Score 22  -RB      Functional Assessment    Outcome Measure Options AM-PAC 6 Clicks Daily Activity (OT)  -RB        User Key  (r) = Recorded By, (t) = Taken  By, (c) = Cosigned By    Initials Name Provider Type    RB Otto Shoemaker OT Occupational Therapist          Time Calculation:   OT Start Time: 1006  OT Stop Time: 1037  OT Time Calculation (min): 31 min    Therapy Charges for Today     Code Description Service Date Service Provider Modifiers Qty    [id number]  OT SELFCARE CURRENT 3/5/2018 Otto Shoemaker OT GO, CJ 1    [id number]  OT SELFCARE PROJECTED 3/5/2018 Otto Shoemaker OT GO, CI 1    [id number]  OT EVAL MOD COMPLEXITY 2 3/5/2018 Otto Shoemaker OT GO 1          OT G-codes  OT Professional Judgement Used?: Yes  OT Functional Scales Options: AM-PAC 6 Clicks Daily Activity (OT)  Score: 22  Functional Limitation: Self care  Self Care Current Status (): At least 20 percent but less than 40 percent impaired, limited or restricted  Self Care Goal Status (): At least 1 percent but less than 20 percent impaired, limited or restricted    Otto Shoemaker OT  3/5/2018

## 2018-03-05 NOTE — THERAPY EVALUATION
Acute Care - Physical Therapy Initial Evaluation  [facility]     Patient Name: Cameron Bowie  : 1955  MRN: [id number]  Today's Date: 3/5/2018   Onset of Illness/Injury or Date of Surgery Date: 18  Date of Referral to PT: 18  Referring Physician: Dr. Mittal      Admit Date: 3/4/2018     Visit Dx:    ICD-10-CM ICD-9-CM   1. Recurrent pneumonia J18.9 486   2. Hypoxia R09.02 799.02   3. Cough R05 786.2   4. Impaired mobility and activities of daily living Z74.09 799.89   5. Impaired mobility and endurance Z74.09 V49.89     Patient Active Problem List   Diagnosis   • Venous insufficiency   • Pulmonary hypertension severe   • Obesity   • Hyperlipidemia   • Gout   • Generalized anxiety disorder   • Essential hypertension   • DJD (degenerative joint disease)   • Coronary atherosclerosis of native coronary artery   • Coronary arteriosclerosis   • Chronic kidney disease   • Psoriasis   • Moderate episode of recurrent major depressive disorder   • Seasonal allergic rhinitis due to pollen   • Type 2 diabetes mellitus with stage 3 chronic kidney disease, without long-term current use of insulin   • Cough   • Non-rheumatic tricuspid valve insufficiency   • Pneumonia of right middle lobe due to infectious organism   • PVD (peripheral vascular disease)   • Light tobacco smoker   • History of tobacco use   • Foot pain   • Encounter for screening for osteoporosis   • COPD (chronic obstructive pulmonary disease)   • Carpal tunnel syndrome   • Breast screening   • Asthenia   • ALVAREZ (nonalcoholic steatohepatitis)   • Recurrent pneumonia   • Hypoxia     Past Medical History:   Diagnosis Date   • Abnormal weight gain    • Asthenia    • Breast screening    • Carpal tunnel syndrome    • Chronic kidney disease     Stage 3   • COPD (chronic obstructive pulmonary disease)    • Coronary arteriosclerosis     2007: PCI RCA 2011: PCI: LCX      • Coronary atherosclerosis of native coronary artery    • DJD (degenerative  joint disease)     involving multiple joints   • Edema of lower extremity    • Encounter for screening for osteoporosis    • Encounter for screening mammogram for malignant neoplasm of breast    • Essential hypertension    • Foot pain    • Generalized anxiety disorder    • Gout    • History of tobacco use    • Hyperlipidemia    • Light tobacco smoker    • Obesity    • Peripheral edema    • Pulmonary hypertension    • PVD (peripheral vascular disease)     LATOYA R .96 L 1.0 aortobifem 2012      • Strain of neck muscle    • Type 2 diabetes mellitus     diet controlled     Past Surgical History:   Procedure Laterality Date   • AXILLARY SURGERY  2007    Right axillary cellulitis and abscess. Right axillary debridement   • BREAST BIOPSY  03/10/2008    Left breast mass. Left breast excisional biopsy   • CARDIAC CATHETERIZATION  10/04/2011    significant circumflex disease responsible for patient's symptoms. Patent right coronary artery stent. Peripheral arterial disease with completely occluded right common iliac and completely occluded left enternal iliac.   • CARDIAC CATHETERIZATION N/A 2017    Procedure: Right Heart Cath/ with vasodilator challenge;  Surgeon: Ignacio Connelly MD;  Location: [facility] CATH INVASIVE LOCATION;  Service:    • CARDIAC CATHETERIZATION N/A 2017    Procedure: Left ventriculography/ LV pressures only;  Surgeon: Ignacio Connelly MD;  Location: [facility] CATH INVASIVE LOCATION;  Service:    • COLONOSCOPY  10/2015    Declined   • CORONARY ARTERY BYPASS GRAFT  2012    Aortobifemoral bypass. Repair of spleen   • CYST REMOVAL  1979    Right, recurrent   • DILATATION AND CURETTAGE  09/10/1976    Incomplete .   • ENDOSCOPY AND COLONOSCOPY  2005    Single small polyp in the rectum. The polyp was removed by snare cautery. Colonoscopy, otherwise normal.   • INCISION AND DRAINAGE ABSCESS  2007    Abdominal wall abscess. Incision and drainage and debridement of abdominal  wall abscess   • INJECTION OF MEDICATION  12/27/2013    Kenalog   • SPLENECTOMY  04/25/2012    Lacerated spleen. Posterior laceration of the capsule with a Grade I injury   • TRANSESOPHAGEAL ECHOCARDIOGRAM (SRINIVASA)  04/12/2016    With color flow-Normal LV function with Ef of 65 to 70%.Severely dilated right ventricle with impaired systolic function.Impingement of LV cavity by the interventricular septum.Grade 1A diastolic dysfunction.Moderate tricuspid regurg   • TRANSESOPHAGEAL ECHOCARDIOGRAM (SRINIVASA)  05/02/2012    Without color flow-Mild right atrial enlargement with mild right ventricular enlargement with normal left atrial and left ventricular size. EF 55%   • TUBAL ABDOMINAL LIGATION  11/06/1987   • VAGINAL DELIVERY  1987          PT ASSESSMENT (last 72 hours)      PT Evaluation       03/05/18 1335 03/05/18 1006    Rehab Evaluation    Document Type evaluation  -MN evaluation  -RB    Subjective Information agree to therapy;complains of;dyspnea  -MN agree to therapy;complains of;fatigue;dyspnea  -RB    Patient Effort, Rehab Treatment good  -MN good  -RB    Symptoms Noted During/After Treatment fatigue;shortness of breath  -MN fatigue;shortness of breath;significant change in vital signs  -RB    General Information    Patient Profile Review yes  -MN yes  -RB    Onset of Illness/Injury or Date of Surgery Date 03/04/18  -MN 03/04/18  -RB    Referring Physician Dr. Mittal  -MN LARISSA Mittal.  -RB    General Observations Side lying +IV +tele  -MN Supine in bed with IV and 3 L 02 canula.     -RB    Pertinent History Of Current Problem Pt admitted with worsening SOB. Dx with recurrent PNE  -MN Hospitalized with recurrent pneumonia, hypoxia and cough.  -RB    Precautions/Limitations oxygen therapy device and L/min;fall precautions  -MN oxygen therapy device and L/min  -RB    Prior Level of Function  independent:;gait;transfer;ADL's;home management;cooking;cleaning  -RB    Equipment Currently Used at Home none   has SBQC, SW  -MN  "cane, quad   Pt has S/W but does not use.  Uses quad cane occasionally.  -RB    Plans/Goals Discussed With patient;agreed upon  -MN patient  -RB    Risks Reviewed patient:;LOB;nausea/vomiting;dizziness;increased discomfort;increased drainage;change in vital signs;lines disloged  -MN patient:  -RB    Benefits Reviewed patient:;improve function;increase independence;increase strength;increase balance;decrease pain;decrease risk of DVT;improve skin integrity;increase knowledge  -MN patient:  -RB    Barriers to Rehab previous functional deficit  -MN previous functional deficit  -RB    Living Environment    Lives With alone  -MN alone  -RB    Living Arrangements house  -MN house  -RB    Home Accessibility ramps present at home  -MN stairs to enter home;ramps present at home  -RB    Number of Stairs to Enter Home  1  -RB    Stair Railings at Home outside, present on right side  -MN outside, present on right side  -RB    Transportation Available family or friend will provide  -MN family or friend will provide  -RB    Clinical Impression    Date of Referral to PT 03/04/18  -MN     PT Diagnosis impaired endurance  -MN     Patient/Family Goals Statement \"Get stronger\"  -MN     Criteria for Skilled Therapeutic Interventions Met yes;treatment indicated  -MN     Pathology/Pathophysiology Noted (Describe Specifically for Each System) musculoskeletal;cardiovascular;pulmonary  -MN     Impairments Found (describe specific impairments) aerobic capacity/endurance;gait, locomotion, and balance  -MN     Rehab Potential good, to achieve stated therapy goals  -MN     Predicted Duration of Therapy Intervention (days/wks) until d/c or all goals met  -MN     Vital Signs    Pre Systolic BP Rehab 129  -  -RB    Pre Treatment Diastolic BP 77  -MN 75  -RB    Post Systolic BP Rehab 158  -  -RB    Post Treatment Diastolic BP 93  -MN 77  -RB    Pretreatment Heart Rate (beats/min) 82  -MN 82  -RB    Intratreatment Heart Rate (beats/min) " 90  -  -RB    Posttreatment Heart Rate (beats/min) 81  -MN 82  -RB    Pre SpO2 (%) 93  -MN 89  -RB    O2 Delivery Pre Treatment supplemental O2  -MN supplemental O2   3 L.  -RB    Intra SpO2 (%) 87  -MN 84  -RB    O2 Delivery Intra Treatment supplemental O2  -MN supplemental O2  -RB    Post SpO2 (%) 93  -MN 91  -RB    O2 Delivery Post Treatment supplemental O2  -MN supplemental O2  -RB    Pre Patient Position Supine  -MN Supine  -RB    Intra Patient Position Sitting  -MN Standing  -RB    Post Patient Position Sitting  -MN Supine  -RB    Pain Assessment    Pain Assessment 0-10  -MN No/denies pain  -RB    Pain Score 0  -MN     Post Pain Score 0  -MN     Vision Assessment/Intervention    Visual Impairment WFL with corrective lenses  -MN WFL with corrective lenses  -RB    Cognitive Assessment/Intervention    Current Cognitive/Communication Assessment functional  -MN functional  -RB    Orientation Status oriented x 4  -MN oriented x 4  -RB    Follows Commands/Answers Questions 100% of the time;able to follow multi-step instructions  -% of the time  -RB    Personal Safety WNL/WFL  -MN WNL/WFL  -RB    Personal Safety Interventions gait belt;nonskid shoes/slippers when out of bed  -MN gait belt;nonskid shoes/slippers when out of bed;supervised activity  -RB    ROM (Range of Motion)    General ROM no range of motion deficits identified  -MN no range of motion deficits identified  -RB    MMT (Manual Muscle Testing)    General MMT Assessment lower extremity strength deficits identified  -MN     General MMT Assessment Detail  4+/5 B UEs.  -RB    Lower Extremity    Lower Ext Manual Muscle Testing Detail BLEs: hip flex 3+/5, knee ext 4/5, knee flex 3+/5, DF 4/5, PF 2/5  -MN     Bed Mobility, Assessment/Treatment    Bed Mobility, Assistive Device  bed rails;head of bed elevated  -RB    Bed Mob, Supine to Sit, [place] independent  -MN conditional independence  -RB    Bed Mob, Sit to Supine, [place] independent   -MN conditional independence  -RB    Transfer Assessment/Treatment    Transfers, Sit-Stand [place] independent  -MN supervision required  -RB    Transfers, Stand-Sit [place] independent  -MN supervision required  -RB    Gait Assessment/Treatment    Gait, [place] Level contact guard assist  -MN     Gait, Assistive Device rolling walker  -MN     Gait, Distance (Feet) 60   then 30 ft with seated rest break between  -MN     Gait, Safety Issues supplemental O2;balance decreased during turns  -MN     Gait, Comment pt with one episode of LOB while turning with walker but able to self correct  -MN     Sensory Assessment/Intervention    Light Touch LLE;RLE  -MN LUE;RUE  -RB    LUE Light Touch  WNL  -RB    RUE Light Touch  WNL  -RB    LLE Light Touch WNL  -MN     RLE Light Touch WNL  -MN     Edema Management    Edema Amount right:;left:;pitting +1   distal to knees  -MN     Positioning and Restraints    Pre-Treatment Position in bed  -MN in bed  -RB    Post Treatment Position bed  -MN bed  -RB    In Bed sitting EOB;call light within reach  -MN supine;call light within reach  -RB      03/04/18 1400       General Information    Equipment Currently Used at Home cane, straight;walker, rolling  -MJ     Living Environment    Lives With alone  -MJ     Living Arrangements house  -MJ     Home Accessibility no concerns  -MJ     Stair Railings at Home none  -MJ     Type of Financial/Environmental Concern none  -MJ     Transportation Available none  -MJ       User Key  (r) = Recorded By, (t) = Taken By, (c) = Cosigned By    Initials Name Provider Type    YUNIEL Shoemaker, OT Occupational Therapist    AMADEO Fontana, PT Physical Therapist    JERRY Wray, RN Registered Nurse              PT Recommendation and Plan  Anticipated Discharge Disposition: home with home health  Planned Therapy Interventions: balance training, bed mobility training, gait training, home exercise program, patient/family education, stair  training, strengthening, transfer training, stretching  PT Frequency: other (see comments) (5-14x/week)  Plan of Care Review  Plan Of Care Reviewed With: patient  Outcome Summary/Follow up Plan: PT evaluation completed today. Pt presents with decreased strength, endurance and pulm function. She ambulated 120 ft with RW and 3L O2 and SpO2 dropped to 87% at lowest point, SpO2 increased to 92% after ~15 sec sit rest break. She will benefit from cont skilled PT to achieve highest level of function prior to d/c home with HH PT          IP PT Goals       03/05/18 1406          Gait Training PT STG    Gait Training Goal PT STG, Date Established 03/05/18  -MN      Gait Training Goal PT STG, Time to Achieve 5 days  -MN      Gait Training Goal PT STG, [place] Level independent  -MN      Gait Training Goal PT STG, Assist Device cane, quad  -MN      Gait Training Goal PT STG, Distance to Achieve 200 ft with SpO2 >/= 92% and without LOB with vertical/horizontal head turns  -MN      Gait Training Goal PT STG, Additional Goal Pt will asc/desc ramp with modified indep  -MN      Physical Therapy PT STG    Physical Therapy PT STG, Date Established 03/05/18  -MN      Physical Therapy PT STG, Time to Achieve 5 days  -MN      Physical Therapy PT STG, Activity Type Pt will score >/= 24/28 on [place] Balance Assessment  -MN      Physical Therapy PT STG, [place] Level independent  -MN        User Key  (r) = Recorded By, (t) = Taken By, (c) = Cosigned By    Initials Name Provider Type    AMADEO Fontana, PT Physical Therapist                Outcome Measures       03/05/18 1335 03/05/18 1006       How much help from another person do you currently need...    Turning from your back to your side while in flat bed without using bedrails? 4  -MN      Moving from lying on back to sitting on the side of a flat bed without bedrails? 4  -MN      Moving to and from a bed to a chair (including a wheelchair)? 4  -MN      Standing up from  a chair using your arms (e.g., wheelchair, bedside chair)? 4  -MN      Climbing 3-5 steps with a railing? 3  -MN      To walk in hospital room? 3  -MN      AM-PAC 6 Clicks Score 22  -MN      How much help from another is currently needed...    Putting on and taking off regular lower body clothing?  4  -RB     Bathing (including washing, rinsing, and drying)  3  -RB     Toileting (which includes using toilet bed pan or urinal)  3  -RB     Putting on and taking off regular upper body clothing  4  -RB     Taking care of personal grooming (such as brushing teeth)  4  -RB     Eating meals  4  -RB     Score  22  -RB     Functional Assessment    Outcome Measure Options AM-PAC 6 Clicks Basic Mobility (PT)  -MN AM-PAC 6 Clicks Daily Activity (OT)  -RB       User Key  (r) = Recorded By, (t) = Taken By, (c) = Cosigned By    Initials Name Provider Type    RB Otto Shoemaker, OT Occupational Therapist    AMADEO Fontana, PT Physical Therapist           Time Calculation:         PT Charges       03/05/18 1410          Time Calculation    Start Time 1335  -MN      Stop Time 1354  -MN      Time Calculation (min) 19 min  -MN      PT Received On 03/05/18  -MN      PT Goal Re-Cert Due Date 03/18/18  -MN        User Key  (r) = Recorded By, (t) = Taken By, (c) = Cosigned By    Initials Name Provider Type    AMADEO Fontana, PT Physical Therapist          Therapy Charges for Today     Code Description Service Date Service Provider Modifiers Qty    49176444239 HC PT MOBILITY CURRENT 3/5/2018 Annabelle Fontana, PT GP, CJ 1    14520057082 HC PT MOBILITY PROJECTED 3/5/2018 Annabelle Fontana, PT GP, CI 1    23478606621 HC PT EVAL LOW COMPLEXITY 1 3/5/2018 Annabelle Fontana, PT GP 1          PT G-Codes  PT Professional Judgement Used?: Yes  Outcome Measure Options: AM-PAC 6 Clicks Basic Mobility (PT)  Score: 22  Functional Limitation: Mobility: Walking and moving around  Mobility: Walking and Moving Around Current Status (): At least 20  percent but less than 40 percent impaired, limited or restricted  Mobility: Walking and Moving Around Goal Status (): At least 1 percent but less than 20 percent impaired, limited or restricted      Annabelle Fontana, PT  3/5/2018

## 2018-03-05 NOTE — PAYOR COMM NOTE
"Cameron Bowie (62 y.o. Female)     Date of Birth Social Security Number Address Home Phone MRN    1955  [address] [place] [address] [phone] [id number]    Hindu Marital Status          Jehovah's witness        Admission Date Admission Type Admitting Provider Attending Provider Department, Room/Bed    3/4/18 Emergency Nataliya Mittal MD Patel, Harshul Amrutlal, MD [address], 427/1    Discharge Date Discharge Disposition Discharge Destination                      Attending Provider: Nataliya Mittal MD     Allergies:  Lisinopril, Penicillins, Wellbutrin [Bupropion], Nsaids, Tramadol    Isolation:  None   Infection:  None   Code Status:  FULL    Ht:  160 cm (63\")   Wt:  121 kg (265 lb 11.2 oz)    Admission Cmt:  None   Principal Problem:  Recurrent pneumonia [J18.9]                 Active Insurance as of 3/4/2018     Primary Coverage     Payor Plan Insurance Group Employer/Plan Group    [employer] [place] [employer]ICA[place]      Payor Plan Address Payor Plan Phone Number Effective From Effective To    [address] [address] [phone] 9/21/2016     [place] [address]       Subscriber Name Subscriber Birth Date Member ID       DEBORAHCAMERON [id number]                 Emergency Contacts      (Rel.) Home Phone Work Phone Mobile Phone    Cameron Espino (Mother) [phone]            Insurance Information                [facility]/WELLCARE MEDICAID Phone: [phone]    Subscriber: Cameron Bowie Subscriber#: [id number]    Group#:  Precert#:              History & Physical      Nataliya Mittal MD at 3/4/2018  3:08 PM                [facility] Medicine Services  INPATIENT HISTORY AND PHYSICAL       Patient Care Team:  Roby Corley MD as PCP - General    Date of Admission March 4, 2018 3:09 PM      Chief complaint   Chief Complaint "   Patient presents with   • Shortness of Breath   • Cough       Subjective     Patient is a 62 y.o. female presents with Complaint of having shortness of breath.  Patient states she's been having coughing spells ongoing for past 2 months.  Patient states she's been coughing up thick yellowish to brownish colored sputum.  Patient denies any fever at this point in time.  No nausea vomiting have been reported.  Patient states she's been having difficult breathing and has decreased excess tolerance.  Patient states she has not been able to sleep laying flat on bed.  Patient denies any chest or palpitation associated with it.  No diaphoresis have been reported.  Patient denies any headache or blurry vision.  Patient states she has been evaluated by pulmonology as an outpatient.    Review of Systems   Review of Systems   Constitutional: Positive for activity change, diaphoresis and fever. Negative for appetite change and chills.   HENT: Negative for congestion, rhinorrhea, sore throat and trouble swallowing.    Eyes: Negative for visual disturbance.   Respiratory: Positive for cough, chest tightness, shortness of breath and wheezing.    Cardiovascular: Negative for chest pain, palpitations and leg swelling.   Gastrointestinal: Negative for abdominal pain, blood in stool, diarrhea, nausea and vomiting.   Endocrine: Negative for cold intolerance and heat intolerance.   Genitourinary: Negative for decreased urine volume and difficulty urinating.   Musculoskeletal: Negative for back pain, gait problem and neck pain.   Skin: Negative for rash.   Neurological: Positive for weakness. Negative for dizziness, syncope, light-headedness, numbness and headaches.   Psychiatric/Behavioral: The patient is not nervous/anxious.        History  Past Medical History:   Diagnosis Date   • Abnormal weight gain    • Asthenia    • Breast screening    • Carpal tunnel syndrome    • Chronic kidney disease     Stage 3   • COPD (chronic obstructive  pulmonary disease)    • Coronary arteriosclerosis     2007: PCI RCA 2011: PCI: LCX      • Coronary atherosclerosis of native coronary artery    • DJD (degenerative joint disease)     involving multiple joints   • Edema of lower extremity    • Encounter for screening for osteoporosis    • Encounter for screening mammogram for malignant neoplasm of breast    • Essential hypertension    • Foot pain    • Generalized anxiety disorder    • Gout    • History of tobacco use    • Hyperlipidemia    • Light tobacco smoker    • Obesity    • Peripheral edema    • Pulmonary hypertension    • PVD (peripheral vascular disease)     LATOYA R .96 L 1.0 aortobifem 2012      • Strain of neck muscle    • Type 2 diabetes mellitus     diet controlled     Past Surgical History:   Procedure Laterality Date   • AXILLARY SURGERY  2007    Right axillary cellulitis and abscess. Right axillary debridement   • BREAST BIOPSY  03/10/2008    Left breast mass. Left breast excisional biopsy   • CARDIAC CATHETERIZATION  10/04/2011    significant circumflex disease responsible for patient's symptoms. Patent right coronary artery stent. Peripheral arterial disease with completely occluded right common iliac and completely occluded left enternal iliac.   • CARDIAC CATHETERIZATION N/A 2017    Procedure: Right Heart Cath/ with vasodilator challenge;  Surgeon: Ignacio Connelly MD;  Location: [facility] CATH INVASIVE LOCATION;  Service:    • CARDIAC CATHETERIZATION N/A 2017    Procedure: Left ventriculography/ LV pressures only;  Surgeon: Ignacio Connelly MD;  Location: [facility] CATH INVASIVE LOCATION;  Service:    • COLONOSCOPY  10/2015    Declined   • CORONARY ARTERY BYPASS GRAFT  2012    Aortobifemoral bypass. Repair of spleen   • CYST REMOVAL  1979    Right, recurrent   • DILATATION AND CURETTAGE  09/10/1976    Incomplete .   • ENDOSCOPY AND COLONOSCOPY  2005    Single small polyp in the rectum. The polyp was removed by snare  cautery. Colonoscopy, otherwise normal.   • INCISION AND DRAINAGE ABSCESS  01/09/2007    Abdominal wall abscess. Incision and drainage and debridement of abdominal wall abscess   • INJECTION OF MEDICATION  12/27/2013    Kenalog   • SPLENECTOMY  04/25/2012    Lacerated spleen. Posterior laceration of the capsule with a Grade I injury   • TRANSESOPHAGEAL ECHOCARDIOGRAM (SRINIVASA)  04/12/2016    With color flow-Normal LV function with Ef of 65 to 70%.Severely dilated right ventricle with impaired systolic function.Impingement of LV cavity by the interventricular septum.Grade 1A diastolic dysfunction.Moderate tricuspid regurg   • TRANSESOPHAGEAL ECHOCARDIOGRAM (SRINIVASA)  05/02/2012    Without color flow-Mild right atrial enlargement with mild right ventricular enlargement with normal left atrial and left ventricular size. EF 55%   • TUBAL ABDOMINAL LIGATION  11/06/1987   • VAGINAL DELIVERY  1987     Family History   Problem Relation Age of Onset   • Cancer Other    • Diabetes Other    • Heart disease Other    • Hypertension Other    • Lung disease Other    • Stroke Other    • Hypertension Father      Social History   Substance Use Topics   • Smoking status: Current Every Day Smoker     Packs/day: 0.25     Years: 39.00     Types: Cigarettes   • Smokeless tobacco: Never Used   • Alcohol use No     Prescriptions Prior to Admission   Medication Sig Dispense Refill Last Dose   • albuterol (PROVENTIL) (2.5 MG/3ML) 0.083% nebulizer solution Take 2.5 mg by nebulization 4 (Four) Times a Day As Needed for Wheezing. 125 vial 11 Unknown at Unknown time   • allopurinol (ZYLOPRIM) 100 MG tablet Take 1 tablet by mouth Daily. 30 tablet 11 Unknown at Unknown time   • aspirin 81 MG tablet Take 1 tablet by mouth Daily. 30 tablet 11 Unknown at Unknown time   • atorvastatin (LIPITOR) 80 MG tablet Take 1 tablet by mouth Every Night. 90 tablet 3 Taking   • azelastine (ASTELIN) 0.1 % nasal spray 2 sprays into each nostril 2 (Two) Times a Day. Use in  each nostril as directed 30 mL 11 Taking   • bisoprolol (ZEBeta) 5 MG tablet Take 0.5 tablets by mouth Daily. 30 tablet 6 Taking   • Blood Glucose Monitoring Suppl (ONE TOUCH ULTRA MINI) w/Device kit    Taking   • Calcium-Magnesium-Vitamin D (CALCIUM 500 PO) Take 500 mg by mouth 2 (two) times a day.   Taking   • clopidogrel (PLAVIX) 75 MG tablet Take 1 tablet by mouth Daily. 30 tablet 11 Taking   • furosemide (LASIX) 80 MG tablet Take 1 tablet by mouth Daily. 30 tablet 11 Taking   • glucose blood test strip Use as instructed 50 each 12 Taking   • GuaiFENesin ER (MUCINEX MAXIMUM STRENGTH) 1200 MG tablet sustained-release 12 hour Take 1 tablet BID as needed. 60 each 11 Taking   • hydrocortisone (WESTCORT) 0.2 % cream Apply  topically 2 (Two) Times a Day. 60 g 2 Taking   • hydrocortisone-bacitracin-zinc oxide-nystatin (MAGIC BARRIER) Apply 1 application topically As Needed (Rash). 60 g 0 Taking   • promethazine-dextromethorphan (PROMETHAZINE-DM) 6.25-15 MG/5ML syrup Take 5 mL by mouth At Night As Needed for Cough. (Patient not taking: Reported on 3/4/2018) 120 mL 1 Not Taking at Unknown time   • Respiratory Therapy Supplies (NEBULIZER/TUBING/MOUTHPIECE) kit Tubing 1 each 11 Taking   • senna (SENOKOT) 8.6 MG tablet tablet Take 1 tablet by mouth 2 (two) times a day.   Taking   • sertraline (ZOLOFT) 100 MG tablet Take 0.5 tablets by mouth Daily. 30 tablet 11 Taking   • sildenafil (REVATIO) 20 MG tablet Take 1 tablet by mouth 3 (Three) Times a Day. 90 tablet 6 Taking   • Tiotropium Bromide Monohydrate 2.5 MCG/ACT aerosol solution Inhale 2 puffs Daily. 1 inhaler 11    • triamcinolone (KENALOG) 0.1 % ointment Apply  topically 2 (Two) Times a Day. Max 2 weeks per treatment (Patient taking differently: Apply 1 application topically 2 (Two) Times a Day. Max 2 weeks per treatment) 30 g 2 Taking   • TRUEPLUS LANCETS 33G misc 1 each Daily. 100 each 3 Taking   • VENTOLIN  (90 Base) MCG/ACT inhaler USE 2 PUFFS FOUR TIMES  DAILY AND AS NEEDED 18 g 11 Taking   • vitamin D (ERGOCALCIFEROL) 86895 units capsule capsule Take 1 capsule by mouth Every 14 (Fourteen) Days. 2 capsule 11 Taking     Allergies:  Lisinopril; Penicillins; Wellbutrin [bupropion]; Nsaids; and Tramadol  Prior to Admission medications    Medication Sig Start Date End Date Taking? Authorizing Provider   albuterol (PROVENTIL) (2.5 MG/3ML) 0.083% nebulizer solution Take 2.5 mg by nebulization 4 (Four) Times a Day As Needed for Wheezing. 2/21/18   Jeni Thompson MD   allopurinol (ZYLOPRIM) 100 MG tablet Take 1 tablet by mouth Daily. 2/16/18   Roby Corley MD   aspirin 81 MG tablet Take 1 tablet by mouth Daily. 11/7/17   David Snowden MD PhD   atorvastatin (LIPITOR) 80 MG tablet Take 1 tablet by mouth Every Night. 2/16/18   Roby Corley MD   azelastine (ASTELIN) 0.1 % nasal spray 2 sprays into each nostril 2 (Two) Times a Day. Use in each nostril as directed 8/15/17   Roby Corley MD   bisoprolol (ZEBeta) 5 MG tablet Take 0.5 tablets by mouth Daily. 11/7/17   David Snowden MD PhD   Blood Glucose Monitoring Suppl (ONE TOUCH ULTRA MINI) w/Device kit  2/1/18   Historical Provider, MD   Calcium-Magnesium-Vitamin D (CALCIUM 500 PO) Take 500 mg by mouth 2 (two) times a day.    Historical Provider, MD   clopidogrel (PLAVIX) 75 MG tablet Take 1 tablet by mouth Daily. 2/16/18   Roby Corley MD   furosemide (LASIX) 80 MG tablet Take 1 tablet by mouth Daily. 2/16/18   Roby Corley MD   glucose blood test strip Use as instructed 5/15/17   Roby Corley MD   GuaiFENesin ER (MUCINEX MAXIMUM STRENGTH) 1200 MG tablet sustained-release 12 hour Take 1 tablet BID as needed. 2/16/18   Roby Corley MD   hydrocortisone (WESTCORT) 0.2 % cream Apply  topically 2 (Two) Times a Day. 9/6/17   Princess L Kim, APRN   hydrocortisone-bacitracin-zinc oxide-nystatin (MAGIC BARRIER) Apply 1 application topically As Needed (Rash). 11/10/17   Roby Corley MD    promethazine-dextromethorphan (PROMETHAZINE-DM) 6.25-15 MG/5ML syrup Take 5 mL by mouth At Night As Needed for Cough.  Patient not taking: Reported on 3/4/2018 12/30/17   VERNON Rosales   Respiratory Therapy Supplies (NEBULIZER/TUBING/MOUTHPIECE) kit Tubing 8/15/17   Roby Corley MD   senna (SENOKOT) 8.6 MG tablet tablet Take 1 tablet by mouth 2 (two) times a day.    Nola Daigle MD   sertraline (ZOLOFT) 100 MG tablet Take 0.5 tablets by mouth Daily. 11/10/17   Roby Corley MD   sildenafil (REVATIO) 20 MG tablet Take 1 tablet by mouth 3 (Three) Times a Day. 1/19/18   Roby Corley MD   Tiotropium Bromide Monohydrate 2.5 MCG/ACT aerosol solution Inhale 2 puffs Daily. 2/21/18   Jeni Thompson MD   triamcinolone (KENALOG) 0.1 % ointment Apply  topically 2 (Two) Times a Day. Max 2 weeks per treatment  Patient taking differently: Apply 1 application topically 2 (Two) Times a Day. Max 2 weeks per treatment 10/17/16   Roby Corley MD   TRUEPLUS LANCETS 33G misc 1 each Daily. 5/15/17   Roby Corley MD   VENTOLIN  (90 Base) MCG/ACT inhaler USE 2 PUFFS FOUR TIMES DAILY AND AS NEEDED 10/10/17   Roby Corley MD   vitamin D (ERGOCALCIFEROL) 50911 units capsule capsule Take 1 capsule by mouth Every 14 (Fourteen) Days. 2/16/18   Roby Corley MD   levoFLOXacin (LEVAQUIN) 750 MG tablet Take 1 tablet every 48 hours for 5 doses.  Patient not taking: Reported on 3/4/2018 2/19/18 3/4/18  Roby Corley MD       Objective     Vital Signs    Temp:  [98.3 °F (36.8 °C)] 98.3 °F (36.8 °C)  Heart Rate:  [62-70] 70  Resp:  [18-24] 18  BP: ()/(53-80) 119/60    Physical Exam:      Physical Exam   Constitutional: She is oriented to person, place, and time. She appears well-developed and well-nourished.   HENT:   Head: Normocephalic and atraumatic.   Nose: Nose normal.   Eyes: Conjunctivae and EOM are normal. Pupils are equal, round, and reactive to light.   Neck: Normal range of  motion. Neck supple. No JVD present. No tracheal deviation present. No thyromegaly present.   Cardiovascular: Normal rate, regular rhythm, normal heart sounds and intact distal pulses.    Pulmonary/Chest: She is in respiratory distress. She has no wheezes. She has rales. She exhibits no tenderness.   Abdominal: Soft. Bowel sounds are normal. She exhibits no distension. There is no tenderness. There is no rebound and no guarding.   Musculoskeletal: Normal range of motion. She exhibits no edema.   Lymphadenopathy:     She has no cervical adenopathy.   Neurological: She is alert and oriented to person, place, and time. She has normal reflexes. No cranial nerve deficit.   Skin: Skin is warm and dry.   Intact   Psychiatric: She has a normal mood and affect.   Nursing note and vitals reviewed.      Results Review:       Results from last 7 days  Lab Units 03/04/18  1140 03/04/18  1137   SODIUM mmol/L  --  139   SODIUM, ARTERIAL mmol/L 135.3*  --    POTASSIUM mmol/L  --  3.4*   CHLORIDE mmol/L  --  101   CO2 mmol/L  --  25.0   BUN mg/dL  --  29*   CREATININE mg/dL  --  1.30*   GLUCOSE mg/dL  --  118*   GLUCOSE, ARTERIAL mmol/L 134  --    CALCIUM mg/dL  --  8.4   BILIRUBIN mg/dL  --  1.2   ALK PHOS U/L  --  94   ALT (SGPT) U/L  --  46   AST (SGOT) U/L  --  22               Results from last 7 days  Lab Units 03/04/18  1057   WBC 10*3/mm3 7.17   HEMOGLOBIN g/dL 14.8   HEMATOCRIT % 45.7*   PLATELETS 10*3/mm3 166         Results from last 7 days  Lab Units 03/04/18  1144   INR  1.09     Imaging Results (last 7 days)     Procedure Component Value Units Date/Time    XR Chest 1 View [[id number]] Collected:  03/04/18 1038     Updated:  03/04/18 1115    Narrative:       [facility] [place]    Patient Name: MS. KEELY CABRERA    ORDERING: JUDY WHITMAN     ATTENDING: PAM NUNEZ     REFERRING: JUDY WHITMAN    -----------------------    PROCEDURE: Chest Single View    TECHNIQUE: Single AP view of the  chest    COMPARISON: 2/16/2018    HISTORY: shortness of breath    FINDINGS:     Life-support devices: None.    Lungs/pleura: There is increasing opacity within the right middle  lobe that may represent consolidating infiltrate in the proper  clinical setting. No other area of acute pulmonary abnormality  seen. No effusion or pneumothorax..    Heart, hilar and mediastinal structures: The heart size and  mediastinal contours are within limits of normal. The trachea is  midline.    Skeletal Structures: No acute findings. No free air beneath the  diaphragm.      Impression:       There is increasing opacity within the right middle lobe compared  to the examinations of January 11 and February 16, 2018. While in  the proper clinical setting this may represent consolidating  pneumonia, however, given the fact there is no resolution likely  with treatment recommend CT of the chest with contrast for  further evaluation to exclude the possibility of underlying  neoplasm.    Electronically signed by:  Charli Nicholson MD  3/4/2018 11:14 AM  CST Workstation: [id number]    CT Chest Without Contrast [[id number]] Collected:  03/04/18 1108     Updated:  03/04/18 1203    Narrative:           Patient Name: KEELY CABRERA    ORDERING: JUDY WHITMAN    ATTENDING: PAM NUNEZ     REFERRING: JUDY WHITMAN    -----------------------  EXAM DESCRIPTION: CT CHEST WO CONTRAST    CLINICAL HISTORY:  62-year-old female with shortness of air and  abnormal chest radiograph.      COMPARISON: Several prior chest radiographs, most recent dated  3/4/2018.    DOSE LENGTH PRODUCT: 802.    This exam was performed according to our departmental dose  optimization program, which includes automated exposure control,  adjustment of the mA and/or KV according to patient size and/or  use of iterative reconstruction technique.      TECHNIQUE: Axial images were obtained and multiplanar  reconstructions were made.      FINDINGS:    LUNGS/PLEURA: There is moderate area of  consolidation within the  right middle lobe containing air bronchograms. Consideration is  given to atelectasis versus consolidating pneumonia. No other  area of consolidation identified. There is some minimal  reticulonodularity within the left upper lobe posteriorly which  is nonspecific and may represent pneumonitis. There are a few  scattered noncalcified nodules most notable within the upper  lobes posteriorly these may be postinflammatory given that there  is areas of calcified granulomata present related to prior  granulomatous disease. No effusion or pneumothorax. No dominant  suspicious mass.  TRACHEA AND BRONCHI:  The trachea and bronchi are patent. No  obvious luminal mass centrally on the right to explain the right  middle lobe process. No fluid or debris identified within the  main airways.  MEDIASTINUM, ALINA AND LYMPH NODES: There are calcified lymph  nodes related to prior inflammation/infection. Otherwise there is  small mostly subcentimeter nodes present. No suspicious axillary,  hilar, or mediastinal nodes based on size or morphologic criteria  identified.  HEART AND PERICARDIUM: No pericardial effusion. There is coronary  vascular calcification present.  VASCULAR: There is vascular calcification without thoracic aortic  aneurysm.  UPPER ABDOMEN: There is evidence of a midline upper abdominal  wall hernia containing fat. There is extension of the left lobe  the liver towards the mouth of the defect. Inferior to this  hernia is a large midline abdominal wall hernia which contains  large bowel without obstruction or findings of incarceration.  There are multiple choleliths. No pericholecystic inflammatory  change seen.  OSSEOUS STRUCTURES: Degenerative changes are present within the  spine. No acute finding.      Impression:       Moderate area of consolidation within the right middle lobe  corresponding to the radiographic finding. There is air  bronchograms present. No obvious central bronchial  lesion  contributing this finding. Consideration is given to atelectasis  and/or consolidating pneumonia. Recommend treatment and follow-up  until clear. If there is no change on the follow-up study  pulmonary consultation would be recommended for possible  bronchoscopy.    Minimal reticulonodularity left upper lobe which may represent  pneumonitis.    There is a background of old granulomatous disease. There are  couple small 5 mm less noncalcified nodules that likely are  postinflammatory as well. These can be evaluated on follow-up  exams.    No suspicious mediastinal or hilar lymph nodes based on size or  morphologic criteria.    Cholelithiasis.    Upper midline abdominal hernia containing fat as well as  extension the left lobe the liver towards the mouth of the  defect. No findings of incarceration or obstruction.    Mid to lower midline abdominal wall hernia, larger in size  containing fat and bowel without findings of obstruction or  incarceration.    Electronically signed by:  Charli Nicholson MD  3/4/2018 12:02 PM  CST Workstation: [id number]          Assessment/Plan     Principal Problem:    Recurrent pneumonia  Active Problems:    Pulmonary hypertension severe    Obesity    Hyperlipidemia    Generalized anxiety disorder    Essential hypertension    Coronary atherosclerosis of native coronary artery    Coronary arteriosclerosis    Chronic kidney disease    Type 2 diabetes mellitus with stage 3 chronic kidney disease, without long-term current use of insulin    Cough    PVD (peripheral vascular disease)    COPD (chronic obstructive pulmonary disease)    Hypoxia      -Continue with IV antibiotics.  -We'll continue with incentive spirometry.  -We'll continue with nebulization treatment and IV steroids.  -We'll recommend aggressive PT OT.  -We will resume patient's home medication as prior to coming to hospital.  -DVT and GI prophylaxis in place.  -We'll continue monitoring patient in hospital setting and treat  patient as course dictates.    I discussed the patients findings and my recommendations with patient and nursing staff.         This document has been electronically signed by Nataliya Mittal MD on March 4, 2018 3:09 PM        Total Time Spent: 43 minutes.    EMR Dragon/Transcription disclaimer:   Dictated utilizing Dragon dictation.            Electronically signed by Nataliya Mittal MD at 3/4/2018  5:08 PM           Emergency Department Notes      ZHAO Vang at 3/4/2018 10:25 AM     Attestation signed by Grabiel Najera MD at 3/5/2018  7:39 AM              For this patient encounter, I reviewed the NP or PA documentation, treatment plan, and medical decision making. Grabiel Najera MD 3/5/2018 7:39 AM                               Subjective   HPI Comments: Patient has had recurrent pneumonia since January. Worsening shortness of breath since yesterday. States that it is worse when she lays down. Saw Dr. Thompson on the 21st and she ordered a CT scan to rule out an obstructive mass verses pneumonia. CT scan was ordered for in the morning but patient states that her breathing just got really worse and she couldn't wait.     Patient is a 62 y.o. female presenting with shortness of breath.   History provided by:  Patient   used: No    Shortness of Breath   Severity:  Moderate  Onset quality:  Gradual  Duration:  2 days  Timing:  Constant  Progression:  Worsening  Chronicity:  Recurrent  Context: smoke exposure    Context: not activity, not animal exposure, not emotional upset, not fumes, not known allergens, not occupational exposure, not pollens, not strong odors, not URI and not weather changes    Relieved by:  Nothing  Worsened by:  Coughing and smoke exposure (laying down)  Ineffective treatments:  Sitting up  Associated symptoms: cough, sputum production and wheezing    Associated symptoms: no abdominal pain, no chest pain, no claudication, no diaphoresis, no ear pain, no  fever, no headaches, no hemoptysis, no neck pain, no PND, no rash, no sore throat, no syncope, no swollen glands and no vomiting    Risk factors: obesity and tobacco use    Risk factors: no recent alcohol use, no family hx of DVT, no hx of cancer, no hx of PE/DVT, no oral contraceptive use, no prolonged immobilization and no recent surgery        Review of Systems   Constitutional: Negative.  Negative for diaphoresis and fever.   HENT: Negative.  Negative for ear pain and sore throat.    Eyes: Negative.    Respiratory: Positive for cough, sputum production, chest tightness, shortness of breath and wheezing. Negative for apnea, hemoptysis, choking and stridor.    Cardiovascular: Negative.  Negative for chest pain, claudication, syncope and PND.   Gastrointestinal: Negative.  Negative for abdominal pain and vomiting.   Endocrine: Negative.    Genitourinary: Negative.    Musculoskeletal: Negative.  Negative for neck pain.   Skin: Negative.  Negative for rash.   Allergic/Immunologic: Negative.    Neurological: Negative.  Negative for headaches.   Hematological: Negative.    Psychiatric/Behavioral: Negative.        Past Medical History:   Diagnosis Date   • Abnormal weight gain    • Asthenia    • Breast screening    • Carpal tunnel syndrome    • Chronic kidney disease     Stage 3   • COPD (chronic obstructive pulmonary disease)    • Coronary arteriosclerosis     2007: PCI RCA 2011: PCI: LCX      • Coronary atherosclerosis of native coronary artery    • DJD (degenerative joint disease)     involving multiple joints   • Edema of lower extremity    • Encounter for screening for osteoporosis    • Encounter for screening mammogram for malignant neoplasm of breast    • Essential hypertension    • Foot pain    • Generalized anxiety disorder    • Gout    • History of tobacco use    • Hyperlipidemia    • Light tobacco smoker    • Obesity    • Peripheral edema    • Pulmonary hypertension    • PVD (peripheral vascular disease)      LATOYA R .96 L 1.0 aortobifem 2012      • Strain of neck muscle    • Type 2 diabetes mellitus     diet controlled       Allergies   Allergen Reactions   • Lisinopril Shortness Of Breath and Cough   • Penicillins Rash   • Wellbutrin [Bupropion] Palpitations   • Nsaids      Due to decreased kidney function    • Tramadol      unknown       Past Surgical History:   Procedure Laterality Date   • AXILLARY SURGERY  2007    Right axillary cellulitis and abscess. Right axillary debridement   • BREAST BIOPSY  03/10/2008    Left breast mass. Left breast excisional biopsy   • CARDIAC CATHETERIZATION  10/04/2011    significant circumflex disease responsible for patient's symptoms. Patent right coronary artery stent. Peripheral arterial disease with completely occluded right common iliac and completely occluded left enternal iliac.   • CARDIAC CATHETERIZATION N/A 2017    Procedure: Right Heart Cath/ with vasodilator challenge;  Surgeon: Ignacio Connelly MD;  Location: [facility] CATH INVASIVE LOCATION;  Service:    • CARDIAC CATHETERIZATION N/A 2017    Procedure: Left ventriculography/ LV pressures only;  Surgeon: Ignacio Connelly MD;  Location: [facility] CATH INVASIVE LOCATION;  Service:    • COLONOSCOPY  10/2015    Declined   • CORONARY ARTERY BYPASS GRAFT  2012    Aortobifemoral bypass. Repair of spleen   • CYST REMOVAL  1979    Right, recurrent   • DILATATION AND CURETTAGE  09/10/1976    Incomplete .   • ENDOSCOPY AND COLONOSCOPY  2005    Single small polyp in the rectum. The polyp was removed by snare cautery. Colonoscopy, otherwise normal.   • INCISION AND DRAINAGE ABSCESS  2007    Abdominal wall abscess. Incision and drainage and debridement of abdominal wall abscess   • INJECTION OF MEDICATION  2013    Kenalog   • SPLENECTOMY  2012    Lacerated spleen. Posterior laceration of the capsule with a Grade I injury   • TRANSESOPHAGEAL ECHOCARDIOGRAM (SRINIVASA)  2016    With color  flow-Normal LV function with Ef of 65 to 70%.Severely dilated right ventricle with impaired systolic function.Impingement of LV cavity by the interventricular septum.Grade 1A diastolic dysfunction.Moderate tricuspid regurg   • TRANSESOPHAGEAL ECHOCARDIOGRAM (SRINIVASA)  05/02/2012    Without color flow-Mild right atrial enlargement with mild right ventricular enlargement with normal left atrial and left ventricular size. EF 55%   • TUBAL ABDOMINAL LIGATION  11/06/1987   • VAGINAL DELIVERY  1987       Family History   Problem Relation Age of Onset   • Cancer Other    • Diabetes Other    • Heart disease Other    • Hypertension Other    • Lung disease Other    • Stroke Other    • Hypertension Father        Social History     Social History   • Marital status:      Social History Main Topics   • Smoking status: Current Every Day Smoker     Packs/day: 0.25     Years: 39.00     Types: Cigarettes   • Smokeless tobacco: Never Used   • Alcohol use No   • Drug use: No   • Sexual activity: No           Objective   Physical Exam   Constitutional: She is oriented to person, place, and time. She appears well-developed and well-nourished. She appears distressed. She is not intubated.   HENT:   Head: Normocephalic and atraumatic.   Eyes: Conjunctivae and EOM are normal. Pupils are equal, round, and reactive to light. Right eye exhibits no discharge. Left eye exhibits no discharge. No scleral icterus.   Neck: Normal range of motion. Neck supple. No JVD present. No tracheal deviation present. No thyromegaly present.   Cardiovascular: Normal rate, regular rhythm, normal heart sounds and intact distal pulses.  Exam reveals no gallop and no friction rub.    No murmur heard.  Pulmonary/Chest: Effort normal. No accessory muscle usage or stridor. No apnea, no tachypnea and no bradypnea. She is not intubated. No respiratory distress. She has decreased breath sounds. She has wheezes. She has rhonchi. She has no rales. She exhibits no  tenderness.   Patient speaking in short sentences, two words at a time. Very short of breath.  Improved with oxygen, 3 Liters.      Abdominal: Soft. Bowel sounds are normal. She exhibits no distension and no mass. There is no tenderness. There is no rebound and no guarding. No hernia.   Musculoskeletal: Normal range of motion. She exhibits no edema, tenderness or deformity.   Lymphadenopathy:     She has no cervical adenopathy.   Neurological: She is alert and oriented to person, place, and time. She has normal reflexes.   Skin: Skin is warm and dry. No rash noted. She is not diaphoretic. No erythema. No pallor.   Psychiatric: She has a normal mood and affect. Her behavior is normal. Judgment and thought content normal.   Nursing note and vitals reviewed.      Procedures        ED Course  ED Course      Labs Reviewed   COMPREHENSIVE METABOLIC PANEL - Abnormal; Notable for the following:        Result Value    Glucose 118 (*)     BUN 29 (*)     Creatinine 1.30 (*)     Potassium 3.4 (*)     Total Protein 6.2 (*)     Albumin 3.30 (*)     eGFR Non  Amer 42 (*)     All other components within normal limits   BNP (IN-HOUSE) - Abnormal; Notable for the following:     proBNP 5840.0 (*)     All other components within normal limits   TROPONIN (IN-HOUSE) - Abnormal; Notable for the following:     Troponin I 0.043 (*)     All other components within normal limits   BLOOD GAS, ARTERIAL - Abnormal; Notable for the following:     pO2, Arterial 69.0 (*)     HCO3, Arterial 26.8 (*)     O2 Saturation, Arterial 93.0 (*)     CO2 Content 28.1 (*)     Sodium, Arterial 135.3 (*)     Potassium, Arterial 3.36 (*)     Ionized Calcium 4.40 (*)     All other components within normal limits   CBC WITH AUTO DIFFERENTIAL - Abnormal; Notable for the following:     Hematocrit 45.7 (*)     RDW 15.4 (*)     RDW-SD 51.8 (*)     Neutrophil % 81.5 (*)     All other components within normal limits   URINALYSIS W/ CULTURE IF INDICATED - Abnormal;  Notable for the following:     Leuk Esterase, UA Trace (*)     All other components within normal limits   URINALYSIS, MICROSCOPIC ONLY - Abnormal; Notable for the following:     RBC, UA 0-2 (*)     Squamous Epithelial Cells, UA 3-5 (*)     All other components within normal limits   STREP PNEUMO AG, URINE OR CSF - Normal   LEGIONELLA ANTIGEN, URINE - Normal   PROTIME-INR - Normal    Narrative:     Therapeutic range for most indications is 2.0-3.0 INR,  or 2.5-3.5 for mechanical heart valves.   APTT - Normal    Narrative:     The recommended Heparin therapeutic range is 68-97 seconds.   CK MB - Normal   CBC AND DIFFERENTIAL    Narrative:     The following orders were created for panel order CBC & Differential.  Procedure                               Abnormality         Status                     ---------                               -----------         ------                     CBC Auto Differential[[id number]]        Abnormal            Final result                 Please view results for these tests on the individual orders.     Ct Chest Without Contrast    Result Date: 3/4/2018  Narrative: Patient Name: DEBORAHKEELY ORDERING: JUDY WHITMAN ATTENDING: PAM NUNEZ REFERRING: JUDY WHITMAN ----------------------- EXAM DESCRIPTION: CT CHEST WO CONTRAST CLINICAL HISTORY:  62-year-old female with shortness of air and abnormal chest radiograph.  COMPARISON: Several prior chest radiographs, most recent dated 3/4/2018. DOSE LENGTH PRODUCT: 802. This exam was performed according to our departmental dose optimization program, which includes automated exposure control, adjustment of the mA and/or KV according to patient size and/or use of iterative reconstruction technique. TECHNIQUE: Axial images were obtained and multiplanar reconstructions were made.  FINDINGS: LUNGS/PLEURA: There is moderate area of consolidation within the right middle lobe containing air bronchograms. Consideration is given to atelectasis versus  consolidating pneumonia. No other area of consolidation identified. There is some minimal reticulonodularity within the left upper lobe posteriorly which is nonspecific and may represent pneumonitis. There are a few scattered noncalcified nodules most notable within the upper lobes posteriorly these may be postinflammatory given that there is areas of calcified granulomata present related to prior granulomatous disease. No effusion or pneumothorax. No dominant suspicious mass. TRACHEA AND BRONCHI:  The trachea and bronchi are patent. No obvious luminal mass centrally on the right to explain the right middle lobe process. No fluid or debris identified within the main airways. MEDIASTINUM, ALINA AND LYMPH NODES: There are calcified lymph nodes related to prior inflammation/infection. Otherwise there is small mostly subcentimeter nodes present. No suspicious axillary, hilar, or mediastinal nodes based on size or morphologic criteria identified. HEART AND PERICARDIUM: No pericardial effusion. There is coronary vascular calcification present. VASCULAR: There is vascular calcification without thoracic aortic aneurysm. UPPER ABDOMEN: There is evidence of a midline upper abdominal wall hernia containing fat. There is extension of the left lobe the liver towards the mouth of the defect. Inferior to this hernia is a large midline abdominal wall hernia which contains large bowel without obstruction or findings of incarceration. There are multiple choleliths. No pericholecystic inflammatory change seen. OSSEOUS STRUCTURES: Degenerative changes are present within the spine. No acute finding.     Impression: Moderate area of consolidation within the right middle lobe corresponding to the radiographic finding. There is air bronchograms present. No obvious central bronchial lesion contributing this finding. Consideration is given to atelectasis and/or consolidating pneumonia. Recommend treatment and follow-up until clear. If there  is no change on the follow-up study pulmonary consultation would be recommended for possible bronchoscopy. Minimal reticulonodularity left upper lobe which may represent pneumonitis. There is a background of old granulomatous disease. There are couple small 5 mm less noncalcified nodules that likely are postinflammatory as well. These can be evaluated on follow-up exams. No suspicious mediastinal or hilar lymph nodes based on size or morphologic criteria. Cholelithiasis. Upper midline abdominal hernia containing fat as well as extension the left lobe the liver towards the mouth of the defect. No findings of incarceration or obstruction. Mid to lower midline abdominal wall hernia, larger in size containing fat and bowel without findings of obstruction or incarceration. Electronically signed by:  Charli Nicholson MD  3/4/2018 12:02 PM CST Workstation: [id number]    Xr Chest 1 View    Result Date: 3/4/2018  Narrative: [facility] [place] Patient Name: MS. KEELY CABRERA ORDERING: JUDY WHITMAN ATTENDING: PAM NUNEZ REFERRING: JUDY WHITMAN ----------------------- PROCEDURE: Chest Single View TECHNIQUE: Single AP view of the chest COMPARISON: 2/16/2018 HISTORY: shortness of breath FINDINGS:  Life-support devices: None. Lungs/pleura: There is increasing opacity within the right middle lobe that may represent consolidating infiltrate in the proper clinical setting. No other area of acute pulmonary abnormality seen. No effusion or pneumothorax.. Heart, hilar and mediastinal structures: The heart size and mediastinal contours are within limits of normal. The trachea is midline. Skeletal Structures: No acute findings. No free air beneath the diaphragm.     Impression: There is increasing opacity within the right middle lobe compared to the examinations of January 11 and February 16, 2018. While in the proper clinical setting this may represent consolidating pneumonia, however, given the fact there is no resolution  likely with treatment recommend CT of the chest with contrast for further evaluation to exclude the possibility of underlying neoplasm. Electronically signed by:  Charli Nicholson MD  3/4/2018 11:14 AM CST Workstation: [id number]    Xr Chest Pa & Lateral    Result Date: 2/16/2018  Narrative: Chest 2 view on  2/16/2018 CLINICAL INDICATION: Follow-up right middle lobe pneumonia COMPARISON: 1/11/2018 FINDINGS: There is persistent and actually worsened right middle lobe wedge-shaped opacity consistent with atelectasis and/or pneumonia. At this point would recommend follow-up chest CT with contrast to better exclude a central obstructing lesion. Mild cardiomegaly is noted. There is evidence of calcified granulomatous disease in the chest.     Impression: Continued and actually worsened right middle lobe wedge-shaped opacity consistent with atelectasis and/or pneumonia. At this point recommend follow-up chest CT with contrast to exclude a central obstructing lesion. Electronically signed by:  Braeden Solano  2/16/2018 9:31 PM CST Workstation: [id number]      Patient admitted to hospital for recurrent pneumonia and hypoxia.           MDM    Final diagnoses:   Recurrent pneumonia   Hypoxia   Cough            ZHAO Vang  03/04/18 1901       Electronically signed by Grabiel Najera MD at 3/5/2018  7:39 AM      Aby Romero RN at 3/4/2018 11:09 AM          Pt has hx of COPD and arrived to ER with c/o chest pain.  Pt has scheduled CT for Monday 3/5/18 for possible lung mass.     Aby Romero RN  03/04/18 1111       Electronically signed by Aby Romero RN at 3/4/2018 11:11 AM      Bee Kirby at 3/4/2018  1:13 PM          Rm [id number] assigned @1245  Tele Box assigned @ 1245     Bee Kirby  03/04/18 1313       Electronically signed by Bee Kirby at 3/4/2018  1:13 PM        Vital Signs (last 72 hrs)       03/02 0700  -  03/03 0659 03/03 0700  -  03/04 0659 03/04 0700  -  03/05 0659 03/05 0700  -   03/05 1224   Most Recent    Temp (°F)     97.6 -  98.5      96.7     96.7 (35.9)    Heart Rate     62 -  86    78 -  88     78    Resp     18 -  24    16 -  18     18    BP     97/53 -  121/56      125/59     125/59    SpO2 (%)     (!)86 -  95    90 -  92     90          Hospital Medications (active)       Dose Frequency Start End    acetaminophen (TYLENOL) tablet 650 mg 650 mg Every 4 Hours PRN 3/4/2018     Sig - Route: Take 2 tablets by mouth Every 4 (Four) Hours As Needed for Mild Pain . - Oral    allopurinol (ZYLOPRIM) tablet 100 mg 100 mg Daily 3/5/2018     Sig - Route: Take 1 tablet by mouth Daily. - Oral    aspirin EC tablet 81 mg 81 mg Daily 3/5/2018     Sig - Route: Take 1 tablet by mouth Daily. - Oral    atorvastatin (LIPITOR) tablet 80 mg 80 mg Nightly 3/4/2018     Sig - Route: Take 2 tablets by mouth Every Night. - Oral    AZITHROMYCIN 500 MG/250 ML 0.9% NS IVPB (vial-mate) 500 mg Every 24 Hours 3/4/2018 3/9/2018    Sig - Route: Infuse 500 mg into a venous catheter Daily. - Intravenous    bisacodyl (DULCOLAX) suppository 10 mg 10 mg Daily PRN 3/4/2018     Sig - Route: Insert 1 suppository into the rectum Daily As Needed for Constipation. - Rectal    bisoprolol (ZEBeta) half tablet 2.5 mg 2.5 mg Daily 3/5/2018     Sig - Route: Take 1 half tablet by mouth Daily. - Oral    calcium carbonate-vitamin d 600-400 MG-UNIT per tablet 1 tablet 1 tablet Daily 3/5/2018     Sig - Route: Take 1 tablet by mouth Daily. - Oral    cefTRIAXone (ROCEPHIN) 1 g/100 mL 0.9% NS (MBP) 1 g Once 3/4/2018 3/4/2018    Sig - Route: Infuse 100 mL into a venous catheter 1 (One) Time. - Intravenous    Cosign for Ordering: Accepted by Grabiel Najera MD on 3/4/2018  2:30 PM    cefTRIAXone (ROCEPHIN) 1 g/100 mL 0.9% NS (MBP) 1 g Every 24 Hours 3/5/2018 3/15/2018    Sig - Route: Infuse 100 mL into a venous catheter Daily. - Intravenous    clopidogrel (PLAVIX) tablet 75 mg 75 mg Daily 3/5/2018     Sig - Route: Take 1 tablet by mouth Daily. -  Oral    docusate sodium (COLACE) capsule 200 mg 200 mg 2 Times Daily 3/4/2018     Sig - Route: Take 2 capsules by mouth 2 (Two) Times a Day. - Oral    furosemide (LASIX) injection 40 mg 40 mg 3 Times Daily 3/4/2018     Sig - Route: Infuse 4 mL into a venous catheter 3 (Three) Times a Day. - Intravenous    guaiFENesin (MUCINEX) 12 hr tablet 1,200 mg 1,200 mg Every 12 Hours Scheduled 3/4/2018     Sig - Route: Take 2 tablets by mouth Every 12 (Twelve) Hours. - Oral    heparin (porcine) 5000 UNIT/ML injection 5,000 Units 5,000 Units Every 8 Hours Scheduled 3/4/2018     Sig - Route: Inject 1 mL under the skin Every 8 (Eight) Hours. - Subcutaneous    hydrALAZINE (APRESOLINE) injection 10 mg 10 mg Every 6 Hours PRN 3/4/2018     Sig - Route: Infuse 0.5 mL into a venous catheter Every 6 (Six) Hours As Needed for High Blood Pressure (SBP > 160). - Intravenous    ipratropium-albuterol (DUO-NEB) nebulizer solution 3 mL 3 mL Every 4 Hours - RT 3/4/2018     Sig - Route: Take 3 mL by nebulization Every 4 (Four) Hours. - Nebulization    magnesium hydroxide (MILK OF MAGNESIA) suspension 2400 mg/10mL 10 mL 10 mL Daily PRN 3/4/2018     Sig - Route: Take 10 mL by mouth Daily As Needed for Constipation. - Oral    methylPREDNISolone sodium succinate (SOLU-Medrol) injection 60 mg 60 mg Every 6 Hours 3/4/2018     Sig - Route: Infuse 0.96 mL into a venous catheter Every 6 (Six) Hours. - Intravenous    nicotine (NICODERM CQ) 21 MG/24HR patch 1 patch 1 patch Every 24 Hours 3/4/2018     Sig - Route: Place 1 patch on the skin Daily. - Transdermal    pantoprazole (PROTONIX) EC tablet 40 mg 40 mg Every Early Morning 3/5/2018     Sig - Route: Take 1 tablet by mouth Every Morning. - Oral    potassium chloride (KLOR-CON) packet 40 mEq 40 mEq As Needed 3/5/2018     Sig - Route: Take 40 mEq by mouth As Needed (potassium replacement, see admin instructions). - Oral    potassium chloride (MICRO-K) CR capsule 40 mEq 40 mEq As Needed 3/5/2018     Sig -  "Route: Take 4 capsules by mouth As Needed (potassium replacement.  see admin instructions). - Oral    sertraline (ZOLOFT) tablet 50 mg 50 mg Daily 3/5/2018     Sig - Route: Take 1 tablet by mouth Daily. - Oral    sildenafil (REVATIO) tablet 20 mg 20 mg 3 Times Daily 3/4/2018     Sig - Route: Take 1 tablet by mouth 3 (Three) Times a Day. - Oral    sodium chloride 0.9 % flush 1-10 mL 1-10 mL As Needed 3/4/2018     Sig - Route: Infuse 1-10 mL into a venous catheter As Needed for Line Care. - Intravenous    sodium chloride 0.9 % flush 10 mL 10 mL As Needed 3/4/2018     Sig - Route: Infuse 10 mL into a venous catheter As Needed for Line Care. - Intravenous    Cosign for Ordering: Accepted by Grabiel Najera MD on 3/4/2018  2:30 PM    Linked Group 1:  \"And\" Linked Group Details        AZITHROMYCIN 500 MG/250 ML 0.9% NS IVPB (vial-mate) (Discontinued) 500 mg Once 3/4/2018 3/4/2018    Sig - Route: Infuse 500 mg into a venous catheter 1 (One) Time. - Intravenous    Reason for Discontinue: Discontinued by another clinician    Cosign for Ordering: Accepted by Grabiel Najera MD on 3/4/2018  2:30 PM    sodium chloride 0.9 % infusion (Discontinued) 100 mL/hr Continuous 3/4/2018 3/5/2018    Sig - Route: Infuse 100 mL/hr into a venous catheter Continuous. - Intravenous          Physician Progress Notes (last 24 hours) (Notes from 3/4/2018 12:24 PM through 3/5/2018 12:24 PM)     No notes of this type exist for this encounter.        "

## 2018-03-05 NOTE — PLAN OF CARE
Problem: Patient Care Overview (Adult)  Goal: Plan of Care Review  Outcome: Ongoing (interventions implemented as appropriate)   03/05/18 0510   Coping/Psychosocial Response Interventions   Plan Of Care Reviewed With patient   Patient Care Overview   Progress no change   Outcome Evaluation   Outcome Summary/Follow up Plan Pt rested overnight; no falls, refused bed alarm; pt SOA with activity; oxygen saturation in low 90's overnight; vital signs stable, monitorin pt      Goal: Adult Individualization and Mutuality  Outcome: Ongoing (interventions implemented as appropriate)    Goal: Discharge Needs Assessment  Outcome: Ongoing (interventions implemented as appropriate)      Problem: Cardiac: Heart Failure (Adult)  Goal: Signs and Symptoms of Listed Potential Problems Will be Absent or Manageable (Cardiac: Heart Failure)  Outcome: Ongoing (interventions implemented as appropriate)      Problem: COPD, Chronic Bronchitis/Emphysema (Adult)  Goal: Signs and Symptoms of Listed Potential Problems Will be Absent or Manageable (COPD, Chronic Bronchitis/Emphysema)  Outcome: Ongoing (interventions implemented as appropriate)      Problem: Fall Risk (Adult)  Goal: Identify Related Risk Factors and Signs and Symptoms  Outcome: Outcome(s) achieved Date Met: 03/05/18    Goal: Absence of Falls  Outcome: Ongoing (interventions implemented as appropriate)

## 2018-03-05 NOTE — PROGRESS NOTES
[facility] Medicine Services  INPATIENT PROGRESS NOTE    Length of Stay: 1  Date of Admission: 3/4/2018  Primary Care Physician: Roby Corley MD    Subjective   Chief Complaint: No complaints    HPI:  This is a 62-year-old lady that presented to [facility] with history significant for COPD, pulmonary hypertension, HTN, morbid obesity, DM, ASCAD, PAD with stents, and CKD stage III who presents with with complaints of shortness of breath.  Patient states she has stopped smoking and is currently having her house cleaned to try to remove the smell of smoke due to this exacerbation.  The patient states she has been on 3 full separate courses of antibiotics for pneumonia, but continues to show increasing opacity within the right middle lobe.  Her PCP is Dr. Corley.  The patient was recently referred to pulmonologist, Dr. Mariana Thompson.      Review of Systems   Constitutional: Positive for fatigue.   Respiratory: Positive for cough and shortness of breath.       All pertinent negatives and positives are as above. All other systems have been reviewed and are negative unless otherwise stated.     Objective    Temp:  [96.7 °F (35.9 °C)-98.5 °F (36.9 °C)] 96.7 °F (35.9 °C)  Heart Rate:  [62-88] 78  Resp:  [16-20] 18  BP: (109-125)/(56-67) 125/59    Physical Exam   Constitutional: She is oriented to person, place, and time. She appears well-developed and well-nourished.   HENT:   Head: Normocephalic and atraumatic.   Eyes: EOM are normal. Pupils are equal, round, and reactive to light.   Neck: Normal range of motion. Neck supple.   Cardiovascular: Normal rate and regular rhythm.    Pulmonary/Chest: Effort normal and breath sounds normal.   Abdominal: Soft. Bowel sounds are normal.   Musculoskeletal: Normal range of motion.   Neurological: She is alert and oriented to person, place, and time.   Skin: Skin is warm and dry.   Psychiatric: She has a normal mood and affect.      Results Review:  I have reviewed the labs, radiology results, and diagnostic studies.    Laboratory Data:     Results from last 7 days  Lab Units 03/05/18  0722 03/04/18  1140 03/04/18  1137   SODIUM mmol/L 140  --  139   SODIUM, ARTERIAL mmol/L  --  135.3*  --    POTASSIUM mmol/L 3.3*  --  3.4*   CHLORIDE mmol/L 101  --  101   CO2 mmol/L 23.0  --  25.0   BUN mg/dL 33*  --  29*   CREATININE mg/dL 1.33*  --  1.30*   GLUCOSE mg/dL 268*  --  118*   GLUCOSE, ARTERIAL mmol/L  --  134  --    CALCIUM mg/dL 8.7  --  8.4   BILIRUBIN mg/dL 0.5  --  1.2   ALK PHOS U/L 83  --  94   ALT (SGPT) U/L 44  --  46   AST (SGOT) U/L 23  --  22   ANION GAP mmol/L 16.0*  --  13.0     Estimated Creatinine Clearance: 55.3 mL/min (by C-G formula based on Cr of 1.33).    Results from last 7 days  Lab Units 03/05/18  0722   MAGNESIUM mg/dL 1.8           Results from last 7 days  Lab Units 03/05/18  0722 03/04/18  1057   WBC 10*3/mm3 5.93 7.17   HEMOGLOBIN g/dL 13.3 14.8   HEMATOCRIT % 41.4 45.7*   PLATELETS 10*3/mm3 158 166       Results from last 7 days  Lab Units 03/04/18  1144   INR  1.09       Culture Data:   No results found for: BLOODCX  No results found for: URINECX  No results found for: RESPCX  No results found for: WOUNDCX  No results found for: STOOLCX  No components found for: BODYFLD    Radiology Data:   Imaging Results (last 24 hours)     ** No results found for the last 24 hours. **          I have reviewed the patient current medications.     Assessment/Plan       Plan:    1.  Pneumonia, right middle lobe/ AECOPD: Continue Rocephin and Azithromycin.  Legionella and strep pneumo negative. Respiratory culture pending.  Spoke with Dr. Thompson in pulmonology.  We will try to get the patient to her baseline and she has an appointment with Dr. Thompson Thursday. Continue IV steroids and nebs. Incentive spirometer and OPEP.  2.  Pulmonary hypertension/ HFpEF: Continue Revatio and Bisoprolol.  Lasix IV. BNP elevated 5864.  Fluid  restrictions initiated (2000cc/daily).    3.  ASCAD:  Continue Bisoprolol, aspirin, Plavix and Lipitor.   4.  Chronic kidney disease, stage III:  Creatinine is at baseline.  The patient follows with Dr. Cavanaugh.    5.  Deconditioning:  PT/OT.        Discharge Planning: I expect patient to be discharged to home in 2-3 days.      This document has been electronically signed by VERNON Farncisco on March 5, 2018 12:44 PM

## 2018-03-05 NOTE — PLAN OF CARE
Problem: Patient Care Overview (Adult)  Goal: Plan of Care Review  Outcome: Ongoing (interventions implemented as appropriate)   03/05/18 1312   Coping/Psychosocial Response Interventions   Plan Of Care Reviewed With patient   Outcome Evaluation   Outcome Summary/Follow up Plan OT eval complete on this date. Pt on 3L 02 and needs supervision for mobility and transfers. 02 drops to 84% with mobility. Pt could benefit from OT services to increase endurance with independence with ADLs and functional mobility. Edu needed for EC/WS techniques when performing ADLs. Pt may need home 02 @ D/C.      Goal: Discharge Needs Assessment  Outcome: Ongoing (interventions implemented as appropriate)   03/05/18 1312   Discharge Needs Assessment   Discharge Facility/Level Of Care Needs home with home health       Problem: Inpatient Occupational Therapy  Goal: Dynamic Standing Balance Goal LTG-OT  Outcome: Ongoing (interventions implemented as appropriate)   03/05/18 1312   Dynamic Standing Balance OT LTG   Dynamic Standing Balance OT LTG, Date Established 03/05/18   Dynamic Standing Balance OT LTG, Time to Achieve by discharge   Dynamic Standing Balance OT LTG, [place] Level independent;conditional independence  (With 02 SATS 92% or above.)   Dynamic Standing Balance OT LTG, Assist Device assistive Device  (R/W if needed.)     Goal: Patient Education Goal LTG- OT  Outcome: Ongoing (interventions implemented as appropriate)   03/05/18 1312   Patient Education OT LTG   Patient Education OT LTG, Date Established 03/05/18   Patient Education OT LTG, Time to Achieve by discharge   Patient Education OT LTG, Education Type home safety;energy conservation;work simplification;adaptive breathing   Patient Education OT LTG, Education Understanding verbalizes understanding;demonstrates adequately     Goal: ADL Goal LTG- OT  Outcome: Ongoing (interventions implemented as appropriate)   03/05/18 1312   ADL OT LTG   ADL OT LTG, Date Established  03/05/18   ADL OT LTG, Time to Achieve by discharge   ADL OT LTG, Activity Type ADL skills  (Sponge bath and dress or walk-in shower.)   ADL OT LTG, [place] Level independent with device;assistive device  (R/W and shower chair if needed & 02 SATS 92% or above.)

## 2018-03-06 NOTE — PLAN OF CARE
Problem: Patient Care Overview (Adult)  Goal: Plan of Care Review   03/06/18 3316   Coping/Psychosocial Response Interventions   Plan Of Care Reviewed With patient   Patient Care Overview   Progress improving   Outcome Evaluation   Outcome Summary/Follow up Plan pt has a bmi greater than 40. education provided. some difficulty chewing but pt is refusing ground meats and soft foods.

## 2018-03-06 NOTE — THERAPY TREATMENT NOTE
Acute Care - Occupational Therapy Treatment Note  [facility]     Patient Name: Cameron Bowie  : 1955  MRN: [id number]  Today's Date: 3/6/2018  Onset of Illness/Injury or Date of Surgery Date: 18  Date of Referral to OT: 18  Referring Physician: Dr. Mittal      Admit Date: 3/4/2018    Visit Dx:     ICD-10-CM ICD-9-CM   1. Recurrent pneumonia J18.9 486   2. Hypoxia R09.02 799.02   3. Cough R05 786.2   4. Impaired mobility and activities of daily living Z74.09 799.89   5. Impaired mobility and endurance Z74.09 V49.89     Patient Active Problem List   Diagnosis   • Venous insufficiency   • Pulmonary hypertension severe   • Obesity   • Hyperlipidemia   • Gout   • Generalized anxiety disorder   • Essential hypertension   • DJD (degenerative joint disease)   • Coronary atherosclerosis of native coronary artery   • Coronary arteriosclerosis   • Chronic kidney disease   • Psoriasis   • Moderate episode of recurrent major depressive disorder   • Seasonal allergic rhinitis due to pollen   • Type 2 diabetes mellitus with stage 3 chronic kidney disease, without long-term current use of insulin   • Cough   • Non-rheumatic tricuspid valve insufficiency   • Pneumonia of right middle lobe due to infectious organism   • PVD (peripheral vascular disease)   • Light tobacco smoker   • History of tobacco use   • Foot pain   • Encounter for screening for osteoporosis   • COPD (chronic obstructive pulmonary disease)   • Carpal tunnel syndrome   • Breast screening   • Asthenia   • ALVAREZ (nonalcoholic steatohepatitis)   • Recurrent pneumonia   • Hypoxia             Adult Rehabilitation Note       18 0912          Rehab Assessment/Intervention    Discipline occupational therapy assistant  -BB      Document Type therapy note (daily note)  -BB      Subjective Information agree to therapy  -BB      Patient Effort, Rehab Treatment good  -BB      Symptoms Noted During/After Treatment shortness of breath  -BB       Precautions/Limitations oxygen therapy device and L/min  -BB      Recorded by [BB] LINO Ocampo/L      Vital Signs    Pretreatment Heart Rate (beats/min) 86  -BB      Posttreatment Heart Rate (beats/min) 83  -BB      Pre SpO2 (%) 92  -BB      O2 Delivery Pre Treatment supplemental O2  -BB      Post SpO2 (%) 94  -BB      O2 Delivery Post Treatment supplemental O2  -BB      Pre Patient Position Sitting  -BB      Post Patient Position Sitting  -BB      Recorded by [BB] LINO Ocampo/L      Pain Assessment    Pain Assessment No/denies pain  -BB      Recorded by [BB] LINO Ocampo/L      Cognitive Assessment/Intervention    Current Cognitive/Communication Assessment functional  -BB      Orientation Status oriented x 4  -BB      Follows Commands/Answers Questions 100% of the time  -BB      Personal Safety --   discussed home safety, E/C, W/S  -BB      Personal Safety Interventions gait belt;nonskid shoes/slippers when out of bed  -BB      Recorded by [BB] LINO Ocampo/L      Bed Mobility, Assessment/Treatment    Bed Mob, Supine to Sit, [place] independent  -BB      Bed Mob, Sit to Supine, [place] independent  -BB      Recorded by [BB] LINO Ocampo/L      Transfer Assessment/Treatment    Transfers, Sit-Stand [place] independent  -BB      Transfers, Stand-Sit [place] independent  -BB      Recorded by [BB] LINO Ocampo/L      Upper Body Bathing Assessment/Training    UB Bathing Assess/Train, Comment pt already had a bath  -BB      Recorded by [BB] LINO Ocampo/L      Toileting Assessment/Training    Toileting Assess/Train, Assistive Device grab bars  -BB      Toileting Assess/Train, Position sitting;standing  -BB      Toileting Assess/Train, Indepen Level independent  -BB      Recorded by [BB] LINO Ocampo/L      Grooming Assessment/Training    Grooming Assess/Train, Position sink side;standing  -BB      Grooming  Assess/Train, Indepen Level independent  -BB      Grooming Assess/Train, Comment washed hands, comb hair, oral care  -BB      Recorded by [BB] MIYA Ocampo      Positioning and Restraints    Pre-Treatment Position in bed  -BB      Post Treatment Position bed  -BB      In Bed sitting;call light within reach;encouraged to call for assist  -BB      Recorded by [BB] MIYA Ocampo        User Key  (r) = Recorded By, (t) = Taken By, (c) = Cosigned By    Initials Name Effective Dates    BB MIYA Ocampo 10/17/16 -                 OT Goals       03/06/18 1328 03/05/18 1312       Dynamic Standing Balance OT LTG    Dynamic Standing Balance OT LTG, Date Established  03/05/18  -RB     Dynamic Standing Balance OT LTG, Time to Achieve  by discharge  -RB     Dynamic Standing Balance OT LTG, [place] Level  independent;conditional independence   With 02 SATS 92% or above.  -RB     Dynamic Standing Balance OT LTG, Assist Device  assistive Device   R/W if needed.  -RB     Dynamic Standing Balance OT LTG, Date Goal Reviewed 03/06/18  -BB      Dynamic Standing Balance OT LTG, Outcome goal met  -BB      Patient Education OT LTG    Patient Education OT LTG, Date Established  03/05/18  -RB     Patient Education OT LTG, Time to Achieve  by discharge  -     Patient Education OT LTG, Education Type  home safety;energy conservation;work simplification;adaptive breathing  -     Patient Education OT LTG, Education Understanding  verbalizes understanding;demonstrates adequately  -RB     Patient Education OT LTG, Date Goal Reviewed 03/06/18  -BB      Patient Education OT LTG Outcome goal met  -BB      ADL OT LTG    ADL OT LTG, Date Established  03/05/18  -RB     ADL OT LTG, Time to Achieve  by discharge  -RB     ADL OT LTG, Activity Type  ADL skills   Sponge bath and dress or walk-in shower.  -RB     ADL OT LTG, [place] Level  independent with device;assistive device   R/W and shower chair if  needed & 02 SATS 92% or above.  -RB     ADL OT LTG, Date Goal Reviewed 03/06/18  -BB      ADL OT LTG, Outcome goal ongoing  -BB        User Key  (r) = Recorded By, (t) = Taken By, (c) = Cosigned By    Initials Name Provider Type    RB Otto Shoemaker OT Occupational Therapist    MIYA Nuñez Occupational Therapy Assistant          Occupational Therapy Education     Title: PT OT SLP Therapies (Active)     Topic: Occupational Therapy (Active)     Point: ADL training (Done)    Description: Instruct learner(s) on proper safety adaptation and remediation techniques during self care or transfers.   Instruct in proper use of assistive devices.    Learning Progress Summary    Learner Readiness Method Response Comment Documented by Status   Patient Acceptance SHIREEN COLLINS DU BB 03/06/18 1328 Done               Point: Precautions (Done)    Description: Instruct learner(s) on prescribed precautions during self-care and functional transfers.    Learning Progress Summary    Learner Readiness Method Response Comment Documented by Status   Patient Acceptance KARINA Marie on use of gait belt and non skid socks when OOB. RB 03/05/18 1311 Done               Point: Body mechanics (Done)    Description: Instruct learner(s) on proper positioning and spine alignment during self-care, functional mobility activities and/or exercises.    Learning Progress Summary    Learner Readiness Method Response Comment Documented by Status   Patient Acceptance SHIREEN COLLINS DU BB 03/06/18 1328 Done                      User Key     Initials Effective Dates Name Provider Type Discipline     06/15/16 -  Otto Shoemaker OT Occupational Therapist OT    BB 10/17/16 -  LINO Ocampo/L Occupational Therapy Assistant OT                  OT Recommendation and Plan  Anticipated Discharge Disposition: home with home health  Planned Therapy Interventions: activity intolerance, energy conservation, ADL retraining  Therapy Frequency:  (3-14x/wk)  Plan of  Care Review  Plan Of Care Reviewed With: patient  Progress: improving  Outcome Summary/Follow up Plan: Pt Indep with bed mobility. Pt is also Indep with bed-toilet t/f and grooming task at the sink. Pt met two new goals this date. Pt's 02 sats remained 90% and above.        Outcome Measures       03/06/18 0912 03/05/18 1335 03/05/18 1006    How much help from another person do you currently need...    Turning from your back to your side while in flat bed without using bedrails?  4  -MN     Moving from lying on back to sitting on the side of a flat bed without bedrails?  4  -MN     Moving to and from a bed to a chair (including a wheelchair)?  4  -MN     Standing up from a chair using your arms (e.g., wheelchair, bedside chair)?  4  -MN     Climbing 3-5 steps with a railing?  3  -MN     To walk in hospital room?  3  -MN     AM-PAC 6 Clicks Score  22  -MN     How much help from another is currently needed...    Putting on and taking off regular lower body clothing? 4  -BB  4  -RB    Bathing (including washing, rinsing, and drying) 4  -BB  3  -RB    Toileting (which includes using toilet bed pan or urinal) 4  -BB  3  -RB    Putting on and taking off regular upper body clothing 4  -BB  4  -RB    Taking care of personal grooming (such as brushing teeth) 4  -BB  4  -RB    Eating meals 4  -BB  4  -RB    Score 24  -BB  22  -RB    Functional Assessment    Outcome Measure Options  AM-PAC 6 Clicks Basic Mobility (PT)  -MN AM-PAC 6 Clicks Daily Activity (OT)  -RB      User Key  (r) = Recorded By, (t) = Taken By, (c) = Cosigned By    Initials Name Provider Type    RB Otto Shoemaker, OT Occupational Therapist    MN Annabelle Fontana, PT Physical Therapist    BB MIYA Ocampo Occupational Therapy Assistant           Time Calculation:         Time Calculation- OT       03/06/18 1332          Time Calculation- OT    OT Start Time 0912  -BB      OT Stop Time 0935  -BB      OT Time Calculation (min) 23 min  -BB      Total  Timed Code Minutes- OT 23 minute(s)  -JOSE EDUARDO      OT Received On 03/06/18  -JOSE EDUARDO        User Key  (r) = Recorded By, (t) = Taken By, (c) = Cosigned By    Initials Name Provider Type    LINO Nuñez/L Occupational Therapy Assistant           Therapy Charges for Today     Code Description Service Date Service Provider Modifiers Qty    [id number] HC OT SELF CARE/MGMT/TRAIN EA 15 MIN 3/6/2018 LINO Ocampo/L GO 2          OT G-codes  OT Professional Judgement Used?: Yes  OT Functional Scales Options: AM-PAC 6 Clicks Daily Activity (OT)  Score: 22  Functional Limitation: Self care  Self Care Current Status (): At least 20 percent but less than 40 percent impaired, limited or restricted  Self Care Goal Status (): At least 1 percent but less than 20 percent impaired, limited or restricted    MIYA Ocampo  3/6/2018

## 2018-03-06 NOTE — PLAN OF CARE
Problem: Patient Care Overview (Adult)  Goal: Plan of Care Review  Outcome: Ongoing (interventions implemented as appropriate)   03/06/18 1507   Coping/Psychosocial Response Interventions   Plan Of Care Reviewed With patient   Outcome Evaluation   Outcome Summary/Follow up Plan Pt met no new goals this tx. Pt ambulated 192ft SBA with RW. Pt was instructed on PLB after O2 sats decreased to 87% on 3L with gait. Pt presents with decreased endurance and strength in B LE. Pt would benefit from HHPT upon D/C.        Problem: Inpatient Physical Therapy  Goal: Gait Training Goal STG- PT  Outcome: Ongoing (interventions implemented as appropriate)   03/05/18 1406 03/06/18 1507   Gait Training PT STG   Gait Training Goal PT STG, Date Established 03/05/18 --    Gait Training Goal PT STG, Time to Achieve 5 days --    Gait Training Goal PT STG, [place] Level independent --    Gait Training Goal PT STG, Assist Device cane, quad --    Gait Training Goal PT STG, Distance to Achieve 200 ft with SpO2 >/= 92% and without LOB with vertical/horizontal head turns --    Gait Training Goal PT STG, Additional Goal Pt will asc/desc ramp with modified indep --    Gait Training Goal PT STG, Date Goal Reviewed --  03/06/18   Gait Training Goal PT STG, Outcome --  goal ongoing     Goal: Physical Therapy Goal STG- PT  Outcome: Ongoing (interventions implemented as appropriate)   03/05/18 1406 03/06/18 1507   Physical Therapy PT STG   Physical Therapy PT STG, Date Established 03/05/18 --    Physical Therapy PT STG, Time to Achieve 5 days --    Physical Therapy PT STG, Activity Type Pt will score >/= 24/28 on [place] Balance Assessment --    Physical Therapy PT STG, [place] Level independent --    Physical Therapy PT STG, Date Goal Reviewed --  03/06/18   Physical Therapy PT STG, Outcome --  goal ongoing

## 2018-03-06 NOTE — CONSULTS
"Adult Nutrition  Assessment    Patient Name:  Cameron Bowie  YOB: 1955  MRN: [id number]  Admit Date:  3/4/2018    Assessment Date:  3/6/2018    Comments:  Visited pt initially at her request re her breakfast meal since she doesn't like typical breakfast food. Pt admitted with recurrent pneumonia. bmi of greater than 40. Good appetite and intake of meals. Some difficulty chewing due to missing teeth but pt is refusing ground meat and softer foods. Pt will choose what she can eat. Will honor food preferences and continue to monitor.          Reason for Assessment       03/06/18 1605    Reason for Assessment    Reason For Assessment/Visit identified at risk by screening criteria    Identified At Risk By Screening Criteria BMI                Nutrition/Diet History       03/06/18 1605    Nutrition/Diet History    Patient Reported Diet/Restrictions/Preferences carbohydrate counting;heart healthy    Typical Food/Fluid Intake pt has a history of dm but did n't have a ada diet ordered. pt requested to talk to RDN about her breakfast meal. appetite is good. pt gave preferences for her meals. may go home in am.    Food Preferences fruit salad or sandwiches for breakfast            Anthropometrics       03/06/18 1607    Anthropometrics    Height Method Actual    Height 160 cm (63\")    Weight 121 kg (266 lb)    Anthropometrics (Special Considerations)    Height Used for Calculations 1.6 m (5' 3\")    Weight Used for Calculations 121 kg (266 lb)    Ideal Body Weight (IBW)    Ideal Body Weight (IBW), Female 53.12    % Ideal Body Weight 227.62    Body Mass Index (BMI)    BMI (kg/m2) 47.22    BMI Grade greater than 40 - obesity grade III            Labs/Tests/Procedures/Meds       03/06/18 1609    Labs/Tests/Procedures/Meds    Labs/Tests Review Reviewed;Alb;K+;Glucose;BUN;Creat            Physical Findings       03/06/18 1609    Physical Findings/Assessment    Additional Documentation Physical Appearance (Group) " "   Physical Appearance    Overall Physical Appearance obese            Estimated/Assessed Needs       03/06/18 1609    Calculation Measurements    Weight Used For Calculations 69 kg (152 lb 1.9 oz)    Height Used for Calculations 1.6 m (5' 3\")    Estimated/Assessed Energy Needs    Energy Need Method [facility]    Age 62    [facility] 1219.12    Total estimated needs ([facility]) 1700-152=0009(for weight mgt)    Estimated Kcal Range  9366-3001    Estimated/Assessed Protein Needs    Weight Used for Protein Calculation 69 kg (152 lb 1.9 oz)    Protein (gm/kg) 1.2    1.2 Gm Protein (gm) 82.8    Estimated/Assessed Fluid Needs    Fluid Need Method RDA method    RDA Method (mL)  1400            Nutrition Prescription Ordered       03/06/18 1611    Nutrition Prescription PO    Current PO Diet Regular    Common Modifiers Cardiac;Consistent Carbohydrate;Fluid Restriction            Evaluation of Received Nutrient/Fluid Intake       03/06/18 1611    PO Evaluation    Number of Meals 3    % PO Intake 75            Electronically signed by:  Sandy Vaughan RD  03/06/18 4:12 PM  "

## 2018-03-06 NOTE — PROGRESS NOTES
"Nutrition Services    Patient Name:  Cameron Bowie  YOB: 1955  MRN: [id number]  Admit Date:  3/4/2018    RDN returned to discuss weight mgt with patient(bmi 47.12) who refused education.\" hold on a minute- I have lost from 339-266lb. I know what to do. I am diabetic and my sugar is good at home. On awa , I cheat. Then I get right back on the diet.\"  Left information-\"Making Lifestyle changes for a healthy weight\"with patient and this RDN's phone number.   Pt said her menu is set as she wants for supper and breafkast.   She hopes to go home tomorrow. RDN staff to continue to monitor.    Electronically signed by:  Sandy Vaughan RD  03/06/18 4:27 PM   "

## 2018-03-06 NOTE — THERAPY TREATMENT NOTE
Acute Care - Physical Therapy Treatment Note  [facility]     Patient Name: Camerno Bowie  : 1955  MRN: [id number]  Today's Date: 3/6/2018  Onset of Illness/Injury or Date of Surgery Date: 18  Date of Referral to PT: 18  Referring Physician: Dr. Mittal    Admit Date: 3/4/2018    Visit Dx:    ICD-10-CM ICD-9-CM   1. Recurrent pneumonia J18.9 486   2. Hypoxia R09.02 799.02   3. Cough R05 786.2   4. Impaired mobility and activities of daily living Z74.09 799.89   5. Impaired mobility and endurance Z74.09 V49.89     Patient Active Problem List   Diagnosis   • Venous insufficiency   • Pulmonary hypertension severe   • Obesity   • Hyperlipidemia   • Gout   • Generalized anxiety disorder   • Essential hypertension   • DJD (degenerative joint disease)   • Coronary atherosclerosis of native coronary artery   • Coronary arteriosclerosis   • Chronic kidney disease   • Psoriasis   • Moderate episode of recurrent major depressive disorder   • Seasonal allergic rhinitis due to pollen   • Type 2 diabetes mellitus with stage 3 chronic kidney disease, without long-term current use of insulin   • Cough   • Non-rheumatic tricuspid valve insufficiency   • Pneumonia of right middle lobe due to infectious organism   • PVD (peripheral vascular disease)   • Light tobacco smoker   • History of tobacco use   • Foot pain   • Encounter for screening for osteoporosis   • COPD (chronic obstructive pulmonary disease)   • Carpal tunnel syndrome   • Breast screening   • Asthenia   • ALVAREZ (nonalcoholic steatohepatitis)   • Recurrent pneumonia   • Hypoxia               Adult Rehabilitation Note       18 1433 18 0912       Rehab Assessment/Intervention    Discipline (P)  physical therapy assistant  -TL occupational therapy assistant  -BB     Document Type (P)  therapy note (daily note)  -TL therapy note (daily note)  -BB     Subjective Information (P)  agree to therapy;no complaints  -TL agree to therapy  -BB      Patient Effort, Rehab Treatment (P)  good  -TL good  -BB     Symptoms Noted During/After Treatment (P)  shortness of breath  -TL shortness of breath  -BB     Precautions/Limitations (P)  fall precautions;oxygen therapy device and L/min  -TL oxygen therapy device and L/min  -BB     Recorded by [TL] Juaquin Haines PTA Student [BB] HITESH OcampoA/L     Vital Signs    Pre Systolic BP Rehab (P)  150  -TL      Pre Treatment Diastolic BP (P)  85  -TL      Post Systolic BP Rehab (P)  141  -TL      Post Treatment Diastolic BP (P)  77  -TL      Pretreatment Heart Rate (beats/min) (P)  78  -TL 86  -BB     Intratreatment Heart Rate (beats/min) (P)  88   with gait  -TL      Posttreatment Heart Rate (beats/min) (P)  67  -TL 83  -BB     Pre SpO2 (%) (P)  91   3L  -TL 92  -BB     O2 Delivery Pre Treatment (P)  supplemental O2  -TL supplemental O2  -BB     Intra SpO2 (%) (P)  87   with gait on 3L  -TL      O2 Delivery Intra Treatment (P)  supplemental O2  -TL      Post SpO2 (%) (P)  93  -TL 94  -BB     O2 Delivery Post Treatment (P)  supplemental O2  -TL supplemental O2  -BB     Pre Patient Position (P)  Sitting  -TL Sitting  -BB     Intra Patient Position (P)  Standing  -TL      Post Patient Position (P)  Sitting  -TL Sitting  -BB     Recorded by [TL] Juaquin Haines PTA Student [BB] HITESH OcampoA/L     Pain Assessment    Pain Assessment (P)  No/denies pain  -TL No/denies pain  -BB     Recorded by [TL] Juaquin Haines PTA Student [BB] HITESH OcampoA/L     Vision Assessment/Intervention    Visual Impairment (P)  WFL with corrective lenses  -TL      Recorded by [TL] Juaquin Haines PTA Student      Cognitive Assessment/Intervention    Current Cognitive/Communication Assessment (P)  functional  -TL functional  -BB     Orientation Status (P)  oriented x 4  -TL oriented x 4  -BB     Follows Commands/Answers Questions (P)  100% of the time;able to follow multi-step instructions  -% of the time   -BB     Personal Safety (P)  WNL/WFL  -TL --   discussed home safety, E/C, W/S  -BB     Personal Safety Interventions (P)  fall prevention program maintained;gait belt;muscle strengthening facilitated;nonskid shoes/slippers when out of bed;supervised activity  -TL gait belt;nonskid shoes/slippers when out of bed  -BB     Recorded by [TL] Juaquin Haines PTA Student [BB] LINO Ocampo/L     Bed Mobility, Assessment/Treatment    Bed Mobility, Scoot/Bridge, [place] (P)  independent  -TL      Bed Mob, Supine to Sit, [place] (P)  not tested  -TL independent  -BB     Bed Mob, Sit to Supine, [place] (P)  not tested  -TL independent  -BB     Recorded by [TL] Juaquin Haines PTA Student [BB] LINO Ocampo/L     Transfer Assessment/Treatment    Transfers, Sit-Stand [place] (P)  independent  -TL independent  -BB     Transfers, Stand-Sit [place] (P)  independent  -TL independent  -BB     Recorded by [TL] Juaquin Haines PTA Student [BB] LINO Ocampo/L     Gait Assessment/Treatment    Gait, [place] Level (P)  supervision required  -TL      Gait, Assistive Device (P)  rolling walker  -TL      Gait, Distance (Feet) (P)  192  -TL      Gait, Safety Issues (P)  supplemental O2;step length decreased  -TL      Gait, Comment (P)  No LOB noted with gait  -TL      Recorded by [TL] CRYSTAL Christine      Stairs Assessment/Treatment    Stairs, [place] Level (P)  not tested  -TL      Recorded by [TL] Juaquin Haines PTA Student      Upper Body Bathing Assessment/Training    UB Bathing Assess/Train, Comment  pt already had a bath  -BB     Recorded by  [BB] LINO Ocampo/L     Toileting Assessment/Training    Toileting Assess/Train, Assistive Device  grab bars  -BB     Toileting Assess/Train, Position  sitting;standing  -BB     Toileting Assess/Train, Indepen Level  independent  -BB     Recorded by  [BB] LINO Ocampo/ZAID     Grooming  Assessment/Training    Grooming Assess/Train, Position  sink side;standing  -BB     Grooming Assess/Train, Indepen Level  independent  -BB     Grooming Assess/Train, Comment  washed hands, comb hair, oral care  -BB     Recorded by  [BB] MIYA Ocampo     Therapy Exercises    Bilateral Lower Extremities (P)  AROM:;15 reps;sitting;ankle pumps/circles;glut sets;hip abduction/adduction;hip flexion;LAQ  -TL      Recorded by [TL] Juaquin Haines PTA Student      Positioning and Restraints    Pre-Treatment Position (P)  in bed  -TL in bed  -BB     Post Treatment Position (P)  bed  -TL bed  -BB     In Bed (P)  sitting;side rails up x1;call light within reach;encouraged to call for assist;exit alarm on  -TL sitting;call light within reach;encouraged to call for assist  -BB     Recorded by [TL] Juaquin Haines PTA Student [BB] MIYA Ocampo       User Key  (r) = Recorded By, (t) = Taken By, (c) = Cosigned By    Initials Name Effective Dates    BB MIYA Ocampo 10/17/16 -     TL Juaquin Haines PTA Student 02/15/18 -                 IP PT Goals       03/06/18 1507 03/05/18 1406       Gait Training PT STG    Gait Training Goal PT STG, Date Established  03/05/18  -MN     Gait Training Goal PT STG, Time to Achieve  5 days  -MN     Gait Training Goal PT STG, [place] Level  independent  -MN     Gait Training Goal PT STG, Assist Device  cane, quad  -MN     Gait Training Goal PT STG, Distance to Achieve  200 ft with SpO2 >/= 92% and without LOB with vertical/horizontal head turns  -MN     Gait Training Goal PT STG, Additional Goal  Pt will asc/desc ramp with modified indep  -MN     Gait Training Goal PT STG, Date Goal Reviewed (P)  03/06/18  -TL      Gait Training Goal PT STG, Outcome (P)  goal ongoing  -TL      Physical Therapy PT STG    Physical Therapy PT STG, Date Established  03/05/18  -MN     Physical Therapy PT STG, Time to Achieve  5 days  -MN     Physical Therapy PT STG, Activity  Type  Pt will score >/= 24/28 on Tinetti Balance Assessment  -MN     Physical Therapy PT STG, [place] Level  independent  -MN     Physical Therapy PT STG, Date Goal Reviewed (P)  03/06/18  -TL      Physical Therapy PT STG, Outcome (P)  goal ongoing  -TL        User Key  (r) = Recorded By, (t) = Taken By, (c) = Cosigned By    Initials Name Provider Type    AMADEO Fontana, PT Physical Therapist    MICHAEL Haines, PTA Student PTA Student                  PT Recommendation and Plan  Anticipated Discharge Disposition: home with home health  Planned Therapy Interventions: balance training, bed mobility training, gait training, home exercise program, patient/family education, stair training, strengthening, transfer training, stretching  PT Frequency: other (see comments) (5-14x/week)  Plan of Care Review  Plan Of Care Reviewed With: (P) patient  Outcome Summary/Follow up Plan: (P) Pt met no new goals this tx. Pt ambulated 192ft SBA with RW. Pt was instructed on PLB after O2 sats decreased to 87% on 3L. Pt presents with decreased endurance in B LE. Pt would benefit from HHPT upon D/C.           Outcome Measures       03/06/18 1433 03/06/18 0912 03/05/18 1335    How much help from another person do you currently need...    Turning from your back to your side while in flat bed without using bedrails? (P)  4  -TL  4  -MN    Moving from lying on back to sitting on the side of a flat bed without bedrails? (P)  4  -TL  4  -MN    Moving to and from a bed to a chair (including a wheelchair)? (P)  4  -TL  4  -MN    Standing up from a chair using your arms (e.g., wheelchair, bedside chair)? (P)  4  -TL  4  -MN    Climbing 3-5 steps with a railing? (P)  3  -TL  3  -MN    To walk in hospital room? (P)  4  -TL  3  -MN    AM-PAC 6 Clicks Score (P)  23  -TL  22  -MN    How much help from another is currently needed...    Putting on and taking off regular lower body clothing?  4  -BB     Bathing (including washing, rinsing,  and drying)  4  -BB     Toileting (which includes using toilet bed pan or urinal)  4  -BB     Putting on and taking off regular upper body clothing  4  -BB     Taking care of personal grooming (such as brushing teeth)  4  -BB     Eating meals  4  -BB     Score  24  -BB     Functional Assessment    Outcome Measure Options (P)  AM-PAC 6 Clicks Basic Mobility (PT)  -TL  AM-PAC 6 Clicks Basic Mobility (PT)  -MN      03/05/18 1006          How much help from another is currently needed...    Putting on and taking off regular lower body clothing? 4  -RB      Bathing (including washing, rinsing, and drying) 3  -RB      Toileting (which includes using toilet bed pan or urinal) 3  -RB      Putting on and taking off regular upper body clothing 4  -RB      Taking care of personal grooming (such as brushing teeth) 4  -RB      Eating meals 4  -RB      Score 22  -RB      Functional Assessment    Outcome Measure Options AM-PAC 6 Clicks Daily Activity (OT)  -RB        User Key  (r) = Recorded By, (t) = Taken By, (c) = Cosigned By    Initials Name Provider Type    RB Otto Shoemaker, OT Occupational Therapist    AMADEO Fontana, PT Physical Therapist    BB LINO Ocampo/L Occupational Therapy Assistant    TL Juaquin Haines PTA Student PTA Student           Time Calculation:           PT G-Codes  PT Professional Judgement Used?: Yes  Outcome Measure Options: (P) AM-PAC 6 Clicks Basic Mobility (PT)  Score: 22  Functional Limitation: Mobility: Walking and moving around  Mobility: Walking and Moving Around Current Status (): At least 20 percent but less than 40 percent impaired, limited or restricted  Mobility: Walking and Moving Around Goal Status (): At least 1 percent but less than 20 percent impaired, limited or restricted    Juaquin Haines, CRYSTAL Student  3/6/2018

## 2018-03-06 NOTE — PAYOR COMM NOTE
"Cameron Cabrera (62 y.o. Female)     Date of Birth Social Security Number Address Home Phone MRN    1955  [address] [place] [address] [phone] [id number]    Anabaptist Marital Status          Orthodox        Admission Date Admission Type Admitting Provider Attending Provider Department, Room/Bed    3/4/18 Emergency Nataliya Mittal MD Patel, Harshul Amrutlal, MD [address], [address]    Discharge Date Discharge Disposition Discharge Destination                      Attending Provider: Nataliya Mittal MD     Allergies:  Lisinopril, Penicillins, Wellbutrin [Bupropion], Nsaids, Tramadol    Isolation:  None   Infection:  None   Code Status:  FULL    Ht:  160 cm (63\")   Wt:  121 kg (266 lb 12.1 oz)    Admission Cmt:  None   Principal Problem:  Recurrent pneumonia [J18.9]                 Active Insurance as of 3/4/2018     Primary Coverage     Payor Plan Insurance Group Employer/Plan Group    [employer] [place] [employer] [place]ICA[place]      Payor Plan Address Payor Plan Phone Number Effective From Effective To    [address] [address] [phone] 9/21/2016     [place] [address]       Subscriber Name Subscriber Birth Date Member ID       CAMERON CABRERA [id number]                 Emergency Contacts      (Rel.) Home Phone Work Phone Mobile Phone    Cameron Espino (Mother) [phone]        Bridget Barnes RN [facility]  [phone] phone  [phone] fax  Continued Stay Review       History & Physical      Nataliya Mittal MD at 3/4/2018  3:08 PM                [facility]  INPATIENT HISTORY AND PHYSICAL       Patient Care Team:  Roby Corley MD as PCP - General    Date of Admission March 4, 2018 3:09 PM      Chief complaint   Chief Complaint   Patient presents with   • Shortness of Breath   • Cough       Subjective "     Patient is a 62 y.o. female presents with Complaint of having shortness of breath.  Patient states she's been having coughing spells ongoing for past 2 months.  Patient states she's been coughing up thick yellowish to brownish colored sputum.  Patient denies any fever at this point in time.  No nausea vomiting have been reported.  Patient states she's been having difficult breathing and has decreased excess tolerance.  Patient states she has not been able to sleep laying flat on bed.  Patient denies any chest or palpitation associated with it.  No diaphoresis have been reported.  Patient denies any headache or blurry vision.  Patient states she has been evaluated by pulmonology as an outpatient.    Review of Systems   Review of Systems   Constitutional: Positive for activity change, diaphoresis and fever. Negative for appetite change and chills.   HENT: Negative for congestion, rhinorrhea, sore throat and trouble swallowing.    Eyes: Negative for visual disturbance.   Respiratory: Positive for cough, chest tightness, shortness of breath and wheezing.    Cardiovascular: Negative for chest pain, palpitations and leg swelling.   Gastrointestinal: Negative for abdominal pain, blood in stool, diarrhea, nausea and vomiting.   Endocrine: Negative for cold intolerance and heat intolerance.   Genitourinary: Negative for decreased urine volume and difficulty urinating.   Musculoskeletal: Negative for back pain, gait problem and neck pain.   Skin: Negative for rash.   Neurological: Positive for weakness. Negative for dizziness, syncope, light-headedness, numbness and headaches.   Psychiatric/Behavioral: The patient is not nervous/anxious.        History  Past Medical History:   Diagnosis Date   • Abnormal weight gain    • Asthenia    • Breast screening    • Carpal tunnel syndrome    • Chronic kidney disease     Stage 3   • COPD (chronic obstructive pulmonary disease)    • Coronary arteriosclerosis     2007: PCI RCA 2011:  PCI: LCX      • Coronary atherosclerosis of native coronary artery    • DJD (degenerative joint disease)     involving multiple joints   • Edema of lower extremity    • Encounter for screening for osteoporosis    • Encounter for screening mammogram for malignant neoplasm of breast    • Essential hypertension    • Foot pain    • Generalized anxiety disorder    • Gout    • History of tobacco use    • Hyperlipidemia    • Light tobacco smoker    • Obesity    • Peripheral edema    • Pulmonary hypertension    • PVD (peripheral vascular disease)     LATOYA R .96 L 1.0 aortobifem 2012      • Strain of neck muscle    • Type 2 diabetes mellitus     diet controlled     Past Surgical History:   Procedure Laterality Date   • AXILLARY SURGERY  2007    Right axillary cellulitis and abscess. Right axillary debridement   • BREAST BIOPSY  03/10/2008    Left breast mass. Left breast excisional biopsy   • CARDIAC CATHETERIZATION  10/04/2011    significant circumflex disease responsible for patient's symptoms. Patent right coronary artery stent. Peripheral arterial disease with completely occluded right common iliac and completely occluded left enternal iliac.   • CARDIAC CATHETERIZATION N/A 2017    Procedure: Right Heart Cath/ with vasodilator challenge;  Surgeon: Ignacio Connelly MD;  Location: [facility] CATH INVASIVE LOCATION;  Service:    • CARDIAC CATHETERIZATION N/A 2017    Procedure: Left ventriculography/ LV pressures only;  Surgeon: Ignacio Connelly MD;  Location: [facility] CATH INVASIVE LOCATION;  Service:    • COLONOSCOPY  10/2015    Declined   • CORONARY ARTERY BYPASS GRAFT  2012    Aortobifemoral bypass. Repair of spleen   • CYST REMOVAL  1979    Right, recurrent   • DILATATION AND CURETTAGE  09/10/1976    Incomplete .   • ENDOSCOPY AND COLONOSCOPY  2005    Single small polyp in the rectum. The polyp was removed by snare cautery. Colonoscopy, otherwise normal.   • INCISION AND DRAINAGE ABSCESS   01/09/2007    Abdominal wall abscess. Incision and drainage and debridement of abdominal wall abscess   • INJECTION OF MEDICATION  12/27/2013    Kenalog   • SPLENECTOMY  04/25/2012    Lacerated spleen. Posterior laceration of the capsule with a Grade I injury   • TRANSESOPHAGEAL ECHOCARDIOGRAM (SRINIVASA)  04/12/2016    With color flow-Normal LV function with Ef of 65 to 70%.Severely dilated right ventricle with impaired systolic function.Impingement of LV cavity by the interventricular septum.Grade 1A diastolic dysfunction.Moderate tricuspid regurg   • TRANSESOPHAGEAL ECHOCARDIOGRAM (SRINIVASA)  05/02/2012    Without color flow-Mild right atrial enlargement with mild right ventricular enlargement with normal left atrial and left ventricular size. EF 55%   • TUBAL ABDOMINAL LIGATION  11/06/1987   • VAGINAL DELIVERY  1987     Family History   Problem Relation Age of Onset   • Cancer Other    • Diabetes Other    • Heart disease Other    • Hypertension Other    • Lung disease Other    • Stroke Other    • Hypertension Father      Social History   Substance Use Topics   • Smoking status: Current Every Day Smoker     Packs/day: 0.25     Years: 39.00     Types: Cigarettes   • Smokeless tobacco: Never Used   • Alcohol use No     Prescriptions Prior to Admission   Medication Sig Dispense Refill Last Dose   • albuterol (PROVENTIL) (2.5 MG/3ML) 0.083% nebulizer solution Take 2.5 mg by nebulization 4 (Four) Times a Day As Needed for Wheezing. 125 vial 11 Unknown at Unknown time   • allopurinol (ZYLOPRIM) 100 MG tablet Take 1 tablet by mouth Daily. 30 tablet 11 Unknown at Unknown time   • aspirin 81 MG tablet Take 1 tablet by mouth Daily. 30 tablet 11 Unknown at Unknown time   • atorvastatin (LIPITOR) 80 MG tablet Take 1 tablet by mouth Every Night. 90 tablet 3 Taking   • azelastine (ASTELIN) 0.1 % nasal spray 2 sprays into each nostril 2 (Two) Times a Day. Use in each nostril as directed 30 mL 11 Taking   • bisoprolol (ZEBeta) 5 MG tablet  Take 0.5 tablets by mouth Daily. 30 tablet 6 Taking   • Blood Glucose Monitoring Suppl (ONE TOUCH ULTRA MINI) w/Device kit    Taking   • Calcium-Magnesium-Vitamin D (CALCIUM 500 PO) Take 500 mg by mouth 2 (two) times a day.   Taking   • clopidogrel (PLAVIX) 75 MG tablet Take 1 tablet by mouth Daily. 30 tablet 11 Taking   • furosemide (LASIX) 80 MG tablet Take 1 tablet by mouth Daily. 30 tablet 11 Taking   • glucose blood test strip Use as instructed 50 each 12 Taking   • GuaiFENesin ER (MUCINEX MAXIMUM STRENGTH) 1200 MG tablet sustained-release 12 hour Take 1 tablet BID as needed. 60 each 11 Taking   • hydrocortisone (WESTCORT) 0.2 % cream Apply  topically 2 (Two) Times a Day. 60 g 2 Taking   • hydrocortisone-bacitracin-zinc oxide-nystatin (MAGIC BARRIER) Apply 1 application topically As Needed (Rash). 60 g 0 Taking   • promethazine-dextromethorphan (PROMETHAZINE-DM) 6.25-15 MG/5ML syrup Take 5 mL by mouth At Night As Needed for Cough. (Patient not taking: Reported on 3/4/2018) 120 mL 1 Not Taking at Unknown time   • Respiratory Therapy Supplies (NEBULIZER/TUBING/MOUTHPIECE) kit Tubing 1 each 11 Taking   • senna (SENOKOT) 8.6 MG tablet tablet Take 1 tablet by mouth 2 (two) times a day.   Taking   • sertraline (ZOLOFT) 100 MG tablet Take 0.5 tablets by mouth Daily. 30 tablet 11 Taking   • sildenafil (REVATIO) 20 MG tablet Take 1 tablet by mouth 3 (Three) Times a Day. 90 tablet 6 Taking   • Tiotropium Bromide Monohydrate 2.5 MCG/ACT aerosol solution Inhale 2 puffs Daily. 1 inhaler 11    • triamcinolone (KENALOG) 0.1 % ointment Apply  topically 2 (Two) Times a Day. Max 2 weeks per treatment (Patient taking differently: Apply 1 application topically 2 (Two) Times a Day. Max 2 weeks per treatment) 30 g 2 Taking   • TRUEPLUS LANCETS 33G misc 1 each Daily. 100 each 3 Taking   • VENTOLIN  (90 Base) MCG/ACT inhaler USE 2 PUFFS FOUR TIMES DAILY AND AS NEEDED 18 g 11 Taking   • vitamin D (ERGOCALCIFEROL) 02374 units  capsule capsule Take 1 capsule by mouth Every 14 (Fourteen) Days. 2 capsule 11 Taking     Allergies:  Lisinopril; Penicillins; Wellbutrin [bupropion]; Nsaids; and Tramadol  Prior to Admission medications    Medication Sig Start Date End Date Taking? Authorizing Provider   albuterol (PROVENTIL) (2.5 MG/3ML) 0.083% nebulizer solution Take 2.5 mg by nebulization 4 (Four) Times a Day As Needed for Wheezing. 2/21/18   Jeni Thompson MD   allopurinol (ZYLOPRIM) 100 MG tablet Take 1 tablet by mouth Daily. 2/16/18   Roby Corley MD   aspirin 81 MG tablet Take 1 tablet by mouth Daily. 11/7/17   David Snowden MD PhD   atorvastatin (LIPITOR) 80 MG tablet Take 1 tablet by mouth Every Night. 2/16/18   Roby Corley MD   azelastine (ASTELIN) 0.1 % nasal spray 2 sprays into each nostril 2 (Two) Times a Day. Use in each nostril as directed 8/15/17   Roby Corley MD   bisoprolol (ZEBeta) 5 MG tablet Take 0.5 tablets by mouth Daily. 11/7/17   David Snowden MD PhD   Blood Glucose Monitoring Suppl (ONE TOUCH ULTRA MINI) w/Device kit  2/1/18   Historical Provider, MD   Calcium-Magnesium-Vitamin D (CALCIUM 500 PO) Take 500 mg by mouth 2 (two) times a day.    Historical Provider, MD   clopidogrel (PLAVIX) 75 MG tablet Take 1 tablet by mouth Daily. 2/16/18   Roby Corley MD   furosemide (LASIX) 80 MG tablet Take 1 tablet by mouth Daily. 2/16/18   Roby Corley MD   glucose blood test strip Use as instructed 5/15/17   Roby Corley MD   GuaiFENesin ER (MUCINEX MAXIMUM STRENGTH) 1200 MG tablet sustained-release 12 hour Take 1 tablet BID as needed. 2/16/18   Roby Corley MD   hydrocortisone (WESTCORT) 0.2 % cream Apply  topically 2 (Two) Times a Day. 9/6/17   Princess MAR Kim, APRN   hydrocortisone-bacitracin-zinc oxide-nystatin (MAGIC BARRIER) Apply 1 application topically As Needed (Rash). 11/10/17   Roby Corley MD   promethazine-dextromethorphan (PROMETHAZINE-DM) 6.25-15 MG/5ML syrup Take 5 mL  by mouth At Night As Needed for Cough.  Patient not taking: Reported on 3/4/2018 12/30/17   VERNON Rosales   Respiratory Therapy Supplies (NEBULIZER/TUBING/MOUTHPIECE) kit Tubing 8/15/17   Roby Corley MD   senna (SENOKOT) 8.6 MG tablet tablet Take 1 tablet by mouth 2 (two) times a day.    Historical Provider, MD   sertraline (ZOLOFT) 100 MG tablet Take 0.5 tablets by mouth Daily. 11/10/17   Roby Corley MD   sildenafil (REVATIO) 20 MG tablet Take 1 tablet by mouth 3 (Three) Times a Day. 1/19/18   Roby Corley MD   Tiotropium Bromide Monohydrate 2.5 MCG/ACT aerosol solution Inhale 2 puffs Daily. 2/21/18   Jeni Thompson MD   triamcinolone (KENALOG) 0.1 % ointment Apply  topically 2 (Two) Times a Day. Max 2 weeks per treatment  Patient taking differently: Apply 1 application topically 2 (Two) Times a Day. Max 2 weeks per treatment 10/17/16   Roby Corley MD   TRUEPLUS LANCETS 33G misc 1 each Daily. 5/15/17   Roby Corley MD   VENTOLIN  (90 Base) MCG/ACT inhaler USE 2 PUFFS FOUR TIMES DAILY AND AS NEEDED 10/10/17   Roby Corley MD   vitamin D (ERGOCALCIFEROL) 74544 units capsule capsule Take 1 capsule by mouth Every 14 (Fourteen) Days. 2/16/18   Roby Corley MD   levoFLOXacin (LEVAQUIN) 750 MG tablet Take 1 tablet every 48 hours for 5 doses.  Patient not taking: Reported on 3/4/2018 2/19/18 3/4/18  Roby Corley MD       Objective     Vital Signs    Temp:  [98.3 °F (36.8 °C)] 98.3 °F (36.8 °C)  Heart Rate:  [62-70] 70  Resp:  [18-24] 18  BP: ()/(53-80) 119/60    Physical Exam:      Physical Exam   Constitutional: She is oriented to person, place, and time. She appears well-developed and well-nourished.   HENT:   Head: Normocephalic and atraumatic.   Nose: Nose normal.   Eyes: Conjunctivae and EOM are normal. Pupils are equal, round, and reactive to light.   Neck: Normal range of motion. Neck supple. No JVD present. No tracheal deviation present. No thyromegaly  present.   Cardiovascular: Normal rate, regular rhythm, normal heart sounds and intact distal pulses.    Pulmonary/Chest: She is in respiratory distress. She has no wheezes. She has rales. She exhibits no tenderness.   Abdominal: Soft. Bowel sounds are normal. She exhibits no distension. There is no tenderness. There is no rebound and no guarding.   Musculoskeletal: Normal range of motion. She exhibits no edema.   Lymphadenopathy:     She has no cervical adenopathy.   Neurological: She is alert and oriented to person, place, and time. She has normal reflexes. No cranial nerve deficit.   Skin: Skin is warm and dry.   Intact   Psychiatric: She has a normal mood and affect.   Nursing note and vitals reviewed.      Results Review:       Results from last 7 days  Lab Units 03/04/18  1140 03/04/18  1137   SODIUM mmol/L  --  139   SODIUM, ARTERIAL mmol/L 135.3*  --    POTASSIUM mmol/L  --  3.4*   CHLORIDE mmol/L  --  101   CO2 mmol/L  --  25.0   BUN mg/dL  --  29*   CREATININE mg/dL  --  1.30*   GLUCOSE mg/dL  --  118*   GLUCOSE, ARTERIAL mmol/L 134  --    CALCIUM mg/dL  --  8.4   BILIRUBIN mg/dL  --  1.2   ALK PHOS U/L  --  94   ALT (SGPT) U/L  --  46   AST (SGOT) U/L  --  22               Results from last 7 days  Lab Units 03/04/18  1057   WBC 10*3/mm3 7.17   HEMOGLOBIN g/dL 14.8   HEMATOCRIT % 45.7*   PLATELETS 10*3/mm3 166         Results from last 7 days  Lab Units 03/04/18  1144   INR  1.09     Imaging Results (last 7 days)     Procedure Component Value Units Date/Time    XR Chest 1 View [[id number]] Collected:  03/04/18 1038     Updated:  03/04/18 1115    Narrative:       [facility]    Patient Name: MS. KEELY CABRERA    ORDERING: JUDY WHITMAN     ATTENDING: PAM NUNEZ     REFERRING: JUDY WHITMAN    -----------------------    PROCEDURE: Chest Single View    TECHNIQUE: Single AP view of the chest    COMPARISON: 2/16/2018    HISTORY: shortness of breath    FINDINGS:     Life-support devices:  None.    Lungs/pleura: There is increasing opacity within the right middle  lobe that may represent consolidating infiltrate in the proper  clinical setting. No other area of acute pulmonary abnormality  seen. No effusion or pneumothorax..    Heart, hilar and mediastinal structures: The heart size and  mediastinal contours are within limits of normal. The trachea is  midline.    Skeletal Structures: No acute findings. No free air beneath the  diaphragm.      Impression:       There is increasing opacity within the right middle lobe compared  to the examinations of January 11 and February 16, 2018. While in  the proper clinical setting this may represent consolidating  pneumonia, however, given the fact there is no resolution likely  with treatment recommend CT of the chest with contrast for  further evaluation to exclude the possibility of underlying  neoplasm.    Electronically signed by:  Charli Nicholson MD  3/4/2018 11:14 AM  CST Workstation: [id number]    CT Chest Without Contrast [[id number]] Collected:  03/04/18 1108     Updated:  03/04/18 1203    Narrative:           Patient Name: KEELY CABRERA    ORDERING: JUDY WHITMAN    ATTENDING: PAM NUNEZ     REFERRING: JUDY WHITMAN    -----------------------  EXAM DESCRIPTION: CT CHEST WO CONTRAST    CLINICAL HISTORY:  62-year-old female with shortness of air and  abnormal chest radiograph.      COMPARISON: Several prior chest radiographs, most recent dated  3/4/2018.    DOSE LENGTH PRODUCT: 802.    This exam was performed according to our departmental dose  optimization program, which includes automated exposure control,  adjustment of the mA and/or KV according to patient size and/or  use of iterative reconstruction technique.      TECHNIQUE: Axial images were obtained and multiplanar  reconstructions were made.      FINDINGS:    LUNGS/PLEURA: There is moderate area of consolidation within the  right middle lobe containing air bronchograms. Consideration is  given to  atelectasis versus consolidating pneumonia. No other  area of consolidation identified. There is some minimal  reticulonodularity within the left upper lobe posteriorly which  is nonspecific and may represent pneumonitis. There are a few  scattered noncalcified nodules most notable within the upper  lobes posteriorly these may be postinflammatory given that there  is areas of calcified granulomata present related to prior  granulomatous disease. No effusion or pneumothorax. No dominant  suspicious mass.  TRACHEA AND BRONCHI:  The trachea and bronchi are patent. No  obvious luminal mass centrally on the right to explain the right  middle lobe process. No fluid or debris identified within the  main airways.  MEDIASTINUM, ALINA AND LYMPH NODES: There are calcified lymph  nodes related to prior inflammation/infection. Otherwise there is  small mostly subcentimeter nodes present. No suspicious axillary,  hilar, or mediastinal nodes based on size or morphologic criteria  identified.  HEART AND PERICARDIUM: No pericardial effusion. There is coronary  vascular calcification present.  VASCULAR: There is vascular calcification without thoracic aortic  aneurysm.  UPPER ABDOMEN: There is evidence of a midline upper abdominal  wall hernia containing fat. There is extension of the left lobe  the liver towards the mouth of the defect. Inferior to this  hernia is a large midline abdominal wall hernia which contains  large bowel without obstruction or findings of incarceration.  There are multiple choleliths. No pericholecystic inflammatory  change seen.  OSSEOUS STRUCTURES: Degenerative changes are present within the  spine. No acute finding.      Impression:       Moderate area of consolidation within the right middle lobe  corresponding to the radiographic finding. There is air  bronchograms present. No obvious central bronchial lesion  contributing this finding. Consideration is given to atelectasis  and/or consolidating  pneumonia. Recommend treatment and follow-up  until clear. If there is no change on the follow-up study  pulmonary consultation would be recommended for possible  bronchoscopy.    Minimal reticulonodularity left upper lobe which may represent  pneumonitis.    There is a background of old granulomatous disease. There are  couple small 5 mm less noncalcified nodules that likely are  postinflammatory as well. These can be evaluated on follow-up  exams.    No suspicious mediastinal or hilar lymph nodes based on size or  morphologic criteria.    Cholelithiasis.    Upper midline abdominal hernia containing fat as well as  extension the left lobe the liver towards the mouth of the  defect. No findings of incarceration or obstruction.    Mid to lower midline abdominal wall hernia, larger in size  containing fat and bowel without findings of obstruction or  incarceration.    Electronically signed by:  Charli Nicholson MD  3/4/2018 12:02 PM  CST Workstation: [id number]          Assessment/Plan     Principal Problem:    Recurrent pneumonia  Active Problems:    Pulmonary hypertension severe    Obesity    Hyperlipidemia    Generalized anxiety disorder    Essential hypertension    Coronary atherosclerosis of native coronary artery    Coronary arteriosclerosis    Chronic kidney disease    Type 2 diabetes mellitus with stage 3 chronic kidney disease, without long-term current use of insulin    Cough    PVD (peripheral vascular disease)    COPD (chronic obstructive pulmonary disease)    Hypoxia      -Continue with IV antibiotics.  -We'll continue with incentive spirometry.  -We'll continue with nebulization treatment and IV steroids.  -We'll recommend aggressive PT OT.  -We will resume patient's home medication as prior to coming to hospital.  -DVT and GI prophylaxis in place.  -We'll continue monitoring patient in hospital setting and treat patient as course dictates.    I discussed the patients findings and my recommendations with  patient and nursing staff.         This document has been electronically signed by Nataliya Mittal MD on March 4, 2018 3:09 PM        Total Time Spent: 43 minutes.    EMR Dragon/Transcription disclaimer:   Dictated utilizing Dragon dictation.            Electronically signed by Nataliya Mittal MD at 3/4/2018  5:08 PM        Hospital Medications (active)       Dose Frequency Start End    acetaminophen (TYLENOL) tablet 650 mg 650 mg Every 4 Hours PRN 3/4/2018     Sig - Route: Take 2 tablets by mouth Every 4 (Four) Hours As Needed for Mild Pain . - Oral    allopurinol (ZYLOPRIM) tablet 100 mg 100 mg Daily 3/5/2018     Sig - Route: Take 1 tablet by mouth Daily. - Oral    aspirin EC tablet 81 mg 81 mg Daily 3/5/2018     Sig - Route: Take 1 tablet by mouth Daily. - Oral    atorvastatin (LIPITOR) tablet 80 mg 80 mg Nightly 3/4/2018     Sig - Route: Take 2 tablets by mouth Every Night. - Oral    AZITHROMYCIN 500 MG/250 ML 0.9% NS IVPB (vial-mate) 500 mg Every 24 Hours 3/4/2018 3/9/2018    Sig - Route: Infuse 500 mg into a venous catheter Daily. - Intravenous    bisacodyl (DULCOLAX) suppository 10 mg 10 mg Daily PRN 3/4/2018     Sig - Route: Insert 1 suppository into the rectum Daily As Needed for Constipation. - Rectal    bisoprolol (ZEBeta) half tablet 2.5 mg 2.5 mg Daily 3/5/2018     Sig - Route: Take 1 half tablet by mouth Daily. - Oral    calcium carbonate-vitamin d 600-400 MG-UNIT per tablet 1 tablet 1 tablet Daily 3/5/2018     Sig - Route: Take 1 tablet by mouth Daily. - Oral    cefTRIAXone (ROCEPHIN) 1 g/100 mL 0.9% NS (MBP) 1 g Every 24 Hours 3/5/2018 3/15/2018    Sig - Route: Infuse 100 mL into a venous catheter Daily. - Intravenous    clopidogrel (PLAVIX) tablet 75 mg 75 mg Daily 3/5/2018     Sig - Route: Take 1 tablet by mouth Daily. - Oral    dextromethorphan polistirex ER (DELSYM) 30 MG/5ML oral suspension 60 mg 60 mg Every 12 Hours Scheduled 3/5/2018     Sig - Route: Take 60 mg by mouth Every  12 (Twelve) Hours. - Oral    docusate sodium (COLACE) capsule 200 mg 200 mg 2 Times Daily 3/4/2018     Sig - Route: Take 2 capsules by mouth 2 (Two) Times a Day. - Oral    furosemide (LASIX) injection 40 mg 40 mg 3 Times Daily 3/4/2018     Sig - Route: Infuse 4 mL into a venous catheter 3 (Three) Times a Day. - Intravenous    guaiFENesin (MUCINEX) 12 hr tablet 1,200 mg 1,200 mg Every 12 Hours Scheduled 3/4/2018     Sig - Route: Take 2 tablets by mouth Every 12 (Twelve) Hours. - Oral    heparin (porcine) 5000 UNIT/ML injection 5,000 Units 5,000 Units Every 8 Hours Scheduled 3/4/2018     Sig - Route: Inject 1 mL under the skin Every 8 (Eight) Hours. - Subcutaneous    hydrALAZINE (APRESOLINE) injection 10 mg 10 mg Every 6 Hours PRN 3/4/2018     Sig - Route: Infuse 0.5 mL into a venous catheter Every 6 (Six) Hours As Needed for High Blood Pressure (SBP > 160). - Intravenous    ipratropium-albuterol (DUO-NEB) nebulizer solution 3 mL 3 mL Every 4 Hours - RT 3/4/2018     Sig - Route: Take 3 mL by nebulization Every 4 (Four) Hours. - Nebulization    magnesium hydroxide (MILK OF MAGNESIA) suspension 2400 mg/10mL 10 mL 10 mL Daily PRN 3/4/2018     Sig - Route: Take 10 mL by mouth Daily As Needed for Constipation. - Oral    methylPREDNISolone sodium succinate (SOLU-Medrol) injection 60 mg 60 mg Every 6 Hours 3/4/2018     Sig - Route: Infuse 0.96 mL into a venous catheter Every 6 (Six) Hours. - Intravenous    nicotine (NICODERM CQ) 21 MG/24HR patch 1 patch 1 patch Every 24 Hours 3/4/2018     Sig - Route: Place 1 patch on the skin Daily. - Transdermal    pantoprazole (PROTONIX) EC tablet 40 mg 40 mg Every Early Morning 3/5/2018     Sig - Route: Take 1 tablet by mouth Every Morning. - Oral    potassium chloride (KLOR-CON) packet 40 mEq 40 mEq As Needed 3/5/2018     Sig - Route: Take 40 mEq by mouth As Needed (potassium replacement, see admin instructions). - Oral    potassium chloride (MICRO-K) CR capsule 40 mEq 40 mEq As Needed  "3/5/2018     Sig - Route: Take 4 capsules by mouth As Needed (potassium replacement.  see admin instructions). - Oral    sertraline (ZOLOFT) tablet 50 mg 50 mg Daily 3/5/2018     Sig - Route: Take 1 tablet by mouth Daily. - Oral    sildenafil (REVATIO) tablet 20 mg 20 mg 3 Times Daily 3/4/2018     Sig - Route: Take 1 tablet by mouth 3 (Three) Times a Day. - Oral    sodium chloride 0.9 % flush 1-10 mL 1-10 mL As Needed 3/4/2018     Sig - Route: Infuse 1-10 mL into a venous catheter As Needed for Line Care. - Intravenous    sodium chloride 0.9 % flush 10 mL 10 mL As Needed 3/4/2018     Sig - Route: Infuse 10 mL into a venous catheter As Needed for Line Care. - Intravenous    Cosign for Ordering: Accepted by Grabiel Najera MD on 3/4/2018  2:30 PM    Linked Group 1:  \"And\" Linked Group Details                 Physician Progress Notes (last 24 hours) (Notes from 3/5/2018 11:50 AM through 3/6/2018 11:50 AM)      VERNON Francisco at 3/5/2018 12:44 PM  Version 1 of 1    Attestation signed by Miguel Mullen MD at 3/5/2018  2:56 PM        I have reviewed the documentation above and agree.                                     [facility] Medicine Services  INPATIENT PROGRESS NOTE    Length of Stay: 1  Date of Admission: 3/4/2018  Primary Care Physician: Roby Corley MD    Subjective   Chief Complaint: No complaints    HPI:  This is a 62-year-old lady that presented to [facility] with history significant for COPD, pulmonary hypertension, HTN, morbid obesity, DM, ASCAD, PAD with stents, and CKD stage III who presents with with complaints of shortness of breath.  Patient states she has stopped smoking and is currently having her house cleaned to try to remove the smell of smoke due to this exacerbation.  The patient states she has been on 3 full separate courses of antibiotics for pneumonia, but continues to show increasing opacity within the right middle lobe.  Her PCP is Dr." Barney.  The patient was recently referred to pulmonologist, Dr. Mariana Thompson.      Review of Systems   Constitutional: Positive for fatigue.   Respiratory: Positive for cough and shortness of breath.       All pertinent negatives and positives are as above. All other systems have been reviewed and are negative unless otherwise stated.     Objective    Temp:  [96.7 °F (35.9 °C)-98.5 °F (36.9 °C)] 96.7 °F (35.9 °C)  Heart Rate:  [62-88] 78  Resp:  [16-20] 18  BP: (109-125)/(56-67) 125/59    Physical Exam   Constitutional: She is oriented to person, place, and time. She appears well-developed and well-nourished.   HENT:   Head: Normocephalic and atraumatic.   Eyes: EOM are normal. Pupils are equal, round, and reactive to light.   Neck: Normal range of motion. Neck supple.   Cardiovascular: Normal rate and regular rhythm.    Pulmonary/Chest: Effort normal and breath sounds normal.   Abdominal: Soft. Bowel sounds are normal.   Musculoskeletal: Normal range of motion.   Neurological: She is alert and oriented to person, place, and time.   Skin: Skin is warm and dry.   Psychiatric: She has a normal mood and affect.     Results Review:  I have reviewed the labs, radiology results, and diagnostic studies.    Laboratory Data:     Results from last 7 days  Lab Units 03/05/18  0722 03/04/18  1140 03/04/18  1137   SODIUM mmol/L 140  --  139   SODIUM, ARTERIAL mmol/L  --  135.3*  --    POTASSIUM mmol/L 3.3*  --  3.4*   CHLORIDE mmol/L 101  --  101   CO2 mmol/L 23.0  --  25.0   BUN mg/dL 33*  --  29*   CREATININE mg/dL 1.33*  --  1.30*   GLUCOSE mg/dL 268*  --  118*   GLUCOSE, ARTERIAL mmol/L  --  134  --    CALCIUM mg/dL 8.7  --  8.4   BILIRUBIN mg/dL 0.5  --  1.2   ALK PHOS U/L 83  --  94   ALT (SGPT) U/L 44  --  46   AST (SGOT) U/L 23  --  22   ANION GAP mmol/L 16.0*  --  13.0     Estimated Creatinine Clearance: 55.3 mL/min (by C-G formula based on Cr of 1.33).    Results from last 7 days  Lab Units 03/05/18  0706   MAGNESIUM  mg/dL 1.8           Results from last 7 days  Lab Units 03/05/18  0722 03/04/18  1057   WBC 10*3/mm3 5.93 7.17   HEMOGLOBIN g/dL 13.3 14.8   HEMATOCRIT % 41.4 45.7*   PLATELETS 10*3/mm3 158 166       Results from last 7 days  Lab Units 03/04/18  1144   INR  1.09       Culture Data:   No results found for: BLOODCX  No results found for: URINECX  No results found for: RESPCX  No results found for: WOUNDCX  No results found for: STOOLCX  No components found for: BODYFLD    Radiology Data:   Imaging Results (last 24 hours)     ** No results found for the last 24 hours. **          I have reviewed the patient current medications.     Assessment/Plan       Plan:    1.  Pneumonia, right middle lobe/ AECOPD: Continue Rocephin and Azithromycin.  Legionella and strep pneumo negative. Respiratory culture pending.  Spoke with Dr. Thompson in pulmonology.  We will try to get the patient to her baseline and she has an appointment with Dr. Thompson Thursday. Continue IV steroids and nebs. Incentive spirometer and OPEP.  2.  Pulmonary hypertension/ HFpEF: Continue Revatio and Bisoprolol.  Lasix IV. BNP elevated 5864.  Fluid restrictions initiated (2000cc/daily).    3.  ASCAD:  Continue Bisoprolol, aspirin, Plavix and Lipitor.   4.  Chronic kidney disease, stage III:  Creatinine is at baseline.  The patient follows with Dr. Cavanaugh.    5.  Deconditioning:  PT/OT.        Discharge Planning: I expect patient to be discharged to home in 2-3 days.      This document has been electronically signed by VERNON Francisco on March 5, 2018 12:44 PM           Electronically signed by Miguel Mullen MD at 3/5/2018  2:56 PM        Medical Student Notes (last 24 hours) (Notes from 3/5/2018 11:50 AM through 3/6/2018 11:50 AM)     No notes of this type exist for this encounter.        Consult Notes (last 24 hours) (Notes from 3/5/2018 11:50 AM through 3/6/2018 11:50 AM)     No notes of this type exist for this encounter.

## 2018-03-06 NOTE — PLAN OF CARE
Problem: Cardiac: Heart Failure (Adult)  Intervention: Monitor/Manage Nutrition Support   03/06/18 5959   Adjust Diet to Patient Tolerance   Nutrition Interventions food preferences provided;referred to dietitian

## 2018-03-06 NOTE — PLAN OF CARE
Problem: Patient Care Overview (Adult)  Goal: Plan of Care Review  Outcome: Ongoing (interventions implemented as appropriate)   03/06/18 1328   Coping/Psychosocial Response Interventions   Plan Of Care Reviewed With patient   Patient Care Overview   Progress improving   Outcome Evaluation   Outcome Summary/Follow up Plan Pt Indep with bed mobility. Pt is also Indep with bed-toilet t/f and grooming task at the sink. Pt met two new goals this date. Pt's 02 sats remained 90% and above during activity.       Problem: Inpatient Occupational Therapy  Goal: Dynamic Standing Balance Goal LTG-OT  Outcome: Ongoing (interventions implemented as appropriate)   03/05/18 1312 03/06/18 1328   Dynamic Standing Balance OT LTG   Dynamic Standing Balance OT LTG, Date Established 03/05/18 --    Dynamic Standing Balance OT LTG, Time to Achieve by discharge --    Dynamic Standing Balance OT LTG, [place] Level independent;conditional independence  (With 02 SATS 92% or above.) --    Dynamic Standing Balance OT LTG, Assist Device assistive Device  (R/W if needed.) --    Dynamic Standing Balance OT LTG, Date Goal Reviewed --  03/06/18   Dynamic Standing Balance OT LTG, Outcome --  goal met     Goal: Patient Education Goal LTG- OT  Outcome: Ongoing (interventions implemented as appropriate)   03/05/18 1312 03/06/18 1328   Patient Education OT LTG   Patient Education OT LTG, Date Established 03/05/18 --    Patient Education OT LTG, Time to Achieve by discharge --    Patient Education OT LTG, Education Type home safety;energy conservation;work simplification;adaptive breathing --    Patient Education OT LTG, Education Understanding verbalizes understanding;demonstrates adequately --    Patient Education OT LTG, Date Goal Reviewed --  03/06/18   Patient Education OT LTG Outcome --  goal met     Goal: ADL Goal LTG- OT  Outcome: Ongoing (interventions implemented as appropriate)   03/05/18 1312 03/06/18 1328   ADL OT LTG   ADL OT LTG, Date  Established 03/05/18 --    ADL OT LTG, Time to Achieve by discharge --    ADL OT LTG, Activity Type ADL skills  (Sponge bath and dress or walk-in shower.) --    ADL OT LTG, [place] Level independent with device;assistive device  (R/W and shower chair if needed & 02 SATS 92% or above.) --    ADL OT LTG, Date Goal Reviewed --  03/06/18   ADL OT LTG, Outcome --  goal ongoing

## 2018-03-06 NOTE — PROGRESS NOTES
[facility] Medicine Services  INPATIENT PROGRESS NOTE    Length of Stay: 2  Date of Admission: 3/4/2018  Primary Care Physician: Roby Corley MD    Subjective   Chief Complaint: No complaints    HPI:      3/6/2018:  The patient states she feels much better today, but is currently wearing oxygen at 3 L with oxygen saturations of 91%.  A walk test was done with the patient on room air and her oxygen saturation dropped to 88%.    This is a 62-year-old lady that presented to [facility] with history significant for COPD, pulmonary hypertension, HTN, morbid obesity, DM, ASCAD, PAD with stents, and CKD stage III who presents with with complaints of shortness of breath.  Patient states she has stopped smoking and is currently having her house cleaned to try to remove the smell of smoke due to this exacerbation.  The patient states she has been on 3 full separate courses of antibiotics for pneumonia, but continues to show increasing opacity within the right middle lobe.  Her PCP is Dr. Corley.  The patient was recently referred to pulmonologist, Dr. Mariana Thompson.      Review of Systems   Constitutional: Positive for fatigue.   Respiratory: Positive for cough and shortness of breath.       All pertinent negatives and positives are as above. All other systems have been reviewed and are negative unless otherwise stated.     Objective    Temp:  [97 °F (36.1 °C)-98 °F (36.7 °C)] 97 °F (36.1 °C)  Heart Rate:  [72-87] 82  Resp:  [18-20] 18  BP: (104-149)/(54-86) 147/86    Physical Exam   Constitutional: She is oriented to person, place, and time. She appears well-developed and well-nourished.   HENT:   Head: Normocephalic and atraumatic.   Eyes: EOM are normal. Pupils are equal, round, and reactive to light.   Neck: Normal range of motion. Neck supple.   Cardiovascular: Normal rate and regular rhythm.    Pulmonary/Chest: Effort normal and breath sounds normal.   Abdominal: Soft.  Bowel sounds are normal.   Musculoskeletal: Normal range of motion.   Neurological: She is alert and oriented to person, place, and time.   Skin: Skin is warm and dry.   Psychiatric: She has a normal mood and affect.     Results Review:  I have reviewed the labs, radiology results, and diagnostic studies.    Laboratory Data:     Results from last 7 days  Lab Units 03/06/18 0556 03/05/18 2022 03/05/18  0722 03/04/18  1140 03/04/18  1137   SODIUM mmol/L 143  --  140  --  139   SODIUM, ARTERIAL mmol/L  --   --   --  135.3*  --    POTASSIUM mmol/L 3.8 3.9 3.3*  --  3.4*   CHLORIDE mmol/L 103  --  101  --  101   CO2 mmol/L 28.0  --  23.0  --  25.0   BUN mg/dL 36*  --  33*  --  29*   CREATININE mg/dL 1.41*  --  1.33*  --  1.30*   GLUCOSE mg/dL 260*  --  268*  --  118*   GLUCOSE, ARTERIAL mmol/L  --   --   --  134  --    CALCIUM mg/dL 8.6  --  8.7  --  8.4   BILIRUBIN mg/dL 0.3  --  0.5  --  1.2   ALK PHOS U/L 69  --  83  --  94   ALT (SGPT) U/L 39  --  44  --  46   AST (SGOT) U/L 28  --  23  --  22   ANION GAP mmol/L 12.0  --  16.0*  --  13.0     Estimated Creatinine Clearance: 52.1 mL/min (by C-G formula based on Cr of 1.41).    Results from last 7 days  Lab Units 03/06/18 0556 03/05/18  0722   MAGNESIUM mg/dL 1.9 1.8           Results from last 7 days  Lab Units 03/06/18 0556 03/05/18  0722 03/04/18  1057   WBC 10*3/mm3 11.44* 5.93 7.17   HEMOGLOBIN g/dL 13.1 13.3 14.8   HEMATOCRIT % 41.3 41.4 45.7*   PLATELETS 10*3/mm3 176 158 166       Results from last 7 days  Lab Units 03/04/18  1144   INR  1.09       Culture Data:   No results found for: BLOODCX  No results found for: URINECX  Respiratory Culture   Date Value Ref Range Status   03/06/2018 Rejected  Final     No results found for: WOUNDCX  No results found for: STOOLCX  No components found for: BODYFLD    Radiology Data:   Imaging Results (last 24 hours)     ** No results found for the last 24 hours. **          I have reviewed the patient current medications.      Assessment/Plan       Plan:    1.  Pneumonia, right middle lobe/ AECOPD: Continue Rocephin and Azithromycin.  Legionella and strep pneumo negative. Respiratory culture pending.  Spoke with Dr. Thompson in pulmonology.  We will try to get the patient to her baseline and she has an appointment with Dr. Thompson Thursday. Continue IV steroids and nebs. Incentive spirometer and OPEP.  2.  Pulmonary hypertension/ HFpEF: Continue Revatio and Bisoprolol.  Lasix IV. BNP elevated 5864.  Fluid restrictions initiated (2000cc/daily).    3.  ASCAD:  Continue Bisoprolol, aspirin, Plavix and Lipitor.   4.  Chronic kidney disease, stage III:  Creatinine is at baseline.  The patient follows with Dr. Cavanaugh.    5.  Deconditioning:  PT/OT.        Discharge Planning: I expect patient to be discharged to home in 2-3 days.      This document has been electronically signed by VERNON Francisco on March 6, 2018 3:22 PM

## 2018-03-07 NOTE — PLAN OF CARE
Problem: Patient Care Overview (Adult)  Goal: Plan of Care Review  Outcome: Ongoing (interventions implemented as appropriate)   03/07/18 1049   Coping/Psychosocial Response Interventions   Plan Of Care Reviewed With patient   Outcome Evaluation   Outcome Summary/Follow up Plan Pt met no new goals this tx. Pt presented with decreased O2 sats sitting EOB, pt levels decreased to 84% on 3L sitting EOB. Gait was deferred per nsg. Pt would benefit from HHPT and  pulmonary rehab upon D/C.        Problem: Inpatient Physical Therapy  Goal: Gait Training Goal STG- PT  Outcome: Ongoing (interventions implemented as appropriate)   03/05/18 1406 03/07/18 1049   Gait Training PT STG   Gait Training Goal PT STG, Date Established 03/05/18 --    Gait Training Goal PT STG, Time to Achieve 5 days --    Gait Training Goal PT STG, [place] Level independent --    Gait Training Goal PT STG, Assist Device cane, quad --    Gait Training Goal PT STG, Distance to Achieve 200 ft with SpO2 >/= 92% and without LOB with vertical/horizontal head turns --    Gait Training Goal PT STG, Additional Goal Pt will asc/desc ramp with modified indep --    Gait Training Goal PT STG, Date Goal Reviewed --  03/07/18   Gait Training Goal PT STG, Outcome --  goal ongoing     Goal: Physical Therapy Goal STG- PT  Outcome: Ongoing (interventions implemented as appropriate)   03/05/18 1406 03/07/18 1049   Physical Therapy PT STG   Physical Therapy PT STG, Date Established 03/05/18 --    Physical Therapy PT STG, Time to Achieve 5 days --    Physical Therapy PT STG, Activity Type Pt will score >/= 24/28 on Tinetti Balance Assessment --    Physical Therapy PT STG, [place] Level independent --    Physical Therapy PT STG, Date Goal Reviewed --  03/07/18   Physical Therapy PT STG, Outcome --  goal ongoing

## 2018-03-07 NOTE — PLAN OF CARE
Problem: Patient Care Overview (Adult)  Goal: Plan of Care Review  Outcome: Ongoing (interventions implemented as appropriate)   03/07/18 0637   Coping/Psychosocial Response Interventions   Plan Of Care Reviewed With patient   Patient Care Overview   Progress improving   Outcome Evaluation   Outcome Summary/Follow up Plan patient rested well in the night. vital signs maintained stable. will continue to monitor.     Goal: Adult Individualization and Mutuality  Outcome: Ongoing (interventions implemented as appropriate)    Goal: Discharge Needs Assessment  Outcome: Ongoing (interventions implemented as appropriate)      Problem: Fall Risk (Adult)  Goal: Absence of Falls  Outcome: Ongoing (interventions implemented as appropriate)

## 2018-03-07 NOTE — PAYOR COMM NOTE
"Cameron Bowie (62 y.o. Female)     Date of Birth Social Security Number Address Home Phone MRN    1955  [address] [place] [address] [phone] [id number]    Jain Marital Status          Amish        Admission Date Admission Type Admitting Provider Attending Provider Department, Room/Bed    3/4/18 Emergency Nataliya Mittal MD Patel, Harshul Amrutlal, MD [address], 427/1    Discharge Date Discharge Disposition Discharge Destination         [place] [facility]             Attending Provider: Nataliya Mittal MD     Allergies:  Lisinopril, Penicillins, Wellbutrin [Bupropion], Nsaids, Tramadol    Isolation:  None   Infection:  None   Code Status:  FULL    Ht:  160 cm (63\")   Wt:  121 kg (267 lb 4.8 oz)    Admission Cmt:  None   Principal Problem:  Recurrent pneumonia [J18.9]                 Active Insurance as of 3/4/2018     Primary Coverage     Payor Plan Insurance Group Employer/Plan Group    [employer] [place] [employer]ICAID      Payor Plan Address Payor Plan Phone Number Effective From Effective To    [address] [address] [phone] 9/21/2016     [place] [address]       Subscriber Name Subscriber Birth Date Member ID       DEBORAHCAMERON [id number]                 Emergency Contacts      (Rel.) Home Phone Work Phone Mobile Phone    Cameron Espino (Mother) [phone]            Insurance Information                [facility] MEDICAID Phone: [phone]    Subscriber: Cameron Bowie Subscriber#: [id number]    Group#:  Precert#:              History & Physical      Nataliya Mittal MD at 3/4/2018  3:08 PM                [facility]  INPATIENT HISTORY AND PHYSICAL       Patient Care Team:  Roby Corley MD as PCP - General    Date of Admission March 4, 2018 3:09 PM      Chief complaint "   Chief Complaint   Patient presents with   • Shortness of Breath   • Cough       Subjective     Patient is a 62 y.o. female presents with Complaint of having shortness of breath.  Patient states she's been having coughing spells ongoing for past 2 months.  Patient states she's been coughing up thick yellowish to brownish colored sputum.  Patient denies any fever at this point in time.  No nausea vomiting have been reported.  Patient states she's been having difficult breathing and has decreased excess tolerance.  Patient states she has not been able to sleep laying flat on bed.  Patient denies any chest or palpitation associated with it.  No diaphoresis have been reported.  Patient denies any headache or blurry vision.  Patient states she has been evaluated by pulmonology as an outpatient.    Review of Systems   Review of Systems   Constitutional: Positive for activity change, diaphoresis and fever. Negative for appetite change and chills.   HENT: Negative for congestion, rhinorrhea, sore throat and trouble swallowing.    Eyes: Negative for visual disturbance.   Respiratory: Positive for cough, chest tightness, shortness of breath and wheezing.    Cardiovascular: Negative for chest pain, palpitations and leg swelling.   Gastrointestinal: Negative for abdominal pain, blood in stool, diarrhea, nausea and vomiting.   Endocrine: Negative for cold intolerance and heat intolerance.   Genitourinary: Negative for decreased urine volume and difficulty urinating.   Musculoskeletal: Negative for back pain, gait problem and neck pain.   Skin: Negative for rash.   Neurological: Positive for weakness. Negative for dizziness, syncope, light-headedness, numbness and headaches.   Psychiatric/Behavioral: The patient is not nervous/anxious.        History  Past Medical History:   Diagnosis Date   • Abnormal weight gain    • Asthenia    • Breast screening    • Carpal tunnel syndrome    • Chronic kidney disease     Stage 3   • COPD  (chronic obstructive pulmonary disease)    • Coronary arteriosclerosis     2007: PCI RCA 2011: PCI: LCX      • Coronary atherosclerosis of native coronary artery    • DJD (degenerative joint disease)     involving multiple joints   • Edema of lower extremity    • Encounter for screening for osteoporosis    • Encounter for screening mammogram for malignant neoplasm of breast    • Essential hypertension    • Foot pain    • Generalized anxiety disorder    • Gout    • History of tobacco use    • Hyperlipidemia    • Light tobacco smoker    • Obesity    • Peripheral edema    • Pulmonary hypertension    • PVD (peripheral vascular disease)     LATOYA R .96 L 1.0 aortobifem 2012      • Strain of neck muscle    • Type 2 diabetes mellitus     diet controlled     Past Surgical History:   Procedure Laterality Date   • AXILLARY SURGERY  2007    Right axillary cellulitis and abscess. Right axillary debridement   • BREAST BIOPSY  03/10/2008    Left breast mass. Left breast excisional biopsy   • CARDIAC CATHETERIZATION  10/04/2011    significant circumflex disease responsible for patient's symptoms. Patent right coronary artery stent. Peripheral arterial disease with completely occluded right common iliac and completely occluded left enternal iliac.   • CARDIAC CATHETERIZATION N/A 2017    Procedure: Right Heart Cath/ with vasodilator challenge;  Surgeon: Ignacio Connelly MD;  Location: [facility] CATH INVASIVE LOCATION;  Service:    • CARDIAC CATHETERIZATION N/A 2017    Procedure: Left ventriculography/ LV pressures only;  Surgeon: Ignacio Connelly MD;  Location: [facility] CATH INVASIVE LOCATION;  Service:    • COLONOSCOPY  10/2015    Declined   • CORONARY ARTERY BYPASS GRAFT  2012    Aortobifemoral bypass. Repair of spleen   • CYST REMOVAL  1979    Right, recurrent   • DILATATION AND CURETTAGE  09/10/1976    Incomplete .   • ENDOSCOPY AND COLONOSCOPY  2005    Single small polyp in the rectum. The polyp was  removed by snare cautery. Colonoscopy, otherwise normal.   • INCISION AND DRAINAGE ABSCESS  01/09/2007    Abdominal wall abscess. Incision and drainage and debridement of abdominal wall abscess   • INJECTION OF MEDICATION  12/27/2013    Kenalog   • SPLENECTOMY  04/25/2012    Lacerated spleen. Posterior laceration of the capsule with a Grade I injury   • TRANSESOPHAGEAL ECHOCARDIOGRAM (SRINIVASA)  04/12/2016    With color flow-Normal LV function with Ef of 65 to 70%.Severely dilated right ventricle with impaired systolic function.Impingement of LV cavity by the interventricular septum.Grade 1A diastolic dysfunction.Moderate tricuspid regurg   • TRANSESOPHAGEAL ECHOCARDIOGRAM (SRINIVASA)  05/02/2012    Without color flow-Mild right atrial enlargement with mild right ventricular enlargement with normal left atrial and left ventricular size. EF 55%   • TUBAL ABDOMINAL LIGATION  11/06/1987   • VAGINAL DELIVERY  1987     Family History   Problem Relation Age of Onset   • Cancer Other    • Diabetes Other    • Heart disease Other    • Hypertension Other    • Lung disease Other    • Stroke Other    • Hypertension Father      Social History   Substance Use Topics   • Smoking status: Current Every Day Smoker     Packs/day: 0.25     Years: 39.00     Types: Cigarettes   • Smokeless tobacco: Never Used   • Alcohol use No     Prescriptions Prior to Admission   Medication Sig Dispense Refill Last Dose   • albuterol (PROVENTIL) (2.5 MG/3ML) 0.083% nebulizer solution Take 2.5 mg by nebulization 4 (Four) Times a Day As Needed for Wheezing. 125 vial 11 Unknown at Unknown time   • allopurinol (ZYLOPRIM) 100 MG tablet Take 1 tablet by mouth Daily. 30 tablet 11 Unknown at Unknown time   • aspirin 81 MG tablet Take 1 tablet by mouth Daily. 30 tablet 11 Unknown at Unknown time   • atorvastatin (LIPITOR) 80 MG tablet Take 1 tablet by mouth Every Night. 90 tablet 3 Taking   • azelastine (ASTELIN) 0.1 % nasal spray 2 sprays into each nostril 2 (Two) Times  a Day. Use in each nostril as directed 30 mL 11 Taking   • bisoprolol (ZEBeta) 5 MG tablet Take 0.5 tablets by mouth Daily. 30 tablet 6 Taking   • Blood Glucose Monitoring Suppl (ONE TOUCH ULTRA MINI) w/Device kit    Taking   • Calcium-Magnesium-Vitamin D (CALCIUM 500 PO) Take 500 mg by mouth 2 (two) times a day.   Taking   • clopidogrel (PLAVIX) 75 MG tablet Take 1 tablet by mouth Daily. 30 tablet 11 Taking   • furosemide (LASIX) 80 MG tablet Take 1 tablet by mouth Daily. 30 tablet 11 Taking   • glucose blood test strip Use as instructed 50 each 12 Taking   • GuaiFENesin ER (MUCINEX MAXIMUM STRENGTH) 1200 MG tablet sustained-release 12 hour Take 1 tablet BID as needed. 60 each 11 Taking   • hydrocortisone (WESTCORT) 0.2 % cream Apply  topically 2 (Two) Times a Day. 60 g 2 Taking   • hydrocortisone-bacitracin-zinc oxide-nystatin (MAGIC BARRIER) Apply 1 application topically As Needed (Rash). 60 g 0 Taking   • promethazine-dextromethorphan (PROMETHAZINE-DM) 6.25-15 MG/5ML syrup Take 5 mL by mouth At Night As Needed for Cough. (Patient not taking: Reported on 3/4/2018) 120 mL 1 Not Taking at Unknown time   • Respiratory Therapy Supplies (NEBULIZER/TUBING/MOUTHPIECE) kit Tubing 1 each 11 Taking   • senna (SENOKOT) 8.6 MG tablet tablet Take 1 tablet by mouth 2 (two) times a day.   Taking   • sertraline (ZOLOFT) 100 MG tablet Take 0.5 tablets by mouth Daily. 30 tablet 11 Taking   • sildenafil (REVATIO) 20 MG tablet Take 1 tablet by mouth 3 (Three) Times a Day. 90 tablet 6 Taking   • Tiotropium Bromide Monohydrate 2.5 MCG/ACT aerosol solution Inhale 2 puffs Daily. 1 inhaler 11    • triamcinolone (KENALOG) 0.1 % ointment Apply  topically 2 (Two) Times a Day. Max 2 weeks per treatment (Patient taking differently: Apply 1 application topically 2 (Two) Times a Day. Max 2 weeks per treatment) 30 g 2 Taking   • TRUEPLUS LANCETS 33G misc 1 each Daily. 100 each 3 Taking   • VENTOLIN  (90 Base) MCG/ACT inhaler USE 2 PUFFS  FOUR TIMES DAILY AND AS NEEDED 18 g 11 Taking   • vitamin D (ERGOCALCIFEROL) 80951 units capsule capsule Take 1 capsule by mouth Every 14 (Fourteen) Days. 2 capsule 11 Taking     Allergies:  Lisinopril; Penicillins; Wellbutrin [bupropion]; Nsaids; and Tramadol  Prior to Admission medications    Medication Sig Start Date End Date Taking? Authorizing Provider   albuterol (PROVENTIL) (2.5 MG/3ML) 0.083% nebulizer solution Take 2.5 mg by nebulization 4 (Four) Times a Day As Needed for Wheezing. 2/21/18   Jeni Thompson MD   allopurinol (ZYLOPRIM) 100 MG tablet Take 1 tablet by mouth Daily. 2/16/18   Roby Corley MD   aspirin 81 MG tablet Take 1 tablet by mouth Daily. 11/7/17   David Snowden MD PhD   atorvastatin (LIPITOR) 80 MG tablet Take 1 tablet by mouth Every Night. 2/16/18   Roby Corley MD   azelastine (ASTELIN) 0.1 % nasal spray 2 sprays into each nostril 2 (Two) Times a Day. Use in each nostril as directed 8/15/17   Roby Corley MD   bisoprolol (ZEBeta) 5 MG tablet Take 0.5 tablets by mouth Daily. 11/7/17   David Snowden MD PhD   Blood Glucose Monitoring Suppl (ONE TOUCH ULTRA MINI) w/Device kit  2/1/18   Historical Provider, MD   Calcium-Magnesium-Vitamin D (CALCIUM 500 PO) Take 500 mg by mouth 2 (two) times a day.    Historical Provider, MD   clopidogrel (PLAVIX) 75 MG tablet Take 1 tablet by mouth Daily. 2/16/18   Roby Corley MD   furosemide (LASIX) 80 MG tablet Take 1 tablet by mouth Daily. 2/16/18   Roby Corley MD   glucose blood test strip Use as instructed 5/15/17   Roby Corley MD   GuaiFENesin ER (MUCINEX MAXIMUM STRENGTH) 1200 MG tablet sustained-release 12 hour Take 1 tablet BID as needed. 2/16/18   Roby Corley MD   hydrocortisone (WESTCORT) 0.2 % cream Apply  topically 2 (Two) Times a Day. 9/6/17   Princess L Kim, APRN   hydrocortisone-bacitracin-zinc oxide-nystatin (MAGIC BARRIER) Apply 1 application topically As Needed (Rash). 11/10/17   Roby VALLADARES  MD Barney   promethazine-dextromethorphan (PROMETHAZINE-DM) 6.25-15 MG/5ML syrup Take 5 mL by mouth At Night As Needed for Cough.  Patient not taking: Reported on 3/4/2018 12/30/17   VERNON Rosales   Respiratory Therapy Supplies (NEBULIZER/TUBING/MOUTHPIECE) kit Tubing 8/15/17   Roby Corley MD   senna (SENOKOT) 8.6 MG tablet tablet Take 1 tablet by mouth 2 (two) times a day.    Nola Daigle MD   sertraline (ZOLOFT) 100 MG tablet Take 0.5 tablets by mouth Daily. 11/10/17   Roby Corley MD   sildenafil (REVATIO) 20 MG tablet Take 1 tablet by mouth 3 (Three) Times a Day. 1/19/18   Roby Corley MD   Tiotropium Bromide Monohydrate 2.5 MCG/ACT aerosol solution Inhale 2 puffs Daily. 2/21/18   Jeni Thompson MD   triamcinolone (KENALOG) 0.1 % ointment Apply  topically 2 (Two) Times a Day. Max 2 weeks per treatment  Patient taking differently: Apply 1 application topically 2 (Two) Times a Day. Max 2 weeks per treatment 10/17/16   Roby Corley MD   TRUEPLUS LANCETS 33G misc 1 each Daily. 5/15/17   Roby Corley MD   VENTOLIN  (90 Base) MCG/ACT inhaler USE 2 PUFFS FOUR TIMES DAILY AND AS NEEDED 10/10/17   Roby Corley MD   vitamin D (ERGOCALCIFEROL) 31784 units capsule capsule Take 1 capsule by mouth Every 14 (Fourteen) Days. 2/16/18   Roby Corley MD   levoFLOXacin (LEVAQUIN) 750 MG tablet Take 1 tablet every 48 hours for 5 doses.  Patient not taking: Reported on 3/4/2018 2/19/18 3/4/18  Roby Corley MD       Objective     Vital Signs    Temp:  [98.3 °F (36.8 °C)] 98.3 °F (36.8 °C)  Heart Rate:  [62-70] 70  Resp:  [18-24] 18  BP: ()/(53-80) 119/60    Physical Exam:      Physical Exam   Constitutional: She is oriented to person, place, and time. She appears well-developed and well-nourished.   HENT:   Head: Normocephalic and atraumatic.   Nose: Nose normal.   Eyes: Conjunctivae and EOM are normal. Pupils are equal, round, and reactive to light.   Neck: Normal  range of motion. Neck supple. No JVD present. No tracheal deviation present. No thyromegaly present.   Cardiovascular: Normal rate, regular rhythm, normal heart sounds and intact distal pulses.    Pulmonary/Chest: She is in respiratory distress. She has no wheezes. She has rales. She exhibits no tenderness.   Abdominal: Soft. Bowel sounds are normal. She exhibits no distension. There is no tenderness. There is no rebound and no guarding.   Musculoskeletal: Normal range of motion. She exhibits no edema.   Lymphadenopathy:     She has no cervical adenopathy.   Neurological: She is alert and oriented to person, place, and time. She has normal reflexes. No cranial nerve deficit.   Skin: Skin is warm and dry.   Intact   Psychiatric: She has a normal mood and affect.   Nursing note and vitals reviewed.      Results Review:       Results from last 7 days  Lab Units 03/04/18  1140 03/04/18  1137   SODIUM mmol/L  --  139   SODIUM, ARTERIAL mmol/L 135.3*  --    POTASSIUM mmol/L  --  3.4*   CHLORIDE mmol/L  --  101   CO2 mmol/L  --  25.0   BUN mg/dL  --  29*   CREATININE mg/dL  --  1.30*   GLUCOSE mg/dL  --  118*   GLUCOSE, ARTERIAL mmol/L 134  --    CALCIUM mg/dL  --  8.4   BILIRUBIN mg/dL  --  1.2   ALK PHOS U/L  --  94   ALT (SGPT) U/L  --  46   AST (SGOT) U/L  --  22               Results from last 7 days  Lab Units 03/04/18  1057   WBC 10*3/mm3 7.17   HEMOGLOBIN g/dL 14.8   HEMATOCRIT % 45.7*   PLATELETS 10*3/mm3 166         Results from last 7 days  Lab Units 03/04/18  1144   INR  1.09     Imaging Results (last 7 days)     Procedure Component Value Units Date/Time    XR Chest 1 View [[id number]] Collected:  03/04/18 1038     Updated:  03/04/18 1115    Narrative:       [facility]    Patient Name: MS. KEELY CABRERA    ORDERING: JUDY WHITMAN     ATTENDING: PAM NUNEZ     REFERRING: JUDY WHITMAN    -----------------------    PROCEDURE: Chest Single View    TECHNIQUE: Single AP view of the  chest    COMPARISON: 2/16/2018    HISTORY: shortness of breath    FINDINGS:     Life-support devices: None.    Lungs/pleura: There is increasing opacity within the right middle  lobe that may represent consolidating infiltrate in the proper  clinical setting. No other area of acute pulmonary abnormality  seen. No effusion or pneumothorax..    Heart, hilar and mediastinal structures: The heart size and  mediastinal contours are within limits of normal. The trachea is  midline.    Skeletal Structures: No acute findings. No free air beneath the  diaphragm.      Impression:       There is increasing opacity within the right middle lobe compared  to the examinations of January 11 and February 16, 2018. While in  the proper clinical setting this may represent consolidating  pneumonia, however, given the fact there is no resolution likely  with treatment recommend CT of the chest with contrast for  further evaluation to exclude the possibility of underlying  neoplasm.    Electronically signed by:  Charli Nicholson MD  3/4/2018 11:14 AM  CST Workstation: [id number]    CT Chest Without Contrast [[id number]] Collected:  03/04/18 1108     Updated:  03/04/18 1203    Narrative:           Patient Name: KEELY CABRERA    ORDERING: JUDY WHITMAN    ATTENDING: PAM NUNEZ     REFERRING: JUDY WHITMAN    -----------------------  EXAM DESCRIPTION: CT CHEST WO CONTRAST    CLINICAL HISTORY:  62-year-old female with shortness of air and  abnormal chest radiograph.      COMPARISON: Several prior chest radiographs, most recent dated  3/4/2018.    DOSE LENGTH PRODUCT: 802.    This exam was performed according to our departmental dose  optimization program, which includes automated exposure control,  adjustment of the mA and/or KV according to patient size and/or  use of iterative reconstruction technique.      TECHNIQUE: Axial images were obtained and multiplanar  reconstructions were made.      FINDINGS:    LUNGS/PLEURA: There is moderate area of  consolidation within the  right middle lobe containing air bronchograms. Consideration is  given to atelectasis versus consolidating pneumonia. No other  area of consolidation identified. There is some minimal  reticulonodularity within the left upper lobe posteriorly which  is nonspecific and may represent pneumonitis. There are a few  scattered noncalcified nodules most notable within the upper  lobes posteriorly these may be postinflammatory given that there  is areas of calcified granulomata present related to prior  granulomatous disease. No effusion or pneumothorax. No dominant  suspicious mass.  TRACHEA AND BRONCHI:  The trachea and bronchi are patent. No  obvious luminal mass centrally on the right to explain the right  middle lobe process. No fluid or debris identified within the  main airways.  MEDIASTINUM, ALINA AND LYMPH NODES: There are calcified lymph  nodes related to prior inflammation/infection. Otherwise there is  small mostly subcentimeter nodes present. No suspicious axillary,  hilar, or mediastinal nodes based on size or morphologic criteria  identified.  HEART AND PERICARDIUM: No pericardial effusion. There is coronary  vascular calcification present.  VASCULAR: There is vascular calcification without thoracic aortic  aneurysm.  UPPER ABDOMEN: There is evidence of a midline upper abdominal  wall hernia containing fat. There is extension of the left lobe  the liver towards the mouth of the defect. Inferior to this  hernia is a large midline abdominal wall hernia which contains  large bowel without obstruction or findings of incarceration.  There are multiple choleliths. No pericholecystic inflammatory  change seen.  OSSEOUS STRUCTURES: Degenerative changes are present within the  spine. No acute finding.      Impression:       Moderate area of consolidation within the right middle lobe  corresponding to the radiographic finding. There is air  bronchograms present. No obvious central bronchial  lesion  contributing this finding. Consideration is given to atelectasis  and/or consolidating pneumonia. Recommend treatment and follow-up  until clear. If there is no change on the follow-up study  pulmonary consultation would be recommended for possible  bronchoscopy.    Minimal reticulonodularity left upper lobe which may represent  pneumonitis.    There is a background of old granulomatous disease. There are  couple small 5 mm less noncalcified nodules that likely are  postinflammatory as well. These can be evaluated on follow-up  exams.    No suspicious mediastinal or hilar lymph nodes based on size or  morphologic criteria.    Cholelithiasis.    Upper midline abdominal hernia containing fat as well as  extension the left lobe the liver towards the mouth of the  defect. No findings of incarceration or obstruction.    Mid to lower midline abdominal wall hernia, larger in size  containing fat and bowel without findings of obstruction or  incarceration.    Electronically signed by:  Charli Nicholson MD  3/4/2018 12:02 PM  CST Workstation: [id number]          Assessment/Plan     Principal Problem:    Recurrent pneumonia  Active Problems:    Pulmonary hypertension severe    Obesity    Hyperlipidemia    Generalized anxiety disorder    Essential hypertension    Coronary atherosclerosis of native coronary artery    Coronary arteriosclerosis    Chronic kidney disease    Type 2 diabetes mellitus with stage 3 chronic kidney disease, without long-term current use of insulin    Cough    PVD (peripheral vascular disease)    COPD (chronic obstructive pulmonary disease)    Hypoxia      -Continue with IV antibiotics.  -We'll continue with incentive spirometry.  -We'll continue with nebulization treatment and IV steroids.  -We'll recommend aggressive PT OT.  -We will resume patient's home medication as prior to coming to hospital.  -DVT and GI prophylaxis in place.  -We'll continue monitoring patient in hospital setting and treat  "patient as course dictates.    I discussed the patients findings and my recommendations with patient and nursing staff.         This document has been electronically signed by Nataliya Mittal MD on 2018 3:09 PM        Total Time Spent: 43 minutes.    EMR Dragon/Transcription disclaimer:   Dictated utilizing Dragon dictation.            Electronically signed by Nataliya Mittal MD at 3/4/2018  5:08 PM         BMI = 25      140.8             18  1158    18  1156    18  1154    18  1136    18  0846                Oxygen Therapy   SpO2  91 %  88 %  92 %  91 %       sitting  sitting  sitting       Pulse Oximetry Type  Intermittent  Intermittent  Intermittent  Intermittent     O2 Device  nasal cannula  with humidific-  ation  room air  nasal cannula  with humidific-  ation  nasal cannula  with humidific-  ation  nasal cannula       [address] [address]  Phone:  [phone]  Fax:   Date Ordered: Mar 7, 2018         Patient:  Cameron Bowie MRN:  [id number]   [address] [place] [address] :  1955  SSN:    Phone: [phone] Sex:  F     Weight: 121 kg (267 lb 4.8 oz)         Ht Readings from Last 1 Encounters:   18 160 cm (63\")         Oxygen Therapy                   (Order ID: [id number])    Diagnosis:  Recurrent pneumonia (J18.9 [ICD-10-CM] 486 [ICD-9-CM])  Hypoxia (R09.02 [ICD-10-CM] 799.02 [ICD-9-CM])  Cough (R05 [ICD-10-CM] 786.2 [ICD-9-CM])  Impaired mobility and activities of daily living (Z74.09 [ICD-10-CM] 799.89 [ICD-9-CM])  Impaired mobility and endurance (Z74.09 [ICD-10-CM] V49.89 [ICD-9-CM])  Pulmonary hypertension (I27.20 [ICD-10-CM] 416.8 [ICD-9-CM])  Pure hypercholesterolemia (E78.00 [ICD-10-CM] 272.0 [ICD-9-CM])  Generalized anxiety disorder (F41.1 [ICD-10-CM] 300.02 [ICD-9-CM])  Essential hypertension (I10 [ICD-10-CM] 401.9 " [ICD-9-CM])  Atherosclerosis of native coronary artery of native heart without angina pectoris (I25.10 [ICD-10-CM] 414.01 [ICD-9-CM])  Coronary arteriosclerosis (I25.10 [ICD-10-CM] 414.00 [ICD-9-CM])  Stage 3 chronic kidney disease (N18.3 [ICD-10-CM] 585.3 [ICD-9-CM])  Type 2 diabetes mellitus with stage 3 chronic kidney disease, without long-term current use of insulin (E11.22,N18.3 [ICD-10-CM] 250.40,585.3 [ICD-9-CM])  PVD (peripheral vascular disease) (I73.9 [ICD-10-CM] 443.9 [ICD-9-CM])  Venous insufficiency (I87.2 [ICD-10-CM] 459.81 [ICD-9-CM])  Psoriasis (L40.9 [ICD-10-CM] 696.1 [ICD-9-CM])  Moderate episode of recurrent major depressive disorder (F33.1 [ICD-10-CM] 296.32 [ICD-9-CM])  Non-rheumatic tricuspid valve insufficiency (I36.1 [ICD-10-CM] 424.2 [ICD-9-CM])  Pneumonia of right middle lobe due to infectious organism (J18.1 [ICD-10-CM] 486 [ICD-9-CM])  Light tobacco smoker (F17.200 [ICD-10-CM] 305.1 [ICD-9-CM])  History of tobacco use (Z87.891 [ICD-10-CM] V15.82 [ICD-9-CM])  Encounter for screening for osteoporosis (Z13.820 [ICD-10-CM] V82.81 [ICD-9-CM])  Breast screening (Z12.31 [ICD-10-CM] V76.10 [ICD-9-CM])  Asthenia (R53.1 [ICD-10-CM] 780.79 [ICD-9-CM])  ALVAREZ (nonalcoholic steatohepatitis) (K75.81 [ICD-10-CM] 571.8 [ICD-9-CM])   Quantity:  1     Delivery Modality: Nasal Cannula  Liters Per Minute: 2  Duration: Continuous  Equipment:  Oxygen Concentrator &  &  Portable Gaseous Oxygen System & Portable Oxygen Contents Gaseous &  Conserving Regulator  Length of Need (99 Months = Lifetime): 99 Months = Lifetime            Authorizing Provider:VERNON Francisco  Authorizing Provider's NPI: [id number]  Order Entered By: VERNON Francisco 3/7/2018  1:14 PM     Electronically signed by: VERNON Francisco 3/7/2018  1:14 PM

## 2018-03-07 NOTE — DISCHARGE SUMMARY
[facility] Medicine Services  DISCHARGE SUMMARY       Date of Admission: 3/4/2018  Date of Discharge:  3/7/2018  Primary Care Physician: Roby Corley MD    Presenting Problem/History of Present Illness:  Cough [R05]  Hypoxia [R09.02]  Recurrent pneumonia [J18.9]     Final Discharge Diagnoses:  Hospital Problem List     * (Principal)Recurrent pneumonia    Pulmonary hypertension severe    Obesity    Hyperlipidemia    Generalized anxiety disorder    Essential hypertension    Coronary atherosclerosis of native coronary artery    Coronary arteriosclerosis    Overview Signed 10/17/2016  4:48 PM by Roby Corley MD     2007: PCI RCA 2011: PCI: LCX            Chronic kidney disease    Overview Signed 10/17/2016  4:48 PM by Roby Corley MD     Stage 3         Type 2 diabetes mellitus with stage 3 chronic kidney disease, without long-term current use of insulin    Cough    PVD (peripheral vascular disease)    Overview Signed 2/16/2018  9:08 AM by Doris Arndt MA     LATOYA R .96 L 1.0 aortobifem 1/2012            COPD (chronic obstructive pulmonary disease)    Hypoxia          Consults:   Consults     Date and Time Order Name Status Description    3/4/2018 1239 Hospitalist (on-call MD unless specified)            Pertinent Test Results:   Lab Results (last 24 hours)     Procedure Component Value Units Date/Time    CBC & Differential [[id number]] Collected:  03/07/18 0633    Specimen:  Blood Updated:  03/07/18 0708    Narrative:       The following orders were created for panel order CBC & Differential.  Procedure                               Abnormality         Status                     ---------                               -----------         ------                     CBC Auto Differential[[id number]]        Abnormal            Final result                 Please view results for these tests on the individual orders.    CBC Auto Differential [[id number]]  (Abnormal)  Collected:  03/07/18 0633    Specimen:  Blood Updated:  03/07/18 0708     WBC 9.57 10*3/mm3      RBC 4.24 10*6/mm3      Hemoglobin 12.4 g/dL      Hematocrit 39.2 %      MCV 92.5 fL      MCH 29.2 pg      MCHC 31.6 g/dL      RDW 15.1 (H) %      RDW-SD 51.1 (H) fl      MPV 12.0 fL      Platelets 153 10*3/mm3      Neutrophil % 89.1 (H) %      Lymphocyte % 6.6 (L) %      Monocyte % 3.8 %      Eosinophil % 0.0 %      Basophil % 0.1 %      Immature Grans % 0.4 %      Neutrophils, Absolute 8.53 10*3/mm3      Lymphocytes, Absolute 0.63 10*3/mm3      Monocytes, Absolute 0.36 10*3/mm3      Eosinophils, Absolute 0.00 10*3/mm3      Basophils, Absolute 0.01 10*3/mm3      Immature Grans, Absolute 0.04 (H) 10*3/mm3     Comprehensive Metabolic Panel [[id number]]  (Abnormal) Collected:  03/07/18 0633    Specimen:  Blood Updated:  03/07/18 0725     Glucose 212 (H) mg/dL      BUN 37 (H) mg/dL      Creatinine 1.36 (H) mg/dL      Sodium 142 mmol/L      Potassium 3.4 (L) mmol/L      Chloride 100 mmol/L      CO2 30.0 mmol/L      Calcium 8.2 (L) mg/dL      Total Protein 5.5 (L) g/dL      Albumin 2.80 (L) g/dL      ALT (SGPT) 47 U/L      AST (SGOT) 20 U/L      Alkaline Phosphatase 62 U/L      Total Bilirubin 0.3 mg/dL      eGFR Non African Amer 39 (L) mL/min/1.73      Globulin 2.7 gm/dL      A/G Ratio 1.0 (L) g/dL      BUN/Creatinine Ratio 27.2 (H)     Anion Gap 12.0 mmol/L     Magnesium [[id number]]  (Normal) Collected:  03/07/18 0633    Specimen:  Blood Updated:  03/07/18 0725     Magnesium 1.7 mg/dL     Respiratory Culture - Sputum, Cough [[id number]] Collected:  03/07/18 1157    Specimen:  Sputum from Cough Updated:  03/07/18 1221     Gram Stain Result Moderate (3+) WBCs per low power field      Rare (1+) Epithelial cells per low power field      Mixed bacterial dawson        Imaging Results (last 24 hours)     ** No results found for the last 24 hours. **        Chief Complaint on Day of Discharge: No complaints.     Hospital Course:  The  "patient is a 62 y.o. female who presented to [facility]/2018 with significant history for COPD, pulmonary hypertension, HTN, morbid obesity, DM, ASCAD, PAD with stents and CKD stage III complaining of shortness of air.  The patient states she has been on three full courses of antibiotics, but continues to show increasing opacity within the right middle lobe.  The patient states she has decided to quit smoking and is having her house cleaning to try to remove the remnants of smoke.   The patient was started on IV azithromycin and Rocephin.  Cultures have remained negative.  The patient could not keep her oxygen saturation greater than 88% on room air, so home oxygen was set up for her.  Patient states she feels much better from admission and she is scheduled to see her pulmonologist, Dr. Mariana Thompson, tomorrow and she will keep that appointment.  Prescriptions are given for azithromycin to complete a full course of antibiotics and a prednisone taper pack.    Condition on Discharge:  Stable    Physical Exam on Discharge:  /90  Pulse 73  Temp 98.1 °F (36.7 °C) (Oral)   Resp 20  Ht 160 cm (63\")  Wt 121 kg (267 lb 4.8 oz)  LMP  (LMP Unknown)  SpO2 92%  BMI 47.35 kg/m2  Physical Exam   Constitutional: She is oriented to person, place, and time. She appears well-developed and well-nourished.   HENT:   Head: Normocephalic and atraumatic.   Eyes: EOM are normal. Pupils are equal, round, and reactive to light.   Neck: Normal range of motion. Neck supple.   Cardiovascular: Normal rate and regular rhythm.    Pulmonary/Chest: Effort normal and breath sounds normal.   Abdominal: Soft. Bowel sounds are normal.   Musculoskeletal: Normal range of motion.   Neurological: She is alert and oriented to person, place, and time.   Skin: Skin is warm and dry.   Psychiatric: She has a normal mood and affect.     Discharge Disposition:  [place] [facility] [place]    Discharge Medications:   Cameron Bowie" Melissa   Home Medication Instructions ROSE:[id number]    Printed on:03/07/18 4028   Medication Information                      albuterol (PROVENTIL) (2.5 MG/3ML) 0.083% nebulizer solution  Take 2.5 mg by nebulization 4 (Four) Times a Day As Needed for Wheezing.             allopurinol (ZYLOPRIM) 100 MG tablet  Take 1 tablet by mouth Daily.             aspirin 81 MG tablet  Take 1 tablet by mouth Daily.             atorvastatin (LIPITOR) 80 MG tablet  Take 1 tablet by mouth Every Night.             azelastine (ASTELIN) 0.1 % nasal spray  2 sprays into each nostril 2 (Two) Times a Day. Use in each nostril as directed             azithromycin (ZITHROMAX) 500 MG tablet  Take 1 tablet by mouth Daily for 5 days.             bisoprolol (ZEBeta) 5 MG tablet  Take 0.5 tablets by mouth Daily.             Blood Glucose Monitoring Suppl (ONE TOUCH ULTRA MINI) w/Device kit               Calcium-Magnesium-Vitamin D (CALCIUM 500 PO)  Take 500 mg by mouth 2 (two) times a day.             clopidogrel (PLAVIX) 75 MG tablet  Take 1 tablet by mouth Daily.             furosemide (LASIX) 80 MG tablet  Take 1 tablet by mouth Daily.             glucose blood test strip  Use as instructed             GuaiFENesin ER (MUCINEX MAXIMUM STRENGTH) 1200 MG tablet sustained-release 12 hour  Take 1 tablet BID as needed.             hydrocortisone (WESTCORT) 0.2 % cream  Apply  topically 2 (Two) Times a Day.             hydrocortisone-bacitracin-zinc oxide-nystatin (MAGIC BARRIER)  Apply 1 application topically As Needed (Rash).             promethazine-dextromethorphan (PROMETHAZINE-DM) 6.25-15 MG/5ML syrup  Take 5 mL by mouth At Night As Needed for Cough.             Respiratory Therapy Supplies (NEBULIZER/TUBING/MOUTHPIECE) kit  Tubing             senna (SENOKOT) 8.6 MG tablet tablet  Take 1 tablet by mouth 2 (two) times a day.             sertraline (ZOLOFT) 100 MG tablet  Take 0.5 tablets by mouth Daily.             sildenafil (REVATIO) 20 MG  tablet  Take 1 tablet by mouth 3 (Three) Times a Day.             Tiotropium Bromide Monohydrate 2.5 MCG/ACT aerosol solution  Inhale 2 puffs Daily.             triamcinolone (KENALOG) 0.1 % ointment  Apply  topically 2 (Two) Times a Day. Max 2 weeks per treatment             TRUEPLUS LANCETS 33G misc  1 each Daily.             VENTOLIN  (90 Base) MCG/ACT inhaler  USE 2 PUFFS FOUR TIMES DAILY AND AS NEEDED             vitamin D (ERGOCALCIFEROL) 41743 units capsule capsule  Take 1 capsule by mouth Every 14 (Fourteen) Days.                 Discharge Diet:   Diet Instructions     Diet: Cardiac       Discharge Diet:  Cardiac                 Activity at Discharge:   Activity Instructions     Activity as Tolerated                     Discharge Care Plan/Instructions: The patient will follow up with her primary care physician within one week.  She will follow up with her pulmonologist, Dr. Mariana Thompson, tomorrow at her regularly scheduled appointment.  Prescriptions are given for azithromycin and a prednisone taper pack.  Home health and oxygen have been set up for the patient.    Follow-up Appointments:   Future Appointments  Date Time Provider Department Center   3/8/2018 10:00 AM Jeni Thompson MD [facility] PULM [facility] None   3/13/2018 1:45 PM Roby Corley MD [facility] FM [facility]AD4 None   5/1/2018 9:30 AM David Snowden MD PhD [facility] CD [facility] None   5/2/2018 9:15 AM David Snowden MD PhD [facility] CD [facility] None       Test Results Pending at Discharge:  Order Current Status    Respiratory Culture - Sputum, Cough Preliminary result            This document has been electronically signed by VERNON Francisco on March 7, 2018 4:04 PM        Time: Greater than 30 minutes.

## 2018-03-07 NOTE — PLAN OF CARE
Problem: Patient Care Overview (Adult)  Goal: Plan of Care Review  Outcome: Ongoing (interventions implemented as appropriate)   03/07/18 1140   Coping/Psychosocial Response Interventions   Plan Of Care Reviewed With patient   Patient Care Overview   Progress improving   Outcome Evaluation   Outcome Summary/Follow up Plan Pt did not meet any new goals this tx. Pt Indep with bed-toilet t/. Pt's O2 decreased to 87%, however pt performed PLB and O2 sats increased to 92% within ~20 secs.        Problem: Inpatient Occupational Therapy  Goal: Dynamic Standing Balance Goal LTG-OT  Outcome: Ongoing (interventions implemented as appropriate)   03/05/18 1312 03/06/18 1328 03/07/18 1140   Dynamic Standing Balance OT LTG   Dynamic Standing Balance OT LTG, Date Established 03/05/18 --  --    Dynamic Standing Balance OT LTG, Time to Achieve by discharge --  --    Dynamic Standing Balance OT LTG, [place] Level independent;conditional independence  (With 02 SATS 92% or above.) --  --    Dynamic Standing Balance OT LTG, Assist Device assistive Device  (R/W if needed.) --  --    Dynamic Standing Balance OT LTG, Date Goal Reviewed --  --  03/07/18   Dynamic Standing Balance OT LTG, Outcome --  goal met --      Goal: Patient Education Goal LTG- OT  Outcome: Ongoing (interventions implemented as appropriate)   03/05/18 1312 03/06/18 1328 03/07/18 1140   Patient Education OT LTG   Patient Education OT LTG, Date Established 03/05/18 --  --    Patient Education OT LTG, Time to Achieve by discharge --  --    Patient Education OT LTG, Education Type home safety;energy conservation;work simplification;adaptive breathing --  --    Patient Education OT LTG, Education Understanding verbalizes understanding;demonstrates adequately --  --    Patient Education OT LTG, Date Goal Reviewed --  --  03/07/18   Patient Education OT LTG Outcome --  goal met --      Goal: ADL Goal LTG- OT  Outcome: Ongoing (interventions implemented as appropriate)    03/05/18 1312 03/06/18 1328 03/07/18 1140   ADL OT LTG   ADL OT LTG, Date Established 03/05/18 --  --    ADL OT LTG, Time to Achieve by discharge --  --    ADL OT LTG, Activity Type ADL skills  (Sponge bath and dress or walk-in shower.) --  --    ADL OT LTG, [place] Level independent with device;assistive device  (R/W and shower chair if needed & 02 SATS 92% or above.) --  --    ADL OT LTG, Date Goal Reviewed --  --  03/07/18   ADL OT LTG, Outcome --  goal ongoing --

## 2018-03-07 NOTE — THERAPY TREATMENT NOTE
Acute Care - Physical Therapy Treatment Note  [facility]     Patient Name: Cameron Bowie  : 1955  MRN: [id number]  Today's Date: 3/7/2018  Onset of Illness/Injury or Date of Surgery Date: 18  Date of Referral to PT: 18  Referring Physician: Dr. Mittal    Admit Date: 3/4/2018    Visit Dx:    ICD-10-CM ICD-9-CM   1. Recurrent pneumonia J18.9 486   2. Hypoxia R09.02 799.02   3. Cough R05 786.2   4. Impaired mobility and activities of daily living Z74.09 799.89   5. Impaired mobility and endurance Z74.09 V49.89     Patient Active Problem List   Diagnosis   • Venous insufficiency   • Pulmonary hypertension severe   • Obesity   • Hyperlipidemia   • Gout   • Generalized anxiety disorder   • Essential hypertension   • DJD (degenerative joint disease)   • Coronary atherosclerosis of native coronary artery   • Coronary arteriosclerosis   • Chronic kidney disease   • Psoriasis   • Moderate episode of recurrent major depressive disorder   • Seasonal allergic rhinitis due to pollen   • Type 2 diabetes mellitus with stage 3 chronic kidney disease, without long-term current use of insulin   • Cough   • Non-rheumatic tricuspid valve insufficiency   • Pneumonia of right middle lobe due to infectious organism   • PVD (peripheral vascular disease)   • Light tobacco smoker   • History of tobacco use   • Foot pain   • Encounter for screening for osteoporosis   • COPD (chronic obstructive pulmonary disease)   • Carpal tunnel syndrome   • Breast screening   • Asthenia   • ALVAREZ (nonalcoholic steatohepatitis)   • Recurrent pneumonia   • Hypoxia               Adult Rehabilitation Note       18 0940 18 1433 18 0912    Rehab Assessment/Intervention    Discipline (P)  physical therapy assistant  -TL physical therapy assistant  -CH,TL,CH2 occupational therapy assistant  -BB    Document Type (P)  therapy note (daily note)  -TL therapy note (daily note)  -CH,TL,CH2 therapy note (daily note)  -BB     Subjective Information (P)  agree to therapy;fatigue  -TL agree to therapy;no complaints  -CH,TL,CH2 agree to therapy  -BB    Patient Effort, Rehab Treatment (P)  good  -TL good  -CH,TL,CH2 good  -BB    Symptoms Noted During/After Treatment (P)  shortness of breath  -TL shortness of breath  -CH,TL,CH2 shortness of breath  -BB    Precautions/Limitations (P)  fall precautions;oxygen therapy device and L/min  -TL fall precautions;oxygen therapy device and L/min  -CH,TL,CH2 oxygen therapy device and L/min  -BB    Recorded by [TL] Juaquin Haines PTA Student [CH,TL,CH2] Jennifer Bae PTA (r) Juaquin Haines PTA Student (t) Jennifer Bae PTA (c) [BB] HITESH OcampoA/L    Vital Signs    Pre Systolic BP Rehab (P)  134  -  -CH,TL,CH2     Pre Treatment Diastolic BP (P)  87  -TL 85  -CH,TL,CH2     Post Systolic BP Rehab (P)  137  -  -CH,TL,CH2     Post Treatment Diastolic BP (P)  85  -TL 77  -CH,TL,CH2     Pretreatment Heart Rate (beats/min) (P)  82  -TL 78  -CH,TL,CH2 86  -BB    Intratreatment Heart Rate (beats/min)  88   with gait  -CH,TL,CH2     Posttreatment Heart Rate (beats/min) (P)  73  -TL 67  -CH,TL,CH2 83  -BB    Pre SpO2 (%) (P)  86  -TL 91   3L  -CH,TL,CH2 92  -BB    O2 Delivery Pre Treatment (P)  supplemental O2   3L  -TL supplemental O2  -CH,TL,CH2 supplemental O2  -BB    Intra SpO2 (%) (P)  84   3L  -TL 87   with gait on 3L  -CH,TL,CH2     O2 Delivery Intra Treatment (P)  supplemental O2  -TL supplemental O2  -CH,TL,CH2     Post SpO2 (%) (P)  91  -TL 93  -CH,TL,CH2 94  -BB    O2 Delivery Post Treatment (P)  supplemental O2  -TL supplemental O2  -CH,TL,CH2 supplemental O2  -BB    Pre Patient Position (P)  Sitting  -TL Sitting  -CH,TL,CH2 Sitting  -BB    Intra Patient Position (P)  Sitting  -TL Standing  -CH,TL,CH2     Post Patient Position (P)  Sitting  -TL Sitting  -CH,TL,CH2 Sitting  -BB    Recorded by [TL] Juaquin Haines PTA Student [CH,TL,CH2] Jennifer Bae PTA (r) Juaquin  CRYSTAL Haines Student (t) Jennifer Bae PTA (c) [BB] Tiffany Miller, HUYNH/L    Pain Assessment    Pain Assessment (P)  No/denies pain  -TL No/denies pain  -CH,TL,CH2 No/denies pain  -BB    Recorded by [TL] Juaquin Haines PTA Student [CH,TL,CH2] Jennifer Bae PTA (r) Juaquin Haines PTA Student (t) Jennifer Bae PTA (c) [BB] Tiffany Miller, HUYNH/L    Vision Assessment/Intervention    Visual Impairment (P)  WFL with corrective lenses  -TL WFL with corrective lenses  -CH,TL,CH2     Recorded by [TL] Juaquin Haines PTA Student [CH,TL,CH2] Jennifer Bae PTA (r) Juaquin Haines PTA Student (t) Jennifer Bae PTA (c)     Cognitive Assessment/Intervention    Current Cognitive/Communication Assessment (P)  functional  -TL functional  -CH,TL,CH2 functional  -BB    Orientation Status (P)  oriented x 4  -TL oriented x 4  -CH,TL,CH2 oriented x 4  -BB    Follows Commands/Answers Questions (P)  100% of the time;able to follow multi-step instructions  -% of the time;able to follow multi-step instructions  -CH,TL,CH2 100% of the time  -BB    Personal Safety (P)  WNL/WFL  -TL WNL/WFL  -CH,TL,CH2 --   discussed home safety, E/C, W/S  -BB    Personal Safety Interventions (P)  fall prevention program maintained;gait belt;muscle strengthening facilitated;nonskid shoes/slippers when out of bed;supervised activity  -TL fall prevention program maintained;gait belt;muscle strengthening facilitated;nonskid shoes/slippers when out of bed;supervised activity  -CH,TL,CH2 gait belt;nonskid shoes/slippers when out of bed  -BB    Recorded by [TL] Juaquin Haines PTA Student [CH,TL,CH2] Jennifer Bae PTA (r) Juaquin Haines PTA Student (t) Jennifer Bae PTA (c) [BB] Tiffany Miller, HUYNH/L    Bed Mobility, Assessment/Treatment    Bed Mobility, Scoot/Bridge, [place] (P)  independent  -TL independent  -CH,TL,CH2     Bed Mob, Supine to Sit, [place] (P)  not tested  -TL not tested  -CH,TL,CH2  independent  -BB    Bed Mob, Sit to Supine, [place] (P)  not tested  -TL not tested  -CH,TL,CH2 independent  -BB    Recorded by [TL] Juaquin Haines PTA Student [CH,TL,CH2] eJnnifer Bae PTA (r) Juaquin Haines PTA Student (t) Jennifer Bae PTA (c) [BB] Tiffany Miller, HUYNH/L    Transfer Assessment/Treatment    Transfers, Sit-Stand [place] (P)  not tested  -TL independent  -CH,TL,CH2 independent  -BB    Transfers, Stand-Sit [place] (P)  not tested  -TL independent  -CH,TL,CH2 independent  -BB    Recorded by [TL] Juaquin Haines PTA Student [CH,TL,CH2] Jennifer Bae PTA (r) Juaquin Haines PTA Student (t) Jennifer Bae PTA (c) [BB] Tiffany Miller, HUYNH/L    Gait Assessment/Treatment    Gait, [place] Level (P)  not tested  -TL supervision required  -CH,TL,CH2     Gait, Assistive Device  rolling walker  -CH,TL,CH2     Gait, Distance (Feet)  192  -CH,TL,CH2     Gait, Safety Issues  supplemental O2;step length decreased  -CH,TL,CH2     Gait, Comment (P)  nsg deferred gait due to decreased O2 sats EOB  -TL No LOB noted with gait  -CH,TL,CH2     Recorded by [TL] Juaquin Haines PTA Student [CH,TL,CH2] Jennifer Bae PTA (r) Juaquin Haines PTA Student (t) Jennifer Bae PTA (c)     Stairs Assessment/Treatment    Stairs, [place] Level (P)  not tested  -TL not tested  -CH,TL,CH2     Recorded by [TL] Juaquin Haines PTA Student [CH,TL,CH2] Jennifer Bae PTA (r) Juaquin Haines PTA Student (t) Jennifer Bae PTA (c)     Upper Body Bathing Assessment/Training    UB Bathing Assess/Train, Comment   pt already had a bath  -BB    Recorded by   [BB] Tiffany Miller, HUYNH/L    Toileting Assessment/Training    Toileting Assess/Train, Assistive Device   grab bars  -BB    Toileting Assess/Train, Position   sitting;standing  -BB    Toileting Assess/Train, Indepen Level   independent  -BB    Recorded by   [BB] LINO Ocapmo/L    Grooming Assessment/Training     Grooming Assess/Train, Position   sink side;standing  -BB    Grooming Assess/Train, Indepen Level   independent  -BB    Grooming Assess/Train, Comment   washed hands, comb hair, oral care  -BB    Recorded by   [BB] MIYA Ocampo    Therapy Exercises    Bilateral Lower Extremities (P)  AROM:;20 reps;sitting;ankle pumps/circles;hip abduction/adduction;glut sets;LAQ;hip flexion   x2 sets  -TL AROM:;15 reps;sitting;ankle pumps/circles;glut sets;hip abduction/adduction;hip flexion;LAQ  -CH,TL,CH2     Recorded by [TL] Juaquin Haines PTA Student [CH,TL,CH2] Jennifer Bae PTA (r) Juaquin Haines PTA Student (t) Jennifer Bae PTA (c)     Positioning and Restraints    Pre-Treatment Position (P)  in bed  -TL in bed  -CH,TL,CH2 in bed  -BB    Post Treatment Position (P)  bed  -TL bed  -CH,TL,CH2 bed  -BB    In Bed (P)  sitting;side rails up x1;exit alarm on;encouraged to call for assist;call light within reach;notified nsg  -TL sitting;side rails up x1;call light within reach;encouraged to call for assist;exit alarm on  -CH,TL,CH2 sitting;call light within reach;encouraged to call for assist  -BB    Recorded by [TL] Juaquin Haines PTA Student [CH,TL,CH2] Jennifer Bae PTA (r) Juaquin Haines PTA Student (t) Jennifer Bae PTA (c) [BB] MIYA Ocampo      User Key  (r) = Recorded By, (t) = Taken By, (c) = Cosigned By    Initials Name Effective Dates     Jennifer Bae PTA 10/17/16 - 03/06/18    MIYA Nuñez 10/17/16 - 03/06/18     Juaquin Haines PTA Student 02/15/18 - 03/06/18     Juaquin Haines PTA Student 03/07/18 -                 IP PT Goals       03/07/18 1049 03/06/18 1507 03/05/18 1406    Gait Training PT STG    Gait Training Goal PT STG, Date Established   03/05/18  -MN    Gait Training Goal PT STG, Time to Achieve   5 days  -MN    Gait Training Goal PT STG, [place] Level   independent  -MN    Gait Training Goal PT STG, Assist Device   cane, quad   -MN    Gait Training Goal PT STG, Distance to Achieve   200 ft with SpO2 >/= 92% and without LOB with vertical/horizontal head turns  -MN    Gait Training Goal PT STG, Additional Goal   Pt will asc/desc ramp with modified indep  -MN    Gait Training Goal PT STG, Date Goal Reviewed (P)  03/07/18  -TL 03/06/18  -CH (r) TL (t) CH (c)     Gait Training Goal PT STG, Outcome (P)  goal ongoing  -TL goal ongoing  -CH (r) TL (t) CH (c)     Physical Therapy PT STG    Physical Therapy PT STG, Date Established   03/05/18  -MN    Physical Therapy PT STG, Time to Achieve   5 days  -MN    Physical Therapy PT STG, Activity Type   Pt will score >/= 24/28 on [place]etti Balance Assessment  -MN    Physical Therapy PT STG, [place] Level   independent  -MN    Physical Therapy PT STG, Date Goal Reviewed (P)  03/07/18  -TL 03/06/18  -CH (r) TL (t) CH (c)     Physical Therapy PT STG, Outcome (P)  goal ongoing  -TL goal ongoing  -CH (r) TL (t) CH (c)       User Key  (r) = Recorded By, (t) = Taken By, (c) = Cosigned By    Initials Name Provider Type    [place] Annabelle Fontana, PT Physical Therapist    TROY Bae, PTA Physical Therapy Assistant    MICHAEL Haines, PTA Student PTA Student     Juaquin Haines, PTA Student PTA Student                  PT Recommendation and Plan  Anticipated Discharge Disposition: home with home health  Planned Therapy Interventions: balance training, bed mobility training, gait training, home exercise program, patient/family education, stair training, strengthening, transfer training, stretching  PT Frequency: other (see comments) (5-14x/week)  Plan of Care Review  Plan Of Care Reviewed With: (P) patient  Outcome Summary/Follow up Plan: (P) Pt met no new goals this tx. Pt presented with decreased O2 sats sitting EOB, pt levels decreased to 84% on 3L sitting EOB. Gait was deferred per nsg. Pt would benefit from pulmonary rehab upon D/C.           Outcome Measures       03/07/18 0940 03/06/18 1433 03/06/18  0912    How much help from another person do you currently need...    Turning from your back to your side while in flat bed without using bedrails? (P)  4  -TL 4  -CH (r) TL (t) CH (c)     Moving from lying on back to sitting on the side of a flat bed without bedrails? (P)  4  -TL 4  -CH (r) TL (t) CH (c)     Moving to and from a bed to a chair (including a wheelchair)? (P)  4  -TL 4  -CH (r) TL (t) CH (c)     Standing up from a chair using your arms (e.g., wheelchair, bedside chair)? (P)  4  -TL 4  -CH (r) TL (t) CH (c)     Climbing 3-5 steps with a railing? (P)  3  -TL 3  -CH (r) TL (t) CH (c)     To walk in hospital room? (P)  4  -TL 4  -CH (r) TL (t) CH (c)     AM-PAC 6 Clicks Score (P)  23  -TL 23  -CH (r) TL (t)     How much help from another is currently needed...    Putting on and taking off regular lower body clothing?   4  -BB    Bathing (including washing, rinsing, and drying)   4  -BB    Toileting (which includes using toilet bed pan or urinal)   4  -BB    Putting on and taking off regular upper body clothing   4  -BB    Taking care of personal grooming (such as brushing teeth)   4  -BB    Eating meals   4  -BB    Score   24  -BB    Functional Assessment    Outcome Measure Options (P)  AM-PAC 6 Clicks Basic Mobility (PT)  -TL AM-PAC 6 Clicks Basic Mobility (PT)  -CH (r) TL (t) CH (c)       03/05/18 1335 03/05/18 1006       How much help from another person do you currently need...    Turning from your back to your side while in flat bed without using bedrails? 4  -MN      Moving from lying on back to sitting on the side of a flat bed without bedrails? 4  -MN      Moving to and from a bed to a chair (including a wheelchair)? 4  -MN      Standing up from a chair using your arms (e.g., wheelchair, bedside chair)? 4  -MN      Climbing 3-5 steps with a railing? 3  -MN      To walk in hospital room? 3  -MN      AM-PAC 6 Clicks Score 22  -MN      How much help from another is currently needed...    Putting on  and taking off regular lower body clothing?  4  -RB     Bathing (including washing, rinsing, and drying)  3  -RB     Toileting (which includes using toilet bed pan or urinal)  3  -RB     Putting on and taking off regular upper body clothing  4  -RB     Taking care of personal grooming (such as brushing teeth)  4  -RB     Eating meals  4  -RB     Score  22  -RB     Functional Assessment    Outcome Measure Options AM-PAC 6 Clicks Basic Mobility (PT)  -MN AM-PAC 6 Clicks Daily Activity (OT)  -RB       User Key  (r) = Recorded By, (t) = Taken By, (c) = Cosigned By    Initials Name Provider Type    RB Otto Shoemaker, OT Occupational Therapist    MN Annabelle Fontana, PT Physical Therapist    TROY Bae, PTA Physical Therapy Assistant    LINO Nuñez/L Occupational Therapy Assistant    MICHAEL Haines, PTA Student PTA Student     Juaquin Haines, CRYSTAL Student PTA Student           Time Calculation:           PT G-Codes  PT Professional Judgement Used?: Yes  Outcome Measure Options: (P) AM-PAC 6 Clicks Basic Mobility (PT)  Score: 22  Functional Limitation: Mobility: Walking and moving around  Mobility: Walking and Moving Around Current Status (): At least 20 percent but less than 40 percent impaired, limited or restricted  Mobility: Walking and Moving Around Goal Status (): At least 1 percent but less than 20 percent impaired, limited or restricted    Juaquin Haines PTA Student  3/7/2018

## 2018-03-07 NOTE — THERAPY TREATMENT NOTE
Acute Care - Occupational Therapy Treatment Note  [facility]     Patient Name: Cameron Bowie  : 1955  MRN: [id number]  Today's Date: 3/7/2018  Onset of Illness/Injury or Date of Surgery Date: 18  Date of Referral to OT: 18  Referring Physician: Dr. Mittal      Admit Date: 3/4/2018    Visit Dx:     ICD-10-CM ICD-9-CM   1. Recurrent pneumonia J18.9 486   2. Hypoxia R09.02 799.02   3. Cough R05 786.2   4. Impaired mobility and activities of daily living Z74.09 799.89   5. Impaired mobility and endurance Z74.09 V49.89     Patient Active Problem List   Diagnosis   • Venous insufficiency   • Pulmonary hypertension severe   • Obesity   • Hyperlipidemia   • Gout   • Generalized anxiety disorder   • Essential hypertension   • DJD (degenerative joint disease)   • Coronary atherosclerosis of native coronary artery   • Coronary arteriosclerosis   • Chronic kidney disease   • Psoriasis   • Moderate episode of recurrent major depressive disorder   • Seasonal allergic rhinitis due to pollen   • Type 2 diabetes mellitus with stage 3 chronic kidney disease, without long-term current use of insulin   • Cough   • Non-rheumatic tricuspid valve insufficiency   • Pneumonia of right middle lobe due to infectious organism   • PVD (peripheral vascular disease)   • Light tobacco smoker   • History of tobacco use   • Foot pain   • Encounter for screening for osteoporosis   • COPD (chronic obstructive pulmonary disease)   • Carpal tunnel syndrome   • Breast screening   • Asthenia   • ALVAREZ (nonalcoholic steatohepatitis)   • Recurrent pneumonia   • Hypoxia             Adult Rehabilitation Note       18 0940 18 0832 18 1433    Rehab Assessment/Intervention    Discipline (P)  physical therapy assistant  -TL occupational therapy assistant  -BB physical therapy assistant  -TROY,MICHAEL,CH2    Document Type (P)  therapy note (daily note)  -TL therapy note (daily note)  -BB therapy note (daily note)  -TROY,MICHAEL,CH2     Subjective Information (P)  agree to therapy;fatigue  -TL agree to therapy  -BB agree to therapy;no complaints  -CH,TL,CH2    Patient Effort, Rehab Treatment (P)  good  -TL good  -BB good  -CH,TL,CH2    Symptoms Noted During/After Treatment (P)  shortness of breath  -TL shortness of breath  -BB shortness of breath  -CH,TL,CH2    Precautions/Limitations (P)  fall precautions;oxygen therapy device and L/min  -TL fall precautions;oxygen therapy device and L/min  -BB fall precautions;oxygen therapy device and L/min  -CH,TL,CH2    Recorded by [TL] Juaquin Haines PTA Student [BB] LINO Ocampo/L [CH,TL,CH2] Jennifer Bae PTA (r) Juaquin Haines PTA Student (t) Jennifer Bae PTA (c)    Vital Signs    Pre Systolic BP Rehab (P)  134  -  -  -CH,TL,CH2    Pre Treatment Diastolic BP (P)  87  -TL 78  -BB 85  -CH,TL,CH2    Post Systolic BP Rehab (P)  137  -TL  141  -CH,TL,CH2    Post Treatment Diastolic BP (P)  85  -TL  77  -CH,TL,CH2    Pretreatment Heart Rate (beats/min) (P)  82  -TL 76  -BB 78  -CH,TL,CH2    Intratreatment Heart Rate (beats/min)  86  -BB 88   with gait  -CH,TL,CH2    Posttreatment Heart Rate (beats/min) (P)  73  -TL 78  -BB 67  -CH,TL,CH2    Pre SpO2 (%) (P)  86  -TL 93  -BB 91   3L  -CH,TL,CH2    O2 Delivery Pre Treatment (P)  supplemental O2   3L  -TL supplemental O2  -BB supplemental O2  -CH,TL,CH2    Intra SpO2 (%) (P)  84   3L  -TL 90  -BB 87   with gait on 3L  -CH,TL,CH2    O2 Delivery Intra Treatment (P)  supplemental O2  -TL supplemental O2  -BB supplemental O2  -CH,TL,CH2    Post SpO2 (%) (P)  91  -TL 93  -BB 93  -CH,TL,CH2    O2 Delivery Post Treatment (P)  supplemental O2  -TL supplemental O2  -BB supplemental O2  -CH,TL,CH2    Pre Patient Position (P)  Sitting  -TL Sitting  -BB Sitting  -CH,TL,CH2    Intra Patient Position (P)  Sitting  -TL Sitting  -BB Standing  -CH,TL,CH2    Post Patient Position (P)  Sitting  -TL Sitting  -BB Sitting  -CH,TL,CH2    Recorded by  [TL] Juaquin Haines PTA Student [BB] Tiffany Miller, HUYNH/L [CH,TL,CH2] Jennifer Bae PTA (r) Juaquin Haines PTA Student (t) Jennifer Bae PTA (c)    Pain Assessment    Pain Assessment (P)  No/denies pain  -TL No/denies pain  -BB No/denies pain  -CH,TL,CH2    Recorded by [TL] Juaquin Haines PTA Student [BB] Tiffany Miller, HUYNH/L [CH,TL,CH2] Jennifer Bae PTA (r) Juaquin Haines PTA Student (t) Jennifer Bae PTA (c)    Vision Assessment/Intervention    Visual Impairment (P)  WFL with corrective lenses  -TL  WFL with corrective lenses  -CH,TL,CH2    Recorded by [TL] Juaquin Haines PTA Student  [CH,TL,CH2] Jennifer Bae PTA (r) Juaquin Haines PTA Student (t) Jennifer Bae PTA (c)    Cognitive Assessment/Intervention    Current Cognitive/Communication Assessment (P)  functional  -TL functional  -BB functional  -CH,TL,CH2    Orientation Status (P)  oriented x 4  -TL oriented x 4  -BB oriented x 4  -CH,TL,CH2    Follows Commands/Answers Questions (P)  100% of the time;able to follow multi-step instructions  -% of the time  -% of the time;able to follow multi-step instructions  -CH,TL,CH2    Personal Safety (P)  WNL/WFL  -TL  WNL/WFL  -CH,TL,CH2    Personal Safety Interventions (P)  fall prevention program maintained;gait belt;muscle strengthening facilitated;nonskid shoes/slippers when out of bed;supervised activity  -TL fall prevention program maintained;gait belt;nonskid shoes/slippers when out of bed;supervised activity  -BB fall prevention program maintained;gait belt;muscle strengthening facilitated;nonskid shoes/slippers when out of bed;supervised activity  -CH,TL,CH2    Recorded by [TL] Juaquin Haines PTA Student [BB] Tiffany Miller, HUYNH/L [CH,TL,CH2] Jennifer Bae PTA (r) Juaquin Haines PTA Student (t) Jennifer A Harpole, PTA (c)    Bed Mobility, Assessment/Treatment    Bed Mobility, Scoot/Bridge, [place] (P)  independent  -TL  independent  -CH,TL,CH2     Bed Mob, Supine to Sit, [place] (P)  not tested  -TL  not tested  -CH,TL,CH2    Bed Mob, Sit to Supine, [place] (P)  not tested  -TL  not tested  -CH,TL,CH2    Recorded by [TL] Juaquin Haines PTA Student  [CH,TL,CH2] Jennifer Bae PTA (r) Juaquin Haines PTA Student (t) Jennifer Bae PTA (c)    Transfer Assessment/Treatment    Transfers, Sit-Stand [place] (P)  not tested  -TL independent  -BB independent  -CH,TL,CH2    Transfers, Stand-Sit [place] (P)  not tested  -TL independent  -BB independent  -CH,TL,CH2    Transfers, Sit-Stand-Sit, Assist Device  --   none  -BB     Toilet Transfer, [place]  independent  -BB     Toilet Transfer, Assistive Device  elevated toilet seat  -BB     Recorded by [TL] Juaquin Haines PTA Student [BB] LINO Ocampo/L [CH,TL,CH2] Jennifer Bae PTA (r) Juaquin Haines PTA Student (t) Jennifer Bae PTA (c)    Gait Assessment/Treatment    Gait, [place] Level (P)  not tested  -TL  supervision required  -CH,TL,CH2    Gait, Assistive Device   rolling walker  -CH,TL,CH2    Gait, Distance (Feet)   192  -CH,TL,CH2    Gait, Safety Issues   supplemental O2;step length decreased  -CH,TL,CH2    Gait, Comment (P)  nsg deferred gait due to decreased O2 sats EOB  -TL  No LOB noted with gait  -CH,TL,CH2    Recorded by [TL] Juaquin Haines PTA Student  [CH,TL,CH2] Jennifer Bae PTA (r) Juaquin Haines PTA Student (t) Jennifer Bae PTA (c)    Stairs Assessment/Treatment    Stairs, [place] Level (P)  not tested  -TL  not tested  -CH,TL,CH2    Recorded by [TL] Juaquin Haines PTA Student  [CH,TL,CH2] Jennifer Bae PTA (r) Juaquin Haines PTA Student (t) Jennifer Bae PTA (c)    Upper Body Bathing Assessment/Training    UB Bathing Assess/Train, Comment  deferred, pt states she is going home today and yumiko is going to take a shower when she gets home where she has more room  -BB     Recorded by  [BB] LINO Ocampo/ZAID      Upper Body Dressing Assessment/Training    UB Dressing Assess/Train, Clothing Type  donning:   robe  -BB     UB Dressing Assess/Train, [place]  independent  -BB     Recorded by  [BB] ILNO Ocampo/L     Lower Body Dressing Assessment/Training    LB Dressing Assess/Train, Clothing Type  donning:;doffing:;socks  -BB     LB Dressing Assess/Train, Position  sitting  -BB     LB Dressing Assess/Train, [place]  independent  -BB     Recorded by  [BB] LINO Ocampo/L     Toileting Assessment/Training    Toileting Assess/Train, Assistive Device  grab bars  -BB     Toileting Assess/Train, Position  sitting;standing  -BB     Toileting Assess/Train, Indepen Level  independent  -BB     Recorded by  [BB] LINO Ocampo/L     Grooming Assessment/Training    Grooming Assess/Train, Position  sitting  -BB     Grooming Assess/Train, Indepen Level  independent  -BB     Grooming Assess/Train, Comment  washed hands, combed hair, oral care  -BB     Recorded by  [BB] LINO Ocampo/L     Therapy Exercises    Bilateral Lower Extremities (P)  AROM:;20 reps;sitting;ankle pumps/circles;hip abduction/adduction;glut sets;LAQ;hip flexion   x2 sets  -TL  AROM:;15 reps;sitting;ankle pumps/circles;glut sets;hip abduction/adduction;hip flexion;LAQ  -CH,TL,CH2    Recorded by [TL] Juaquin Haines PTA Student  [CH,TL,CH2] Jennifer Bae PTA (r) Juaquin Haines PTA Student (t) Jennifer Bae PTA (c)    Positioning and Restraints    Pre-Treatment Position (P)  in bed  -TL in bed  -BB in bed  -CH,TL,CH2    Post Treatment Position (P)  bed  -TL bed  -BB bed  -CH,TL,CH2    In Bed (P)  sitting;side rails up x1;exit alarm on;encouraged to call for assist;call light within reach;notified nsg  -TL sitting;call light within reach;encouraged to call for assist  -BB sitting;side rails up x1;call light within reach;encouraged to call for assist;exit alarm on  -CH,TL,CH2    Recorded by [TL] Juaquin Haines PTA  Student [BB] LINO Ocampo/L [CH,TL,CH2] Jennifer Bae, PTA (r) Juaquin Haines PTA Student (t) Jennifer Bae, CRYSTAL (c)      03/06/18 0912          Rehab Assessment/Intervention    Discipline occupational therapy assistant  -BB      Document Type therapy note (daily note)  -BB      Subjective Information agree to therapy  -BB      Patient Effort, Rehab Treatment good  -BB      Symptoms Noted During/After Treatment shortness of breath  -BB      Precautions/Limitations oxygen therapy device and L/min  -BB      Recorded by [BB] HITESH OcampoA/L      Vital Signs    Pretreatment Heart Rate (beats/min) 86  -BB      Posttreatment Heart Rate (beats/min) 83  -BB      Pre SpO2 (%) 92  -BB      O2 Delivery Pre Treatment supplemental O2  -BB      Post SpO2 (%) 94  -BB      O2 Delivery Post Treatment supplemental O2  -BB      Pre Patient Position Sitting  -BB      Post Patient Position Sitting  -BB      Recorded by [BB] HITESH OcampoA/L      Pain Assessment    Pain Assessment No/denies pain  -BB      Recorded by [BB] LINO Ocampo/L      Cognitive Assessment/Intervention    Current Cognitive/Communication Assessment functional  -BB      Orientation Status oriented x 4  -BB      Follows Commands/Answers Questions 100% of the time  -BB      Personal Safety --   discussed home safety, E/C, W/S  -BB      Personal Safety Interventions gait belt;nonskid shoes/slippers when out of bed  -BB      Recorded by [BB] HITESH OcampoA/L      Bed Mobility, Assessment/Treatment    Bed Mob, Supine to Sit, [place] independent  -BB      Bed Mob, Sit to Supine, [place] independent  -BB      Recorded by [BB] LINO Ocampo/L      Transfer Assessment/Treatment    Transfers, Sit-Stand [place] independent  -BB      Transfers, Stand-Sit [place] independent  -BB      Recorded by [BB] HITESH OcampoA/L      Upper Body Bathing Assessment/Training    UB Bathing  Assess/Train, Comment pt already had a bath  -BB      Recorded by [BB] Tiffany Miller, HUYNH/L      Toileting Assessment/Training    Toileting Assess/Train, Assistive Device grab bars  -BB      Toileting Assess/Train, Position sitting;standing  -BB      Toileting Assess/Train, Indepen Level independent  -BB      Recorded by [BB] Tiffany Miller, HUYNH/L      Grooming Assessment/Training    Grooming Assess/Train, Position sink side;standing  -BB      Grooming Assess/Train, Indepen Level independent  -BB      Grooming Assess/Train, Comment washed hands, comb hair, oral care  -BB      Recorded by [BB] Tiffany Miller, HUYNH/L      Positioning and Restraints    Pre-Treatment Position in bed  -BB      Post Treatment Position bed  -BB      In Bed sitting;call light within reach;encouraged to call for assist  -BB      Recorded by [BB] Tiffany Miller, HUYNH/L        User Key  (r) = Recorded By, (t) = Taken By, (c) = Cosigned By    Initials Name Effective Dates    CH Jennifer Bae, PTA 10/17/16 - 03/06/18    BB Tiffany Miller, HUYNH/L 10/17/16 - 03/06/18     Tiffany Miller, HUYNH/L 03/07/18 -     TL Juaquin Haines, PTA Student 02/15/18 - 03/06/18     Juaquin Haines, PTA Student 03/07/18 -                 OT Goals       03/07/18 1140 03/06/18 1328 03/05/18 1312    Dynamic Standing Balance OT LTG    Dynamic Standing Balance OT LTG, Date Established   03/05/18  -RB    Dynamic Standing Balance OT LTG, Time to Achieve   by discharge  -RB    Dynamic Standing Balance OT LTG, [place] Level   independent;conditional independence   With 02 SATS 92% or above.  -RB    Dynamic Standing Balance OT LTG, Assist Device   assistive Device   R/W if needed.  -RB    Dynamic Standing Balance OT LTG, Date Goal Reviewed 03/07/18  -BB 03/06/18  -BB     Dynamic Standing Balance OT LTG, Outcome  goal met  -BB     Patient Education OT LTG    Patient Education OT LTG, Date Established   03/05/18  -RB    Patient Education OT LTG,  Time to Achieve   by discharge  -RB    Patient Education OT LTG, Education Type   home safety;energy conservation;work simplification;adaptive breathing  -RB    Patient Education OT LTG, Education Understanding   verbalizes understanding;demonstrates adequately  -RB    Patient Education OT LTG, Date Goal Reviewed 03/07/18  -BB 03/06/18  -BB     Patient Education OT LTG Outcome  goal met  -BB     ADL OT LTG    ADL OT LTG, Date Established   03/05/18  -RB    ADL OT LTG, Time to Achieve   by discharge  -RB    ADL OT LTG, Activity Type   ADL skills   Sponge bath and dress or walk-in shower.  -RB    ADL OT LTG, [place] Level   independent with device;assistive device   R/W and shower chair if needed & 02 SATS 92% or above.  -RB    ADL OT LTG, Date Goal Reviewed 03/07/18  -BB 03/06/18  -BB     ADL OT LTG, Outcome  goal ongoing  -BB       User Key  (r) = Recorded By, (t) = Taken By, (c) = Cosigned By    Initials Name Provider Type    RB Otto Shoemaker, OT Occupational Therapist    MIYA Nuñez Occupational Therapy Assistant     MIYA Ocampo Occupational Therapy Assistant          Occupational Therapy Education     Title: PT OT SLP Therapies (Active)     Topic: Occupational Therapy (Active)     Point: ADL training (Done)    Description: Instruct learner(s) on proper safety adaptation and remediation techniques during self care or transfers.   Instruct in proper use of assistive devices.    Learning Progress Summary    Learner Readiness Method Response Comment Documented by Status   Patient Acceptance SHIREEN COLLINS DU BB 03/07/18 1140 Done    Acceptance SHIREEN COLLINS DU BB 03/06/18 1328 Done               Point: Precautions (Done)    Description: Instruct learner(s) on prescribed precautions during self-care and functional transfers.    Learning Progress Summary    Learner Readiness Method Response Comment Documented by Status   Patient Acceptance KARINA Marie on use of gait belt and non skid socks when  OOB.  03/05/18 1311 Done               Point: Body mechanics (Done)    Description: Instruct learner(s) on proper positioning and spine alignment during self-care, functional mobility activities and/or exercises.    Learning Progress Summary    Learner Readiness Method Response Comment Documented by Status   Patient Acceptance SHIREEN COLLINS DU   03/07/18 1140 Done    Acceptance SHIREEN COLLINSERIN   03/06/18 1328 Done                      User Key     Initials Effective Dates Name Provider Type Discipline     06/15/16 -  Otto Shoemaker, OT Occupational Therapist OT     10/17/16 - 03/06/18 MIYA Ocampo Occupational Therapy Assistant OT     03/07/18 -  MIYA Ocampo Occupational Therapy Assistant OT                  OT Recommendation and Plan  Anticipated Discharge Disposition: home with home health  Planned Therapy Interventions: activity intolerance, energy conservation, ADL retraining  Therapy Frequency:  (3-14x/wk)  Plan of Care Review  Plan Of Care Reviewed With: patient  Progress: improving  Outcome Summary/Follow up Plan: Pt did not meet any new goals this tx. Pt Indep with bed-toilet t/. Pt's O2 decreased to 87%, however pt performed PLB and O2 sats increased to 92% within ~20 secs.         Outcome Measures       03/07/18 0940 03/07/18 0832 03/06/18 1433    How much help from another person do you currently need...    Turning from your back to your side while in flat bed without using bedrails? (P)  4  -TL  4  -CH (r) TL (t) CH (c)    Moving from lying on back to sitting on the side of a flat bed without bedrails? (P)  4  -TL  4  -CH (r) TL (t) CH (c)    Moving to and from a bed to a chair (including a wheelchair)? (P)  4  -TL  4  -CH (r) TL (t) CH (c)    Standing up from a chair using your arms (e.g., wheelchair, bedside chair)? (P)  4  -TL  4  -CH (r) TL (t) CH (c)    Climbing 3-5 steps with a railing? (P)  3  -TL  3  -CH (r) TL (t) CH (c)    To walk in hospital room? (P)  4  -TL  4  -CH  (r) TL (t) CH (c)    AM-PAC 6 Clicks Score (P)  23  -BB (r) TL (t)  23  -CH (r) TL (t)    How much help from another is currently needed...    Putting on and taking off regular lower body clothing?  4  -BB     Bathing (including washing, rinsing, and drying)  4  -BB     Toileting (which includes using toilet bed pan or urinal)  4  -BB     Putting on and taking off regular upper body clothing  4  -BB     Taking care of personal grooming (such as brushing teeth)  4  -BB     Eating meals  4  -BB     Score  24  -BB     Functional Assessment    Outcome Measure Options (P)  AM-PAC 6 Clicks Basic Mobility (PT)  -TL  AM-PAC 6 Clicks Basic Mobility (PT)  -CH (r) TL (t) CH (c)      03/06/18 0912 03/05/18 1335 03/05/18 1006    How much help from another person do you currently need...    Turning from your back to your side while in flat bed without using bedrails?  4  -MN     Moving from lying on back to sitting on the side of a flat bed without bedrails?  4  -MN     Moving to and from a bed to a chair (including a wheelchair)?  4  -MN     Standing up from a chair using your arms (e.g., wheelchair, bedside chair)?  4  -MN     Climbing 3-5 steps with a railing?  3  -MN     To walk in hospital room?  3  -MN     AM-PAC 6 Clicks Score  22  -MN     How much help from another is currently needed...    Putting on and taking off regular lower body clothing? 4  -BB  4  -RB    Bathing (including washing, rinsing, and drying) 4  -BB  3  -RB    Toileting (which includes using toilet bed pan or urinal) 4  -BB  3  -RB    Putting on and taking off regular upper body clothing 4  -BB  4  -RB    Taking care of personal grooming (such as brushing teeth) 4  -BB  4  -RB    Eating meals 4  -BB  4  -RB    Score 24  -BB  22  -RB    Functional Assessment    Outcome Measure Options  AM-PAC 6 Clicks Basic Mobility (PT)  -MN AM-PAC 6 Clicks Daily Activity (OT)  -RB      User Key  (r) = Recorded By, (t) = Taken By, (c) = Cosigned By    Initials Name  Provider Type    RB Otto Shoemaker, OT Occupational Therapist    AMADEO Fontana, PT Physical Therapist    CH Jennifer Bae, PTA Physical Therapy Assistant    BB LINO Ocampo/L Occupational Therapy Assistant     LINO Ocampo/L Occupational Therapy Assistant    TL Juaquin Haines, PTA Student PTA Student     Juaquin Haines, PTA Student PTA Student           Time Calculation:         Time Calculation- OT       03/07/18 1144          Time Calculation- OT    OT Start Time 0832  -BB      OT Stop Time 0910  -BB      OT Time Calculation (min) 38 min  -BB      Total Timed Code Minutes- OT 38 minute(s)  -BB      OT Received On 03/07/18  -        User Key  (r) = Recorded By, (t) = Taken By, (c) = Cosigned By    Initials Name Provider Type    BB LINO Ocampo/L Occupational Therapy Assistant           Therapy Charges for Today     Code Description Service Date Service Provider Modifiers Qty    09218676052 HC OT SELF CARE/MGMT/TRAIN EA 15 MIN 3/6/2018 HITESH OcampoA/L GO 2    99745603078 HC OT SELF CARE/MGMT/TRAIN EA 15 MIN 3/7/2018 LINO Ocampo/L GO 3          OT G-codes  OT Professional Judgement Used?: Yes  OT Functional Scales Options: AM-PAC 6 Clicks Daily Activity (OT)  Score: 22  Functional Limitation: Self care  Self Care Current Status (): At least 20 percent but less than 40 percent impaired, limited or restricted  Self Care Goal Status (): At least 1 percent but less than 20 percent impaired, limited or restricted    MIYA Ocampo  3/7/2018

## 2018-03-07 NOTE — DISCHARGE INSTR - OTHER ORDERS
[facility] will contact you prior to their visit  [phone]  Contact Gertrude at  once you get home and they will deliver the rest of your equipment

## 2018-03-08 PROBLEM — G47.33 OSA (OBSTRUCTIVE SLEEP APNEA): Status: ACTIVE | Noted: 2018-01-01

## 2018-03-08 PROBLEM — J18.9 PNEUMONIA OF RIGHT MIDDLE LOBE DUE TO INFECTIOUS ORGANISM: Status: RESOLVED | Noted: 2018-01-01 | Resolved: 2018-01-01

## 2018-03-08 PROBLEM — J18.9 RECURRENT PNEUMONIA: Status: RESOLVED | Noted: 2018-01-01 | Resolved: 2018-01-01

## 2018-03-08 PROBLEM — E66.01 MORBID OBESITY WITH BMI OF 45.0-49.9, ADULT (HCC): Status: ACTIVE | Noted: 2018-01-01

## 2018-03-08 PROBLEM — J96.11 CHRONIC RESPIRATORY FAILURE WITH HYPOXIA (HCC): Status: ACTIVE | Noted: 2018-01-01

## 2018-03-08 NOTE — PAYOR COMM NOTE
"Cameron Cabrera (62 y.o. Female)     Date of Birth Social Security Number Address Home Phone MRN    1955  [address] [address] [phone] [id number]    Sabianist Marital Status          Jain        Admission Date Admission Type Admitting Provider Attending Provider Department, Room/Bed    3/4/18 Emergency Nataliya Mittal MD  [address], 427/1    Discharge Date Discharge Disposition Discharge Destination        3/7/2018 Home-Health Care Sv             Attending Provider: (none)    Allergies:  Lisinopril, Penicillins, Wellbutrin [Bupropion], Nsaids, Tramadol    Isolation:  None   Infection:  None   Code Status:  Prior    Ht:  160 cm (63\")   Wt:  121 kg (267 lb 4.8 oz)    Admission Cmt:  None   Principal Problem:  Recurrent pneumonia [J18.9]                 Active Insurance as of 3/4/2018     Primary Coverage     Payor Plan Insurance Group Employer/Plan Group    [employer] [place] [employer] [place]      Payor Plan Address Payor Plan Phone Number Effective From Effective To    [address] [address] [phone] 9/21/2016     [place] [address]       Subscriber Name Subscriber Birth Date Member ID       CAMERON CABRERA 1955 [id number]                 Emergency Contacts      (Rel.) Home Phone Work Phone Mobile Phone    Cameron Espino (Mother) [phone]               Discharge Summary      VERNON Francisco at 3/7/2018  3:15 PM     Attestation signed by Miguel Mullen MD at 3/7/2018  4:33 PM        I agree with the above note and assessment and plan.    Physical exam:    Temp:  [97.5 °F (36.4 °C)-98.1 °F (36.7 °C)] 98.1 °F (36.7 °C)  Heart Rate:  [72-92] 73  Resp:  [18-20] 20  BP: (140-143)/(61-90) 142/90    CVS: S1S2 RRR                                     [facility]  DISCHARGE SUMMARY       Date of Admission: 3/4/2018  Date of Discharge:  " 3/7/2018  Primary Care Physician: Roby Corley MD    Presenting Problem/History of Present Illness:  Cough [R05]  Hypoxia [R09.02]  Recurrent pneumonia [J18.9]     Final Discharge Diagnoses:  Hospital Problem List     * (Principal)Recurrent pneumonia    Pulmonary hypertension severe    Obesity    Hyperlipidemia    Generalized anxiety disorder    Essential hypertension    Coronary atherosclerosis of native coronary artery    Coronary arteriosclerosis    Overview Signed 10/17/2016  4:48 PM by Roby Corley MD     2007: PCI RCA 2011: PCI: LCX            Chronic kidney disease    Overview Signed 10/17/2016  4:48 PM by Roby Corley MD     Stage 3         Type 2 diabetes mellitus with stage 3 chronic kidney disease, without long-term current use of insulin    Cough    PVD (peripheral vascular disease)    Overview Signed 2/16/2018  9:08 AM by Doris Arndt MA     LATOYA R .96 L 1.0 aortobifem 1/2012            COPD (chronic obstructive pulmonary disease)    Hypoxia          Consults:   Consults     Date and Time Order Name Status Description    3/4/2018 1239 Hospitalist (on-call MD unless specified)            Pertinent Test Results:   Lab Results (last 24 hours)     Procedure Component Value Units Date/Time    CBC & Differential [[id number]] Collected:  03/07/18 0633    Specimen:  Blood Updated:  03/07/18 0708    Narrative:       The following orders were created for panel order CBC & Differential.  Procedure                               Abnormality         Status                     ---------                               -----------         ------                     CBC Auto Differential[[id number]]        Abnormal            Final result                 Please view results for these tests on the individual orders.    CBC Auto Differential [[id number]]  (Abnormal) Collected:  03/07/18 0633    Specimen:  Blood Updated:  03/07/18 0708     WBC 9.57 10*3/mm3      RBC 4.24 10*6/mm3      Hemoglobin 12.4 g/dL       Hematocrit 39.2 %      MCV 92.5 fL      MCH 29.2 pg      MCHC 31.6 g/dL      RDW 15.1 (H) %      RDW-SD 51.1 (H) fl      MPV 12.0 fL      Platelets 153 10*3/mm3      Neutrophil % 89.1 (H) %      Lymphocyte % 6.6 (L) %      Monocyte % 3.8 %      Eosinophil % 0.0 %      Basophil % 0.1 %      Immature Grans % 0.4 %      Neutrophils, Absolute 8.53 10*3/mm3      Lymphocytes, Absolute 0.63 10*3/mm3      Monocytes, Absolute 0.36 10*3/mm3      Eosinophils, Absolute 0.00 10*3/mm3      Basophils, Absolute 0.01 10*3/mm3      Immature Grans, Absolute 0.04 (H) 10*3/mm3     Comprehensive Metabolic Panel [[id number]]  (Abnormal) Collected:  03/07/18 0633    Specimen:  Blood Updated:  03/07/18 0725     Glucose 212 (H) mg/dL      BUN 37 (H) mg/dL      Creatinine 1.36 (H) mg/dL      Sodium 142 mmol/L      Potassium 3.4 (L) mmol/L      Chloride 100 mmol/L      CO2 30.0 mmol/L      Calcium 8.2 (L) mg/dL      Total Protein 5.5 (L) g/dL      Albumin 2.80 (L) g/dL      ALT (SGPT) 47 U/L      AST (SGOT) 20 U/L      Alkaline Phosphatase 62 U/L      Total Bilirubin 0.3 mg/dL      eGFR Non African Amer 39 (L) mL/min/1.73      Globulin 2.7 gm/dL      A/G Ratio 1.0 (L) g/dL      BUN/Creatinine Ratio 27.2 (H)     Anion Gap 12.0 mmol/L     Magnesium [[id number]]  (Normal) Collected:  03/07/18 0633    Specimen:  Blood Updated:  03/07/18 0725     Magnesium 1.7 mg/dL     Respiratory Culture - Sputum, Cough [[id number]] Collected:  03/07/18 1157    Specimen:  Sputum from Cough Updated:  03/07/18 1221     Gram Stain Result Moderate (3+) WBCs per low power field      Rare (1+) Epithelial cells per low power field      Mixed bacterial dawson        Imaging Results (last 24 hours)     ** No results found for the last 24 hours. **        Chief Complaint on Day of Discharge: No complaints.     Hospital Course:  The patient is a 62 y.o. female who presented to [facility] on [facility]2018 with significant history for COPD, pulmonary hypertension,  "HTN, morbid obesity, DM, ASCAD, PAD with stents and CKD stage III complaining of shortness of air.  The patient states she has been on three full courses of antibiotics, but continues to show increasing opacity within the right middle lobe.  The patient states she has decided to quit smoking and is having her house cleaning to try to remove the remnants of smoke.   The patient was started on IV azithromycin and Rocephin.  Cultures have remained negative.  The patient could not keep her oxygen saturation greater than 88% on room air, so home oxygen was set up for her.  Patient states she feels much better from admission and she is scheduled to see her pulmonologist, Dr. Mariana Thompson, tomorrow and she will keep that appointment.  Prescriptions are given for azithromycin to complete a full course of antibiotics and a prednisone taper pack.    Condition on Discharge:  Stable    Physical Exam on Discharge:  /90  Pulse 73  Temp 98.1 °F (36.7 °C) (Oral)   Resp 20  Ht 160 cm (63\")  Wt 121 kg (267 lb 4.8 oz)  LMP  (LMP Unknown)  SpO2 92%  BMI 47.35 kg/m2  Physical Exam   Constitutional: She is oriented to person, place, and time. She appears well-developed and well-nourished.   HENT:   Head: Normocephalic and atraumatic.   Eyes: EOM are normal. Pupils are equal, round, and reactive to light.   Neck: Normal range of motion. Neck supple.   Cardiovascular: Normal rate and regular rhythm.    Pulmonary/Chest: Effort normal and breath sounds normal.   Abdominal: Soft. Bowel sounds are normal.   Musculoskeletal: Normal range of motion.   Neurological: She is alert and oriented to person, place, and time.   Skin: Skin is warm and dry.   Psychiatric: She has a normal mood and affect.     Discharge Disposition:  Home-Health Care [facility]    Discharge Medications:   Cameron Bowie   Home Medication Instructions ROSE:[id number]    Printed on:03/07/18 1604   Medication Information                      albuterol " (PROVENTIL) (2.5 MG/3ML) 0.083% nebulizer solution  Take 2.5 mg by nebulization 4 (Four) Times a Day As Needed for Wheezing.             allopurinol (ZYLOPRIM) 100 MG tablet  Take 1 tablet by mouth Daily.             aspirin 81 MG tablet  Take 1 tablet by mouth Daily.             atorvastatin (LIPITOR) 80 MG tablet  Take 1 tablet by mouth Every Night.             azelastine (ASTELIN) 0.1 % nasal spray  2 sprays into each nostril 2 (Two) Times a Day. Use in each nostril as directed             azithromycin (ZITHROMAX) 500 MG tablet  Take 1 tablet by mouth Daily for 5 days.             bisoprolol (ZEBeta) 5 MG tablet  Take 0.5 tablets by mouth Daily.             Blood Glucose Monitoring Suppl (ONE TOUCH ULTRA MINI) w/Device kit               Calcium-Magnesium-Vitamin D (CALCIUM 500 PO)  Take 500 mg by mouth 2 (two) times a day.             clopidogrel (PLAVIX) 75 MG tablet  Take 1 tablet by mouth Daily.             furosemide (LASIX) 80 MG tablet  Take 1 tablet by mouth Daily.             glucose blood test strip  Use as instructed             GuaiFENesin ER (MUCINEX MAXIMUM STRENGTH) 1200 MG tablet sustained-release 12 hour  Take 1 tablet BID as needed.             hydrocortisone (WESTCORT) 0.2 % cream  Apply  topically 2 (Two) Times a Day.             hydrocortisone-bacitracin-zinc oxide-nystatin (MAGIC BARRIER)  Apply 1 application topically As Needed (Rash).             promethazine-dextromethorphan (PROMETHAZINE-DM) 6.25-15 MG/5ML syrup  Take 5 mL by mouth At Night As Needed for Cough.             Respiratory Therapy Supplies (NEBULIZER/TUBING/MOUTHPIECE) kit  Tubing             senna (SENOKOT) 8.6 MG tablet tablet  Take 1 tablet by mouth 2 (two) times a day.             sertraline (ZOLOFT) 100 MG tablet  Take 0.5 tablets by mouth Daily.             sildenafil (REVATIO) 20 MG tablet  Take 1 tablet by mouth 3 (Three) Times a Day.             Tiotropium Bromide Monohydrate 2.5 MCG/ACT aerosol solution  Inhale 2  puffs Daily.             triamcinolone (KENALOG) 0.1 % ointment  Apply  topically 2 (Two) Times a Day. Max 2 weeks per treatment             TRUEPLUS LANCETS 33G misc  1 each Daily.             VENTOLIN  (90 Base) MCG/ACT inhaler  USE 2 PUFFS FOUR TIMES DAILY AND AS NEEDED             vitamin D (ERGOCALCIFEROL) 25180 units capsule capsule  Take 1 capsule by mouth Every 14 (Fourteen) Days.                 Discharge Diet:   Diet Instructions     Diet: Cardiac       Discharge Diet:  Cardiac                 Activity at Discharge:   Activity Instructions     Activity as Tolerated                     Discharge Care Plan/Instructions: The patient will follow up with her primary care physician within one week.  She will follow up with her pulmonologist, Dr. Mariana Thompson, tomorrow at her regularly scheduled appointment.  Prescriptions are given for azithromycin and a prednisone taper pack.  Home health and oxygen have been set up for the patient.    Follow-up Appointments:   Future Appointments  Date Time Provider Department Center   3/8/2018 10:00 AM Jeni Thompson MD [facility] PULM [facility] None   3/13/2018 1:45 PM Roby Corley MD [facility] FM MAD4 None   5/1/2018 9:30 AM David Snowden MD PhD [facility] CD [facility] None   5/2/2018 9:15 AM David Snowden MD PhD [facility] CD [facility] None       Test Results Pending at Discharge:  Order Current Status    Respiratory Culture - Sputum, Cough Preliminary result            This document has been electronically signed by VERNON Francisco on March 7, 2018 4:04 PM        Time: Greater than 30 minutes.                 Electronically signed by Miguel Mullen MD at 3/7/2018  4:33 PM

## 2018-03-08 NOTE — THERAPY DISCHARGE NOTE
Acute Care - Occupational Therapy Discharge Summary  [facility]     Patient Name: Cameron Bowie  : 1955  MRN: [id number]    Today's Date: 3/8/2018  Onset of Illness/Injury or Date of Surgery Date: 18    Date of Referral to OT: 18  Referring Physician: Dr. Mittal      Admit Date: 3/4/2018        OT Recommendation and Plan    Visit Dx:    ICD-10-CM ICD-9-CM   1. Recurrent pneumonia J18.9 486   2. Hypoxia R09.02 799.02   3. Cough R05 786.2   4. Impaired mobility and activities of daily living Z74.09 799.89   5. Impaired mobility and endurance Z74.09 V49.89   6. Pulmonary hypertension severe I27.20 416.8   7. Pure hypercholesterolemia E78.00 272.0   8. Generalized anxiety disorder F41.1 300.02   9. Essential hypertension I10 401.9   10. Atherosclerosis of native coronary artery of native heart without angina pectoris I25.10 414.01   11. Coronary arteriosclerosis I25.10 414.00   12. Stage 3 chronic kidney disease N18.3 585.3   13. Type 2 diabetes mellitus with stage 3 chronic kidney disease, without long-term current use of insulin E11.22 250.40    N18.3 585.3   14. PVD (peripheral vascular disease) I73.9 443.9   15. Venous insufficiency I87.2 459.81   16. Psoriasis L40.9 696.1   17. Moderate episode of recurrent major depressive disorder F33.1 296.32   18. Non-rheumatic tricuspid valve insufficiency I36.1 424.2   19. Pneumonia of right middle lobe due to infectious organism J18.1 486   20. Light tobacco smoker F17.200 305.1   21. History of tobacco use Z87.891 V15.82   22. Encounter for screening for osteoporosis Z13.820 V82.81   23. Breast screening Z12.31 V76.10   24. Asthenia R53.1 780.79   25. ALVAREZ (nonalcoholic steatohepatitis) K75.81 571.8                     OT Goals       18 0655 18 1140 18 1328    Dynamic Standing Balance OT LTG    Dynamic Standing Balance OT LTG, Date Goal Reviewed 18  -NN 18  -BB 18  -BB    Dynamic Standing Balance OT LTG,  Outcome goal not met  -NN  goal met  -BB    Dynamic Standing Balance OT LTG, Reason Goal Not Met discharged from facility  -NN      Patient Education OT LTG    Patient Education OT LTG, Date Goal Reviewed 03/08/18  -NN 03/07/18  -BB 03/06/18  -BB    Patient Education OT LTG Outcome goal not met  -NN  goal met  -BB    Patient Education OT LTG, Reason Goal Not Met discharged from facility  -NN      ADL OT LTG    ADL OT LTG, Date Goal Reviewed 03/08/18  -NN 03/07/18  -BB 03/06/18  -BB    ADL OT LTG, Outcome goal not met  -NN  goal ongoing  -BB    ADL OT LTG, Reason Goal Not Met discharged from facility  -NN        03/05/18 1312          Dynamic Standing Balance OT LTG    Dynamic Standing Balance OT LTG, Date Established 03/05/18  -RB      Dynamic Standing Balance OT LTG, Time to Achieve by discharge  -RB      Dynamic Standing Balance OT LTG, Tuttle Level independent;conditional independence   With 02 SATS 92% or above.  -RB      Dynamic Standing Balance OT LTG, Assist Device assistive Device   R/W if needed.  -RB      Patient Education OT LTG    Patient Education OT LTG, Date Established 03/05/18  -RB      Patient Education OT LTG, Time to Achieve by discharge  -RB      Patient Education OT LTG, Education Type home safety;energy conservation;work simplification;adaptive breathing  -RB      Patient Education OT LTG, Education Understanding verbalizes understanding;demonstrates adequately  -RB      ADL OT LTG    ADL OT LTG, Date Established 03/05/18  -RB      ADL OT LTG, Time to Achieve by discharge  -RB      ADL OT LTG, Activity Type ADL skills   Sponge bath and dress or walk-in shower.  -RB      ADL OT LTG, Tuttle Level independent with device;assistive device   R/W and shower chair if needed & 02 SATS 92% or above.  -RB        User Key  (r) = Recorded By, (t) = Taken By, (c) = Cosigned By    Initials Name Provider Type    YUNIEL Shoemaker, OT Occupational Therapist    LINO Nuñez/ZAID  Occupational Therapy Assistant     LINO Ocampo/L Occupational Therapy Assistant    MARIELOS Corona/L Occupational Therapist                Outcome Measures       03/07/18 0940 03/07/18 0832 03/06/18 1433    How much help from another person do you currently need...    Turning from your back to your side while in flat bed without using bedrails? 4  -CH (r) TL (t) CH (c)  4  -CH (r) TL (t) CH (c)    Moving from lying on back to sitting on the side of a flat bed without bedrails? 4  -CH (r) TL (t) CH (c)  4  -CH (r) TL (t) CH (c)    Moving to and from a bed to a chair (including a wheelchair)? 4  -CH (r) TL (t) CH (c)  4  -CH (r) TL (t) CH (c)    Standing up from a chair using your arms (e.g., wheelchair, bedside chair)? 4  -CH (r) TL (t) CH (c)  4  -CH (r) TL (t) CH (c)    Climbing 3-5 steps with a railing? 3  -CH (r) TL (t) CH (c)  3  -CH (r) TL (t) CH (c)    To walk in hospital room? 4  -CH (r) TL (t) CH (c)  4  -CH (r) TL (t) CH (c)    AM-PAC 6 Clicks Score 23  -CH (r) TL (t)  23  -CH (r) TL (t)    How much help from another is currently needed...    Putting on and taking off regular lower body clothing?  4  -BB     Bathing (including washing, rinsing, and drying)  4  -BB     Toileting (which includes using toilet bed pan or urinal)  4  -BB     Putting on and taking off regular upper body clothing  4  -BB     Taking care of personal grooming (such as brushing teeth)  4  -BB     Eating meals  4  -BB     Score  24  -BB     Functional Assessment    Outcome Measure Options AM-PAC 6 Clicks Basic Mobility (PT)  -CH (r) TL (t) CH (c)  AM-PAC 6 Clicks Basic Mobility (PT)  -CH (r) TL (t) CH (c)      03/06/18 0912 03/05/18 1335 03/05/18 1006    How much help from another person do you currently need...    Turning from your back to your side while in flat bed without using bedrails?  4  -MN     Moving from lying on back to sitting on the side of a flat bed without bedrails?  4  -MN     Moving to and from  a bed to a chair (including a wheelchair)?  4  -MN     Standing up from a chair using your arms (e.g., wheelchair, bedside chair)?  4  -MN     Climbing 3-5 steps with a railing?  3  -MN     To walk in hospital room?  3  -MN     AM-PAC 6 Clicks Score  22  -MN     How much help from another is currently needed...    Putting on and taking off regular lower body clothing? 4  -BB  4  -RB    Bathing (including washing, rinsing, and drying) 4  -BB  3  -RB    Toileting (which includes using toilet bed pan or urinal) 4  -BB  3  -RB    Putting on and taking off regular upper body clothing 4  -BB  4  -RB    Taking care of personal grooming (such as brushing teeth) 4  -BB  4  -RB    Eating meals 4  -BB  4  -RB    Score 24  -BB  22  -RB    Functional Assessment    Outcome Measure Options  AM-PAC 6 Clicks Basic Mobility (PT)  -MN AM-PAC 6 Clicks Daily Activity (OT)  -RB      User Key  (r) = Recorded By, (t) = Taken By, (c) = Cosigned By    Initials Name Provider Type    YUNIEL Shoemaker, OT Occupational Therapist    MN Annabelle Fontana, PT Physical Therapist    CH Jennifer Bae, PTA Physical Therapy Assistant     Jnenifer Bae, PTA Physical Therapy Assistant    LINO Nuñez/L Occupational Therapy Assistant     LINO Ocampo/L Occupational Therapy Assistant    MICHAEL Haines, CRYSTAL Student PTA Student     Juaquin Haines, PTA Student PTA Student              OT Discharge Summary  Anticipated Discharge Disposition: home with home health  Reason for Discharge: Discharge from facility  Outcomes Achieved: Refer to plan of care for updates on goals achieved  Discharge Destination: Home with home health      Brittany Soto OTR/L  3/8/2018

## 2018-03-08 NOTE — THERAPY DISCHARGE NOTE
Acute Care - Physical Therapy Discharge Summary  [facility]       Patient Name: Cameron Bowie  : 1955  MRN: [id number]    Today's Date: 3/8/2018  Onset of Illness/Injury or Date of Surgery Date: 18    Date of Referral to PT: 18  Referring Physician: Dr. Mittal      Admit Date: 3/4/2018      PT Recommendation and Plan    Visit Dx:    ICD-10-CM ICD-9-CM   1. Recurrent pneumonia J18.9 486   2. Hypoxia R09.02 799.02   3. Cough R05 786.2   4. Impaired mobility and activities of daily living Z74.09 799.89   5. Impaired mobility and endurance Z74.09 V49.89   6. Pulmonary hypertension severe I27.20 416.8   7. Pure hypercholesterolemia E78.00 272.0   8. Generalized anxiety disorder F41.1 300.02   9. Essential hypertension I10 401.9   10. Atherosclerosis of native coronary artery of native heart without angina pectoris I25.10 414.01   11. Coronary arteriosclerosis I25.10 414.00   12. Stage 3 chronic kidney disease N18.3 585.3   13. Type 2 diabetes mellitus with stage 3 chronic kidney disease, without long-term current use of insulin E11.22 250.40    N18.3 585.3   14. PVD (peripheral vascular disease) I73.9 443.9   15. Venous insufficiency I87.2 459.81   16. Psoriasis L40.9 696.1   17. Moderate episode of recurrent major depressive disorder F33.1 296.32   18. Non-rheumatic tricuspid valve insufficiency I36.1 424.2   19. Pneumonia of right middle lobe due to infectious organism J18.1 486   20. Light tobacco smoker F17.200 305.1   21. History of tobacco use Z87.891 V15.82   22. Encounter for screening for osteoporosis Z13.820 V82.81   23. Breast screening Z12.31 V76.10   24. Asthenia R53.1 780.79   25. ALVAREZ (nonalcoholic steatohepatitis) K75.81 571.8             Outcome Measures       18 0940 18 0832 18 1433    How much help from another person do you currently need...    Turning from your back to your side while in flat bed without using bedrails? 4  -CH (r) TL (t) CH (c)  4  -CH  (r) TL (t) CH (c)    Moving from lying on back to sitting on the side of a flat bed without bedrails? 4  -CH (r) TL (t) CH (c)  4  -CH (r) TL (t) CH (c)    Moving to and from a bed to a chair (including a wheelchair)? 4  -CH (r) TL (t) CH (c)  4  -CH (r) TL (t) CH (c)    Standing up from a chair using your arms (e.g., wheelchair, bedside chair)? 4  -CH (r) TL (t) CH (c)  4  -CH (r) TL (t) CH (c)    Climbing 3-5 steps with a railing? 3  -CH (r) TL (t) CH (c)  3  -CH (r) TL (t) CH (c)    To walk in hospital room? 4  -CH (r) TL (t) CH (c)  4  -CH (r) TL (t) CH (c)    AM-PAC 6 Clicks Score 23  -CH (r) TL (t)  23  -CH (r) TL (t)    How much help from another is currently needed...    Putting on and taking off regular lower body clothing?  4  -BB     Bathing (including washing, rinsing, and drying)  4  -BB     Toileting (which includes using toilet bed pan or urinal)  4  -BB     Putting on and taking off regular upper body clothing  4  -BB     Taking care of personal grooming (such as brushing teeth)  4  -BB     Eating meals  4  -BB     Score  24  -BB     Functional Assessment    Outcome Measure Options AM-PAC 6 Clicks Basic Mobility (PT)  -CH (r) TL (t) CH (c)  AM-PAC 6 Clicks Basic Mobility (PT)  -CH (r) TL (t) CH (c)      03/06/18 0912 03/05/18 1335 03/05/18 1006    How much help from another person do you currently need...    Turning from your back to your side while in flat bed without using bedrails?  4  -MN     Moving from lying on back to sitting on the side of a flat bed without bedrails?  4  -MN     Moving to and from a bed to a chair (including a wheelchair)?  4  -MN     Standing up from a chair using your arms (e.g., wheelchair, bedside chair)?  4  -MN     Climbing 3-5 steps with a railing?  3  -MN     To walk in hospital room?  3  -MN     AM-PAC 6 Clicks Score  22  -MN     How much help from another is currently needed...    Putting on and taking off regular lower body clothing? 4  -BB  4  -RB    Bathing  (including washing, rinsing, and drying) 4  -BB  3  -RB    Toileting (which includes using toilet bed pan or urinal) 4  -BB  3  -RB    Putting on and taking off regular upper body clothing 4  -BB  4  -RB    Taking care of personal grooming (such as brushing teeth) 4  -BB  4  -RB    Eating meals 4  -BB  4  -RB    Score 24  -BB  22  -RB    Functional Assessment    Outcome Measure Options  AM-PAC 6 Clicks Basic Mobility (PT)  -MN AM-PAC 6 Clicks Daily Activity (OT)  -RB      User Key  (r) = Recorded By, (t) = Taken By, (c) = Cosigned By    Initials Name Provider Type    RB Otto Shoemaker, OT Occupational Therapist    MN Annabelle Fontana, PT Physical Therapist    CH Jennifer Bae, PTA Physical Therapy Assistant     Jennifer Bae, PTA Physical Therapy Assistant    BB Tiffany Miller, [facility]/L Occupational Therapy Assistant     Tiffany Miller, [facility]/L Occupational Therapy Assistant    TL Juaquin Haines, PTA Student PTA Student     Juaquin Haines, PTA Student PTA Student                      IP PT Goals       03/08/18 0801 03/07/18 1049 03/06/18 1507    Gait Training PT STG    Gait Training Goal PT STG, Date Goal Reviewed 03/08/18  -MN 03/07/18  -CH (r) TL (t) CH (c) 03/06/18  -CH (r) TL (t) CH (c)    Gait Training Goal PT STG, Outcome goal not met  -MN goal ongoing  -CH (r) TL (t) CH (c) goal ongoing  -CH (r) TL (t) CH (c)    Gait Training Goal PT STG, Reason Goal Not Met discharged from facility  -MN      Physical Therapy PT STG    Physical Therapy PT STG, Date Goal Reviewed 03/08/18  -MN 03/07/18  -CH (r) TL (t) CH (c) 03/06/18  -CH (r) TL (t) CH (c)    Physical Therapy PT STG, Outcome goal not met  -MN goal ongoing  -CH (r) TL (t) CH (c) goal ongoing  -CH (r) TL (t) CH (c)    Physical Therapy PT STG, Reason Goal Not Met discharged from facility  -MN        03/05/18 1406          Gait Training PT STG    Gait Training Goal PT STG, Date Established 03/05/18  -MN      Gait Training Goal PT STG, Time to Achieve 5  days  -MN      Gait Training Goal PT STG, [place] Level independent  -MN      Gait Training Goal PT STG, Assist Device cane, quad  -MN      Gait Training Goal PT STG, Distance to Achieve 200 ft with SpO2 >/= 92% and without LOB with vertical/horizontal head turns  -MN      Gait Training Goal PT STG, Additional Goal Pt will asc/desc ramp with modified indep  -MN      Physical Therapy PT STG    Physical Therapy PT STG, Date Established 03/05/18  -MN      Physical Therapy PT STG, Time to Achieve 5 days  -MN      Physical Therapy PT STG, Activity Type Pt will score >/= 24/28 on Tinetti Balance Assessment  -MN      Physical Therapy PT STG, [place] Level independent  -MN        User Key  (r) = Recorded By, (t) = Taken By, (c) = Cosigned By    Initials Name Provider Type    MN Annabelle Fontana, PT Physical Therapist     Annabelle Fontana, PT Physical Therapist    TROY Bae, PTA Physical Therapy Assistant     Jennifer Bae, PTA Physical Therapy Assistant    MICHAEL Haines, PTA Student PTA Student     Juaquin Haines, PTA Student PTA Student              PT Discharge Summary  Anticipated Discharge Disposition: home with home health  Reason for Discharge: Per MD order, Discharge from facility  Outcomes Achieved: Unable to make functional progress toward goals at this time  Discharge Destination: Home with home health      Annabelle Fontana, PT   3/8/2018

## 2018-03-08 NOTE — PROGRESS NOTES
"Pulmonary Office Follow-up    Subjective     Cameron Bowie is seen today at the office for   Chief Complaint   Patient presents with   • Follow-up         HPI  Cameron Bowie is a 62 y.o. female with a PMH significant for COPD, tobacco use, pulmonary hypertension, morbid obesity, HTN, ASCAD, DM, PAD s/p stenting, and CKD who presents for follow-up of of recent CT. She was initially seen on 2/21/18 at which time I recommended changing duonebs to Spiriva as well as recommended she have a CT chest. Pt states she was \"about to choke out\" on Sunday morning. She tried sitting up but she felt like she was going ot pass out so she call her friend. She called EMS who brought her to the ED where her sats were low. Her CXR was unchanged so a CT chest was obtained. She was admitted where she was treated with nebs, lasix, and abx. Pt states her edema had worsened up to her hospitalization and it did improve with diuresis. She states her sputum is so thick in chest which is difficult to get up that she gets choked. Pt is now taking Spiriva daily and albuterol QID. She admits that she is using the nebs to help get sputum up. Pt has not smoked since Sunday morning but she is craving. She remains on 2lpm. Pt was sent home on a z-pack and her home lasix. She is back on her normal lasix 80mg daily but she does not feel like it is enough. She does not weigh herself on a daily basis. Pt states she drinks Diet Rite, prepackaged tea with artificial sweetener and some water, but she does not track how much she drinks a day. She was sent home with an IS gerry Bustamante.      Patient Active Problem List   Diagnosis   • Venous insufficiency   • Pulmonary hypertension severe   • Hyperlipidemia   • Gout   • Generalized anxiety disorder   • Essential hypertension   • DJD (degenerative joint disease)   • Coronary atherosclerosis of native coronary artery   • Coronary arteriosclerosis   • Chronic kidney disease   • Psoriasis   • Moderate " episode of recurrent major depressive disorder   • Seasonal allergic rhinitis due to pollen   • Type 2 diabetes mellitus with stage 3 chronic kidney disease, without long-term current use of insulin   • Cough   • Non-rheumatic tricuspid valve insufficiency   • PVD (peripheral vascular disease)   • Light tobacco smoker   • History of tobacco use   • Foot pain   • Encounter for screening for osteoporosis   • COPD (chronic obstructive pulmonary disease)   • Carpal tunnel syndrome   • Breast screening   • Asthenia   • ALVAREZ (nonalcoholic steatohepatitis)   • Chronic respiratory failure with hypoxia   • JESSICA (obstructive sleep apnea)   • Morbid obesity with BMI of 45.0-49.9, adult       Review of Systems  Review of Systems   Constitutional: Positive for unexpected weight change. Negative for fatigue and fever.   HENT: Positive for postnasal drip. Negative for congestion.    Respiratory: Positive for cough, chest tightness and shortness of breath.    Cardiovascular: Positive for leg swelling. Negative for chest pain.   Gastrointestinal: Negative for abdominal pain.     As described in the HPI. Otherwise, remainder of ROS (14 systems) were negative.    Medications, Allergies, Social, and Family Histories reviewed as per EMR.    Objective     Vitals:    03/08/18 1006   BP: 133/81   Pulse: 68   SpO2: 93%     Physical Exam   Constitutional: She is oriented to person, place, and time. Vital signs are normal. She appears well-developed and well-nourished.   HENT:   Head: Normocephalic and atraumatic.   Nose: Nose normal.   Mouth/Throat: Uvula is midline, oropharynx is clear and moist and mucous membranes are normal.   Mallampati 4   Eyes: Conjunctivae, EOM and lids are normal. Pupils are equal, round, and reactive to light.   Neck: Trachea normal and normal range of motion. No tracheal tenderness present. No thyroid mass present.   Cardiovascular: Normal rate, regular rhythm and normal heart sounds.  PMI is not displaced.  Exam  reveals no gallop.    No murmur heard.  Pulmonary/Chest: Effort normal. No respiratory distress. She has no decreased breath sounds. She has wheezes in the right upper field, the right middle field and the left upper field. She has no rhonchi. She exhibits deformity (thoracic kyphosis). She exhibits no tenderness.   Abdominal: Soft. Normal appearance and bowel sounds are normal. There is no hepatosplenomegaly. There is no tenderness.   obese   Musculoskeletal:   Walks with cane, BLE nonpitting edema   Lymphadenopathy:        Head (right side): No submandibular adenopathy present.        Head (left side): No submandibular adenopathy present.     She has no cervical adenopathy.        Right: No supraclavicular adenopathy present.        Left: No supraclavicular adenopathy present.   Neurological: She is alert and oriented to person, place, and time. Gait normal.   Skin: Skin is warm and dry. No rash noted. No cyanosis. Nails show no clubbing.   Psychiatric: She has a normal mood and affect. Her speech is normal and behavior is normal. Judgment normal.   Nursing note and vitals reviewed.      IMAGING: 3/4/18 (independently reviewed and interpreted by me) showed RML atelectasis with air bronchograms, compression of RML bronchus by pulmonary artery, scattered noncalcified nodules posterior upper lobes, old granulomatous disease      Assessment/Plan     Cameron was seen today for follow-up.    Diagnoses and all orders for this visit:    Acute exacerbation of chronic obstructive pulmonary disease (COPD)  -     predniSONE (DELTASONE) 20 MG tablet; Take 2 tablets by mouth Daily for 5 days.    Chronic obstructive pulmonary disease, unspecified COPD type    Abnormal CT of the chest    Pulmonary hypertension severe    Chronic respiratory failure with hypoxia    JESSICA (obstructive sleep apnea)    Morbid obesity with BMI of 45.0-49.9, adult         Discussion/ Recommendations:   Unfortunately, she was hospitalized with what sounds  like an acute exacerbation of COPD which may have been infected from hypervolemia related to her underlying pulmonary hypertension/cor pulmonale.  I do not think she has active pneumonia as she has been afebrile.  CT of the chest was personally reviewed and while it did show atelectasis of the right middle lobe there are air bronchograms which speak against an endobronchial lesion.  Unfortunately, CT was without contrast, but I do believe that her right middle lobe atelectasis is secondary to compression from pulmonary artery which is enlarged with her point hypertension.  She does have active wheezing today on exam so I think she would benefit from additional steroids but I do not think a change in her current antibiotics is warranted.  She has not smoked since being admitted to the hospital, but continues to have cravings making her high risk for relapse.    -Complete current Z-Nathan.  -Start prednisone 40 mg daily ×5 days.  -Continue Spiriva daily and albuterol as needed.  -Encouraged her to use the Aerobika twice daily after using her albuterol nebs to assist with mucus clearance.  -Recommended she monitor her weight daily and to contact Dr. Snowden's office if she notes a greater than 3 pound increase in 1 day.  -Follow-up with NP in HF clinic to assist with fluid management.  -Again, stressed importance of treating underlying JESSICA and encourage patient to strongly consider referral to Dr. Gaviria.  -I advised the patient of the risks in continuing to use tobacco, and I provided this patient with smoking cessation educational materials.  Encouraged her to maintain off smoking, and cautioned on risk of temptation leading to relapse.  During this visit, I spent > 3-10 minutes counseling the patient regarding smoking cessation.      Patient's BMI is above normal parameters. Follow-up plan includes:  exercise counseling, referral to a nutritionist and referral to primary care.        Return in about 4 weeks (around  4/5/2018) for Recheck.          This document has been electronically signed by Jeni Thompson MD on March 8, 2018 11:34 AM      Dictated using Dragon

## 2018-03-08 NOTE — PLAN OF CARE
Problem: Inpatient Occupational Therapy  Goal: Dynamic Standing Balance Goal LTG-OT  Outcome: Unable to achieve outcome(s) by discharge Date Met: 03/08/18 03/05/18 1312 03/08/18 0655   Dynamic Standing Balance OT LTG   Dynamic Standing Balance OT LTG, Date Established 03/05/18 --    Dynamic Standing Balance OT LTG, Time to Achieve by discharge --    Dynamic Standing Balance OT LTG, [place] Level independent;conditional independence  (With 02 SATS 92% or above.) --    Dynamic Standing Balance OT LTG, Assist Device assistive Device  (R/W if needed.) --    Dynamic Standing Balance OT LTG, Date Goal Reviewed --  03/08/18   Dynamic Standing Balance OT LTG, Outcome --  goal not met   Dynamic Standing Balance OT LTG, Reason Goal Not Met --  discharged from facility     Goal: Patient Education Goal LTG- OT  Outcome: Unable to achieve outcome(s) by discharge Date Met: 03/08/18 03/05/18 1312 03/08/18 0655   Patient Education OT LTG   Patient Education OT LTG, Date Established 03/05/18 --    Patient Education OT LTG, Time to Achieve by discharge --    Patient Education OT LTG, Education Type home safety;energy conservation;work simplification;adaptive breathing --    Patient Education OT LTG, Education Understanding verbalizes understanding;demonstrates adequately --    Patient Education OT LTG, Date Goal Reviewed --  03/08/18   Patient Education OT LTG Outcome --  goal not met   Patient Education OT LTG, Reason Goal Not Met --  discharged from facility     Goal: ADL Goal LTG- OT  Outcome: Unable to achieve outcome(s) by discharge Date Met: 03/08/18 03/05/18 1312 03/08/18 0655   ADL OT LTG   ADL OT LTG, Date Established 03/05/18 --    ADL OT LTG, Time to Achieve by discharge --    ADL OT LTG, Activity Type ADL skills  (Sponge bath and dress or walk-in shower.) --    ADL OT LTG, [place] Level independent with device;assistive device  (R/W and shower chair if needed & 02 SATS 92% or above.) --    ADL OT LTG, Date  Goal Reviewed --  03/08/18   ADL OT LTG, Outcome --  goal not met   ADL OT LTG, Reason Goal Not Met --  discharged from facility

## 2018-03-13 PROBLEM — I50.810 RVF (RIGHT VENTRICULAR FAILURE) (HCC): Status: ACTIVE | Noted: 2018-01-01

## 2018-03-13 PROBLEM — I27.81 COR PULMONALE (HCC): Status: ACTIVE | Noted: 2018-01-01

## 2018-03-13 NOTE — PROGRESS NOTES
Subjective:     Congestive Heart Failure (HF enrollment)    PCP: Dr. Corley  Cardiologist: Dr. David Snowden  Nephrologist: Dr. Cavanaugh  Pulmonologist: Dr. Mariana Thompson  Vascular: Dr Beck    Congestive Heart Failure   Presents for initial visit. The disease course has been worsening. Associated symptoms include edema, fatigue and shortness of breath. Pertinent negatives include no abdominal pain, chest pain, chest pressure, claudication, muscle weakness, near-syncope, nocturia, orthopnea, palpitations or paroxysmal nocturnal dyspnea. Unexpected weight change: unknown. The symptoms have been worsening. Past treatments include salt and fluid restriction and oxygen. The treatment provided mild relief. Compliance with prior treatments has been poor. Prior compliance problems include difficulty with treatment plan. Her past medical history is significant for CAD, chronic lung disease, DM and HTN.     The patient is a 62-year-old female who presents as referral regarding volume overload per Mariana Thompson M.D.    The patient established with cardiologist, Dr. David Snowden in November 2017 with impression of:  PAH/PVH/HFpEF WHO Group Likely 2/3; FC: Class 3  At that time plan was for: Continuation of Revatio-discontinuation Opsumit with referral to sleep medicine as well as pulmonology;   The patient was initiated on bisoprolol for RV dysfunction with plans for repeat transthoracic echocardiogram in 1 year in follow-up office evaluation in 3 months.  The patient did not keep her appointment regarding sleep medicine nor her follow-up in 3 months.    The patient received pulmonology evaluation (initial) with Dr. Mariana Thompson on 02/21/2018 with impression: Mixed type COPD/severe obstruction.    The patient reports worsening of dyspnea beginning around the end of December 2017 with intermittent outpatient evaluations and attempts at stabilization.  The patient was hospitalized on March 4 with discharge on March 7  "regarding \"recurrent pneumonia\".    She received office evaluation with Dr. Thompson on March 8 with concerns regarding potential complication of hypervolemia in the setting of pulmonary hypertension/cor pulmonale.  The plan was to complete antibiotic therapy rent disease El); initiation prednisone 40 mg daily ×5 days; continue Spiriva daily and albuterol when necessary; Aerobika twice daily after nebulizer treatments; stress placed up on importance of sleep medicine evaluation as well as tobacco cessation.    The patient reports that she continues to smoke approximately 4 cigarettes per day.    Over the course of the past 2448 hrs., the patient reports \"nonspecific\" weight gain with increased shortness of breath and leg/pedal edema.    The patient is receiving home health care.    The patient has history of coronary artery disease:  12/4/2007: 2.5 x 23 mm Pixel stent - mid RCA  10/04/2011: 3.0 mm x 28 mm Xience V stent - proximal to mid circumflex artery     PAD:  04/25/2012   A.. Aortobifemoral bypass ([place]), 16 x 8 stretch PTFE graft  B. Repair of spleen (Amin)    Review of Systems   Constitution: Positive for fatigue, malaise/fatigue and weight gain. Negative for chills and fever. Unexpected weight change: unknown.   HENT: Negative for congestion and nosebleeds.    Eyes: Negative for discharge and pain.   Cardiovascular: Positive for dyspnea on exertion, leg swelling and orthopnea. Negative for chest pain, claudication, cyanosis, irregular heartbeat, near-syncope, palpitations, paroxysmal nocturnal dyspnea and syncope.   Respiratory: Positive for cough (chronic ), shortness of breath, sleep disturbances due to breathing, snoring, sputum production and wheezing. Negative for hemoptysis.    Endocrine: Negative for polydipsia, polyphagia and polyuria.   Hematologic/Lymphatic: Negative for bleeding problem. Does not bruise/bleed easily.   Skin: Negative for itching and rash.   Musculoskeletal: Negative for " falls and muscle weakness.   Gastrointestinal: Positive for bloating. Negative for abdominal pain.   Genitourinary: Negative for dysuria, hematuria and nocturia.   Neurological: Positive for excessive daytime sleepiness. Negative for dizziness, light-headedness, loss of balance and vertigo.   Psychiatric/Behavioral: Negative for altered mental status and suicidal ideas. The patient is not nervous/anxious.      Past Medical History:   Diagnosis Date   • Abnormal weight gain    • Asthenia    • Breast screening    • Carpal tunnel syndrome    • Chronic kidney disease     Stage 3   • COPD (chronic obstructive pulmonary disease)    • Coronary arteriosclerosis     2007: PCI RCA 2011: PCI: LCX      • Coronary atherosclerosis of native coronary artery    • DJD (degenerative joint disease)     involving multiple joints   • Edema of lower extremity    • Encounter for screening for osteoporosis    • Encounter for screening mammogram for malignant neoplasm of breast    • Essential hypertension    • Foot pain    • Generalized anxiety disorder    • Gout    • History of tobacco use    • Hyperlipidemia    • Light tobacco smoker    • Obesity    • Peripheral edema    • Pulmonary hypertension    • PVD (peripheral vascular disease)     LATOYA R .96 L 1.0 aortobifem 1/2012      • Strain of neck muscle    • Type 2 diabetes mellitus     diet controlled     Past Surgical History:   Procedure Laterality Date   • AXILLARY SURGERY  06/03/2007    Right axillary cellulitis and abscess. Right axillary debridement   • BREAST BIOPSY  03/10/2008    Left breast mass. Left breast excisional biopsy   • CARDIAC CATHETERIZATION  10/04/2011    significant circumflex disease responsible for patient's symptoms. Patent right coronary artery stent. Peripheral arterial disease with completely occluded right common iliac and completely occluded left enternal iliac.   • CARDIAC CATHETERIZATION N/A 5/22/2017    Procedure: Right Heart Cath/ with vasodilator  challenge;  Surgeon: Ignacio Connelly MD;  Location: [facility] CATH INVASIVE LOCATION;  Service:    • CARDIAC CATHETERIZATION N/A 2017    Procedure: Left ventriculography/ LV pressures only;  Surgeon: Ignacio Connelly MD;  Location: [facility] CATH INVASIVE LOCATION;  Service:    • COLONOSCOPY  10/2015    Declined   • CORONARY ARTERY BYPASS GRAFT  2012    Aortobifemoral bypass. Repair of spleen   • CYST REMOVAL  1979    Right, recurrent   • DILATATION AND CURETTAGE  09/10/1976    Incomplete .   • ENDOSCOPY AND COLONOSCOPY  2005    Single small polyp in the rectum. The polyp was removed by snare cautery. Colonoscopy, otherwise normal.   • INCISION AND DRAINAGE ABSCESS  2007    Abdominal wall abscess. Incision and drainage and debridement of abdominal wall abscess   • INJECTION OF MEDICATION  2013    Kenalog   • SPLENECTOMY  2012    Lacerated spleen. Posterior laceration of the capsule with a Grade I injury   • TRANSESOPHAGEAL ECHOCARDIOGRAM (SRINIVASA)  2016    With color flow-Normal LV function with Ef of 65 to 70%.Severely dilated right ventricle with impaired systolic function.Impingement of LV cavity by the interventricular septum.Grade 1A diastolic dysfunction.Moderate tricuspid regurg   • TRANSESOPHAGEAL ECHOCARDIOGRAM (SRINIVASA)  2012    Without color flow-Mild right atrial enlargement with mild right ventricular enlargement with normal left atrial and left ventricular size. EF 55%   • TUBAL ABDOMINAL LIGATION  1987   • VAGINAL DELIVERY       Social History     Social History   • Marital status:      Social History Main Topics   • Smoking status: Current Every Day Smoker     Packs/day: 0.25     Years: 39.00     Types: Cigarettes   • Smokeless tobacco: Never Used   • Alcohol use No   • Drug use: No   • Sexual activity: No     Other Topics Concern   • Not on file     Lisinopril; Penicillins; Wellbutrin [bupropion]; Nsaids; and Tramadol    Current Outpatient  Prescriptions   Medication Sig Dispense Refill   • albuterol (PROVENTIL) (2.5 MG/3ML) 0.083% nebulizer solution Take 2.5 mg by nebulization 4 (Four) Times a Day As Needed for Wheezing. 125 vial 11   • allopurinol (ZYLOPRIM) 100 MG tablet Take 1 tablet by mouth Daily. 30 tablet 11   • aspirin 81 MG tablet Take 1 tablet by mouth Daily. 30 tablet 11   • atorvastatin (LIPITOR) 80 MG tablet Take 1 tablet by mouth Every Night. 90 tablet 3   • azelastine (ASTELIN) 0.1 % nasal spray 2 sprays into each nostril 2 (Two) Times a Day. Use in each nostril as directed 30 mL 11   • bisoprolol (ZEBeta) 5 MG tablet Take 0.5 tablets by mouth Daily. 30 tablet 6   • Blood Glucose Monitoring Suppl (ONE TOUCH ULTRA MINI) w/Device kit      • Calcium-Magnesium-Vitamin D (CALCIUM 500 PO) Take 500 mg by mouth 2 (two) times a day.     • clopidogrel (PLAVIX) 75 MG tablet Take 1 tablet by mouth Daily. 30 tablet 11   • furosemide (LASIX) 80 MG tablet Take 1 tablet by mouth Daily. 30 tablet 11   • glucose blood test strip Use as instructed 50 each 12   • GuaiFENesin ER (MUCINEX MAXIMUM STRENGTH) 1200 MG tablet sustained-release 12 hour Take 1 tablet BID as needed. 60 each 11   • hydrocortisone (WESTCORT) 0.2 % cream Apply  topically 2 (Two) Times a Day. 60 g 2   • hydrocortisone-bacitracin-zinc oxide-nystatin (MAGIC BARRIER) Apply 1 application topically As Needed (Rash). 60 g 0   • promethazine-dextromethorphan (PROMETHAZINE-DM) 6.25-15 MG/5ML syrup Take 5 mL by mouth At Night As Needed for Cough. 120 mL 1   • Respiratory Therapy Supplies (NEBULIZER/TUBING/MOUTHPIECE) kit Tubing 1 each 11   • senna (SENOKOT) 8.6 MG tablet tablet Take 1 tablet by mouth 2 (two) times a day.     • sertraline (ZOLOFT) 100 MG tablet Take 0.5 tablets by mouth Daily. 30 tablet 11   • sildenafil (REVATIO) 20 MG tablet Take 1 tablet by mouth 3 (Three) Times a Day. 90 tablet 6   • Tiotropium Bromide Monohydrate 2.5 MCG/ACT aerosol solution Inhale 2 puffs Daily. 1 inhaler  "11   • TRUEPLUS LANCETS 33G misc 1 each Daily. 100 each 3   • VENTOLIN  (90 Base) MCG/ACT inhaler USE 2 PUFFS FOUR TIMES DAILY AND AS NEEDED 18 g 11   • vitamin D (ERGOCALCIFEROL) 87789 units capsule capsule Take 1 capsule by mouth Every 14 (Fourteen) Days. 2 capsule 11     Current Facility-Administered Medications   Medication Dose Route Frequency Provider Last Rate Last Dose   • furosemide (LASIX) injection 80 mg  80 mg Subcutaneous Once VERNON Alvarado         Objective:     Vitals:    03/13/18 1451   BP: 130/78   Pulse: 73   SpO2: 97%   Weight: 116 kg (255 lb 14.4 oz)   Height: 160 cm (63\")     Wt Readings from Last 3 Encounters:   03/13/18 116 kg (255 lb 14.4 oz)   03/13/18 116 kg (255 lb 8 oz)   03/08/18 118 kg (259 lb 8 oz)      Physical Exam   Constitutional: She is oriented to person, place, and time. She appears well-developed and well-nourished. No distress.   HENT:   Head: Normocephalic and atraumatic.   Eyes: Right eye exhibits no discharge. Left eye exhibits no discharge.   Neck: JVD (12 cm@45°) present. No tracheal deviation present.   Cardiovascular: Normal rate and regular rhythm.    Murmur heard.   Systolic murmur of grade 2/6 is also present at the lower left sternal border.  Pulses:       Radial pulses are 2+ on the right side, and 2+ on the left side.   Pulmonary/Chest: Effort normal. No respiratory distress. She has wheezes. She has no rales.   Abdominal: Soft. Distention: exogenous obesity.   Musculoskeletal: She exhibits edema (1+ pitting BLE; pedal and ankle edema present).   Neurological: She is alert and oriented to person, place, and time.   Skin: Skin is warm and dry. She is not diaphoretic.   Psychiatric: She has a normal mood and affect. Her behavior is normal. Judgment and thought content normal.     Flowsheet Rows    Flowsheet Row First Filed Value   Admission Height 160 cm (63\") Documented at 03/13/2018 1451   Admission Weight 116 kg (255 lb 14.4 oz) Documented at " 03/13/2018 1451        Data Reviewed:  Electrocardiogram:03/04/2018    Echocardiogram: 04/24/2017  · Left ventricular function is normal.  · Left ventricular diastolic dysfunction (grade I) consistent with impaired relaxation.  · Right ventricular cavity is severely dilated.  · Severely reduced right ventricular systolic function noted.  · Moderate tricuspid valve regurgitation is present.  · Estimated EF appears to be in the range of 56 - 60%.  · Systolic flattening of the interventricular septum consistent with right ventricle pressure overload.    Echocardiogram: 10/30/2017  · Left Ventricle: Left ventricular systolic function is normal. Estimated EF appears to be in the range of 56 - 60%.  · Left ventricular wall thickness is consistent with mild concentric hypertrophy  · Right Ventricle: Normal right ventricular wall thickness noted. Right ventricular cavity is moderate-to-severely dilated  · Abnormal septal motion. Systolic flattening of interventricular septum consistent with right ventricle pressure overload.  · Tricuspid Valve: The tricuspid valve has poor leaflet coaptation and is abnormal  · Moderate to severe tricuspid valve regurgitation is present  · Estimated right ventricular systolic pressure from tricuspid regurgitation is markedly elevated (>55 mmHg)  · Severe pulmonary hypertension is present.  · There is no evidence of pericardial effusion    Right Heart Catheterization:05/22/2017 - Dr Connelly  Pulmonary artery pressure:  91/35 mmHg mean 56 mmHg.  Pulmonary capillary wedge pressure at end expiration: 25/23 mmHg mean: 21 mmHg  RV: 102/7 mmHg,  mean of 16 mmHg.  Right atrium: 21/18 , 16 mmHg  Cardiac output by thermodilution method: 4.47 L/m  Transpulmonary gradient: 35  Pulmonary vascular resistance: 7.82 with significant  Saturation: On room air  Right atrium 49%,   Right ventricle 47%  Pulmonary artery 52%  Aortic sat there appears to be artifact with 82%  Left heart catheterization:  /6  mmHg, LVEDP: 15 mmHg  No gradient across the aortic valve  VQ scan: Low probability for pulmonary embolism    Chest x-ray: 03/04/2018  IMPRESSION:  There is increasing opacity within the right middle lobe compared  to the examinations of January 11 and February 16, 2018. While in  the proper clinical setting this may represent consolidating  pneumonia, however, given the fact there is no resolution likely  with treatment recommend CT of the chest with contrast for  further evaluation to exclude the possibility of underlying  Neoplasm.        CT chest: 03/04/2018  Moderate area of consolidation within the right middle lobe  corresponding to the radiographic finding. There is air  bronchograms present. No obvious central bronchial lesion  contributing this finding. Consideration is given to atelectasis  and/or consolidating pneumonia. Recommend treatment and follow-up  until clear. If there is no change on the follow-up study  pulmonary consultation would be recommended for possible  bronchoscopy.  Minimal reticulonodularity left upper lobe which may represent  pneumonitis.  There is a background of old granulomatous disease. There are  couple small 5 mm less noncalcified nodules that likely are  postinflammatory as well. These can be evaluated on follow-up  exams.  No suspicious mediastinal or hilar lymph nodes based on size or  morphologic criteria.  Cholelithiasis.  Upper midline abdominal hernia containing fat as well as  extension the left lobe the liver towards the mouth of the  defect. No findings of incarceration or obstruction.  Mid to lower midline abdominal wall hernia, larger in size  containing fat and bowel without findings of obstruction or  Incarceration.    PFT: 11/13/2017  Severe obstruction, moderate restriction and severely reduced diffusing capacity.    Labs:   Glucose   Date Value Ref Range Status   03/13/2018 150 (H) 60 - 100 mg/dL Final     Glucose, Arterial   Date Value Ref Range Status    03/04/2018 134 mmol/L Final     BUN   Date Value Ref Range Status   03/13/2018 38 (H) 7 - 21 mg/dL Final     Creatinine   Date Value Ref Range Status   03/13/2018 1.37 (H) 0.50 - 1.00 mg/dL Final     Sodium   Date Value Ref Range Status   03/13/2018 136 (L) 137 - 145 mmol/L Final     Potassium   Date Value Ref Range Status   03/13/2018 3.6 3.5 - 5.1 mmol/L Final     Chloride   Date Value Ref Range Status   03/13/2018 96 95 - 110 mmol/L Final     CO2   Date Value Ref Range Status   03/13/2018 27.0 22.0 - 31.0 mmol/L Final     Calcium   Date Value Ref Range Status   03/13/2018 8.8 8.4 - 10.2 mg/dL Final     Total Protein   Date Value Ref Range Status   03/07/2018 5.5 (L) 6.3 - 8.6 g/dL Final     Albumin   Date Value Ref Range Status   03/07/2018 2.80 (L) 3.40 - 4.80 g/dL Final     ALT (SGPT)   Date Value Ref Range Status   03/07/2018 47 9 - 52 U/L Final     AST (SGOT)   Date Value Ref Range Status   03/07/2018 20 14 - 36 U/L Final     Alkaline Phosphatase   Date Value Ref Range Status   03/07/2018 62 38 - 126 U/L Final     Total Bilirubin   Date Value Ref Range Status   03/07/2018 0.3 0.2 - 1.3 mg/dL Final     eGFR Non  Amer   Date Value Ref Range Status   03/13/2018 39 (L) 45 - 104 mL/min/1.73 Final     A/G Ratio   Date Value Ref Range Status   03/07/2018 1.0 (L) 1.1 - 1.8 g/dL Final     BUN/Creatinine Ratio   Date Value Ref Range Status   03/13/2018 27.7 (H) 7.0 - 25.0 Final     Anion Gap   Date Value Ref Range Status   03/13/2018 13.0 5.0 - 15.0 mmol/L Final     WBC   Date Value Ref Range Status   03/07/2018 9.57 3.20 - 9.80 10*3/mm3 Final     RBC   Date Value Ref Range Status   03/07/2018 4.24 3.77 - 5.16 10*6/mm3 Final     Hemoglobin   Date Value Ref Range Status   03/07/2018 12.4 12.0 - 15.5 g/dL Final     Hematocrit   Date Value Ref Range Status   03/07/2018 39.2 35.0 - 45.0 % Final     MCV   Date Value Ref Range Status   03/07/2018 92.5 80.0 - 98.0 fL Final     MCH   Date Value Ref Range Status    03/07/2018 29.2 26.5 - 34.0 pg Final     MCHC   Date Value Ref Range Status   03/07/2018 31.6 31.4 - 36.0 g/dL Final     RDW   Date Value Ref Range Status   03/07/2018 15.1 (H) 11.5 - 14.5 % Final     RDW-SD   Date Value Ref Range Status   03/07/2018 51.1 (H) 36.4 - 46.3 fl Final     MPV   Date Value Ref Range Status   03/07/2018 12.0 8.0 - 12.0 fL Final     Platelets   Date Value Ref Range Status   03/07/2018 153 150 - 450 10*3/mm3 Final     Neutrophil %   Date Value Ref Range Status   03/07/2018 89.1 (H) 37.0 - 80.0 % Final     Lymphocyte %   Date Value Ref Range Status   03/07/2018 6.6 (L) 10.0 - 50.0 % Final     Monocyte %   Date Value Ref Range Status   03/07/2018 3.8 0.0 - 12.0 % Final     Eosinophil %   Date Value Ref Range Status   03/07/2018 0.0 0.0 - 7.0 % Final     Basophil %   Date Value Ref Range Status   03/07/2018 0.1 0.0 - 2.0 % Final     Immature Grans %   Date Value Ref Range Status   03/07/2018 0.4 0.0 - 0.5 % Final     Neutrophils, Absolute   Date Value Ref Range Status   03/07/2018 8.53 2.00 - 8.60 10*3/mm3 Final     Lymphocytes, Absolute   Date Value Ref Range Status   03/07/2018 0.63 0.60 - 4.20 10*3/mm3 Final     Monocytes, Absolute   Date Value Ref Range Status   03/07/2018 0.36 0.00 - 0.90 10*3/mm3 Final     Eosinophils, Absolute   Date Value Ref Range Status   03/07/2018 0.00 0.00 - 0.70 10*3/mm3 Final     Basophils, Absolute   Date Value Ref Range Status   03/07/2018 0.01 0.00 - 0.20 10*3/mm3 Final     Immature Grans, Absolute   Date Value Ref Range Status   03/07/2018 0.04 (H) 0.00 - 0.02 10*3/mm3 Final     nRBC   Date Value Ref Range Status   12/10/2014 0.0 0.0 - 0.2 % Final   12/10/2014 0.000 x1000/uL Final       Lab Results   Component Value Date    CHOL 104 02/21/2018    CHOL 126 08/15/2017    CHOL 131 02/15/2017     Lab Results   Component Value Date    TRIG 130 02/21/2018    TRIG 103 08/15/2017    TRIG 81 02/15/2017    TRIG 95 02/15/2017     Lab Results   Component Value Date     HDL 35 (L) 02/21/2018    HDL 44 (L) 08/15/2017    HDL 43 (L) 02/15/2017     Lab Results   Component Value Date    PROBNP 5,840.0 (H) 03/04/2018       The following portions of the patient's history were reviewed and updated as appropriate: allergies, current medications, problem list,  past medical history, surgical history, family history and social history.    Recent images independently reviewed.    Available laboratory values reviewed.    Assessment:      Diagnosis Plan   1. Pulmonary hypertension severe (please refer below)  Ambulatory Referral to Sleep Medicine    Basic Metabolic Panel    furosemide (LASIX) injection 80 mg   2. RVF (right ventricular failure) (please refer below) Ambulatory Referral to Sleep Medicine    Basic Metabolic Panel    furosemide (LASIX) injection 80 mg   3. Cor pulmonale (please refer below) Ambulatory Referral to Sleep Medicine    Basic Metabolic Panel    furosemide (LASIX) injection 80 mg       4. Atherosclerosis of native coronary artery of native heart without angina pectoris      DAPT, beta blocker, statin therapy   5. Chronic obstructive pulmonary disease, unspecified COPD type      Dr Jeni Thompson   6. History of tobacco use  Please refer below       7. Type 2 diabetes mellitus with stage 3 chronic kidney disease, without long-term current use of insulin      As per PCP     8. Stage 3 chronic kidney disease  Basic Metabolic Panel  Dr Cavanaugh       9. Hyperlipidemia     Statin therapy   10. BMI: 45.3 Please refer below         AHA Stage C: ; NYHA Class III-IV  The patient presents with clinical evidence of hypervolemia in the setting of severe pulmonary hypertension/right ventricular failure/cor pulmonale.  She is well perfused.  BETA-BLOCKER: Bisoprolol 5 mg daily  ACE/ARB: Not applicable/allergy to lisinopril  ENTRESTO: Not applicable  DIURETIC: Lasix 80 mg daily  Lasix 80 mg subcutaneous given in office today  ALDOSTERONT ANTAGONIST: Not applicable  IMDUR/HYDRALAZINE: Not  applicable  DIGOXIN: Not applicable  Fluid restriction: 8978-8608 cc daily  Sodium restriction: 2000 mg daily   6MWT: To be performed at a more stable office evaluation  Cardiac Rehab: Not applicable   Pulmonary Rehab: To be considered at a more stable interval  ICD: Not applicable  CardioMEMS: Not applicable  Advanced HF evaluation (Transplant/LVAD): Not applicable    Presented an overview of heart failure, expected course, considerations, risk factors and exacerbation prevention.  Recommended daily weight monitoring.  Discussed patient action plan for heart failure;  Recommended avoiding NSAIDs use.  Discussed warning signs requiring additional medical attention for heart failure.    Activity: Approximately 150 minutes of moderate activity (such as walking program)  per week (20-30 minutes most days of the week)  Diet: Heart healthy foods - more fresh fruits and vegetables and whole grains; less red meat; more fish and poultry that is baked or grilled - not fried; less salt not to exceed 2000 mg daily - less processed food; No trans or saturated fat  Smoking cessation: I advised the patient of the risks in continuing to use tobacco, and I provided this patient with smoking cessation educational materials.  During this visit, I spent < 3 minutes counseling the patient regarding smoking cessation.  Diabetes management  Blood pressure management  Weight management: Discussed the patient's BMI with her. BMI is above normal parameters. Follow-up plan includes:  educational material, exercise counseling and nutrition counseling.  Stress management    *Disease risk for type 2 diabetes. Hypertension and cardiovascular disease discussed with the patient  * Increased waist circumference (greater than 35 inches for females and 40 inches for males) also can be a marker for increased risk, even in persons of normal weight - patient made aware of this information    [place]mer discussion with the patient regarding complex multiple  medical problems for which we are limited in curative treatment however we will proceed with maximization of medical management/symptomatic management.  I have pleaded with the patient to please keep her appointments with Dr. Snowden, Dr. Thompson as well as repeat referral to Dr. Gaviria regarding most probable obstructive sleep apnea.    In upcoming visits and at a more appropriate/stable presentation, we will proceed with discussion regarding advanced care planning/goals of care.          This document has been electronically signed by VERNON Alvarado on March 13, 2018 6:59 PM

## 2018-03-13 NOTE — PROGRESS NOTES
Subjective   Cameron Bowie is a 62 y.o. female.     Hypertension   This is a new problem. The current episode started more than 1 year ago. The problem is unchanged. The problem is controlled. Associated symptoms include peripheral edema and shortness of breath. Pertinent negatives include no chest pain or palpitations. There are no associated agents to hypertension.   Pneumonia   She complains of cough, difficulty breathing, shortness of breath, sputum production and wheezing. There is no hemoptysis. This is a new problem. The current episode started 1 to 4 weeks ago. The problem occurs constantly. The problem has been gradually worsening. Pertinent negatives include no chest pain.   Chronic Kidney Disease   This is a chronic problem. The current episode started more than 1 year ago. The problem occurs constantly. The problem has been unchanged. Associated symptoms include coughing. Pertinent negatives include no chest pain.   Diabetes   She presents for her follow-up diabetic visit. She has type 2 diabetes mellitus. Her disease course has been fluctuating. Pertinent negatives for diabetes include no chest pain. Her overall blood glucose range is >200 mg/dl.        The following portions of the patient's history were reviewed and updated as appropriate: allergies, current medications, past family history, past medical history, past social history, past surgical history and problem list.    Review of Systems   Respiratory: Positive for cough, sputum production, shortness of breath and wheezing. Negative for hemoptysis.    Cardiovascular: Negative for chest pain and palpitations.       Objective   Physical Exam   Constitutional: She is oriented to person, place, and time. She appears well-developed and well-nourished. No distress.   HENT:   Head: Normocephalic and atraumatic.   Cardiovascular: Normal rate and regular rhythm.  Exam reveals distant heart sounds.    Pulmonary/Chest: Effort normal. She has  decreased breath sounds. She has rhonchi. She has no rales.   Musculoskeletal: She exhibits edema. She exhibits no tenderness.   Neurological: She is alert and oriented to person, place, and time.   Skin: Skin is warm and dry. She is not diaphoretic.   Psychiatric: She has a normal mood and affect. Her behavior is normal. Judgment and thought content normal.   Nursing note and vitals reviewed.      Assessment/Plan   Problems Addressed this Visit        Cardiovascular and Mediastinum    Venous insufficiency    Relevant Orders    Renal Function Panel       Respiratory    COPD (chronic obstructive pulmonary disease)       Endocrine    Type 2 diabetes mellitus with stage 3 chronic kidney disease, without long-term current use of insulin    Relevant Orders    Renal Function Panel      Other Visit Diagnoses     Pneumonia of right middle lobe due to infectious organism    -  Primary            Current outpatient and discharge medications have been reconciled for the patient.  Roby Corley MD  [place]. [place] [facility] [place].  She had a right middle lobe pneumonia.  Her biggest complaint today other than fatigue is that she continues to have swelling in her legs.  She's recently seen Dr. Thompson and got an extra cortisone injection.  The glucoses were running 200 in the hospital with the other medication however her recent A1c last month was 6.8.  Her renal function stayed fairly stable in the hospital.  She has appointment see cardiology this afternoon.

## 2018-03-14 NOTE — TELEPHONE ENCOUNTER
Script sent 2/16/18.  Pharmacy called they have the script on file and will get it ready for her.     No Refill needed today.

## 2018-03-14 NOTE — PROGRESS NOTES
Subjective:     Congestive Heart Failure (1 day follow up (SQ lasix injection 03/13/2018))    PCP: Dr. Corley  Cardiologist: Dr. David Snowden  Nephrologist: Dr. Cavanaugh  Pulmonologist: Dr. Mariana Thompson  Vascular: Dr Beck    Congestive Heart Failure   Presents for follow-up visit. The disease course has been worsening. Associated symptoms include edema, fatigue and shortness of breath (improved). Pertinent negatives include no abdominal pain, chest pain, chest pressure, claudication, muscle weakness, near-syncope, nocturia, orthopnea, palpitations or paroxysmal nocturnal dyspnea. Unexpected weight change: unknown. The symptoms have been improving. Past treatments include salt and fluid restriction and oxygen. The treatment provided mild relief. Compliance with prior treatments has been poor. Prior compliance problems include difficulty with treatment plan. Her past medical history is significant for CAD, chronic lung disease, DM and HTN. Compliance with total regimen is 51-75%. Compliance problems include adherence to diet and adherence to exercise.  Compliance with diet is 51-75%. Compliance with exercise is 0-25%. Compliance with medications is %.     The patient is a 62-year-old female who received initial heart failure evaluation yesterday (03/13/2018).  As she presented with clinical evidence of hypervolemia, Lasix 80 mg subcutaneous was given with plans for return to the office today for reevaluation.  The patient indicates significant increase in urinary output with an approximate 3 pound weight loss in the past 24 hours.  She reports that she is more tolerant to activity with a decrease in shortness of breath and a significant improvement regarding pedal edema.    The patient established with cardiologist, Dr. David Snowden in November 2017 with impression of:  PAH/PVH/HFpEF WHO Group Likely 2/3; FC: Class 3  At that time plan was for: Continuation of Revatio-discontinuation Opsumit with  "referral to sleep medicine as well as pulmonology;   The patient was initiated on bisoprolol for RV dysfunction with plans for repeat transthoracic echocardiogram in 1 year in follow-up office evaluation in 3 months.  The patient did not keep her appointment regarding sleep medicine nor her follow-up in 3 months.    The patient received pulmonology evaluation (initial) with Dr. Mariana Thompson on 02/21/2018 with impression: Mixed type COPD/severe obstruction.    The patient reports worsening of dyspnea beginning around the end of December 2017 with intermittent outpatient evaluations and attempts at stabilization.  The patient was hospitalized on March 4 with discharge on March 7 regarding \"recurrent pneumonia\".    She received office evaluation with Dr. Thompson on March 8 with concerns regarding potential complication of hypervolemia in the setting of pulmonary hypertension/cor pulmonale.  The plan was to complete antibiotic therapy rent disease El); initiation prednisone 40 mg daily ×5 days; continue Spiriva daily and albuterol when necessary; Aerobika twice daily after nebulizer treatments; stress placed up on importance of sleep medicine evaluation as well as tobacco cessation.    The patient reports that she continues to smoke approximately 4 cigarettes per day.    Over the course of the past 2448 hrs., the patient reports \"nonspecific\" weight gain with increased shortness of breath and leg/pedal edema.    The patient is receiving home health care.    The patient has history of coronary artery disease:  12/4/2007: 2.5 x 23 mm Pixel stent - mid RCA  10/04/2011: 3.0 mm x 28 mm Xience V stent - proximal to mid circumflex artery     PAD:  04/25/2012   A.. Aortobifemoral bypass (E[place]ony), 16 x 8 stretch PTFE graft  B. Repair of spleen ([place])    Review of Systems   Constitution: Positive for fatigue, malaise/fatigue and weight loss (3 pounds in 24 hours). Negative for chills, fever and weight gain. Unexpected weight " change: unknown.   HENT: Negative for congestion and nosebleeds.    Eyes: Negative for discharge and pain.   Cardiovascular: Positive for dyspnea on exertion. Negative for chest pain, claudication, cyanosis, irregular heartbeat, leg swelling, near-syncope, orthopnea, palpitations, paroxysmal nocturnal dyspnea and syncope.   Respiratory: Positive for cough (chronic improved), shortness of breath (improved), sleep disturbances due to breathing, snoring and wheezing (improved). Negative for hemoptysis and sputum production.    Endocrine: Negative for polydipsia, polyphagia and polyuria.   Hematologic/Lymphatic: Negative for bleeding problem. Does not bruise/bleed easily.   Skin: Negative for itching and rash.   Musculoskeletal: Negative for falls and muscle weakness.   Gastrointestinal: Negative for bloating and abdominal pain.   Genitourinary: Negative for dysuria, hematuria and nocturia.   Neurological: Positive for excessive daytime sleepiness. Negative for dizziness, light-headedness, loss of balance and vertigo.   Psychiatric/Behavioral: Negative for altered mental status and suicidal ideas. The patient is not nervous/anxious.      Past Medical History:   Diagnosis Date   • Abnormal weight gain    • Asthenia    • Breast screening    • Carpal tunnel syndrome    • Chronic kidney disease     Stage 3   • COPD (chronic obstructive pulmonary disease)    • Coronary arteriosclerosis     2007: PCI RCA 2011: PCI: LCX      • Coronary atherosclerosis of native coronary artery    • DJD (degenerative joint disease)     involving multiple joints   • Edema of lower extremity    • Encounter for screening for osteoporosis    • Encounter for screening mammogram for malignant neoplasm of breast    • Essential hypertension    • Foot pain    • Generalized anxiety disorder    • Gout    • History of tobacco use    • Hyperlipidemia    • Light tobacco smoker    • Obesity    • Peripheral edema    • Pulmonary hypertension    • PVD  (peripheral vascular disease)     LATOYA R .96 L 1.0 aortobifem 2012      • Strain of neck muscle    • Type 2 diabetes mellitus     diet controlled     Past Surgical History:   Procedure Laterality Date   • AXILLARY SURGERY  2007    Right axillary cellulitis and abscess. Right axillary debridement   • BREAST BIOPSY  03/10/2008    Left breast mass. Left breast excisional biopsy   • CARDIAC CATHETERIZATION  10/04/2011    significant circumflex disease responsible for patient's symptoms. Patent right coronary artery stent. Peripheral arterial disease with completely occluded right common iliac and completely occluded left enternal iliac.   • CARDIAC CATHETERIZATION N/A 2017    Procedure: Right Heart Cath/ with vasodilator challenge;  Surgeon: Ignacio Connelly MD;  Location: [facility] CATH INVASIVE LOCATION;  Service:    • CARDIAC CATHETERIZATION N/A 2017    Procedure: Left ventriculography/ LV pressures only;  Surgeon: Ignacio Connelly MD;  Location: [facility] CATH INVASIVE LOCATION;  Service:    • COLONOSCOPY  10/2015    Declined   • CORONARY ARTERY BYPASS GRAFT  2012    Aortobifemoral bypass. Repair of spleen   • CYST REMOVAL  1979    Right, recurrent   • DILATATION AND CURETTAGE  09/10/1976    Incomplete .   • ENDOSCOPY AND COLONOSCOPY  2005    Single small polyp in the rectum. The polyp was removed by snare cautery. Colonoscopy, otherwise normal.   • INCISION AND DRAINAGE ABSCESS  2007    Abdominal wall abscess. Incision and drainage and debridement of abdominal wall abscess   • INJECTION OF MEDICATION  2013    Kenalog   • SPLENECTOMY  2012    Lacerated spleen. Posterior laceration of the capsule with a Grade I injury   • TRANSESOPHAGEAL ECHOCARDIOGRAM (SRINIVASA)  2016    With color flow-Normal LV function with Ef of 65 to 70%.Severely dilated right ventricle with impaired systolic function.Impingement of LV cavity by the interventricular septum.Grade 1A diastolic  dysfunction.Moderate tricuspid regurg   • TRANSESOPHAGEAL ECHOCARDIOGRAM (SRINIVASA)  05/02/2012    Without color flow-Mild right atrial enlargement with mild right ventricular enlargement with normal left atrial and left ventricular size. EF 55%   • TUBAL ABDOMINAL LIGATION  11/06/1987   • VAGINAL DELIVERY  1987     Social History     Social History   • Marital status:      Social History Main Topics   • Smoking status: Current Every Day Smoker     Packs/day: 0.25     Years: 39.00     Types: Cigarettes   • Smokeless tobacco: Never Used   • Alcohol use No   • Drug use: No   • Sexual activity: No     Other Topics Concern   • Not on file     Lisinopril; Penicillins; Wellbutrin [bupropion]; Nsaids; and Tramadol    Current Outpatient Prescriptions   Medication Sig Dispense Refill   • albuterol (PROVENTIL) (2.5 MG/3ML) 0.083% nebulizer solution Take 2.5 mg by nebulization 4 (Four) Times a Day As Needed for Wheezing. 125 vial 11   • allopurinol (ZYLOPRIM) 100 MG tablet Take 1 tablet by mouth Daily. 30 tablet 11   • aspirin 81 MG tablet Take 1 tablet by mouth Daily. 30 tablet 11   • atorvastatin (LIPITOR) 80 MG tablet Take 1 tablet by mouth Every Night. 90 tablet 3   • azelastine (ASTELIN) 0.1 % nasal spray 2 sprays into each nostril 2 (Two) Times a Day. Use in each nostril as directed 30 mL 11   • bisoprolol (ZEBeta) 5 MG tablet Take 0.5 tablets by mouth Daily. 30 tablet 6   • Blood Glucose Monitoring Suppl (ONE TOUCH ULTRA MINI) w/Device kit      • Calcium-Magnesium-Vitamin D (CALCIUM 500 PO) Take 500 mg by mouth 2 (two) times a day.     • clopidogrel (PLAVIX) 75 MG tablet Take 1 tablet by mouth Daily. 30 tablet 11   • furosemide (LASIX) 80 MG tablet (1) tablet by mouth every other day alternating with (1 1/2) tablets beginning 03/15/2018 45 tablet 11   • glucose blood test strip Use as instructed 50 each 12   • GuaiFENesin ER (MUCINEX MAXIMUM STRENGTH) 1200 MG tablet sustained-release 12 hour Take 1 tablet BID as  "needed. 60 each 11   • hydrocortisone (WESTCORT) 0.2 % cream Apply  topically 2 (Two) Times a Day. 60 g 2   • hydrocortisone-bacitracin-zinc oxide-nystatin (MAGIC BARRIER) Apply 1 application topically As Needed (Rash). 60 g 0   • promethazine-dextromethorphan (PROMETHAZINE-DM) 6.25-15 MG/5ML syrup Take 5 mL by mouth At Night As Needed for Cough. 120 mL 1   • Respiratory Therapy Supplies (NEBULIZER/TUBING/MOUTHPIECE) kit Tubing 1 each 11   • senna (SENOKOT) 8.6 MG tablet tablet Take 1 tablet by mouth 2 (two) times a day.     • sertraline (ZOLOFT) 100 MG tablet Take 0.5 tablets by mouth Daily. 30 tablet 11   • sildenafil (REVATIO) 20 MG tablet Take 1 tablet by mouth 3 (Three) Times a Day. 90 tablet 6   • Tiotropium Bromide Monohydrate 2.5 MCG/ACT aerosol solution Inhale 2 puffs Daily. 1 inhaler 11   • TRUEPLUS LANCETS 33G misc 1 each Daily. 100 each 3   • VENTOLIN  (90 Base) MCG/ACT inhaler USE 2 PUFFS FOUR TIMES DAILY AND AS NEEDED 18 g 11   • vitamin D (ERGOCALCIFEROL) 18903 units capsule capsule Take 1 capsule by mouth Every 14 (Fourteen) Days. 2 capsule 11     No current facility-administered medications for this visit.      Objective:     Vitals:    03/14/18 1355   BP: 138/72   Pulse: 72   SpO2: 96%   Weight: 115 kg (252 lb 11.2 oz)   Height: 160 cm (63\")     Wt Readings from Last 3 Encounters:   03/14/18 115 kg (252 lb 11.2 oz)   03/13/18 116 kg (255 lb 14.4 oz)   03/13/18 116 kg (255 lb 8 oz)      Physical Exam   Constitutional: She is oriented to person, place, and time. She appears well-developed and well-nourished. No distress.   HENT:   Head: Normocephalic and atraumatic.   Eyes: Right eye exhibits no discharge. Left eye exhibits no discharge.   Neck: JVD (9 cm@45°) present. No tracheal deviation present.   Cardiovascular: Normal rate and regular rhythm.    Murmur heard.   Systolic murmur of grade 2/6 is also present at the lower left sternal border.  Pulses:       Radial pulses are 2+ on the right " "side, and 2+ on the left side.   Pulmonary/Chest: Effort normal. No respiratory distress. She has wheezes (significant improvement in the past 24 hours; few expiratory wheezes). She has no rales.   Abdominal: Soft. Distention: exogenous obesity.   Musculoskeletal: She exhibits edema (mild non pitting edema to (L) pedal; ).   Neurological: She is alert and oriented to person, place, and time.   Skin: Skin is warm and dry. She is not diaphoretic.   Psychiatric: She has a normal mood and affect. Her behavior is normal. Judgment and thought content normal.     Flowsheet Rows    Flowsheet Row First Filed Value   Admission Height 160 cm (63\") Documented at 03/13/2018 1451   Admission Weight 116 kg (255 lb 14.4 oz) Documented at 03/13/2018 1451        Data Reviewed:  Electrocardiogram:03/04/2018    Echocardiogram: 04/24/2017  · Left ventricular function is normal.  · Left ventricular diastolic dysfunction (grade I) consistent with impaired relaxation.  · Right ventricular cavity is severely dilated.  · Severely reduced right ventricular systolic function noted.  · Moderate tricuspid valve regurgitation is present.  · Estimated EF appears to be in the range of 56 - 60%.  · Systolic flattening of the interventricular septum consistent with right ventricle pressure overload.    Echocardiogram: 10/30/2017  · Left Ventricle: Left ventricular systolic function is normal. Estimated EF appears to be in the range of 56 - 60%.  · Left ventricular wall thickness is consistent with mild concentric hypertrophy  · Right Ventricle: Normal right ventricular wall thickness noted. Right ventricular cavity is moderate-to-severely dilated  · Abnormal septal motion. Systolic flattening of interventricular septum consistent with right ventricle pressure overload.  · Tricuspid Valve: The tricuspid valve has poor leaflet coaptation and is abnormal  · Moderate to severe tricuspid valve regurgitation is present  · Estimated right ventricular " systolic pressure from tricuspid regurgitation is markedly elevated (>55 mmHg)  · Severe pulmonary hypertension is present.  · There is no evidence of pericardial effusion    Right Heart Catheterization:05/22/2017 - Dr Connelly  Pulmonary artery pressure:  91/35 mmHg mean 56 mmHg.  Pulmonary capillary wedge pressure at end expiration: 25/23 mmHg mean: 21 mmHg  RV: 102/7 mmHg,  mean of 16 mmHg.  Right atrium: 21/18 , 16 mmHg  Cardiac output by thermodilution method: 4.47 L/m  Transpulmonary gradient: 35  Pulmonary vascular resistance: 7.82 with significant  Saturation: On room air  Right atrium 49%,   Right ventricle 47%  Pulmonary artery 52%  Aortic sat there appears to be artifact with 82%  Left heart catheterization:  /6 mmHg, LVEDP: 15 mmHg  No gradient across the aortic valve  VQ scan: Low probability for pulmonary embolism    Chest x-ray: 03/04/2018  IMPRESSION:  There is increasing opacity within the right middle lobe compared  to the examinations of January 11 and February 16, 2018. While in  the proper clinical setting this may represent consolidating  pneumonia, however, given the fact there is no resolution likely  with treatment recommend CT of the chest with contrast for  further evaluation to exclude the possibility of underlying  Neoplasm.        CT chest: 03/04/2018  Moderate area of consolidation within the right middle lobe  corresponding to the radiographic finding. There is air  bronchograms present. No obvious central bronchial lesion  contributing this finding. Consideration is given to atelectasis  and/or consolidating pneumonia. Recommend treatment and follow-up  until clear. If there is no change on the follow-up study  pulmonary consultation would be recommended for possible  bronchoscopy.  Minimal reticulonodularity left upper lobe which may represent  pneumonitis.  There is a background of old granulomatous disease. There are  couple small 5 mm less noncalcified nodules that likely  are  postinflammatory as well. These can be evaluated on follow-up  exams.  No suspicious mediastinal or hilar lymph nodes based on size or  morphologic criteria.  Cholelithiasis.  Upper midline abdominal hernia containing fat as well as  extension the left lobe the liver towards the mouth of the  defect. No findings of incarceration or obstruction.  Mid to lower midline abdominal wall hernia, larger in size  containing fat and bowel without findings of obstruction or  Incarceration.    PFT: 11/13/2017  Severe obstruction, moderate restriction and severely reduced diffusing capacity.    Labs:   Glucose   Date Value Ref Range Status   03/13/2018 150 (H) 60 - 100 mg/dL Final     Glucose, Arterial   Date Value Ref Range Status   03/04/2018 134 mmol/L Final     BUN   Date Value Ref Range Status   03/13/2018 38 (H) 7 - 21 mg/dL Final     Creatinine   Date Value Ref Range Status   03/13/2018 1.37 (H) 0.50 - 1.00 mg/dL Final     Sodium   Date Value Ref Range Status   03/13/2018 136 (L) 137 - 145 mmol/L Final     Potassium   Date Value Ref Range Status   03/13/2018 3.6 3.5 - 5.1 mmol/L Final     Chloride   Date Value Ref Range Status   03/13/2018 96 95 - 110 mmol/L Final     CO2   Date Value Ref Range Status   03/13/2018 27.0 22.0 - 31.0 mmol/L Final     Calcium   Date Value Ref Range Status   03/13/2018 8.8 8.4 - 10.2 mg/dL Final     Total Protein   Date Value Ref Range Status   03/07/2018 5.5 (L) 6.3 - 8.6 g/dL Final     Albumin   Date Value Ref Range Status   03/07/2018 2.80 (L) 3.40 - 4.80 g/dL Final     ALT (SGPT)   Date Value Ref Range Status   03/07/2018 47 9 - 52 U/L Final     AST (SGOT)   Date Value Ref Range Status   03/07/2018 20 14 - 36 U/L Final     Alkaline Phosphatase   Date Value Ref Range Status   03/07/2018 62 38 - 126 U/L Final     Total Bilirubin   Date Value Ref Range Status   03/07/2018 0.3 0.2 - 1.3 mg/dL Final     eGFR Non  Amer   Date Value Ref Range Status   03/13/2018 39 (L) 45 - 104  mL/min/1.73 Final     A/G Ratio   Date Value Ref Range Status   03/07/2018 1.0 (L) 1.1 - 1.8 g/dL Final     BUN/Creatinine Ratio   Date Value Ref Range Status   03/13/2018 27.7 (H) 7.0 - 25.0 Final     Anion Gap   Date Value Ref Range Status   03/13/2018 13.0 5.0 - 15.0 mmol/L Final     WBC   Date Value Ref Range Status   03/07/2018 9.57 3.20 - 9.80 10*3/mm3 Final     RBC   Date Value Ref Range Status   03/07/2018 4.24 3.77 - 5.16 10*6/mm3 Final     Hemoglobin   Date Value Ref Range Status   03/07/2018 12.4 12.0 - 15.5 g/dL Final     Hematocrit   Date Value Ref Range Status   03/07/2018 39.2 35.0 - 45.0 % Final     MCV   Date Value Ref Range Status   03/07/2018 92.5 80.0 - 98.0 fL Final     MCH   Date Value Ref Range Status   03/07/2018 29.2 26.5 - 34.0 pg Final     MCHC   Date Value Ref Range Status   03/07/2018 31.6 31.4 - 36.0 g/dL Final     RDW   Date Value Ref Range Status   03/07/2018 15.1 (H) 11.5 - 14.5 % Final     RDW-SD   Date Value Ref Range Status   03/07/2018 51.1 (H) 36.4 - 46.3 fl Final     MPV   Date Value Ref Range Status   03/07/2018 12.0 8.0 - 12.0 fL Final     Platelets   Date Value Ref Range Status   03/07/2018 153 150 - 450 10*3/mm3 Final     Neutrophil %   Date Value Ref Range Status   03/07/2018 89.1 (H) 37.0 - 80.0 % Final     Lymphocyte %   Date Value Ref Range Status   03/07/2018 6.6 (L) 10.0 - 50.0 % Final     Monocyte %   Date Value Ref Range Status   03/07/2018 3.8 0.0 - 12.0 % Final     Eosinophil %   Date Value Ref Range Status   03/07/2018 0.0 0.0 - 7.0 % Final     Basophil %   Date Value Ref Range Status   03/07/2018 0.1 0.0 - 2.0 % Final     Immature Grans %   Date Value Ref Range Status   03/07/2018 0.4 0.0 - 0.5 % Final     Neutrophils, Absolute   Date Value Ref Range Status   03/07/2018 8.53 2.00 - 8.60 10*3/mm3 Final     Lymphocytes, Absolute   Date Value Ref Range Status   03/07/2018 0.63 0.60 - 4.20 10*3/mm3 Final     Monocytes, Absolute   Date Value Ref Range Status    03/07/2018 0.36 0.00 - 0.90 10*3/mm3 Final     Eosinophils, Absolute   Date Value Ref Range Status   03/07/2018 0.00 0.00 - 0.70 10*3/mm3 Final     Basophils, Absolute   Date Value Ref Range Status   03/07/2018 0.01 0.00 - 0.20 10*3/mm3 Final     Immature Grans, Absolute   Date Value Ref Range Status   03/07/2018 0.04 (H) 0.00 - 0.02 10*3/mm3 Final     nRBC   Date Value Ref Range Status   12/10/2014 0.0 0.0 - 0.2 % Final   12/10/2014 0.000 x1000/uL Final       Lab Results   Component Value Date    CHOL 104 02/21/2018    CHOL 126 08/15/2017    CHOL 131 02/15/2017     Lab Results   Component Value Date    TRIG 130 02/21/2018    TRIG 103 08/15/2017    TRIG 81 02/15/2017    TRIG 95 02/15/2017     Lab Results   Component Value Date    HDL 35 (L) 02/21/2018    HDL 44 (L) 08/15/2017    HDL 43 (L) 02/15/2017     Lab Results   Component Value Date    PROBNP 5,840.0 (H) 03/04/2018       The following portions of the patient's history were reviewed and updated as appropriate: allergies, current medications, problem list,  past medical history, surgical history, family history and social history.    Recent images independently reviewed.    Available laboratory values reviewed.    Assessment:      Diagnosis Plan   1. Pulmonary hypertension severe     Refer below re: plan   2. RVF (right ventricular failure) (please refer below) Refer below re: plan       3. Cor pulmonale Refer below re: plan       4. Atherosclerosis of native coronary artery of native heart without angina pectoris      DAPT, beta blocker, statin therapy   5. Chronic obstructive pulmonary disease, unspecified COPD type      Dr Jeni Thompson   6. History of tobacco use  Please refer below       7. Type 2 diabetes mellitus with stage 3 chronic kidney disease, without long-term current use of insulin      As per PCP     8. Stage 3 chronic kidney disease  Dr Cavanaugh       9. Hyperlipidemia     Statin therapy   10. BMI: 45.3 Please refer below         AHA Stage C: ;  NYHA Class III-IV  The patient presents with clinical evidence of hypervolemia in the setting of severe pulmonary hypertension/right ventricular failure/cor pulmonale.  She is well perfused.  BETA-BLOCKER: Bisoprolol 5 mg daily  ACE/ARB: Not applicable/allergy to lisinopril  ENTRESTO: Not applicable  DIURETIC: Lasix 80 mg daily alternating with Lasix 120 mg (every other day)  ALDOSTERONT ANTAGONIST: Not applicable  IMDUR/HYDRALAZINE: Not applicable  DIGOXIN: Not applicable  Fluid restriction: 8471-5527 cc daily  Sodium restriction: 2000 mg daily   6MWT: To be performed at a more stable office evaluation  Cardiac Rehab: Not applicable   Pulmonary Rehab: To be considered at a more stable interval  ICD: Not applicable  CardioMEMS: Not applicable  Advanced HF evaluation (Transplant/LVAD): Not applicable    Recommended daily weight monitoring.  Discussed patient action plan for heart failure;  Recommended avoiding NSAIDs use.  Discussed warning signs requiring additional medical attention for heart failure.    Activity: Approximately 150 minutes of moderate activity (such as walking program)  per week (20-30 minutes most days of the week)  Diet: Heart healthy foods - more fresh fruits and vegetables and whole grains; less red meat; more fish and poultry that is baked or grilled - not fried; less salt not to exceed 2000 mg daily - less processed food; No trans or saturated fat  Smoking cessation: I advised the patient of the risks in continuing to use tobacco, and I provided this patient with smoking cessation educational materials.  During this visit, I spent < 3 minutes counseling the patient regarding smoking cessation.  Diabetes management  Blood pressure management  Weight management: Discussed the patient's BMI with her. BMI is above normal parameters. Follow-up plan includes:  educational material, exercise counseling and nutrition counseling.  Stress management    *Disease risk for type 2 diabetes. Hypertension  and cardiovascular disease discussed with the patient  * Increased waist circumference (greater than 35 inches for females and 40 inches for males) also can be a marker for increased risk, even in persons of normal weight - patient made aware of this information    Gilmer discussion with the patient regarding complex multiple medical problems for which we are limited in curative treatment however we will proceed with maximization of medical management/symptomatic management.  I have once again pleaded with the patient to please keep her appointments with Dr. Snowden, Dr. Thompson as well as repeat referral to Dr. Gaviria regarding most probable obstructive sleep apnea.    In upcoming visits and at a more appropriate/stable presentation, we will proceed with discussion regarding advanced care planning/goals of care.          This document has been electronically signed by VERNON Alvarado on March 14, 2018 5:17 PM

## 2018-03-14 NOTE — TELEPHONE ENCOUNTER
She was seen yesterday and forgot to tell Dr Corley that she needs refills for her mucinex- uses [facility]. She said she does not need to be called back-will check with pharmacy later on.

## 2018-03-20 PROBLEM — N18.30 STAGE 3 CHRONIC KIDNEY DISEASE (HCC): Status: ACTIVE | Noted: 2018-01-01

## 2018-03-20 PROBLEM — IMO0001 CLASS 3 OBESITY IN ADULT: Status: ACTIVE | Noted: 2018-01-01

## 2018-03-20 NOTE — TELEPHONE ENCOUNTER
-Pr Dr. Corley, Ms. Bowie has been called with her recent lab results & recommendations.  Continue her current medications and follow-up as planned or sooner if any problems.      ---- Message from Roby Corley MD sent at 3/20/2018  2:08 PM CDT -----  Renal function stable at stage III chronic kidney disease.

## 2018-03-20 NOTE — PROGRESS NOTES
Subjective:     Follow-up (CHF)    PCP: Dr. Corley  Cardiologist: Dr. David Snowden  Nephrologist: Dr. Cavanaugh  Pulmonologist: Dr. Mariana Thompson  Vascular: Dr Beck    Congestive Heart Failure   Presents for follow-up visit. The disease course has been worsening. Associated symptoms include fatigue and shortness of breath (improved). Pertinent negatives include no abdominal pain, chest pain, chest pressure, claudication, edema (chronic (L) ankle), muscle weakness, near-syncope, nocturia, orthopnea, palpitations or paroxysmal nocturnal dyspnea. Unexpected weight change: unknown. The symptoms have been improving. Past treatments include salt and fluid restriction and oxygen. The treatment provided mild relief. Compliance with prior treatments has been poor. Prior compliance problems include difficulty with treatment plan. Her past medical history is significant for CAD, chronic lung disease, DM and HTN. Compliance with total regimen is 51-75%. Compliance problems include adherence to diet and adherence to exercise.  Compliance with diet is 51-75%. Compliance with exercise is 0-25%. Compliance with medications is %.     The patient is a 62-year-old female who received initial heart failure evaluation 03/13/2018.  As she presented with clinical evidence of hypervolemia, Lasix 80 mg subcutaneous was given with plans for return to the office today for reevaluation.  She receive follow-up evaluation on 03/14/2018 at which time decision was made to proceed with alternation of Lasix 80 mg/120 mg untiloffice evaluation today.  She presented for laboratory diagnostics prior to today's office evaluation.  She reports that she is more tolerant to activity with a decrease in shortness of breath and a significant improvement regarding pedal edema.    The patient established with cardiologist, Dr. David Snowden in November 2017 with impression of:  PAH/PVH/HFpEF WHO Group Likely 2/3; FC: Class 3  At that time plan was  "for: Continuation of Revatio-discontinuation Opsumit with referral to sleep medicine as well as pulmonology;   The patient was initiated on bisoprolol for RV dysfunction with plans for repeat transthoracic echocardiogram in 1 year in follow-up office evaluation in 3 months.  The patient did not keep her appointment regarding sleep medicine nor her follow-up in 3 months.    The patient received pulmonology evaluation (initial) with Dr. Mariana Thompson on 02/21/2018 with impression: Mixed type COPD/severe obstruction.    The patient reports worsening of dyspnea beginning around the end of December 2017 with intermittent outpatient evaluations and attempts at stabilization.  The patient was hospitalized on March 4 with discharge on March 7 regarding \"recurrent pneumonia\".    She received office evaluation with Dr. Thompson on March 8 with concerns regarding potential complication of hypervolemia in the setting of pulmonary hypertension/cor pulmonale.  The plan was to complete antibiotic therapy rent disease El); initiation prednisone 40 mg daily ×5 days; continue Spiriva daily and albuterol when necessary; Aerobika twice daily after nebulizer treatments; stress placed up on importance of sleep medicine evaluation as well as tobacco cessation.    The patient reports that she continues to smoke approximately 4 cigarettes per day.    The patient is receiving home health care.    The patient has history of coronary artery disease:  12/4/2007: 2.5 x 23 mm Pixel stent - mid RCA  10/04/2011: 3.0 mm x 28 mm Xience V stent - proximal to mid circumflex artery     PAD:  04/25/2012   A.. Aortobifemoral bypass ([place]), 16 x 8 stretch PTFE graft  B. Repair of spleen (Amin)    Review of Systems   Constitution: Positive for fatigue, malaise/fatigue and weight loss (3 pounds in 24 hours). Negative for chills, fever and weight gain. Unexpected weight change: unknown.   HENT: Negative for congestion and nosebleeds.    Eyes: Negative " for discharge and pain.   Cardiovascular: Positive for dyspnea on exertion. Negative for chest pain, claudication, cyanosis, irregular heartbeat, leg swelling, near-syncope, orthopnea, palpitations, paroxysmal nocturnal dyspnea and syncope.   Respiratory: Positive for cough (chronic improved), shortness of breath (improved), sleep disturbances due to breathing, snoring and wheezing (improved). Negative for hemoptysis and sputum production.    Endocrine: Negative for polydipsia, polyphagia and polyuria.   Hematologic/Lymphatic: Negative for bleeding problem. Does not bruise/bleed easily.   Skin: Negative for itching and rash.   Musculoskeletal: Negative for falls and muscle weakness.   Gastrointestinal: Negative for bloating and abdominal pain.   Genitourinary: Negative for dysuria, hematuria and nocturia.   Neurological: Positive for excessive daytime sleepiness. Negative for dizziness, light-headedness, loss of balance and vertigo.   Psychiatric/Behavioral: Negative for altered mental status and suicidal ideas. The patient is not nervous/anxious.      Past Medical History:   Diagnosis Date   • Abnormal weight gain    • Asthenia    • Breast screening    • Carpal tunnel syndrome    • Chronic kidney disease     Stage 3   • COPD (chronic obstructive pulmonary disease)    • Coronary arteriosclerosis     2007: PCI RCA 2011: PCI: LCX      • Coronary atherosclerosis of native coronary artery    • DJD (degenerative joint disease)     involving multiple joints   • Edema of lower extremity    • Encounter for screening for osteoporosis    • Encounter for screening mammogram for malignant neoplasm of breast    • Essential hypertension    • Foot pain    • Generalized anxiety disorder    • Gout    • History of tobacco use    • Hyperlipidemia    • Light tobacco smoker    • Obesity    • Peripheral edema    • Pulmonary hypertension    • PVD (peripheral vascular disease)     LATOYA R .96 L 1.0 aortobifem 1/2012      • Strain of neck  muscle    • Type 2 diabetes mellitus     diet controlled     Past Surgical History:   Procedure Laterality Date   • AXILLARY SURGERY  2007    Right axillary cellulitis and abscess. Right axillary debridement   • BREAST BIOPSY  03/10/2008    Left breast mass. Left breast excisional biopsy   • CARDIAC CATHETERIZATION  10/04/2011    significant circumflex disease responsible for patient's symptoms. Patent right coronary artery stent. Peripheral arterial disease with completely occluded right common iliac and completely occluded left enternal iliac.   • CARDIAC CATHETERIZATION N/A 2017    Procedure: Right Heart Cath/ with vasodilator challenge;  Surgeon: Ignacio Connelly MD;  Location: [facility] CATH INVASIVE LOCATION;  Service:    • CARDIAC CATHETERIZATION N/A 2017    Procedure: Left ventriculography/ LV pressures only;  Surgeon: Ignacio Connelly MD;  Location: [facility] CATH INVASIVE LOCATION;  Service:    • COLONOSCOPY  10/2015    Declined   • CORONARY ARTERY BYPASS GRAFT  2012    Aortobifemoral bypass. Repair of spleen   • CYST REMOVAL  1979    Right, recurrent   • DILATATION AND CURETTAGE  09/10/1976    Incomplete .   • ENDOSCOPY AND COLONOSCOPY  2005    Single small polyp in the rectum. The polyp was removed by snare cautery. Colonoscopy, otherwise normal.   • INCISION AND DRAINAGE ABSCESS  2007    Abdominal wall abscess. Incision and drainage and debridement of abdominal wall abscess   • INJECTION OF MEDICATION  2013    Kenalog   • SPLENECTOMY  2012    Lacerated spleen. Posterior laceration of the capsule with a Grade I injury   • TRANSESOPHAGEAL ECHOCARDIOGRAM (SRINIVASA)  2016    With color flow-Normal LV function with Ef of 65 to 70%.Severely dilated right ventricle with impaired systolic function.Impingement of LV cavity by the interventricular septum.Grade 1A diastolic dysfunction.Moderate tricuspid regurg   • TRANSESOPHAGEAL ECHOCARDIOGRAM (SRINIVASA)  2012     Without color flow-Mild right atrial enlargement with mild right ventricular enlargement with normal left atrial and left ventricular size. EF 55%   • TUBAL ABDOMINAL LIGATION  11/06/1987   • VAGINAL DELIVERY  1987     Social History     Social History   • Marital status:      Social History Main Topics   • Smoking status: Current Every Day Smoker     Packs/day: 0.25     Years: 39.00     Types: Cigarettes   • Smokeless tobacco: Never Used   • Alcohol use No   • Drug use: No   • Sexual activity: No     Other Topics Concern   • Not on file     Lisinopril; Penicillins; Wellbutrin [bupropion]; Nsaids; and Tramadol    Current Outpatient Prescriptions   Medication Sig Dispense Refill   • albuterol (PROVENTIL) (2.5 MG/3ML) 0.083% nebulizer solution Take 2.5 mg by nebulization 4 (Four) Times a Day As Needed for Wheezing. 125 vial 11   • allopurinol (ZYLOPRIM) 100 MG tablet Take 1 tablet by mouth Daily. 30 tablet 11   • aspirin 81 MG tablet Take 1 tablet by mouth Daily. 30 tablet 11   • atorvastatin (LIPITOR) 80 MG tablet Take 1 tablet by mouth Every Night. 90 tablet 3   • azelastine (ASTELIN) 0.1 % nasal spray 2 sprays into each nostril 2 (Two) Times a Day. Use in each nostril as directed 30 mL 11   • bisoprolol (ZEBeta) 5 MG tablet Take 0.5 tablets by mouth Daily. 30 tablet 6   • Blood Glucose Monitoring Suppl (ONE TOUCH ULTRA MINI) w/Device kit      • Calcium-Magnesium-Vitamin D (CALCIUM 500 PO) Take 500 mg by mouth 2 (two) times a day.     • clopidogrel (PLAVIX) 75 MG tablet Take 1 tablet by mouth Daily. 30 tablet 11   • furosemide (LASIX) 80 MG tablet (1) tablet by mouth every other day alternating with (1 1/2) tablets beginning 03/15/2018 45 tablet 11   • glucose blood test strip Use as instructed 50 each 12   • GuaiFENesin ER (MUCINEX MAXIMUM STRENGTH) 1200 MG tablet sustained-release 12 hour Take 1 tablet BID as needed. 60 each 11   • hydrocortisone (WESTCORT) 0.2 % cream Apply  topically 2 (Two) Times a Day.  "60 g 2   • hydrocortisone-bacitracin-zinc oxide-nystatin (MAGIC BARRIER) Apply 1 application topically As Needed (Rash). 60 g 0   • promethazine-dextromethorphan (PROMETHAZINE-DM) 6.25-15 MG/5ML syrup Take 5 mL by mouth At Night As Needed for Cough. 120 mL 1   • Respiratory Therapy Supplies (NEBULIZER/TUBING/MOUTHPIECE) kit Tubing 1 each 11   • senna (SENOKOT) 8.6 MG tablet tablet Take 1 tablet by mouth 2 (two) times a day.     • sertraline (ZOLOFT) 100 MG tablet Take 0.5 tablets by mouth Daily. 30 tablet 11   • sildenafil (REVATIO) 20 MG tablet Take 1 tablet by mouth 3 (Three) Times a Day. 90 tablet 6   • Tiotropium Bromide Monohydrate 2.5 MCG/ACT aerosol solution Inhale 2 puffs Daily. 1 inhaler 11   • TRUEPLUS LANCETS 33G misc 1 each Daily. 100 each 3   • VENTOLIN  (90 Base) MCG/ACT inhaler USE 2 PUFFS FOUR TIMES DAILY AND AS NEEDED 18 g 11   • vitamin D (ERGOCALCIFEROL) 69553 units capsule capsule Take 1 capsule by mouth Every 14 (Fourteen) Days. 2 capsule 11     No current facility-administered medications for this visit.      Objective:     Vitals:    03/20/18 1050   BP: 118/78   Pulse: 65   SpO2: 97%   Weight: 114 kg (251 lb 11.2 oz)   Height: 160 cm (63\")     Wt Readings from Last 3 Encounters:   03/20/18 114 kg (251 lb 11.2 oz)   03/14/18 115 kg (252 lb 11.2 oz)   03/13/18 116 kg (255 lb 14.4 oz)      Physical Exam   Constitutional: She is oriented to person, place, and time. She appears well-developed and well-nourished. No distress.   HENT:   Head: Normocephalic and atraumatic.   Eyes: Right eye exhibits no discharge. Left eye exhibits no discharge.   Neck: JVD (9 cm@45°) present. No tracheal deviation present.   Cardiovascular: Normal rate and regular rhythm.    Murmur heard.   Systolic murmur of grade 2/6 is also present at the lower left sternal border.  Pulses:       Radial pulses are 2+ on the right side, and 2+ on the left side.   Pulmonary/Chest: Effort normal. No respiratory distress. She " "has wheezes (few expiratory wheezes bilateral). She has no rales.   Abdominal: Soft. Distention: exogenous obesity.   Musculoskeletal: She exhibits no edema (mild non pitting edema to (L) ankle (reported chronic; no edema to RLE).   Neurological: She is alert and oriented to person, place, and time.   Skin: Skin is warm and dry. She is not diaphoretic.   Psychiatric: She has a normal mood and affect. Her behavior is normal. Judgment and thought content normal.     Flowsheet Rows    Flowsheet Row First Filed Value   Admission Height 160 cm (63\") Documented at 03/13/2018 1451   Admission Weight 116 kg (255 lb 14.4 oz) Documented at 03/13/2018 1451        Data Reviewed:  Electrocardiogram:03/04/2018    Echocardiogram: 04/24/2017  · Left ventricular function is normal.  · Left ventricular diastolic dysfunction (grade I) consistent with impaired relaxation.  · Right ventricular cavity is severely dilated.  · Severely reduced right ventricular systolic function noted.  · Moderate tricuspid valve regurgitation is present.  · Estimated EF appears to be in the range of 56 - 60%.  · Systolic flattening of the interventricular septum consistent with right ventricle pressure overload.    Echocardiogram: 10/30/2017  · Left Ventricle: Left ventricular systolic function is normal. Estimated EF appears to be in the range of 56 - 60%.  · Left ventricular wall thickness is consistent with mild concentric hypertrophy  · Right Ventricle: Normal right ventricular wall thickness noted. Right ventricular cavity is moderate-to-severely dilated  · Abnormal septal motion. Systolic flattening of interventricular septum consistent with right ventricle pressure overload.  · Tricuspid Valve: The tricuspid valve has poor leaflet coaptation and is abnormal  · Moderate to severe tricuspid valve regurgitation is present  · Estimated right ventricular systolic pressure from tricuspid regurgitation is markedly elevated (>55 mmHg)  · Severe pulmonary " hypertension is present.  · There is no evidence of pericardial effusion    Right Heart Catheterization:05/22/2017 - Dr Connelly  Pulmonary artery pressure:  91/35 mmHg mean 56 mmHg.  Pulmonary capillary wedge pressure at end expiration: 25/23 mmHg mean: 21 mmHg  RV: 102/7 mmHg,  mean of 16 mmHg.  Right atrium: 21/18 , 16 mmHg  Cardiac output by thermodilution method: 4.47 L/m  Transpulmonary gradient: 35  Pulmonary vascular resistance: 7.82 with significant  Saturation: On room air  Right atrium 49%,   Right ventricle 47%  Pulmonary artery 52%  Aortic sat there appears to be artifact with 82%  Left heart catheterization:  /6 mmHg, LVEDP: 15 mmHg  No gradient across the aortic valve  VQ scan: Low probability for pulmonary embolism    Chest x-ray: 03/04/2018  IMPRESSION:  There is increasing opacity within the right middle lobe compared  to the examinations of January 11 and February 16, 2018. While in  the proper clinical setting this may represent consolidating  pneumonia, however, given the fact there is no resolution likely  with treatment recommend CT of the chest with contrast for  further evaluation to exclude the possibility of underlying  Neoplasm.        CT chest: 03/04/2018  Moderate area of consolidation within the right middle lobe  corresponding to the radiographic finding. There is air  bronchograms present. No obvious central bronchial lesion  contributing this finding. Consideration is given to atelectasis  and/or consolidating pneumonia. Recommend treatment and follow-up  until clear. If there is no change on the follow-up study  pulmonary consultation would be recommended for possible  bronchoscopy.  Minimal reticulonodularity left upper lobe which may represent  pneumonitis.  There is a background of old granulomatous disease. There are  couple small 5 mm less noncalcified nodules that likely are  postinflammatory as well. These can be evaluated on follow-up  exams.  No suspicious mediastinal  or hilar lymph nodes based on size or  morphologic criteria.  Cholelithiasis.  Upper midline abdominal hernia containing fat as well as  extension the left lobe the liver towards the mouth of the  defect. No findings of incarceration or obstruction.  Mid to lower midline abdominal wall hernia, larger in size  containing fat and bowel without findings of obstruction or  Incarceration.    PFT: 11/13/2017  Severe obstruction, moderate restriction and severely reduced diffusing capacity.    Labs:   Glucose   Date Value Ref Range Status   03/20/2018 147 (H) 60 - 100 mg/dL Final     Glucose, Arterial   Date Value Ref Range Status   03/04/2018 134 mmol/L Final     BUN   Date Value Ref Range Status   03/20/2018 30 (H) 7 - 21 mg/dL Final     Creatinine   Date Value Ref Range Status   03/20/2018 1.44 (H) 0.50 - 1.00 mg/dL Final     Sodium   Date Value Ref Range Status   03/20/2018 142 137 - 145 mmol/L Final     Potassium   Date Value Ref Range Status   03/20/2018 4.0 3.5 - 5.1 mmol/L Final     Chloride   Date Value Ref Range Status   03/20/2018 101 95 - 110 mmol/L Final     CO2   Date Value Ref Range Status   03/20/2018 26.0 22.0 - 31.0 mmol/L Final     Calcium   Date Value Ref Range Status   03/20/2018 8.5 8.4 - 10.2 mg/dL Final     Total Protein   Date Value Ref Range Status   03/07/2018 5.5 (L) 6.3 - 8.6 g/dL Final     Albumin   Date Value Ref Range Status   03/20/2018 3.50 3.40 - 4.80 g/dL Final     ALT (SGPT)   Date Value Ref Range Status   03/07/2018 47 9 - 52 U/L Final     AST (SGOT)   Date Value Ref Range Status   03/07/2018 20 14 - 36 U/L Final     Alkaline Phosphatase   Date Value Ref Range Status   03/07/2018 62 38 - 126 U/L Final     Total Bilirubin   Date Value Ref Range Status   03/07/2018 0.3 0.2 - 1.3 mg/dL Final     eGFR Non  Amer   Date Value Ref Range Status   03/20/2018 37 (L) 45 - 104 mL/min/1.73 Final     A/G Ratio   Date Value Ref Range Status   03/07/2018 1.0 (L) 1.1 - 1.8 g/dL Final      BUN/Creatinine Ratio   Date Value Ref Range Status   03/20/2018 20.8 7.0 - 25.0 Final     Anion Gap   Date Value Ref Range Status   03/20/2018 15.0 5.0 - 15.0 mmol/L Final     WBC   Date Value Ref Range Status   03/07/2018 9.57 3.20 - 9.80 10*3/mm3 Final     RBC   Date Value Ref Range Status   03/07/2018 4.24 3.77 - 5.16 10*6/mm3 Final     Hemoglobin   Date Value Ref Range Status   03/07/2018 12.4 12.0 - 15.5 g/dL Final     Hematocrit   Date Value Ref Range Status   03/07/2018 39.2 35.0 - 45.0 % Final     MCV   Date Value Ref Range Status   03/07/2018 92.5 80.0 - 98.0 fL Final     MCH   Date Value Ref Range Status   03/07/2018 29.2 26.5 - 34.0 pg Final     MCHC   Date Value Ref Range Status   03/07/2018 31.6 31.4 - 36.0 g/dL Final     RDW   Date Value Ref Range Status   03/07/2018 15.1 (H) 11.5 - 14.5 % Final     RDW-SD   Date Value Ref Range Status   03/07/2018 51.1 (H) 36.4 - 46.3 fl Final     MPV   Date Value Ref Range Status   03/07/2018 12.0 8.0 - 12.0 fL Final     Platelets   Date Value Ref Range Status   03/07/2018 153 150 - 450 10*3/mm3 Final     Neutrophil %   Date Value Ref Range Status   03/07/2018 89.1 (H) 37.0 - 80.0 % Final     Lymphocyte %   Date Value Ref Range Status   03/07/2018 6.6 (L) 10.0 - 50.0 % Final     Monocyte %   Date Value Ref Range Status   03/07/2018 3.8 0.0 - 12.0 % Final     Eosinophil %   Date Value Ref Range Status   03/07/2018 0.0 0.0 - 7.0 % Final     Basophil %   Date Value Ref Range Status   03/07/2018 0.1 0.0 - 2.0 % Final     Immature Grans %   Date Value Ref Range Status   03/07/2018 0.4 0.0 - 0.5 % Final     Neutrophils, Absolute   Date Value Ref Range Status   03/07/2018 8.53 2.00 - 8.60 10*3/mm3 Final     Lymphocytes, Absolute   Date Value Ref Range Status   03/07/2018 0.63 0.60 - 4.20 10*3/mm3 Final     Monocytes, Absolute   Date Value Ref Range Status   03/07/2018 0.36 0.00 - 0.90 10*3/mm3 Final     Eosinophils, Absolute   Date Value Ref Range Status   03/07/2018  0.00 0.00 - 0.70 10*3/mm3 Final     Basophils, Absolute   Date Value Ref Range Status   03/07/2018 0.01 0.00 - 0.20 10*3/mm3 Final     Immature Grans, Absolute   Date Value Ref Range Status   03/07/2018 0.04 (H) 0.00 - 0.02 10*3/mm3 Final     nRBC   Date Value Ref Range Status   12/10/2014 0.0 0.0 - 0.2 % Final   12/10/2014 0.000 x1000/uL Final       Lab Results   Component Value Date    CHOL 104 02/21/2018    CHOL 126 08/15/2017    CHOL 131 02/15/2017     Lab Results   Component Value Date    TRIG 130 02/21/2018    TRIG 103 08/15/2017    TRIG 81 02/15/2017    TRIG 95 02/15/2017     Lab Results   Component Value Date    HDL 35 (L) 02/21/2018    HDL 44 (L) 08/15/2017    HDL 43 (L) 02/15/2017     Lab Results   Component Value Date    PROBNP 5,840.0 (H) 03/04/2018       The following portions of the patient's history were reviewed and updated as appropriate: allergies, current medications, problem list,  past medical history, surgical history, family history and social history.    Recent images independently reviewed.    Available laboratory values reviewed.    Assessment:      Diagnosis Plan   1. Pulmonary hypertension severe     Refer below re: plan   2. RVF (right ventricular failure) (please refer below) Refer below re: plan       3. Cor pulmonale Refer below re: plan       4. Atherosclerosis of native coronary artery of native heart without angina pectoris      DAPT, beta blocker, statin therapy   5. Chronic obstructive pulmonary disease, unspecified COPD type      Dr Jeni Thompson   6. History of tobacco use  Please refer below       7. Type 2 diabetes mellitus with stage 3 chronic kidney disease, without long-term current use of insulin      As per PCP     8. Stage 3 chronic kidney disease  Dr Cavanaugh       9. Hyperlipidemia     Statin therapy   10. BMI: 45.3 Please refer below         AHA Stage C: ; NYHA Class III-IV  The patient presents with clinical evidence of hypervolemia in the setting of severe pulmonary  hypertension/right ventricular failure/cor pulmonale.  She is well perfused.  BETA-BLOCKER: Bisoprolol 5 mg daily  ACE/ARB: Not applicable/allergy to lisinopril  ENTRESTO: Not applicable  DIURETIC: Lasix 80 mg daily with Lasix 120 mg On Tuesdays and Fridays until seen back in the office in 2 weeks  ALDOSTERONT ANTAGONIST: Not applicable  IMDUR/HYDRALAZINE: Not applicable  DIGOXIN: Not applicable  Fluid restriction: 2361-4408 cc daily  Sodium restriction: 2000 mg daily   6MWT: To be performed at a more stable office evaluation  Cardiac Rehab: Not applicable   Pulmonary Rehab: Declines referral based on transportation issues (see she refuses to use public transportation)  ICD: Not applicable  CardioMEMS: Not applicable  Advanced HF evaluation (Transplant/LVAD): Not applicable    Recommended daily weight monitoring.  Discussed patient action plan for heart failure;  Recommended avoiding NSAIDs use.  Discussed warning signs requiring additional medical attention for heart failure.    Activity: Approximately 150 minutes of moderate activity (such as walking program)  per week (20-30 minutes most days of the week)  Diet: Heart healthy foods - more fresh fruits and vegetables and whole grains; less red meat; more fish and poultry that is baked or grilled - not fried; less salt not to exceed 2000 mg daily - less processed food; No trans or saturated fat  Smoking cessation: I advised the patient of the risks in continuing to use tobacco, and I provided this patient with smoking cessation educational materials.  During this visit, I spent < 3 minutes counseling the patient regarding smoking cessation.  Diabetes management  Blood pressure management  Weight management: Discussed the patient's BMI with her. BMI is above normal parameters. Follow-up plan includes:  educational material, exercise counseling and nutrition counseling.  Stress management    *Disease risk for type 2 diabetes. Hypertension and cardiovascular disease  discussed with the patient  * Increased waist circumference (greater than 35 inches for females and 40 inches for males) also can be a marker for increased risk, even in persons of normal weight - patient made aware of this information    [place]mer discussion with the patient regarding complex multiple medical problems for which we are limited in curative treatment however we will proceed with maximization of medical management/symptomatic management.  I have once again pleaded with the patient to please keep her appointments with Dr. Snowden, Dr. Thompson as well as repeat referral to Dr. Gaviria regarding most probable obstructive sleep apnea.  The patient indicates that she has not received telephone contact regarding repeat referral to Dr. Gaviria at this time.    In upcoming visits and at a more appropriate/stable presentation, we will proceed with discussion regarding advanced care planning/goals of care.          This document has been electronically signed by VERNON Alvarado on March 20, 2018 5:06 PM

## 2018-04-02 NOTE — PROGRESS NOTES
Subjective:     Chief Complaint   Patient presents with   • Congestive Heart Failure     2 week follow up     PCP: Dr. Corley  Cardiologist: Dr. David Snowden  Nephrologist: Dr. Cavanaugh  Pulmonologist: Dr. Mariana Thompson  Vascular: Dr Beck    Congestive Heart Failure   Presents for follow-up visit. The disease course has been worsening. Associated symptoms include fatigue and shortness of breath (improved). Pertinent negatives include no abdominal pain, chest pain, chest pressure, claudication, edema (chronic (L) ankle), muscle weakness, near-syncope, nocturia, orthopnea, palpitations or paroxysmal nocturnal dyspnea. Unexpected weight change: unknown. The symptoms have been improving. Past treatments include salt and fluid restriction and oxygen. The treatment provided mild relief. Compliance with prior treatments has been poor. Prior compliance problems include difficulty with treatment plan. Her past medical history is significant for CAD, chronic lung disease, DM and HTN. Compliance with total regimen is 51-75%. Compliance problems include adherence to diet and adherence to exercise.  Compliance with diet is 51-75%. Compliance with exercise is 0-25%. Compliance with medications is %.     The patient is a 62-year-old female who received initial heart failure evaluation 03/13/2018.  As she presented with clinical evidence of hypervolemia, Lasix 80 mg subcutaneous was given with plans for return to the office today for reevaluation.  She receive follow-up evaluation on 03/14/2018 at which time decision was made to proceed with alternation of Lasix 80 mg/120 mg untiloffice evaluation today.  She presented for laboratory diagnostics prior to today's office evaluation.  She reports that she is more tolerant to activity with a decrease in shortness of breath and a significant improvement regarding pedal edema.    The patient established with cardiologist, Dr. David Snowden in November 2017 with impression  "of:  PAH/PVH/HFpEF WHO Group Likely 2/3; FC: Class 3  At that time plan was for: Continuation of Revatio-discontinuation Opsumit with referral to sleep medicine as well as pulmonology;   The patient was initiated on bisoprolol for RV dysfunction with plans for repeat transthoracic echocardiogram in 1 year in follow-up office evaluation in 3 months.  The patient did not keep her appointment regarding sleep medicine nor her follow-up in 3 months.    The patient received pulmonology evaluation (initial) with Dr. Mariana Thompson on 02/21/2018 with impression: Mixed type COPD/severe obstruction.    The patient reports worsening of dyspnea beginning around the end of December 2017 with intermittent outpatient evaluations and attempts at stabilization.  The patient was hospitalized on March 4 with discharge on March 7 regarding \"recurrent pneumonia\".    She received office evaluation with Dr. Thompson on March 8 with concerns regarding potential complication of hypervolemia in the setting of pulmonary hypertension/cor pulmonale.  The plan was to complete antibiotic therapy rent disease El); initiation prednisone 40 mg daily ×5 days; continue Spiriva daily and albuterol when necessary; Aerobika twice daily after nebulizer treatments; stress placed up on importance of sleep medicine evaluation as well as tobacco cessation.    The patient reports that she has not had a cigarette in the past 5 days.   She was non-compliant with dietary Na+ intake yesterday during family Easter dinner.    The patient is receiving home health care.    The patient has history of coronary artery disease:  12/4/2007: 2.5 x 23 mm Pixel stent - mid RCA  10/04/2011: 3.0 mm x 28 mm Xience V stent - proximal to mid circumflex artery     PAD:  04/25/2012   A.. Aortobifemoral bypass ([place]), 16 x 8 stretch PTFE graft  B. Repair of spleen ([place])    Review of Systems   Constitution: Positive for fatigue, malaise/fatigue (slowly improving) and weight gain " (4# in two weeks). Negative for chills, fever and weight loss. Unexpected weight change: unknown.   HENT: Negative for congestion and nosebleeds.    Eyes: Negative for discharge and pain.   Cardiovascular: Positive for dyspnea on exertion. Negative for chest pain, claudication, cyanosis, irregular heartbeat, leg swelling, near-syncope, orthopnea, palpitations, paroxysmal nocturnal dyspnea and syncope.   Respiratory: Positive for cough (chronic improved), shortness of breath (improved), sleep disturbances due to breathing, snoring and wheezing (improved). Negative for sputum production.    Endocrine: Negative for polydipsia, polyphagia and polyuria.   Hematologic/Lymphatic: Negative for bleeding problem. Does not bruise/bleed easily.   Skin: Negative for itching and rash.   Musculoskeletal: Negative for falls and muscle weakness.   Gastrointestinal: Negative for bloating and abdominal pain.   Genitourinary: Negative for dysuria, hematuria and nocturia.   Neurological: Positive for excessive daytime sleepiness. Negative for dizziness, light-headedness, loss of balance and vertigo.   Psychiatric/Behavioral: Negative for altered mental status and suicidal ideas. The patient is not nervous/anxious.      Past Medical History:   Diagnosis Date   • Abnormal weight gain    • Asthenia    • Breast screening    • Carpal tunnel syndrome    • Chronic kidney disease     Stage 3   • COPD (chronic obstructive pulmonary disease)    • Coronary arteriosclerosis     2007: PCI RCA 2011: PCI: LCX      • Coronary atherosclerosis of native coronary artery    • DJD (degenerative joint disease)     involving multiple joints   • Edema of lower extremity    • Encounter for screening for osteoporosis    • Encounter for screening mammogram for malignant neoplasm of breast    • Essential hypertension    • Foot pain    • Generalized anxiety disorder    • Gout    • History of tobacco use    • Hyperlipidemia    • Light tobacco smoker    • Obesity     • Peripheral edema    • Pulmonary hypertension    • PVD (peripheral vascular disease)     LATOYA R .96 L 1.0 aortobifem 2012      • Strain of neck muscle    • Type 2 diabetes mellitus     diet controlled     Past Surgical History:   Procedure Laterality Date   • AXILLARY SURGERY  2007    Right axillary cellulitis and abscess. Right axillary debridement   • BREAST BIOPSY  03/10/2008    Left breast mass. Left breast excisional biopsy   • CARDIAC CATHETERIZATION  10/04/2011    significant circumflex disease responsible for patient's symptoms. Patent right coronary artery stent. Peripheral arterial disease with completely occluded right common iliac and completely occluded left enternal iliac.   • CARDIAC CATHETERIZATION N/A 2017    Procedure: Right Heart Cath/ with vasodilator challenge;  Surgeon: Ignacio Connelly MD;  Location: [facility] CATH INVASIVE LOCATION;  Service:    • CARDIAC CATHETERIZATION N/A 2017    Procedure: Left ventriculography/ LV pressures only;  Surgeon: Ignacio Connelly MD;  Location: [facility] CATH INVASIVE LOCATION;  Service:    • COLONOSCOPY  10/2015    Declined   • CORONARY ARTERY BYPASS GRAFT  2012    Aortobifemoral bypass. Repair of spleen   • CYST REMOVAL  1979    Right, recurrent   • DILATATION AND CURETTAGE  09/10/1976    Incomplete .   • ENDOSCOPY AND COLONOSCOPY  2005    Single small polyp in the rectum. The polyp was removed by snare cautery. Colonoscopy, otherwise normal.   • INCISION AND DRAINAGE ABSCESS  2007    Abdominal wall abscess. Incision and drainage and debridement of abdominal wall abscess   • INJECTION OF MEDICATION  2013    Kenalog   • SPLENECTOMY  2012    Lacerated spleen. Posterior laceration of the capsule with a Grade I injury   • TRANSESOPHAGEAL ECHOCARDIOGRAM (SRINIVASA)  2016    With color flow-Normal LV function with Ef of 65 to 70%.Severely dilated right ventricle with impaired systolic function.Impingement of LV  cavity by the interventricular septum.Grade 1A diastolic dysfunction.Moderate tricuspid regurg   • TRANSESOPHAGEAL ECHOCARDIOGRAM (SRINIVASA)  05/02/2012    Without color flow-Mild right atrial enlargement with mild right ventricular enlargement with normal left atrial and left ventricular size. EF 55%   • TUBAL ABDOMINAL LIGATION  11/06/1987   • VAGINAL DELIVERY  1987     Social History     Social History   • Marital status:      Social History Main Topics   • Smoking status: Current Every Day Smoker     Packs/day: 0.25     Years: 39.00     Types: Cigarettes   • Smokeless tobacco: Never Used   • Alcohol use No   • Drug use: No   • Sexual activity: No     Other Topics Concern   • Not on file     Lisinopril; Penicillins; Wellbutrin [bupropion]; Nsaids; and Tramadol    Current Outpatient Prescriptions   Medication Sig Dispense Refill   • albuterol (PROVENTIL) (2.5 MG/3ML) 0.083% nebulizer solution Take 2.5 mg by nebulization 4 (Four) Times a Day As Needed for Wheezing. 125 vial 11   • allopurinol (ZYLOPRIM) 100 MG tablet Take 1 tablet by mouth Daily. 30 tablet 11   • aspirin 81 MG tablet Take 1 tablet by mouth Daily. 30 tablet 11   • atorvastatin (LIPITOR) 80 MG tablet Take 1 tablet by mouth Every Night. 90 tablet 3   • azelastine (ASTELIN) 0.1 % nasal spray 2 sprays into each nostril 2 (Two) Times a Day. Use in each nostril as directed 30 mL 11   • bisoprolol (ZEBeta) 5 MG tablet Take 0.5 tablets by mouth Daily. 30 tablet 6   • Blood Glucose Monitoring Suppl (ONE TOUCH ULTRA MINI) w/Device kit      • Calcium-Magnesium-Vitamin D (CALCIUM 500 PO) Take 500 mg by mouth 2 (two) times a day.     • clopidogrel (PLAVIX) 75 MG tablet Take 1 tablet by mouth Daily. 30 tablet 11   • furosemide (LASIX) 80 MG tablet (1) tablet by mouth every other day alternating with (1 1/2) tablets beginning 03/15/2018 45 tablet 11   • glucose blood test strip Use as instructed 50 each 12   • GuaiFENesin ER (MUCINEX MAXIMUM STRENGTH) 1200 MG  "tablet sustained-release 12 hour Take 1 tablet BID as needed. 60 each 11   • hydrocortisone (WESTCORT) 0.2 % cream Apply  topically 2 (Two) Times a Day. 60 g 2   • hydrocortisone-bacitracin-zinc oxide-nystatin (MAGIC BARRIER) Apply 1 application topically As Needed (Rash). 60 g 0   • promethazine-dextromethorphan (PROMETHAZINE-DM) 6.25-15 MG/5ML syrup Take 5 mL by mouth At Night As Needed for Cough. 120 mL 1   • Respiratory Therapy Supplies (NEBULIZER/TUBING/MOUTHPIECE) kit Tubing 1 each 11   • senna (SENOKOT) 8.6 MG tablet tablet Take 1 tablet by mouth 2 (two) times a day.     • sertraline (ZOLOFT) 100 MG tablet Take 0.5 tablets by mouth Daily. 30 tablet 11   • sildenafil (REVATIO) 20 MG tablet Take 1 tablet by mouth 3 (Three) Times a Day. 90 tablet 6   • Tiotropium Bromide Monohydrate 2.5 MCG/ACT aerosol solution Inhale 2 puffs Daily. 1 inhaler 11   • TRUEPLUS LANCETS 33G misc 1 each Daily. 100 each 3   • VENTOLIN  (90 Base) MCG/ACT inhaler USE 2 PUFFS FOUR TIMES DAILY AND AS NEEDED 18 g 11   • vitamin D (ERGOCALCIFEROL) 49113 units capsule capsule Take 1 capsule by mouth Every 14 (Fourteen) Days. 2 capsule 11     No current facility-administered medications for this visit.      Objective:     Vitals:    04/02/18 1121   BP: 120/66   Pulse: 61   SpO2: 97%   Weight: 116 kg (255 lb 1.6 oz)   Height: 160 cm (63\")     Wt Readings from Last 3 Encounters:   04/02/18 116 kg (255 lb 1.6 oz)   03/20/18 114 kg (251 lb 11.2 oz)   03/14/18 115 kg (252 lb 11.2 oz)      Physical Exam   Constitutional: She is oriented to person, place, and time. She appears well-developed and well-nourished. No distress.   HENT:   Head: Normocephalic and atraumatic.   Eyes: Right eye exhibits no discharge. Left eye exhibits no discharge.   Neck: JVD (9 cm@45°) present. No tracheal deviation present.   Cardiovascular: Normal rate and regular rhythm.    Murmur heard.   Systolic murmur of grade 2/6 is also present at the lower left sternal " "border.  Pulses:       Radial pulses are 2+ on the right side, and 2+ on the left side.   Pulmonary/Chest: Effort normal. No respiratory distress. She has wheezes (few expiratory wheezes bilateral). She has no rales.   Abdominal: Soft. Distention: exogenous obesity.   Musculoskeletal: She exhibits no edema (mild non pitting edema to (L) ankle (reported chronic; no edema to RLE).   Neurological: She is alert and oriented to person, place, and time.   Skin: Skin is warm and dry. She is not diaphoretic.   Psychiatric: She has a normal mood and affect. Her behavior is normal. Judgment and thought content normal.     Flowsheet Rows    Flowsheet Row First Filed Value   Admission Height 160 cm (63\") Documented at 03/13/2018 1451   Admission Weight 116 kg (255 lb 14.4 oz) Documented at 03/13/2018 1451        Data Reviewed:  Electrocardiogram:03/04/2018    Echocardiogram: 04/24/2017  · Left ventricular function is normal.  · Left ventricular diastolic dysfunction (grade I) consistent with impaired relaxation.  · Right ventricular cavity is severely dilated.  · Severely reduced right ventricular systolic function noted.  · Moderate tricuspid valve regurgitation is present.  · Estimated EF appears to be in the range of 56 - 60%.  · Systolic flattening of the interventricular septum consistent with right ventricle pressure overload.    Echocardiogram: 10/30/2017  · Left Ventricle: Left ventricular systolic function is normal. Estimated EF appears to be in the range of 56 - 60%.  · Left ventricular wall thickness is consistent with mild concentric hypertrophy  · Right Ventricle: Normal right ventricular wall thickness noted. Right ventricular cavity is moderate-to-severely dilated  · Abnormal septal motion. Systolic flattening of interventricular septum consistent with right ventricle pressure overload.  · Tricuspid Valve: The tricuspid valve has poor leaflet coaptation and is abnormal  · Moderate to severe tricuspid valve " regurgitation is present  · Estimated right ventricular systolic pressure from tricuspid regurgitation is markedly elevated (>55 mmHg)  · Severe pulmonary hypertension is present.  · There is no evidence of pericardial effusion    Right Heart Catheterization:05/22/2017 - Dr Connelly  Pulmonary artery pressure:  91/35 mmHg mean 56 mmHg.  Pulmonary capillary wedge pressure at end expiration: 25/23 mmHg mean: 21 mmHg  RV: 102/7 mmHg,  mean of 16 mmHg.  Right atrium: 21/18 , 16 mmHg  Cardiac output by thermodilution method: 4.47 L/m  Transpulmonary gradient: 35  Pulmonary vascular resistance: 7.82 with significant  Saturation: On room air  Right atrium 49%,   Right ventricle 47%  Pulmonary artery 52%  Aortic sat there appears to be artifact with 82%  Left heart catheterization:  /6 mmHg, LVEDP: 15 mmHg  No gradient across the aortic valve  VQ scan: Low probability for pulmonary embolism    Chest x-ray: 03/04/2018  IMPRESSION:  There is increasing opacity within the right middle lobe compared  to the examinations of January 11 and February 16, 2018. While in  the proper clinical setting this may represent consolidating  pneumonia, however, given the fact there is no resolution likely  with treatment recommend CT of the chest with contrast for  further evaluation to exclude the possibility of underlying  Neoplasm.        CT chest: 03/04/2018  Moderate area of consolidation within the right middle lobe  corresponding to the radiographic finding. There is air  bronchograms present. No obvious central bronchial lesion  contributing this finding. Consideration is given to atelectasis  and/or consolidating pneumonia. Recommend treatment and follow-up  until clear. If there is no change on the follow-up study  pulmonary consultation would be recommended for possible  bronchoscopy.  Minimal reticulonodularity left upper lobe which may represent  pneumonitis.  There is a background of old granulomatous disease. There  are  couple small 5 mm less noncalcified nodules that likely are  postinflammatory as well. These can be evaluated on follow-up  exams.  No suspicious mediastinal or hilar lymph nodes based on size or  morphologic criteria.  Cholelithiasis.  Upper midline abdominal hernia containing fat as well as  extension the left lobe the liver towards the mouth of the  defect. No findings of incarceration or obstruction.  Mid to lower midline abdominal wall hernia, larger in size  containing fat and bowel without findings of obstruction or  Incarceration.    PFT: 11/13/2017  Severe obstruction, moderate restriction and severely reduced diffusing capacity.    Labs:   Glucose   Date Value Ref Range Status   04/02/2018 130 (H) 60 - 100 mg/dL Final     Glucose, Arterial   Date Value Ref Range Status   03/04/2018 134 mmol/L Final     BUN   Date Value Ref Range Status   04/02/2018 27 (H) 7 - 21 mg/dL Final     Creatinine   Date Value Ref Range Status   04/02/2018 1.20 (H) 0.50 - 1.00 mg/dL Final     Sodium   Date Value Ref Range Status   04/02/2018 141 137 - 145 mmol/L Final     Potassium   Date Value Ref Range Status   04/02/2018 3.9 3.5 - 5.1 mmol/L Final     Chloride   Date Value Ref Range Status   04/02/2018 101 95 - 110 mmol/L Final     CO2   Date Value Ref Range Status   04/02/2018 24.0 22.0 - 31.0 mmol/L Final     Calcium   Date Value Ref Range Status   04/02/2018 8.8 8.4 - 10.2 mg/dL Final     Total Protein   Date Value Ref Range Status   03/07/2018 5.5 (L) 6.3 - 8.6 g/dL Final     Albumin   Date Value Ref Range Status   04/02/2018 4.00 3.40 - 4.80 g/dL Final     ALT (SGPT)   Date Value Ref Range Status   03/07/2018 47 9 - 52 U/L Final     AST (SGOT)   Date Value Ref Range Status   03/07/2018 20 14 - 36 U/L Final     Alkaline Phosphatase   Date Value Ref Range Status   03/07/2018 62 38 - 126 U/L Final     Total Bilirubin   Date Value Ref Range Status   03/07/2018 0.3 0.2 - 1.3 mg/dL Final     eGFR Non  Amer   Date  Value Ref Range Status   04/02/2018 46 45 - 104 mL/min/1.73 Final     A/G Ratio   Date Value Ref Range Status   03/07/2018 1.0 (L) 1.1 - 1.8 g/dL Final     BUN/Creatinine Ratio   Date Value Ref Range Status   04/02/2018 22.5 7.0 - 25.0 Final     Anion Gap   Date Value Ref Range Status   04/02/2018 16.0 (H) 5.0 - 15.0 mmol/L Final     WBC   Date Value Ref Range Status   03/07/2018 9.57 3.20 - 9.80 10*3/mm3 Final     RBC   Date Value Ref Range Status   03/07/2018 4.24 3.77 - 5.16 10*6/mm3 Final     Hemoglobin   Date Value Ref Range Status   03/07/2018 12.4 12.0 - 15.5 g/dL Final     Hematocrit   Date Value Ref Range Status   03/07/2018 39.2 35.0 - 45.0 % Final     MCV   Date Value Ref Range Status   03/07/2018 92.5 80.0 - 98.0 fL Final     MCH   Date Value Ref Range Status   03/07/2018 29.2 26.5 - 34.0 pg Final     MCHC   Date Value Ref Range Status   03/07/2018 31.6 31.4 - 36.0 g/dL Final     RDW   Date Value Ref Range Status   03/07/2018 15.1 (H) 11.5 - 14.5 % Final     RDW-SD   Date Value Ref Range Status   03/07/2018 51.1 (H) 36.4 - 46.3 fl Final     MPV   Date Value Ref Range Status   03/07/2018 12.0 8.0 - 12.0 fL Final     Platelets   Date Value Ref Range Status   03/07/2018 153 150 - 450 10*3/mm3 Final     Neutrophil %   Date Value Ref Range Status   03/07/2018 89.1 (H) 37.0 - 80.0 % Final     Lymphocyte %   Date Value Ref Range Status   03/07/2018 6.6 (L) 10.0 - 50.0 % Final     Monocyte %   Date Value Ref Range Status   03/07/2018 3.8 0.0 - 12.0 % Final     Eosinophil %   Date Value Ref Range Status   03/07/2018 0.0 0.0 - 7.0 % Final     Basophil %   Date Value Ref Range Status   03/07/2018 0.1 0.0 - 2.0 % Final     Immature Grans %   Date Value Ref Range Status   03/07/2018 0.4 0.0 - 0.5 % Final     Neutrophils, Absolute   Date Value Ref Range Status   03/07/2018 8.53 2.00 - 8.60 10*3/mm3 Final     Lymphocytes, Absolute   Date Value Ref Range Status   03/07/2018 0.63 0.60 - 4.20 10*3/mm3 Final     Monocytes,  Absolute   Date Value Ref Range Status   03/07/2018 0.36 0.00 - 0.90 10*3/mm3 Final     Eosinophils, Absolute   Date Value Ref Range Status   03/07/2018 0.00 0.00 - 0.70 10*3/mm3 Final     Basophils, Absolute   Date Value Ref Range Status   03/07/2018 0.01 0.00 - 0.20 10*3/mm3 Final     Immature Grans, Absolute   Date Value Ref Range Status   03/07/2018 0.04 (H) 0.00 - 0.02 10*3/mm3 Final     nRBC   Date Value Ref Range Status   12/10/2014 0.0 0.0 - 0.2 % Final   12/10/2014 0.000 x1000/uL Final       Lab Results   Component Value Date    CHOL 104 02/21/2018    CHOL 126 08/15/2017    CHOL 131 02/15/2017     Lab Results   Component Value Date    TRIG 130 02/21/2018    TRIG 103 08/15/2017    TRIG 81 02/15/2017    TRIG 95 02/15/2017     Lab Results   Component Value Date    HDL 35 (L) 02/21/2018    HDL 44 (L) 08/15/2017    HDL 43 (L) 02/15/2017     Lab Results   Component Value Date    PROBNP 5,840.0 (H) 03/04/2018       The following portions of the patient's history were reviewed and updated as appropriate: allergies, current medications, problem list,  past medical history, surgical history, family history and social history.    Recent images independently reviewed.    Available laboratory values reviewed.    Assessment:      Diagnosis Plan   1. Pulmonary hypertension severe     Refer below re: plan   2. RVF (right ventricular failure) (please refer below) Refer below re: plan       3. Cor pulmonale Refer below re: plan       4. Atherosclerosis of native coronary artery of native heart without angina pectoris      DAPT, beta blocker, statin therapy   5. Chronic obstructive pulmonary disease, unspecified COPD type, stable      Dr Jeni Thompson on 04/10/2018   6. History of tobacco use with recent cessation  Please refer below       7. Type 2 diabetes mellitus with stage 3 chronic kidney disease, without long-term current use of insulin      As per PCP on 04/12/2018     8. Stage 3 chronic kidney disease, stable    Mao on 04/16/2018       9. Hyperlipidemia, stable     Statin therapy   10. BMI: 45.3 Please refer below         AHA Stage C: ; NYHA Class III-IV  The patient presents with clinical evidence of mild hypervolemia in the setting of severe pulmonary hypertension/right ventricular failure/cor pulmonale which appears to be related to indiscretions in dietary Na+ intake. She is well perfused.  BETA-BLOCKER: Bisoprolol 5 mg daily  ACE/ARB: Not applicable/allergy to lisinopril  ENTRESTO: Not applicable  DIURETIC: Lasix 80 mg daily with Lasix 120 mg On Tuesdays and Fridays; She will take extra 40 mg today when she returns home  ALDOSTERONT ANTAGONIST: Not applicable  IMDUR/HYDRALAZINE: Not applicable  DIGOXIN: Not applicable  Fluid restriction: 3251-7014 cc daily  Sodium restriction: 2000 mg daily   6MWT: Performed today  Cardiac Rehab: Not applicable   Pulmonary Rehab: Declines referral based on transportation issues (see she refuses to use public transportation)  ICD: Not applicable  CardioMEMS: Not applicable  Advanced HF evaluation (Transplant/LVAD): Not applicable    Recommended daily weight monitoring.  Discussed patient action plan for heart failure;  Recommended avoiding NSAIDs use.  Discussed warning signs requiring additional medical attention for heart failure.    Activity: Approximately 150 minutes of moderate activity (such as walking program)  per week (20-30 minutes most days of the week)  Diet: Heart healthy foods - more fresh fruits and vegetables and whole grains; less red meat; more fish and poultry that is baked or grilled - not fried; less salt not to exceed 2000 mg daily - less processed food; No trans or saturated fat  Smoking cessation: I advised the patient of the risks in continuing to use tobacco, and I provided this patient with smoking cessation educational materials.  During this visit, I spent < 3 minutes counseling the patient regarding smoking cessation.  Diabetes management  Blood pressure  management  Weight management: Discussed the patient's BMI with her. BMI is above normal parameters. Follow-up plan includes:  educational material, exercise counseling and nutrition counseling.  Stress management    *Disease risk for type 2 diabetes. Hypertension and cardiovascular disease discussed with the patient  * Increased waist circumference (greater than 35 inches for females and 40 inches for males) also can be a marker for increased risk, even in persons of normal weight - patient made aware of this information    Gilmer discussion with the patient regarding complex multiple medical problems for which we are limited in curative treatment however we will proceed with maximization of medical management/symptomatic management.  I have once again pleaded with the patient to please keep her appointments with Dr. Snowden, Dr. Thompson as well as repeat referral to Dr. Gaviria regarding most probable obstructive sleep apnea.  The patient indicates that she has not received telephone contact regarding repeat referral to Dr. Gaviria at this time.    In upcoming visits and at a more appropriate/stable presentation, we will proceed with discussion regarding advanced care planning/goals of care.    The patient will be seen in follow up HF Clinic in 3 weeks, with additional appointments as outlined above.           This document has been electronically signed by VERNON Alvarado on April 2, 2018 1:23 PM

## 2018-04-02 NOTE — PROGRESS NOTES
Cameron Bowie  Procedure: 6 Minute Walk Test   4-2-2018  Indication:CHF    Pretest: BP:120/66               HR:61               Sa02:97               Dyspnea:0               Fatigue:5    Post Test: BP:110/68               HR:71               Sa02:97               Dyspnea:0               Fatigue:3    First 6MWT:yes    Supplemental oxygen during test:yes    Stopped before 6 minutes:no    Pauses:yes, 2 times    Results in distance walked:254.16    Did individual experience any pain or discomfort:no     I was present for the above test and agree with the data.     NYHA Functional Class III

## 2018-04-10 NOTE — PROGRESS NOTES
Pulmonary Office Follow-up    Subjective     Cameron Bowie is seen today at the office for   Chief Complaint   Patient presents with   • Follow-up     4 week   • COPD         HPI  Cameron Bowie is a 62 y.o. female with a PMH significant for COPD, tobacco use, pulmonary hypertension, morbid obesity, HTN, ASCAD, DM, PAD s/p stenting, and CKD who presents for follow-up of COPD. She was initially seen on 3/8/18 at which time I recommended a short course of prednisone in addition to continuing her Spiriva daily and albuterol as needed.  I also encouraged her to use her flutter valve regularly due to her known atelectasis. Since her last visit, she is been seen multiple times in the heart failure clinic for volume overload and need for diuresis.  Her fluid status has improved and her weight is down at least 5 pounds in the past week.  She does endorse that her breathing has improved, but she continues to require her albuterol several times a day on top of her daily Spiriva.  She is using the flutter valve with success she is having less congestion and sputum production.  She denies recent fevers, changes in her sputum, or chest pain.  Her leg edema is reduced.  She wants to know what her sodium limitations are as she cannot recall from her prior discussions with the heart failure team.  She continues on her supplemental oxygen, but reports that she sometimes removes it when she is sitting around the house.  Patient has had a few cigarettes since her last visit, with her last one 3 days ago.  She states that she was very stressed as one of her close friends is battling cancer.    Patient Active Problem List   Diagnosis   • Venous insufficiency   • Pulmonary hypertension severe   • Hyperlipidemia   • Gout   • Generalized anxiety disorder   • Essential hypertension   • DJD (degenerative joint disease)   • Coronary atherosclerosis of native coronary artery   • Coronary arteriosclerosis   • Chronic kidney disease    • Psoriasis   • Moderate episode of recurrent major depressive disorder   • Seasonal allergic rhinitis due to pollen   • Type 2 diabetes mellitus with stage 3 chronic kidney disease, without long-term current use of insulin   • Cough   • Non-rheumatic tricuspid valve insufficiency   • PVD (peripheral vascular disease)   • Light tobacco smoker   • History of tobacco use   • Foot pain   • Encounter for screening for osteoporosis   • COPD (chronic obstructive pulmonary disease)   • Carpal tunnel syndrome   • Breast screening   • Asthenia   • ALVAREZ (nonalcoholic steatohepatitis)   • Chronic respiratory failure with hypoxia   • JESSICA (obstructive sleep apnea)   • Morbid obesity with BMI of 40.0-44.9, adult   • RVF (right ventricular failure)   • Cor pulmonale   • Stage 3 chronic kidney disease   • Class 3 obesity in adult       Review of Systems  Review of Systems   Constitutional: Positive for unexpected weight change. Negative for fatigue and fever.   HENT: Negative for congestion.    Respiratory: Positive for cough and shortness of breath. Negative for chest tightness and wheezing.    Cardiovascular: Positive for leg swelling. Negative for chest pain.   Gastrointestinal: Negative for abdominal pain.     As described in the HPI. Otherwise, remainder of ROS (14 systems) were negative.    Medications, Allergies, Social, and Family Histories reviewed as per EMR.    Objective     Vitals:    04/10/18 1054   BP: 120/78   Pulse: 74   SpO2: 95%     Physical Exam   Constitutional: She is oriented to person, place, and time. Vital signs are normal. She appears well-developed and well-nourished.   HENT:   Head: Normocephalic and atraumatic.   Nose: Nose normal.   Mouth/Throat: Uvula is midline, oropharynx is clear and moist and mucous membranes are normal.   Mallampati 4   Eyes: Conjunctivae, EOM and lids are normal. Pupils are equal, round, and reactive to light.   Neck: Trachea normal and normal range of motion. No tracheal  tenderness present. No thyroid mass present.   Cardiovascular: Normal rate, regular rhythm and normal heart sounds.  PMI is not displaced.  Exam reveals no gallop.    No murmur heard.  Pulmonary/Chest: Effort normal. No respiratory distress. She has no decreased breath sounds. She has no wheezes. She has rhonchi in the right middle field and the right lower field. She exhibits deformity (thoracic kyphosis). She exhibits no tenderness.   Abdominal: Soft. Normal appearance and bowel sounds are normal. There is no hepatosplenomegaly. There is no tenderness.   obese   Musculoskeletal:   Walks with cane, decreased BLE edema   Lymphadenopathy:        Head (right side): No submandibular adenopathy present.        Head (left side): No submandibular adenopathy present.     She has no cervical adenopathy.        Right: No supraclavicular adenopathy present.        Left: No supraclavicular adenopathy present.   Neurological: She is alert and oriented to person, place, and time. Gait normal.   Skin: Skin is warm and dry. No rash noted. No cyanosis. Nails show no clubbing.   Psychiatric: She has a normal mood and affect. Her speech is normal and behavior is normal. Judgment normal.   Nursing note and vitals reviewed.      IMAGING: 3/4/18 (independently reviewed and interpreted by me) showed RML atelectasis with air bronchograms, compression of RML bronchus by pulmonary artery, scattered noncalcified nodules posterior upper lobes, old granulomatous disease      Assessment/Plan     Cameron was seen today for follow-up and copd.    Diagnoses and all orders for this visit:    Chronic obstructive pulmonary disease, unspecified COPD type  -     umeclidinium-vilanterol (ANORO ELLIPTA) 62.5-25 MCG/INH aerosol powder  inhaler; Inhale 1 puff Daily.    Chronic respiratory failure with hypoxia    JESSICA (obstructive sleep apnea)    Morbid obesity with BMI of 40.0-44.9, adult    Cigarette nicotine dependence, uncomplicated    Abnormal CT of the  chest  -     CT Chest Without Contrast; Future    Pulmonary hypertension severe         Discussion/ Recommendations:   She is improving with aggressive management of her heart failure and she is motivated to make lifestyle changes necessary to continue outpatient treatment.  Given that she is still having frequent albuterol need, I think it is worth escalating her therapy to a LAMA/LABA.  Unfortunately, she continues to have intermittent cigarettes so she has not been able to maintain complete cessation yet.    -Stop Spiriva.  -Start Anoro daily.  Sample provided and instructed on use.  -Continue albuterol as needed only.  -Encouraged her to use the Aerobika twice daily after using her albuterol nebs to assist with mucus clearance.  -Encouraged her to keep her consultation with Dr. Gaviria as she likely has underlying JESSICA which needs to be addressed.  -Repeat CT chest next month (8 weeks from prior).  -I advised the patient of the risks in continuing to use tobacco, and I provided this patient with smoking cessation educational materials.  Encouraged her to find other means of sustaining her cravings rather than smoking.  During this visit, I spent > 3-10 minutes counseling the patient regarding smoking cessation.  -Counseled patient on 2000 mg a day sodium restriction.      Patient's BMI is above normal parameters. Follow-up plan includes:  exercise counseling, referral to a nutritionist and referral to primary care.        Return in about 4 weeks (around 5/8/2018) for F/u CT chest.          This document has been electronically signed by Jeni Thompson MD on April 10, 2018 12:08 PM      Dictated using Dragon

## 2018-04-12 NOTE — PROGRESS NOTES
Subjective   Cameron Bowie is a 62 y.o. female.     COPD   This is a chronic problem. The current episode started more than 1 year ago. The problem occurs constantly. The problem has been waxing and waning. Associated symptoms include coughing. Pertinent negatives include no chest pain.   Hypertension   This is a new problem. The current episode started more than 1 year ago. The problem is unchanged. The problem is controlled. Associated symptoms include peripheral edema and shortness of breath. Pertinent negatives include no chest pain, orthopnea, palpitations or PND. There are no associated agents to hypertension.   Pneumonia   She complains of cough, difficulty breathing, shortness of breath, sputum production and wheezing. There is no hemoptysis. This is a new problem. The current episode started 1 to 4 weeks ago. The problem occurs constantly. The problem has been gradually worsening. Pertinent negatives include no chest pain or PND.   Chronic Kidney Disease   This is a chronic problem. The current episode started more than 1 year ago. The problem occurs constantly. The problem has been unchanged. Associated symptoms include coughing. Pertinent negatives include no chest pain.   Diabetes   She presents for her follow-up diabetic visit. She has type 2 diabetes mellitus. Her disease course has been fluctuating. Associated symptoms include polydipsia and polyuria. Pertinent negatives for diabetes include no chest pain and no foot paresthesias. Her breakfast blood glucose is taken between 7-8 am. Her breakfast blood glucose range is generally 140-180 mg/dl. Her dinner blood glucose is taken between 7-8 pm. Her dinner blood glucose range is generally 140-180 mg/dl. An ACE inhibitor/angiotensin II receptor blocker is contraindicated. Eye exam is not current.        The following portions of the patient's history were reviewed and updated as appropriate: allergies, current medications, past family history, past  medical history, past social history, past surgical history and problem list.    Review of Systems   Respiratory: Positive for cough, sputum production, shortness of breath and wheezing. Negative for hemoptysis.    Cardiovascular: Negative for chest pain, palpitations, orthopnea and PND.   Endocrine: Positive for polydipsia and polyuria.       Objective   Physical Exam   Constitutional: She is oriented to person, place, and time. She appears well-developed and well-nourished. No distress.   HENT:   Head: Normocephalic and atraumatic.   Cardiovascular: Normal rate and regular rhythm.  Exam reveals distant heart sounds.    Pulmonary/Chest: Effort normal. She has decreased breath sounds. She has no wheezes. She has rhonchi. She has no rales.   Musculoskeletal: She exhibits edema. She exhibits no tenderness.    Cameron had a diabetic foot exam performed (callus noted) today.   During the foot exam she had a monofilament test performed.  Vascular Status -  Her right foot exhibits abnormal foot edema. Her right foot exhibits normal foot vasculature . Her left foot exhibits abnormal foot edema. Her left foot exhibits normal foot vasculature .  Neurological: She is alert and oriented to person, place, and time.   Skin: Skin is warm and dry. She is not diaphoretic.   Psychiatric: She has a normal mood and affect. Her behavior is normal. Judgment and thought content normal.   Nursing note and vitals reviewed.      Assessment/Plan   Problems Addressed this Visit        Cardiovascular and Mediastinum    Essential hypertension - Primary    Coronary atherosclerosis of native coronary artery    Relevant Medications    clopidogrel (PLAVIX) 75 MG tablet       Respiratory    COPD (chronic obstructive pulmonary disease)       Endocrine    Type 2 diabetes mellitus with stage 3 chronic kidney disease, without long-term current use of insulin      Other Visit Diagnoses    None.           Current outpatient and discharge medications have  been reconciled for the patient.  Roby Corley MD

## 2018-04-23 NOTE — TELEPHONE ENCOUNTER
----- Message from Tori Stanton sent at 4/23/2018 10:18 AM CDT -----  Ms. Johnson called asking about an appointment for a CT SCAN. She said no one has called her to schedule it & she is concerned.  Please call her @ [phone].   THX

## 2018-04-23 NOTE — TELEPHONE ENCOUNTER
Spoke to patient let her know her CT will be on 5-7-18 at 12:30 and also mailed a letter with appointment date and time.

## 2018-04-26 NOTE — PROGRESS NOTES
Subjective:     Chief Complaint   Patient presents with   • Follow-up     PCP: Dr. Corley  Cardiologist: Dr. David Snowden  Nephrologist: Dr. Cavanaugh  Pulmonologist: Dr. Mariana Thompson  Vascular: Dr Beck    Congestive Heart Failure   Presents for follow-up visit. The disease course has been worsening. Associated symptoms include fatigue and shortness of breath (improved). Pertinent negatives include no abdominal pain, chest pain, chest pressure, claudication, edema (chronic (L) ankle), muscle weakness, near-syncope, nocturia, orthopnea, palpitations or paroxysmal nocturnal dyspnea. Unexpected weight change: unknown. The symptoms have been improving. Past treatments include salt and fluid restriction and oxygen. The treatment provided mild relief. Compliance with prior treatments has been poor. Prior compliance problems include difficulty with treatment plan. Her past medical history is significant for CAD, chronic lung disease, DM and HTN. Compliance with total regimen is 51-75%. Compliance problems include adherence to diet and adherence to exercise.  Compliance with diet is 51-75%. Compliance with exercise is 0-25%. Compliance with medications is %.     The patient is a 62-year-old female who received initial heart failure evaluation 03/13/2018.  As she presented with clinical evidence of hypervolemia, Lasix 80 mg subcutaneous was given with plans for return to the office today for reevaluation.  She receive follow-up evaluation on 03/14/2018 at which time decision was made to proceed with alternation of Lasix 80 mg/120 mg untiloffice evaluation today.  She presented for laboratory diagnostics prior to today's office evaluation.  She reports that she is more tolerant to activity with a decrease in shortness of breath and a significant improvement regarding pedal edema.    The patient established with cardiologist, Dr. David Snowden in November 2017 with impression of:  PAH/PVH/HFpEF WHO Group Likely  "2/3; FC: Class 3  At that time plan was for: Continuation of Revatio-discontinuation Opsumit with referral to sleep medicine as well as pulmonology;   The patient was initiated on bisoprolol for RV dysfunction with plans for repeat transthoracic echocardiogram in 1 year in follow-up office evaluation in 3 months.  The patient did not keep her appointment regarding sleep medicine nor her follow-up in 3 months.    The patient received pulmonology evaluation (initial) with Dr. Mariana Thompson on 02/21/2018 with impression: Mixed type COPD/severe obstruction.    The patient reports worsening of dyspnea beginning around the end of December 2017 with intermittent outpatient evaluations and attempts at stabilization.  The patient was hospitalized on March 4 with discharge on March 7 regarding \"recurrent pneumonia\".    She received office evaluation with Dr. Thompson on March 8 with concerns regarding potential complication of hypervolemia in the setting of pulmonary hypertension/cor pulmonale.  The plan was to complete antibiotic therapy rent disease El); initiation prednisone 40 mg daily ×5 days; continue Spiriva daily and albuterol when necessary; Aerobika twice daily after nebulizer treatments; stress placed up on importance of sleep medicine evaluation as well as tobacco cessation.    The patient reports that she has not had a cigarette in the past 5 days.   She was non-compliant with dietary Na+ intake yesterday during family Easter dinner.    The patient is receiving home health care.    The patient has history of coronary artery disease:  12/4/2007: 2.5 x 23 mm Pixel stent - mid RCA  10/04/2011: 3.0 mm x 28 mm Xience V stent - proximal to mid circumflex artery     PAD:  04/25/2012   A.. Aortobifemoral bypass (Ebony), 16 x 8 stretch PTFE graft  B. Repair of spleen (Amin)    Review of Systems   Constitution: Positive for fatigue, malaise/fatigue (slowly improving) and weight gain (4# in two weeks). Negative for " chills, fever and weight loss. Unexpected weight change: unknown.   HENT: Negative for congestion and nosebleeds.    Eyes: Negative for discharge and pain.   Cardiovascular: Positive for dyspnea on exertion. Negative for chest pain, claudication, cyanosis, irregular heartbeat, leg swelling, near-syncope, orthopnea, palpitations, paroxysmal nocturnal dyspnea and syncope.   Respiratory: Positive for cough (chronic improved), shortness of breath (improved), sleep disturbances due to breathing, snoring and wheezing (improved). Negative for sputum production.    Endocrine: Negative for polydipsia, polyphagia and polyuria.   Hematologic/Lymphatic: Negative for bleeding problem. Does not bruise/bleed easily.   Skin: Negative for itching and rash.   Musculoskeletal: Negative for falls and muscle weakness.   Gastrointestinal: Negative for bloating and abdominal pain.   Genitourinary: Negative for dysuria, hematuria and nocturia.   Neurological: Positive for excessive daytime sleepiness. Negative for dizziness, light-headedness, loss of balance and vertigo.   Psychiatric/Behavioral: Negative for altered mental status and suicidal ideas. The patient is not nervous/anxious.      Past Medical History:   Diagnosis Date   • Abnormal weight gain    • Asthenia    • Breast screening    • Carpal tunnel syndrome    • Chronic kidney disease     Stage 3   • COPD (chronic obstructive pulmonary disease)    • Coronary arteriosclerosis     2007: PCI RCA 2011: PCI: LCX      • Coronary atherosclerosis of native coronary artery    • DJD (degenerative joint disease)     involving multiple joints   • Edema of lower extremity    • Encounter for screening for osteoporosis    • Encounter for screening mammogram for malignant neoplasm of breast    • Essential hypertension    • Foot pain    • Generalized anxiety disorder    • Gout    • History of tobacco use    • Hyperlipidemia    • Light tobacco smoker    • Obesity    • Peripheral edema    •  Pulmonary hypertension    • PVD (peripheral vascular disease)     LATOYA R .96 L 1.0 aortobifem 2012      • Strain of neck muscle    • Type 2 diabetes mellitus     diet controlled     Past Surgical History:   Procedure Laterality Date   • AXILLARY SURGERY  2007    Right axillary cellulitis and abscess. Right axillary debridement   • BREAST BIOPSY  03/10/2008    Left breast mass. Left breast excisional biopsy   • CARDIAC CATHETERIZATION  10/04/2011    significant circumflex disease responsible for patient's symptoms. Patent right coronary artery stent. Peripheral arterial disease with completely occluded right common iliac and completely occluded left enternal iliac.   • CARDIAC CATHETERIZATION N/A 2017    Procedure: Right Heart Cath/ with vasodilator challenge;  Surgeon: Ignacio Connelly MD;  Location: [facility] CATH INVASIVE LOCATION;  Service:    • CARDIAC CATHETERIZATION N/A 2017    Procedure: Left ventriculography/ LV pressures only;  Surgeon: Ignacio Connelly MD;  Location: [facility] CATH INVASIVE LOCATION;  Service:    • COLONOSCOPY  10/2015    Declined   • CORONARY ARTERY BYPASS GRAFT  2012    Aortobifemoral bypass. Repair of spleen   • CYST REMOVAL  1979    Right, recurrent   • DILATATION AND CURETTAGE  09/10/1976    Incomplete .   • ENDOSCOPY AND COLONOSCOPY  2005    Single small polyp in the rectum. The polyp was removed by snare cautery. Colonoscopy, otherwise normal.   • INCISION AND DRAINAGE ABSCESS  2007    Abdominal wall abscess. Incision and drainage and debridement of abdominal wall abscess   • INJECTION OF MEDICATION  2013    Kenalog   • SPLENECTOMY  2012    Lacerated spleen. Posterior laceration of the capsule with a Grade I injury   • TRANSESOPHAGEAL ECHOCARDIOGRAM (SRINIVASA)  2016    With color flow-Normal LV function with Ef of 65 to 70%.Severely dilated right ventricle with impaired systolic function.Impingement of LV cavity by the interventricular  septum.Grade 1A diastolic dysfunction.Moderate tricuspid regurg   • TRANSESOPHAGEAL ECHOCARDIOGRAM (SRINIVASA)  05/02/2012    Without color flow-Mild right atrial enlargement with mild right ventricular enlargement with normal left atrial and left ventricular size. EF 55%   • TUBAL ABDOMINAL LIGATION  11/06/1987   • VAGINAL DELIVERY  1987     Social History     Social History   • Marital status:      Social History Main Topics   • Smoking status: Current Every Day Smoker     Packs/day: 0.25     Years: 39.00     Types: Cigarettes   • Smokeless tobacco: Never Used   • Alcohol use No   • Drug use: No   • Sexual activity: No     Other Topics Concern   • Not on file     Lisinopril; Penicillins; Wellbutrin [bupropion]; Nsaids; and Tramadol    Current Outpatient Prescriptions   Medication Sig Dispense Refill   • albuterol (PROVENTIL) (2.5 MG/3ML) 0.083% nebulizer solution Take 2.5 mg by nebulization 4 (Four) Times a Day As Needed for Wheezing. 125 vial 11   • allopurinol (ZYLOPRIM) 100 MG tablet Take 1 tablet by mouth Daily. 30 tablet 11   • aspirin 81 MG tablet Take 1 tablet by mouth Daily. 30 tablet 11   • atorvastatin (LIPITOR) 80 MG tablet Take 1 tablet by mouth Every Night. 90 tablet 3   • azelastine (ASTELIN) 0.1 % nasal spray 2 sprays into each nostril 2 (Two) Times a Day. Use in each nostril as directed 30 mL 11   • bisoprolol (ZEBeta) 5 MG tablet Take 0.5 tablets by mouth Daily. 30 tablet 6   • Blood Glucose Monitoring Suppl (ONE TOUCH ULTRA MINI) w/Device kit      • Calcium-Magnesium-Vitamin D (CALCIUM 500 PO) Take 500 mg by mouth 2 (two) times a day.     • clopidogrel (PLAVIX) 75 MG tablet Take 1 tablet by mouth Daily. 30 tablet 11   • glucose blood test strip Use as instructed 50 each 12   • GuaiFENesin ER (MUCINEX MAXIMUM STRENGTH) 1200 MG tablet sustained-release 12 hour Take 1 tablet BID as needed. 60 each 11   • hydrocortisone-bacitracin-zinc oxide-nystatin (MAGIC BARRIER) Apply 1 application topically As  "Needed (Rash). 60 g 0   • Respiratory Therapy Supplies (NEBULIZER/TUBING/MOUTHPIECE) kit Tubing 1 each 11   • senna (SENOKOT) 8.6 MG tablet tablet Take 1 tablet by mouth 2 (two) times a day.     • sertraline (ZOLOFT) 100 MG tablet Take 0.5 tablets by mouth Daily. 30 tablet 11   • sildenafil (REVATIO) 20 MG tablet Take 1 tablet by mouth 3 (Three) Times a Day. 90 tablet 6   • TRUEPLUS LANCETS 33G misc 1 each Daily. 100 each 3   • umeclidinium-vilanterol (ANORO ELLIPTA) 62.5-25 MCG/INH aerosol powder  inhaler Inhale 1 puff Daily. 1 each 11   • VENTOLIN  (90 Base) MCG/ACT inhaler USE 2 PUFFS FOUR TIMES DAILY AND AS NEEDED 18 g 11   • vitamin D (ERGOCALCIFEROL) 01371 units capsule capsule Take 1 capsule by mouth Every 14 (Fourteen) Days. 2 capsule 11   • bumetanide (BUMEX) 2 MG tablet Take 1 tablet by mouth Daily. Then 1.5 tablets every other day 45 tablet 6     No current facility-administered medications for this visit.      Objective:     Vitals:    04/26/18 0943   BP: 100/70   Pulse: 68   SpO2: 96%   Weight: 115 kg (253 lb 6.4 oz)   Height: 160 cm (63\")     Wt Readings from Last 3 Encounters:   04/26/18 115 kg (253 lb 6.4 oz)   04/12/18 112 kg (248 lb)   04/10/18 114 kg (250 lb 6.4 oz)      Physical Exam   Constitutional: She is oriented to person, place, and time. She appears well-developed and well-nourished. No distress.   HENT:   Head: Normocephalic and atraumatic.   Eyes: Right eye exhibits no discharge. Left eye exhibits no discharge.   Neck: JVD (9 cm@45°) present. No tracheal deviation present.   Cardiovascular: Normal rate and regular rhythm.    Murmur heard.   Systolic murmur of grade 2/6 is also present at the lower left sternal border.  Pulses:       Radial pulses are 2+ on the right side, and 2+ on the left side.   Pulmonary/Chest: Effort normal. No respiratory distress. She has wheezes (few expiratory wheezes bilateral). She has no rales.   Abdominal: Soft. Distention: exogenous obesity. " "  Musculoskeletal: She exhibits no edema (mild non pitting edema to (L) ankle (reported chronic; no edema to RLE).   Neurological: She is alert and oriented to person, place, and time.   Skin: Skin is warm and dry. She is not diaphoretic.   Psychiatric: She has a normal mood and affect. Her behavior is normal. Judgment and thought content normal.     Flowsheet Rows    Flowsheet Row First Filed Value   Admission Height 160 cm (63\") Documented at 03/13/2018 1451   Admission Weight 116 kg (255 lb 14.4 oz) Documented at 03/13/2018 1451        Data Reviewed:  Electrocardiogram:03/04/2018    Echocardiogram: 04/24/2017  · Left ventricular function is normal.  · Left ventricular diastolic dysfunction (grade I) consistent with impaired relaxation.  · Right ventricular cavity is severely dilated.  · Severely reduced right ventricular systolic function noted.  · Moderate tricuspid valve regurgitation is present.  · Estimated EF appears to be in the range of 56 - 60%.  · Systolic flattening of the interventricular septum consistent with right ventricle pressure overload.    Echocardiogram: 10/30/2017  · Left Ventricle: Left ventricular systolic function is normal. Estimated EF appears to be in the range of 56 - 60%.  · Left ventricular wall thickness is consistent with mild concentric hypertrophy  · Right Ventricle: Normal right ventricular wall thickness noted. Right ventricular cavity is moderate-to-severely dilated  · Abnormal septal motion. Systolic flattening of interventricular septum consistent with right ventricle pressure overload.  · Tricuspid Valve: The tricuspid valve has poor leaflet coaptation and is abnormal  · Moderate to severe tricuspid valve regurgitation is present  · Estimated right ventricular systolic pressure from tricuspid regurgitation is markedly elevated (>55 mmHg)  · Severe pulmonary hypertension is present.  · There is no evidence of pericardial effusion    Right Heart Catheterization:05/22/2017 - " Dr Connelly  Pulmonary artery pressure:  91/35 mmHg mean 56 mmHg.  Pulmonary capillary wedge pressure at end expiration: 25/23 mmHg mean: 21 mmHg  RV: 102/7 mmHg,  mean of 16 mmHg.  Right atrium: 21/18 , 16 mmHg  Cardiac output by thermodilution method: 4.47 L/m  Transpulmonary gradient: 35  Pulmonary vascular resistance: 7.82 with significant  Saturation: On room air  Right atrium 49%,   Right ventricle 47%  Pulmonary artery 52%  Aortic sat there appears to be artifact with 82%  Left heart catheterization:  /6 mmHg, LVEDP: 15 mmHg  No gradient across the aortic valve    VQ scan: Low probability for pulmonary embolism    Chest x-ray: 03/04/2018  IMPRESSION:  There is increasing opacity within the right middle lobe compared  to the examinations of January 11 and February 16, 2018. While in  the proper clinical setting this may represent consolidating  pneumonia, however, given the fact there is no resolution likely  with treatment recommend CT of the chest with contrast for  further evaluation to exclude the possibility of underlying  Neoplasm.        CT chest: 03/04/2018  Moderate area of consolidation within the right middle lobe  corresponding to the radiographic finding. There is air  bronchograms present. No obvious central bronchial lesion  contributing this finding. Consideration is given to atelectasis  and/or consolidating pneumonia. Recommend treatment and follow-up  until clear. If there is no change on the follow-up study  pulmonary consultation would be recommended for possible  bronchoscopy.  Minimal reticulonodularity left upper lobe which may represent  pneumonitis.  There is a background of old granulomatous disease. There are  couple small 5 mm less noncalcified nodules that likely are  postinflammatory as well. These can be evaluated on follow-up  exams.  No suspicious mediastinal or hilar lymph nodes based on size or  morphologic criteria.  Cholelithiasis.  Upper midline abdominal hernia  containing fat as well as  extension the left lobe the liver towards the mouth of the  defect. No findings of incarceration or obstruction.  Mid to lower midline abdominal wall hernia, larger in size  containing fat and bowel without findings of obstruction or  Incarceration.    PFT: 11/13/2017  Severe obstruction, moderate restriction and severely reduced diffusing capacity.    Labs:   Lab Results   Component Value Date    GLUCOSE 130 (H) 04/02/2018    BUN 27 (H) 04/02/2018    CREATININE 1.20 (H) 04/02/2018    EGFRIFNONA 46 04/02/2018    BCR 22.5 04/02/2018    K 3.9 04/02/2018    CO2 24.0 04/02/2018    CALCIUM 8.8 04/02/2018    ALBUMIN 4.00 04/02/2018    LABIL2 1.0 (L) 03/07/2018    AST 20 03/07/2018    ALT 47 03/07/2018     Lab Results   Component Value Date    WBC 9.57 03/07/2018    HGB 12.4 03/07/2018    HCT 39.2 03/07/2018    MCV 92.5 03/07/2018     03/07/2018     Lab Results   Component Value Date    CHOL 104 02/21/2018    CHOL 126 08/15/2017    CHOL 131 02/15/2017     Lab Results   Component Value Date    TRIG 130 02/21/2018    TRIG 103 08/15/2017    TRIG 81 02/15/2017    TRIG 95 02/15/2017     Lab Results   Component Value Date    HDL 35 (L) 02/21/2018    HDL 44 (L) 08/15/2017    HDL 43 (L) 02/15/2017     Lab Results   Component Value Date    PROBNP 5,840.0 (H) 03/04/2018       The following portions of the patient's history were reviewed and updated as appropriate: allergies, current medications, problem list,  past medical history, surgical history, family history and social history.    Recent images independently reviewed.    Available laboratory values reviewed.    Assessment:   [place]mer discussion with the patient regarding complex multiple medical problems for which we are limited in curative treatment however we will proceed with maximization of medical management/symptomatic management.  I have once again pleaded with the patient to please keep her appointments with Dr. Snowden, Dr. Thompson as  well as Dr. Gaviria. She does feel overwhelmed with appointments, but we spoke about the significance of her disease process.     Weight is down 2lbs since last visit 4/2/18. She had to take extra lasix on Tuesday and Wednesday due to frothy cough. She felt as though she had fluid built up.     Diagnosis Plan   1. Pulmonary hypertension severe     WHO group II/III. Class III-IV   2. RVF (right ventricular failure) (please refer below) RVEF <40%. LVEF: 55%  AHA Stage C: ; NYHA Class III-IV  The patient presents with clinical evidence of mild hypervolemia in the setting of severe pulmonary hypertension/right ventricular failure/cor pulmonale which appears to be related to indiscretions in dietary Na+ intake. She is well perfused.    BETA-BLOCKER: Bisoprolol 5 mg daily  ACE/ARB: Not applicable/allergy to lisinopril  ENTRESTO: Not applicable  DIURETIC: Bumex 2mg daily and 1.5 tablets every other day. Same as Lasix instructions.  ALDOSTERONT ANTAGONIST: Not applicable  IMDUR/HYDRALAZINE: Not applicable  DIGOXIN: Not applicable  Fluid restriction: 4560-9446 cc daily  Sodium restriction: 2000 mg daily   6MWT: Performed today  Cardiac Rehab: Not applicable   Pulmonary Rehab: Declines referral based on transportation issues (see she refuses to use public transportation)  ICD: Not applicable  CardioMEMS: Not applicable  Advanced HF evaluation (Transplant/LVAD): Not applicable    Recommended daily weight monitoring.  Discussed patient action plan for heart failure;  Recommended avoiding NSAIDs use.  Discussed warning signs requiring additional medical attention for heart failure.       3. Stage 3 chronic kidney disease, stable  Dr Cavanaugh on 04/16/2018. No changes to medications.          In upcoming visits and at a more appropriate/stable presentation, we will proceed with discussion regarding advanced care planning/goals of care.    Follow up in 3 weeks. Plan for BMP then.

## 2018-04-26 NOTE — PATIENT INSTRUCTIONS
Change Lasix to Bumex.     Lasix 80mg = Bumex 2mg.     Continue to take as directed- tablet and a half Tuesday and Thursday as Whitley directed.

## 2018-05-02 NOTE — PROGRESS NOTES
Cardiovascular Medicine   David Snowden M.D., Ph.D., [facility]      The patient returns to cardiology clinic for follow-up of the following cardiac problems:    PROBLEM LIST:    1. HFpEF-PVH  Right heart catheterization by Dr. Connelly  Pulmonary artery pressure:  91/35 mmHg mean 56 mmHg.  Pulmonary capillary wedge pressure at end expiration: 25/23 mmHg mean: 21 mmHg  RV: 102/7 mmHg,  mean of 16 mmHg.  Right atrium: 21/18 , 16 mmHg  Cardiac output by thermodilution method: 4.47 L/m  Transpulmonary gradient: 35  Pulmonary vascular resistance: 7.82 with significant  Saturation: On room air  Right atrium 49%,   Right ventricle 47%  Pulmonary artery 52%  2. ASCAD  A. December 4, 2004 STEMI  A. PCI RCA  3. Smoker  4. Moderate TR  5. HFpEF      Pulmonary hypertension/HFpEF  This is a former patient of Dr. Connelly.  She was having significant dyspnea and was referred for an echocardiogram.  Apparently, her PA systolic pressure was markedly elevated with evidence of likely cor pulmonale.  Dr. Connelly performed a LHC and RHC.  He interpreted the information has group 1 PAH.  VQ scan showed no evidence of CTEPH.  She has not seen sleep medicine, nor had she had PFTs.  Of note, on her RHC her wedge was elevated at 21.  Her mean PA pressure was 56. DPG was 14.  She apparently was diagnosed with Group 1 PAH, despite her clearly elevated LVEDP.  She apparently was started on Opsumit and Revatio.  When I saw her last, I spent quite some time explaining the actual results of her RHC. OPSUMIT was discontinued.  I placed a referral for PFTs and pulmonary consultation.  This shows that she does have an element of COPD.  She did have an appointment for sleep evaluation, but missed that appointment.  She has another appointment scheduled in July.    Since her last appt she had PFTs and saw Dr. Thompson. She is wearing Oxygen at night for sleep. She uses it when she ambulates. She is still smoking.     Valve Disease  Cameron Bowie is a  62 y.o. female who presents for follow-up of tricuspid valve disease.Current status: Moderate TR. .  The patient has not had valve surgery.  The patient does not have a history of rheumatic fever. The patient does not need endocarditis prophylaxis.      Atherosclerotic Disease  Patient is a 62 y.o. female who presents for follow-up of atherosclerotic coronary artery disease.  Recent history: The patient is prescribed ASA and Clopidogrel (Plavix), Bisoprolol, and Lipitor (atorvastatin). The patient's current CCS is: No anginal symptoms.     Review of Systems   Cardiovascular: Positive for dyspnea on exertion. Negative for chest pain, claudication, irregular heartbeat, leg swelling, near-syncope, orthopnea, palpitations, paroxysmal nocturnal dyspnea and syncope.   Respiratory: Positive for shortness of breath and snoring. Negative for cough and hemoptysis.      family history includes Cancer in her other; Diabetes in her other; Heart disease in her other; Hypertension in her father and other; Lung disease in her other; Stroke in her other.   reports that she has been smoking Cigarettes.  She has a 9.75 pack-year smoking history. She has never used smokeless tobacco. She reports that she does not drink alcohol or use drugs.  Allergies   Allergen Reactions   • Lisinopril Shortness Of Breath and Cough   • Penicillins Rash   • Wellbutrin [Bupropion] Palpitations   • Nsaids      Due to decreased kidney function    • Tramadol      unknown       Current Outpatient Prescriptions:   •  albuterol (PROVENTIL) (2.5 MG/3ML) 0.083% nebulizer solution, Take 2.5 mg by nebulization 4 (Four) Times a Day As Needed for Wheezing., Disp: 125 vial, Rfl: 11  •  allopurinol (ZYLOPRIM) 100 MG tablet, Take 1 tablet by mouth Daily., Disp: 30 tablet, Rfl: 11  •  aspirin 81 MG tablet, Take 1 tablet by mouth Daily., Disp: 30 tablet, Rfl: 11  •  atorvastatin (LIPITOR) 80 MG tablet, Take 1 tablet by mouth Every Night., Disp: 90 tablet, Rfl: 3  •   azelastine (ASTELIN) 0.1 % nasal spray, 2 sprays into each nostril 2 (Two) Times a Day. Use in each nostril as directed, Disp: 30 mL, Rfl: 11  •  bisoprolol (ZEBeta) 5 MG tablet, Take 0.5 tablets by mouth Daily., Disp: 30 tablet, Rfl: 6  •  Blood Glucose Monitoring Suppl (ONE TOUCH ULTRA MINI) w/Device kit, , Disp: , Rfl:   •  bumetanide (BUMEX) 2 MG tablet, Take 1 tablet by mouth Daily. Then 1.5 tablets every other day, Disp: 45 tablet, Rfl: 6  •  Calcium-Magnesium-Vitamin D (CALCIUM 500 PO), Take 500 mg by mouth 2 (two) times a day., Disp: , Rfl:   •  clopidogrel (PLAVIX) 75 MG tablet, Take 1 tablet by mouth Daily., Disp: 30 tablet, Rfl: 11  •  glucose blood test strip, Use as instructed, Disp: 50 each, Rfl: 12  •  GuaiFENesin ER (MUCINEX MAXIMUM STRENGTH) 1200 MG tablet sustained-release 12 hour, Take 1 tablet BID as needed., Disp: 60 each, Rfl: 11  •  hydrocortisone-bacitracin-zinc oxide-nystatin (MAGIC BARRIER), Apply 1 application topically As Needed (Rash)., Disp: 60 g, Rfl: 0  •  Respiratory Therapy Supplies (NEBULIZER/TUBING/MOUTHPIECE) kit, Tubing, Disp: 1 each, Rfl: 11  •  senna (SENOKOT) 8.6 MG tablet tablet, Take 1 tablet by mouth 2 (two) times a day., Disp: , Rfl:   •  sildenafil (REVATIO) 20 MG tablet, Take 1 tablet by mouth 3 (Three) Times a Day., Disp: 90 tablet, Rfl: 6  •  TRUEPLUS LANCETS 33G misc, 1 each Daily., Disp: 100 each, Rfl: 3  •  umeclidinium-vilanterol (ANORO ELLIPTA) 62.5-25 MCG/INH aerosol powder  inhaler, Inhale 1 puff Daily., Disp: 1 each, Rfl: 11  •  VENTOLIN  (90 Base) MCG/ACT inhaler, USE 2 PUFFS FOUR TIMES DAILY AND AS NEEDED, Disp: 18 g, Rfl: 11  •  vitamin D (ERGOCALCIFEROL) 36270 units capsule capsule, Take 1 capsule by mouth Every 14 (Fourteen) Days., Disp: 2 capsule, Rfl: 11  •  sertraline (ZOLOFT) 100 MG tablet, Take 0.5 tablets by mouth Daily., Disp: 30 tablet, Rfl: 11    Physical Exam:  Vitals:    05/02/18 0917   BP: 110/58   Pulse: 58   SpO2: 92%     Body mass  index is 44.23 kg/m².    GEN: alert, appears stated age and cooperative  Body Habitus: overweight  JVP: 6 cm of water at 45 degrees HJR: absent      Heart rate: normal  Heart Rhythm: regular     Heart Sounds: S1: normal intensity  S2: increased intensity  S3: absent   S4: absent  Opening Snap: absent  A2-OS:  N/A  Pericardial rub: absent    Ejection click: None      Murmurs: Systolic: I/VI LSB  Diastolic: none  Extremity: Trace LE edema. Cyanosis, peripheral pulses 2+ and symmetric      DATA REVIEWED:     EKG. I personally reviewed and interpreted the EKG.    Results for orders placed during the hospital encounter of 10/30/17   Adult Transthoracic Echo Limited    Narrative · Left Ventricle: Left ventricular systolic function is normal. Estimated   EF appears to be in the range of 56 - 60%.  · Left ventricular wall thickness is consistent with mild concentric   hypertrophy  · Right Ventricle: Normal right ventricular wall thickness noted. Right   ventricular cavity is moderate-to-severely dilated  · Abnormal septal motion. Systolic flattening of interventricular septum   consistent with right ventricle pressure overload.  · Tricuspid Valve: The tricuspid valve has poor leaflet coaptation and is   abnormal  · Moderate to severe tricuspid valve regurgitation is present  · Estimated right ventricular systolic pressure from tricuspid   regurgitation is markedly elevated (>55 mmHg)  · Severe pulmonary hypertension is present.  · There is no evidence of pericardial effusion          LABS:   Lab Results   Component Value Date    GLUCOSE 130 (H) 04/02/2018    BUN 27 (H) 04/02/2018    CREATININE 1.20 (H) 04/02/2018    EGFRIFNONA 46 04/02/2018    BCR 22.5 04/02/2018    K 3.9 04/02/2018    CO2 24.0 04/02/2018    CALCIUM 8.8 04/02/2018    ALBUMIN 4.00 04/02/2018    LABIL2 1.0 (L) 03/07/2018    AST 20 03/07/2018    ALT 47 03/07/2018     Lab Results   Component Value Date    WBC 9.57 03/07/2018    HGB 12.4 03/07/2018    HCT 39.2  03/07/2018    MCV 92.5 03/07/2018     03/07/2018     Lab Results   Component Value Date    CHOL 104 02/21/2018    CHLPL 138 07/18/2016    TRIG 130 02/21/2018    HDL 35 (L) 02/21/2018    LDL 52 02/21/2018     No results found for: TSH, R5PNNFC, G7VECDB, THYROIDAB  Lab Results   Component Value Date    CKTOTAL 60 12/10/2014    CKMB 1.24 03/04/2018    TROPONINI 0.043 (H) 03/04/2018     Lab Results   Component Value Date    HGBA1C 6.9 (H) 02/21/2018     Lab Results   Component Value Date    DDIMER 1,606 (H) 12/10/2014     Lab Results   Component Value Date    ALT 47 03/07/2018     Lab Results   Component Value Date    HGBA1C 6.9 (H) 02/21/2018    HGBA1C 6.3 (H) 08/15/2017    HGBA1C 6.41 (H) 02/15/2017     Lab Results   Component Value Date    MICROALBUR 0.6 02/21/2018    CREATININE 1.20 (H) 04/02/2018     Lab Results   Component Value Date    IRON 48 09/23/2016    TIBC 344 09/23/2016    FERRITIN 43.1 09/23/2016     Lab Results   Component Value Date    INR 1.09 03/04/2018    INR 1.08 05/22/2017    INR 1.0 09/23/2016    PROTIME 13.9 03/04/2018    PROTIME 13.9 05/22/2017    PROTIME 13.0 09/23/2016       Assessment/Plan     1. PAH/PVH/HFpEF/RV failure.  WHO Group Likely 2/3; FC: Class 3.     RV status: Adversely adapted. The patient is in an euvolemic and well-perfused physiologic state.  I would like to leave her on her PDE-5 given her RV failure until she can be assessed/treated for JESSICA.   -6MWT prior to next appt  -PDE5-Inhibitor: Continue Revatio for now  -Sleep referral for JESSICA; Pt. Has appt in July  -Dr. Thompson  -Smoking cessation  -Weight loss  -Bisoprolol and Bumex    2. ASCAD, obstructive. No anginal symptoms. The patient has been revascularized. Revascularization:  PCI to right coronary artery  -Bisoprolol  -ASA and Clopidogrel (Plavix)  -Lipitor (atorvastatin)    3. HTN, essential.  The patient has been diagnosed with  essential hypertension and is managed by primary care.  Today, the patient's blood  "pressure is controlled.   -Continue current medications and follow-up PCP for mgmt    4. Cardiac Risk Assessment/Dyslipidemia:  Pt. has known coronary artery disease and meets indication for statin therapy. LDL goal: < 100 mg/dl (CHD or \"CHD risk equivalent\" is present).    -Dietary changes: Increase soluble fiber  -Exercise changes:  Encouraged daily aerobic activity.  -Smoking cessation (discussed)  -Recommended moderate-intensity statin   -Recommended ASA  -BP goal <130/80, Maintain BMI 18.5-24.9, Maintain hemoglobin A1c less than 7    5. Weight: Body mass index is 44.23 kg/m²..  BMI above upper level, FU Plan documented .  Class: III  -General patient education:  -Weight loss hand-out  -Exercise intervention:   Hand out, increase aerobic activity  -PCP Follow-up    6. Nicotine status: is a current smoker     I advised the patient of the risks in continuing to use tobacco, and I provided this patient with smoking cessation educational materials.    During this visit, I spent 3 minutes counseling the patient regarding smoking cessation.      1-800-QUIT-NOW information was provided.         Return in about 6 months (around 11/2/2018).          There are no discontinued medications.  Orders Placed This Encounter   Procedures   • Adult Transthoracic Echo Limited W/ Cont if Necessary Per Protocol     Scheduling Instructions:      PTNA     Order Specific Question:   Reason for exam?     Answer:   Heart Failure, Cardiomyopathy, or Sytemic or Pulmonary Hypertension     Order Specific Question:   Hypertension, Heart Failure, or Cardiomyopathy specification?     Answer:   Known Cardiomyopathy     Order Specific Question:   Change in clinical status, cardiac exam, or medical therapy?     Answer:   Yes     Comments:   OMT   • Walking Oximetry     Scheduling Instructions:      PTNA               This document has been electronically signed by David Snowden MD PhD on May 2, 2018 9:30 AM          "

## 2018-05-02 NOTE — PATIENT INSTRUCTIONS
Obesity, Adult  Obesity is the condition of having too much total body fat. Being overweight or obese means that your weight is greater than what is considered healthy for your body size. Obesity is determined by a measurement called BMI. BMI is an estimate of body fat and is calculated from height and weight. For adults, a BMI of 30 or higher is considered obese.  Obesity can eventually lead to other health concerns and major illnesses, including:  · Stroke.  · Coronary artery disease (CAD).  · Type 2 diabetes.  · Some types of cancer, including cancers of the colon, breast, uterus, and gallbladder.  · Osteoarthritis.  · High blood pressure (hypertension).  · High cholesterol.  · Sleep apnea.  · Gallbladder stones.  · Infertility problems.  What are the causes?  The main cause of obesity is taking in (consuming) more calories than your body uses for energy. Other factors that contribute to this condition may include:  · Being born with genes that make you more likely to become obese.  · Having a medical condition that causes obesity. These conditions include:  ¨ Hypothyroidism.  ¨ Polycystic ovarian syndrome (PCOS).  ¨ Binge-eating disorder.  ¨ Cushing syndrome.  · Taking certain medicines, such as steroids, antidepressants, and seizure medicines.  · Not being physically active (sedentary lifestyle).  · Living where there are limited places to exercise safely or buy healthy foods.  · Not getting enough sleep.  What increases the risk?  The following factors may increase your risk of this condition:  · Having a family history of obesity.  · Being a woman of -American descent.  · Being a man of  descent.  What are the signs or symptoms?  Having excessive body fat is the main symptom of this condition.  How is this diagnosed?  This condition may be diagnosed based on:  · Your symptoms.  · Your medical history.  · A physical exam. Your health care provider may measure:  ¨ Your BMI. If you are an adult  with a BMI between 25 and less than 30, you are considered overweight. If you are an adult with a BMI of 30 or higher, you are considered obese.  ¨ The distances around your hips and your waist (circumferences). These may be compared to each other to help diagnose your condition.  ¨ Your skinfold thickness. Your health care provider may gently pinch a fold of your skin and measure it.  How is this treated?  Treatment for this condition often includes changing your lifestyle. Treatment may include some or all of the following:  · Dietary changes. Work with your health care provider and a dietitian to set a weight-loss goal that is healthy and reasonable for you. Dietary changes may include eating:  ¨ Smaller portions. A portion size is the amount of a particular food that is healthy for you to eat at one time. This varies from person to person.  ¨ Low-calorie or low-fat options.  ¨ More whole grains, fruits, and vegetables.  · Regular physical activity. This may include aerobic activity (cardio) and strength training.  · Medicine to help you lose weight. Your health care provider may prescribe medicine if you are unable to lose 1 pound a week after 6 weeks of eating more healthily and doing more physical activity.  · Surgery. Surgical options may include gastric banding and gastric bypass. Surgery may be done if:  ¨ Other treatments have not helped to improve your condition.  ¨ You have a BMI of 40 or higher.  ¨ You have life-threatening health problems related to obesity.  Follow these instructions at home:     Eating and drinking     · Follow recommendations from your health care provider about what you eat and drink. Your health care provider may advise you to:  ¨ Limit fast foods, sweets, and processed snack foods.  ¨ Choose low-fat options, such as low-fat milk instead of whole milk.  ¨ Eat 5 or more servings of fruits or vegetables every day.  ¨ Eat at home more often. This gives you more control over what you  eat.  ¨ Choose healthy foods when you eat out.  ¨ Learn what a healthy portion size is.  ¨ Keep low-fat snacks on hand.  ¨ Avoid sugary drinks, such as soda, fruit juice, iced tea sweetened with sugar, and flavored milk.  ¨ Eat a healthy breakfast.  · Drink enough water to keep your urine clear or pale yellow.  · Do not go without eating for long periods of time (do not fast) or follow a fad diet. Fasting and fad diets can be unhealthy and even dangerous.  Physical Activity   · Exercise regularly, as told by your health care provider. Ask your health care provider what types of exercise are safe for you and how often you should exercise.  · Warm up and stretch before being active.  · Cool down and stretch after being active.  · Rest between periods of activity.  Lifestyle   · Limit the time that you spend in front of your TV, computer, or video game system.  · Find ways to reward yourself that do not involve food.  · Limit alcohol intake to no more than 1 drink a day for nonpregnant women and 2 drinks a day for men. One drink equals 12 oz of beer, 5 oz of wine, or 1½ oz of hard liquor.  General instructions   · Keep a weight loss journal to keep track of the food you eat and how much you exercise you get.  · Take over-the-counter and prescription medicines only as told by your health care provider.  · Take vitamins and supplements only as told by your health care provider.  · Consider joining a support group. Your health care provider may be able to recommend a support group.  · Keep all follow-up visits as told by your health care provider. This is important.  Contact a health care provider if:  · You are unable to meet your weight loss goal after 6 weeks of dietary and lifestyle changes.  This information is not intended to replace advice given to you by your health care provider. Make sure you discuss any questions you have with your health care provider.  Document Released: 01/25/2006 Document Revised:  05/22/2017 Document Reviewed: 10/05/2016  [employer] Interactive Patient Education © 2017 [employer]  Pulmonary Hypertension  Pulmonary hypertension is high blood pressure within the arteries in your lungs (pulmonary arteries). It is different than having high blood pressure elsewhere in your body, such as blood pressure that is measured with a blood pressure cuff. Pulmonary hypertension makes it harder for blood to flow through the lungs. As a result, the heart must work harder to pump blood through the lungs, and it may be harder for you to breathe. Over time, this can weaken the heart muscle. Pulmonary hypertension is a serious condition and it can be fatal.  What are the causes?  Many different medical conditions can cause pulmonary hypertension. Pulmonary hypertension can be categorized by cause into five groups:  Group 1   Pulmonary hypertension that is caused by abnormal growth of small blood vessels in the lungs (pulmonary arterial hypertension). The abnormal blood vessel growth may have no known cause, or it may be:  · Passed along from a parent (hereditary).  · Caused by another disease, such as a connective tissue disease (including lupus or scleroderma) or HIV.  · Caused by certain drugs or toxins.  Group 2   Pulmonary hypertension that is caused by weakness of the main chamber of the heart (left ventricle) or heart valve disease.  Group 3   Pulmonary hypertension that is caused by lung disease or low oxygen levels. Causes in this group include:  · Emphysema or chronic obstructive pulmonary disease (COPD).  · Untreated sleep apnea.  · Pulmonary fibrosis.  Group 4   Pulmonary hypertension that is caused by blood clots in the lungs (pulmonary emboli).  Group 5   Other causes of pulmonary hypertension, such as sickle cell anemia, or a mix of multiple causes.  What are the signs or symptoms?  Symptoms of this condition include:  · Shortness of breath. You may notice shortness of breath with:  ¨ Activity,  such as walking.  ¨ No activity.  · Tiredness and fatigue.  · Dizziness or fainting.  · Rapid heartbeat or feeling your heart flutter or skip a beat (palpitations).  · Neck vein enlargement.  · Bluish color to your lips and fingertips.  How is this diagnosed?  This condition may be diagnosed by:  · Chest X-ray.  · Arterial blood gases. This test checks the acidity of your blood as well as your blood oxygen and carbon dioxide levels.  · CT scan. This test can provide detailed images of your lungs.  · Pulmonary function test. This test measures how much air your lungs can hold. It also tests how well air moves in and out of your lungs.  · Electrocardiogram (ECG). This test traces the electrical activity of your heart.  · Echocardiogram. This test is used to look at your heart in motion and check how it is functioning.  · Heart catheterization. This test can measure the pressure in your pulmonary artery and the right side of your heart.  · Lung biopsy. This procedure involves checking a sample of lung tissue to find underlying causes.  How is this treated?  There is no cure for pulmonary hypertension, but treatment can help to relieve symptoms and slow the progress of the condition. Treatment can involve:  · Medicines, such as:  ¨ Blood pressure medicines.  ¨ Medicines to relax (dilate) the pulmonary blood vessels.  ¨ Water pills to get rid of extra fluid (diuretic medicines).  ¨ Blood-thinning medicines.  · Surgery. For severe pulmonary hypertension that does not respond to medical treatment, heart-lung or lung transplant may be needed.  Follow these instructions at home:  · Take medicines only as directed by your health care provider. These include over-the-counter medicines and prescription medicines. Take all medicines exactly as instructed. Do not change or stop medicines without first checking with your health care provider.  · Do not smoke. If you need help quitting, ask your health care provider.  · Eat a  healthy diet.  · Limit your salt (sodium) intake to less than 2,300 mg per day.  · Stay as active as possible. Exercise as directed by your health care provider. Talk with your health care provider about what type of exercise is safe for you.  · Avoid high altitudes.  · Avoid hot tubs and saunas.  · Avoid becoming pregnant, if this applies. Talk with your health care provider about safe methods of birth control.  · Keep all follow-up visits as directed by your health care provider. This is important.  Get help right away if:  · You have severe shortness of breath.  · You develop chest pain or pressure in your chest.  · You cough up blood.  · You develop swelling of your feet or legs.  · You have a significant increase in weight within 1-2 days.  This information is not intended to replace advice given to you by your health care provider. Make sure you discuss any questions you have with your health care provider.  Document Released: 10/14/2008 Document Revised: 07/07/2017 Document Reviewed: 06/09/2014  [employer] Interactive Patient Education © 2017 [employer]  Calorie Counting for Weight Loss  Calories are units of energy. Your body needs a certain amount of calories from food to keep you going throughout the day. When you eat more calories than your body needs, your body stores the extra calories as fat. When you eat fewer calories than your body needs, your body burns fat to get the energy it needs.  Calorie counting means keeping track of how many calories you eat and drink each day. Calorie counting can be helpful if you need to lose weight. If you make sure to eat fewer calories than your body needs, you should lose weight. Ask your health care provider what a healthy weight is for you.  For calorie counting to work, you will need to eat the right number of calories in a day in order to lose a healthy amount of weight per week. A dietitian can help you determine how many calories you need in a day and will  give you suggestions on how to reach your calorie goal.  · A healthy amount of weight to lose per week is usually 1-2 lb (0.5-0.9 kg). This usually means that your daily calorie intake should be reduced by 500-750 calories.  · Eating 1,200 - 1,500 calories per day can help most women lose weight.  · Eating 1,500 - 1,800 calories per day can help most men lose weight.  What is my plan?  My goal is to have __________ calories per day.  If I have this many calories per day, I should lose around __________ pounds per week.  What do I need to know about calorie counting?  In order to meet your daily calorie goal, you will need to:  · Find out how many calories are in each food you would like to eat. Try to do this before you eat.  · Decide how much of the food you plan to eat.  · Write down what you ate and how many calories it had. Doing this is called keeping a food log.  To successfully lose weight, it is important to balance calorie counting with a healthy lifestyle that includes regular activity. Aim for 150 minutes of moderate exercise (such as walking) or 75 minutes of vigorous exercise (such as running) each week.  Where do I find calorie information?     The number of calories in a food can be found on a Nutrition Facts label. If a food does not have a Nutrition Facts label, try to look up the calories online or ask your dietitian for help.  Remember that calories are listed per serving. If you choose to have more than one serving of a food, you will have to multiply the calories per serving by the amount of servings you plan to eat. For example, the label on a package of bread might say that a serving size is 1 slice and that there are 90 calories in a serving. If you eat 1 slice, you will have eaten 90 calories. If you eat 2 slices, you will have eaten 180 calories.  How do I keep a food log?  Immediately after each meal, record the following information in your food log:  · What you ate. Don't forget to  "include toppings, sauces, and other extras on the food.  · How much you ate. This can be measured in cups, ounces, or number of items.  · How many calories each food and drink had.  · The total number of calories in the meal.  Keep your food log near you, such as in a small notebook in your pocket, or use a mobile ginger or website. Some programs will calculate calories for you and show you how many calories you have left for the day to meet your goal.  What are some calorie counting tips?  · Use your calories on foods and drinks that will fill you up and not leave you hungry:  ¨ Some examples of foods that fill you up are nuts and nut butters, vegetables, lean proteins, and high-fiber foods like whole grains. High-fiber foods are foods with more than 5 g fiber per serving.  ¨ Drinks such as sodas, specialty coffee drinks, alcohol, and juices have a lot of calories, yet do not fill you up.  · Eat nutritious foods and avoid empty calories. Empty calories are calories you get from foods or beverages that do not have many vitamins or protein, such as candy, sweets, and soda. It is better to have a nutritious high-calorie food (such as an avocado) than a food with few nutrients (such as a bag of chips).  · Know how many calories are in the foods you eat most often. This will help you calculate calorie counts faster.  · Pay attention to calories in drinks. Low-calorie drinks include water and unsweetened drinks.  · Pay attention to nutrition labels for \"low fat\" or \"fat free\" foods. These foods sometimes have the same amount of calories or more calories than the full fat versions. They also often have added sugar, starch, or salt, to make up for flavor that was removed with the fat.  · Find a way of tracking calories that works for you. Get creative. Try different apps or programs if writing down calories does not work for you.  What are some portion control tips?  · Know how many calories are in a serving. This will help " you know how many servings of a certain food you can have.  · Use a measuring cup to measure serving sizes. You could also try weighing out portions on a kitchen scale. With time, you will be able to estimate serving sizes for some foods.  · Take some time to put servings of different foods on your favorite plates, bowls, and cups so you know what a serving looks like.  · Try not to eat straight from a bag or box. Doing this can lead to overeating. Put the amount you would like to eat in a cup or on a plate to make sure you are eating the right portion.  · Use smaller plates, glasses, and bowls to prevent overeating.  · Try not to multitask (for example, watch TV or use your computer) while eating. If it is time to eat, sit down at a table and enjoy your food. This will help you to know when you are full. It will also help you to be aware of what you are eating and how much you are eating.  What are tips for following this plan?  Reading food labels   · Check the calorie count compared to the serving size. The serving size may be smaller than what you are used to eating.  · Check the source of the calories. Make sure the food you are eating is high in vitamins and protein and low in saturated and trans fats.  Shopping   · Read nutrition labels while you shop. This will help you make healthy decisions before you decide to purchase your food.  · Make a grocery list and stick to it.  Cooking   · Try to cook your favorite foods in a healthier way. For example, try baking instead of frying.  · Use low-fat dairy products.  Meal planning   · Use more fruits and vegetables. Half of your plate should be fruits and vegetables.  · Include lean proteins like poultry and fish.  How do I count calories when eating out?  · Ask for smaller portion sizes.  · Consider sharing an entree and sides instead of getting your own entree.  · If you get your own entree, eat only half. Ask for a box at the beginning of your meal and put the  rest of your entree in it so you are not tempted to eat it.  · If calories are listed on the menu, choose the lower calorie options.  · Choose dishes that include vegetables, fruits, whole grains, low-fat dairy products, and lean protein.  · Choose items that are boiled, broiled, grilled, or steamed. Stay away from items that are buttered, battered, fried, or served with cream sauce. Items labeled “crispy” are usually fried, unless stated otherwise.  · Choose water, low-fat milk, unsweetened iced tea, or other drinks without added sugar. If you want an alcoholic beverage, choose a lower calorie option such as a glass of wine or light beer.  · Ask for dressings, sauces, and syrups on the side. These are usually high in calories, so you should limit the amount you eat.  · If you want a salad, choose a garden salad and ask for grilled meats. Avoid extra toppings like mcdermott, cheese, or fried items. Ask for the dressing on the side, or ask for olive oil and vinegar or lemon to use as dressing.  · Estimate how many servings of a food you are given. For example, a serving of cooked rice is ½ cup or about the size of half a baseball. Knowing serving sizes will help you be aware of how much food you are eating at restaurants. The list below tells you how big or small some common portion sizes are based on everyday objects:  ¨ 1 oz--4 stacked dice.  ¨ 3 oz--1 deck of cards.  ¨ 1 tsp--1 die.  ¨ 1 Tbsp--½ a ping-pong ball.  ¨ 2 Tbsp--1 ping-pong ball.  ¨ ½ cup--½ baseball.  ¨ 1 cup--1 baseball.  Summary  · Calorie counting means keeping track of how many calories you eat and drink each day. If you eat fewer calories than your body needs, you should lose weight.  · A healthy amount of weight to lose per week is usually 1-2 lb (0.5-0.9 kg). This usually means reducing your daily calorie intake by 500-750 calories.  · The number of calories in a food can be found on a Nutrition Facts label. If a food does not have a Nutrition  Facts label, try to look up the calories online or ask your dietitian for help.  · Use your calories on foods and drinks that will fill you up, and not on foods and drinks that will leave you hungry.  · Use smaller plates, glasses, and bowls to prevent overeating.  This information is not intended to replace advice given to you by your health care provider. Make sure you discuss any questions you have with your health care provider.  Document Released: 12/18/2006 Document Revised: 11/17/2017 Document Reviewed: 11/17/2017  [employer] Interactive Patient Education © 2017 [employer]

## 2018-05-11 NOTE — PROGRESS NOTES
Pulmonary Office Follow-up    Subjective     Cameron Bowie is seen today at the office for   Chief Complaint   Patient presents with   • Follow-up     4 week   • Results     CT   • COPD         HPI  Cameron Bowie is a 62 y.o. female with a PMH significant for COPD, tobacco use, pulmonary hypertension, morbid obesity, HTN, ASCAD, DM, PAD s/p stenting, and CKD who presents for follow-up of COPD and recent CT. She was initially seen on 4/10/18 at which time I recommended stopping Spiriva and starting Anoro. Pt feels much better on the Anoro and has not needed her albuterol recently. She still has a cough productive of thick, white sputum and wheeze. Pt denies wt changes or increased leg edema. She is trying to do better with her salt intake. Pt is still smoke <0.5ppd but she had stopped smoking for a week before relapse. She continues on her supplemental O2. She is scheduled to have a sleep evaluation in early July.    Patient Active Problem List   Diagnosis   • Venous insufficiency   • Pulmonary hypertension severe   • Hyperlipidemia   • Gout   • Generalized anxiety disorder   • Essential hypertension   • DJD (degenerative joint disease)   • Coronary atherosclerosis of native coronary artery   • Coronary arteriosclerosis   • Chronic kidney disease   • Psoriasis   • Moderate episode of recurrent major depressive disorder   • Seasonal allergic rhinitis due to pollen   • Type 2 diabetes mellitus with stage 3 chronic kidney disease, without long-term current use of insulin   • Cough   • Non-rheumatic tricuspid valve insufficiency   • PVD (peripheral vascular disease)   • Light tobacco smoker   • History of tobacco use   • Foot pain   • Encounter for screening for osteoporosis   • COPD (chronic obstructive pulmonary disease)   • Carpal tunnel syndrome   • Breast screening   • Asthenia   • ALVAREZ (nonalcoholic steatohepatitis)   • Chronic respiratory failure with hypoxia   • JESSICA (obstructive sleep apnea)   •  Morbid obesity with BMI of 40.0-44.9, adult   • RVF (right ventricular failure)   • Cor pulmonale   • Stage 3 chronic kidney disease   • Class 3 obesity in adult       Review of Systems  Review of Systems   Constitutional: Negative for fatigue, fever and unexpected weight change.   HENT: Negative for congestion.    Respiratory: Positive for cough and shortness of breath. Negative for chest tightness and wheezing.    Cardiovascular: Positive for leg swelling. Negative for chest pain.   Gastrointestinal: Negative for abdominal pain.     As described in the HPI. Otherwise, remainder of ROS (14 systems) were negative.    Medications, Allergies, Social, and Family Histories reviewed as per EMR.    Objective     Vitals:    05/11/18 0958   BP: 118/80   Pulse: 72   SpO2: 94%     Physical Exam   Constitutional: She is oriented to person, place, and time. Vital signs are normal. She appears well-developed and well-nourished.   Morbidly obese   HENT:   Head: Normocephalic and atraumatic.   Nose: Nose normal.   Mouth/Throat: Uvula is midline, oropharynx is clear and moist and mucous membranes are normal.   Mallampati 4   Eyes: Conjunctivae, EOM and lids are normal. Pupils are equal, round, and reactive to light.   Neck: Trachea normal and normal range of motion. No tracheal tenderness present. No thyroid mass present.   Cardiovascular: Normal rate, regular rhythm and normal heart sounds.  PMI is not displaced.  Exam reveals no gallop.    No murmur heard.  Pulmonary/Chest: Effort normal. No respiratory distress. She has no decreased breath sounds. She has no wheezes. She has no rhonchi. She exhibits deformity (thoracic kyphosis). She exhibits no tenderness.   Abdominal: Soft. Normal appearance and bowel sounds are normal. There is no hepatosplenomegaly. There is no tenderness.   obese   Musculoskeletal:   Slow but normal gait, BLE edema     Vascular Status -  Her right foot exhibits abnormal foot edema. Her left foot exhibits  abnormal foot edema.  Lymphadenopathy:        Head (right side): No submandibular adenopathy present.        Head (left side): No submandibular adenopathy present.     She has no cervical adenopathy.        Right: No supraclavicular adenopathy present.        Left: No supraclavicular adenopathy present.   Neurological: She is alert and oriented to person, place, and time. Gait normal.   Skin: Skin is warm and dry. No rash noted. No cyanosis. Nails show no clubbing.   Psychiatric: She has a normal mood and affect. Her speech is normal and behavior is normal. Judgment normal.   Nursing note and vitals reviewed.      IMAGING: CT chest without contrast 5/7/18 (independently reviewed and interpreted by me) showed persistent RML atelectasis but smaller and there are no longer air bronchograms, no definite central or endobronchial mass, old granulomatous disease, cholelithiasis, stable large ventral hernia      Assessment/Plan     Cameron was seen today for follow-up, results and copd.    Diagnoses and all orders for this visit:    Chronic obstructive pulmonary disease, unspecified COPD type    Chronic respiratory failure with hypoxia    Morbid obesity with BMI of 40.0-44.9, adult    Abnormal CT of the chest  -     CT Chest Without Contrast; Future    Cor pulmonale    JESSICA (obstructive sleep apnea)         Discussion/ Recommendations:   Symptomatically, she is improved since starting the Anoro and has had infrequent albuterol need.  She does continue with a cough and some wheeze but it is much better.  Unfortunately, she does continue to smoke but is working at trying to cut back.  She is more compliant with her diet and has had good success managing her heart failure.    -Continue Anoro daily and albuterol as needed only.  -Continue to use the Aerobika twice daily after using her albuterol nebs to assist with mucus clearance.  -Encouraged her to keep her consultation with Dr. Gaviria as she likely has underlying JESSICA which needs  to be addressed.  -Continue supplemental O2.  -Repeat CT chest in 3 months.  -I advised the patient of the risks in continuing to use tobacco, and I provided this patient with smoking cessation educational materials. Encouraged her to pick a quit date. During this visit, I spent 3 minutes counseling the patient regarding smoking cessation.  -F/u in HF clinic as planned.    While the findings on her CT are improving, she is at high risk for lung cancer and I cannot rule out stability of an underlying mass.  I discussed the options for further workup which include surveillance CT in 3 months versus a PET/CT now to assess for possible occult malignancy.  The patient stated that if she were to have lung cancer she would not want to have treatment given her underlying comorbidities as she much values quality of life.  I explained if she would not pursue treatment and I would not recommend aggressive measures for workup and possible biopsy.  The patient is in agreement with this plan.  She also accepts that if the findings on her CT progress and she becomes more symptomatic that hospice may be a good option.    Patient's BMI is above normal parameters. Follow-up plan includes:  exercise counseling, referral to a nutritionist and referral to primary care.        Return in about 3 months (around 8/11/2018) for F/u CT.          This document has been electronically signed by Jeni Thompson MD on May 11, 2018 11:45 AM      Dictated using Dragon

## 2018-05-14 NOTE — PROGRESS NOTES
Subjective:     Chief Complaint   Patient presents with   • Congestive Heart Failure     follow up     PCP: Dr. Corley  Cardiologist: Dr. David Snowden  Nephrologist: Dr. Cavanaugh  Pulmonologist: Dr. Mariana Thompson  Vascular: Dr Beck    Congestive Heart Failure   Presents for follow-up visit. The disease course has been worsening. Associated symptoms include fatigue and shortness of breath (improved). Pertinent negatives include no abdominal pain, chest pain, chest pressure, claudication, edema (chronic (L) ankle), muscle weakness, near-syncope, nocturia, orthopnea, palpitations or paroxysmal nocturnal dyspnea. Unexpected weight change: unknown. The symptoms have been improving. Past treatments include salt and fluid restriction and oxygen. The treatment provided mild relief. Compliance with prior treatments has been poor. Prior compliance problems include difficulty with treatment plan. Her past medical history is significant for CAD, chronic lung disease, DM and HTN. Compliance with total regimen is 51-75%. Compliance problems include adherence to diet and adherence to exercise.  Compliance with diet is 51-75%. Compliance with exercise is 0-25%. Compliance with medications is %.     The patient is a 62-year-old female who received initial heart failure evaluation 03/13/2018.  As she presented with clinical evidence of hypervolemia, Lasix 80 mg subcutaneous was given with plans for return to the office today for reevaluation.  She receive follow-up evaluation on 03/14/2018 at which time decision was made to proceed with alternation of Lasix 80 mg/120 mg untiloffice evaluation today.  She presented for laboratory diagnostics prior to today's office evaluation.  She reports that she is more tolerant to activity with a decrease in shortness of breath and a significant improvement regarding pedal edema.    The patient established with cardiologist, Dr. David Snowden in November 2017 with impression  "of:  PAH/PVH/HFpEF WHO Group Likely 2/3; FC: Class 3  At that time plan was for: Continuation of Revatio-discontinuation Opsumit with referral to sleep medicine as well as pulmonology;   The patient was initiated on bisoprolol for RV dysfunction with plans for repeat transthoracic echocardiogram in 1 year in follow-up office evaluation in 3 months.    The patient received pulmonology evaluation (initial) with Dr. Mariana Thompson on 02/21/2018 with impression: Mixed type COPD/severe obstruction.    The patient reports worsening of dyspnea beginning around the end of December 2017 with intermittent outpatient evaluations and attempts at stabilization.  The patient was hospitalized on March 4 with discharge on March 7 regarding \"recurrent pneumonia\".    The patient presents today (05/14/2018) indicating that overall she feels that she is doing relatively well.  The patient had recent evaluation with Dr. Thompson and indicates plan for follow-up CT of the chest in 3 months with most recent showing some improvement in abnormality.    The patient will receive initial evaluation in sleep medicine on 07/02/2018.    The patient received evaluation with Dr. David Snowden on 05/02/2018.    The patient has history of coronary artery disease:  12/4/2007: 2.5 x 23 mm Pixel stent - mid RCA  10/04/2011: 3.0 mm x 28 mm Xience V stent - proximal to mid circumflex artery     PAD:  04/25/2012   A.. Aortobifemoral bypass (Brice), 16 x 8 stretch PTFE graft  B. Repair of spleen (Amin)    Review of Systems   Constitution: Positive for fatigue. Negative for chills, fever, malaise/fatigue and weight gain. Unexpected weight change: unknown.   HENT: Negative for congestion and nosebleeds.    Eyes: Negative for discharge and pain.   Cardiovascular: Positive for dyspnea on exertion. Negative for chest pain, claudication, cyanosis, irregular heartbeat, leg swelling, near-syncope, orthopnea, palpitations, paroxysmal nocturnal dyspnea and syncope. "   Respiratory: Positive for shortness of breath (improved), sleep disturbances due to breathing, snoring and wheezing (improved). Negative for sputum production. Cough: chronic improved.    Endocrine: Negative for polydipsia, polyphagia and polyuria.   Hematologic/Lymphatic: Negative for bleeding problem. Does not bruise/bleed easily.   Skin: Negative for itching and rash.   Musculoskeletal: Negative for falls and muscle weakness.   Gastrointestinal: Negative for bloating and abdominal pain.   Genitourinary: Negative for dysuria, hematuria and nocturia.   Neurological: Positive for excessive daytime sleepiness. Negative for dizziness, light-headedness, loss of balance and vertigo.   Psychiatric/Behavioral: Negative for altered mental status and suicidal ideas. The patient is not nervous/anxious.      Past Medical History:   Diagnosis Date   • Abnormal weight gain    • Asthenia    • Breast screening    • Carpal tunnel syndrome    • Chronic kidney disease     Stage 3   • COPD (chronic obstructive pulmonary disease)    • Coronary arteriosclerosis     2007: PCI RCA 2011: PCI: LCX      • Coronary atherosclerosis of native coronary artery    • DJD (degenerative joint disease)     involving multiple joints   • Edema of lower extremity    • Encounter for screening for osteoporosis    • Encounter for screening mammogram for malignant neoplasm of breast    • Essential hypertension    • Foot pain    • Generalized anxiety disorder    • Gout    • History of tobacco use    • Hyperlipidemia    • Light tobacco smoker    • Obesity    • Peripheral edema    • Pulmonary hypertension    • PVD (peripheral vascular disease)     LATOYA R .96 L 1.0 aortobifem 1/2012      • Strain of neck muscle    • Type 2 diabetes mellitus     diet controlled     Past Surgical History:   Procedure Laterality Date   • AXILLARY SURGERY  06/03/2007    Right axillary cellulitis and abscess. Right axillary debridement   • BREAST BIOPSY  03/10/2008    Left breast  mass. Left breast excisional biopsy   • CARDIAC CATHETERIZATION  10/04/2011    significant circumflex disease responsible for patient's symptoms. Patent right coronary artery stent. Peripheral arterial disease with completely occluded right common iliac and completely occluded left enternal iliac.   • CARDIAC CATHETERIZATION N/A 2017    Procedure: Right Heart Cath/ with vasodilator challenge;  Surgeon: Ignacio Connelly MD;  Location: [facility] INVASIVE LOCATION;  Service:    • CARDIAC CATHETERIZATION N/A 2017    Procedure: Left ventriculography/ LV pressures only;  Surgeon: Ignacio Connelly MD;  Location: [facility] CATH INVASIVE LOCATION;  Service:    • COLONOSCOPY  10/2015    Declined   • CORONARY ARTERY BYPASS GRAFT  2012    Aortobifemoral bypass. Repair of spleen   • CYST REMOVAL  1979    Right, recurrent   • DILATATION AND CURETTAGE  09/10/1976    Incomplete .   • ENDOSCOPY AND COLONOSCOPY  2005    Single small polyp in the rectum. The polyp was removed by snare cautery. Colonoscopy, otherwise normal.   • INCISION AND DRAINAGE ABSCESS  2007    Abdominal wall abscess. Incision and drainage and debridement of abdominal wall abscess   • INJECTION OF MEDICATION  2013    Kenalog   • SPLENECTOMY  2012    Lacerated spleen. Posterior laceration of the capsule with a Grade I injury   • TRANSESOPHAGEAL ECHOCARDIOGRAM (SRINIVASA)  2016    With color flow-Normal LV function with Ef of 65 to 70%.Severely dilated right ventricle with impaired systolic function.Impingement of LV cavity by the interventricular septum.Grade 1A diastolic dysfunction.Moderate tricuspid regurg   • TRANSESOPHAGEAL ECHOCARDIOGRAM (SRINIVASA)  2012    Without color flow-Mild right atrial enlargement with mild right ventricular enlargement with normal left atrial and left ventricular size. EF 55%   • TUBAL ABDOMINAL LIGATION  1987   • VAGINAL DELIVERY       Social History     Social History   •  Marital status:      Social History Main Topics   • Smoking status: Current Every Day Smoker     Packs/day: 0.25     Years: 39.00     Types: Cigarettes   • Smokeless tobacco: Never Used   • Alcohol use No   • Drug use: No   • Sexual activity: No     Other Topics Concern   • Not on file     Lisinopril; Penicillins; Wellbutrin [bupropion]; Nsaids; and Tramadol    Current Outpatient Prescriptions   Medication Sig Dispense Refill   • albuterol (PROVENTIL) (2.5 MG/3ML) 0.083% nebulizer solution Take 2.5 mg by nebulization 4 (Four) Times a Day As Needed for Wheezing. 125 vial 11   • allopurinol (ZYLOPRIM) 100 MG tablet Take 1 tablet by mouth Daily. 30 tablet 11   • aspirin 81 MG tablet Take 1 tablet by mouth Daily. 30 tablet 11   • atorvastatin (LIPITOR) 80 MG tablet Take 1 tablet by mouth Every Night. 90 tablet 3   • azelastine (ASTELIN) 0.1 % nasal spray 2 sprays into each nostril 2 (Two) Times a Day. Use in each nostril as directed 30 mL 11   • bisoprolol (ZEBeta) 5 MG tablet Take 0.5 tablets by mouth Daily. 30 tablet 6   • Blood Glucose Monitoring Suppl (ONE TOUCH ULTRA MINI) w/Device kit      • bumetanide (BUMEX) 2 MG tablet Take 1 tablet by mouth Daily. Then 1.5 tablets every other day 45 tablet 6   • Calcium-Magnesium-Vitamin D (CALCIUM 500 PO) Take 500 mg by mouth 2 (two) times a day.     • clopidogrel (PLAVIX) 75 MG tablet Take 1 tablet by mouth Daily. 30 tablet 11   • glucose blood test strip Use as instructed 50 each 12   • GuaiFENesin ER (MUCINEX MAXIMUM STRENGTH) 1200 MG tablet sustained-release 12 hour Take 1 tablet BID as needed. 60 each 11   • hydrocortisone-bacitracin-zinc oxide-nystatin (MAGIC BARRIER) Apply 1 application topically As Needed (Rash). 60 g 0   • Respiratory Therapy Supplies (NEBULIZER/TUBING/MOUTHPIECE) kit Tubing 1 each 11   • senna (SENOKOT) 8.6 MG tablet tablet Take 1 tablet by mouth 2 (two) times a day.     • sertraline (ZOLOFT) 100 MG tablet Take 0.5 tablets by mouth Daily. 30  "tablet 11   • sildenafil (REVATIO) 20 MG tablet Take 1 tablet by mouth 3 (Three) Times a Day. 90 tablet 6   • TRUEPLUS LANCETS 33G misc 1 each Daily. 100 each 3   • umeclidinium-vilanterol (ANORO ELLIPTA) 62.5-25 MCG/INH aerosol powder  inhaler Inhale 1 puff Daily. 1 each 11   • VENTOLIN  (90 Base) MCG/ACT inhaler USE 2 PUFFS FOUR TIMES DAILY AND AS NEEDED 18 g 11   • vitamin D (ERGOCALCIFEROL) 91718 units capsule capsule Take 1 capsule by mouth Every 14 (Fourteen) Days. 2 capsule 11     No current facility-administered medications for this visit.      Objective:     Vitals:    05/14/18 1322   BP: 116/74   Pulse: 74   SpO2: 95%   Weight: 113 kg (248 lb 4.8 oz)   Height: 160 cm (63\")     Wt Readings from Last 3 Encounters:   05/14/18 113 kg (248 lb 4.8 oz)   05/11/18 113 kg (249 lb 12.8 oz)   05/02/18 113 kg (249 lb 11.2 oz)      Physical Exam   Constitutional: She is oriented to person, place, and time. She appears well-developed and well-nourished. No distress.   HENT:   Head: Normocephalic and atraumatic.   Eyes: Right eye exhibits no discharge. Left eye exhibits no discharge.   Neck: JVD (9 cm@45°) present. No tracheal deviation present.   Cardiovascular: Normal rate and regular rhythm.    Murmur heard.   Systolic murmur of grade 2/6 is also present at the lower left sternal border.  Pulses:       Radial pulses are 2+ on the right side, and 2+ on the left side.   Pulmonary/Chest: Effort normal. No respiratory distress. She has wheezes (few expiratory wheezes bilateral). She has no rales.   Abdominal: Soft. Distention: exogenous obesity.   Musculoskeletal: She exhibits no edema (mild non pitting edema to (L) ankle (reported chronic; no edema to RLE).   Neurological: She is alert and oriented to person, place, and time.   Skin: Skin is warm and dry. She is not diaphoretic.   Psychiatric: She has a normal mood and affect. Her behavior is normal. Judgment and thought content normal.     Flowsheet Rows  " "  Flowsheet Row First Filed Value   Admission Height 160 cm (63\") Documented at 03/13/2018 1451   Admission Weight 116 kg (255 lb 14.4 oz) Documented at 03/13/2018 1451        Data Reviewed:  Electrocardiogram:03/04/2018    Echocardiogram: 04/24/2017  · Left ventricular function is normal.  · Left ventricular diastolic dysfunction (grade I) consistent with impaired relaxation.  · Right ventricular cavity is severely dilated.  · Severely reduced right ventricular systolic function noted.  · Moderate tricuspid valve regurgitation is present.  · Estimated EF appears to be in the range of 56 - 60%.  · Systolic flattening of the interventricular septum consistent with right ventricle pressure overload.    Echocardiogram: 10/30/2017  · Left Ventricle: Left ventricular systolic function is normal. Estimated EF appears to be in the range of 56 - 60%.  · Left ventricular wall thickness is consistent with mild concentric hypertrophy  · Right Ventricle: Normal right ventricular wall thickness noted. Right ventricular cavity is moderate-to-severely dilated  · Abnormal septal motion. Systolic flattening of interventricular septum consistent with right ventricle pressure overload.  · Tricuspid Valve: The tricuspid valve has poor leaflet coaptation and is abnormal  · Moderate to severe tricuspid valve regurgitation is present  · Estimated right ventricular systolic pressure from tricuspid regurgitation is markedly elevated (>55 mmHg)  · Severe pulmonary hypertension is present.  · There is no evidence of pericardial effusion    Right Heart Catheterization:05/22/2017 - Dr Connelly  Pulmonary artery pressure:  91/35 mmHg mean 56 mmHg.  Pulmonary capillary wedge pressure at end expiration: 25/23 mmHg mean: 21 mmHg  RV: 102/7 mmHg,  mean of 16 mmHg.  Right atrium: 21/18 , 16 mmHg  Cardiac output by thermodilution method: 4.47 L/m  Transpulmonary gradient: 35  Pulmonary vascular resistance: 7.82 with significant  Saturation: On room " air  Right atrium 49%,   Right ventricle 47%  Pulmonary artery 52%  Aortic sat there appears to be artifact with 82%  Left heart catheterization:  /6 mmHg, LVEDP: 15 mmHg  No gradient across the aortic valve  VQ scan: Low probability for pulmonary embolism    Chest x-ray: 03/04/2018  IMPRESSION:  There is increasing opacity within the right middle lobe compared  to the examinations of January 11 and February 16, 2018. While in  the proper clinical setting this may represent consolidating  pneumonia, however, given the fact there is no resolution likely  with treatment recommend CT of the chest with contrast for  further evaluation to exclude the possibility of underlying  Neoplasm.        CT chest: 03/04/2018  Moderate area of consolidation within the right middle lobe  corresponding to the radiographic finding. There is air  bronchograms present. No obvious central bronchial lesion  contributing this finding. Consideration is given to atelectasis  and/or consolidating pneumonia. Recommend treatment and follow-up  until clear. If there is no change on the follow-up study  pulmonary consultation would be recommended for possible  bronchoscopy.  Minimal reticulonodularity left upper lobe which may represent  pneumonitis.  There is a background of old granulomatous disease. There are  couple small 5 mm less noncalcified nodules that likely are  postinflammatory as well. These can be evaluated on follow-up  exams.  No suspicious mediastinal or hilar lymph nodes based on size or  morphologic criteria.  Cholelithiasis.  Upper midline abdominal hernia containing fat as well as  extension the left lobe the liver towards the mouth of the  defect. No findings of incarceration or obstruction.  Mid to lower midline abdominal wall hernia, larger in size  containing fat and bowel without findings of obstruction or  Incarceration.    PFT: 11/13/2017  Severe obstruction, moderate restriction and severely reduced diffusing  capacity.    Labs:   Glucose   Date Value Ref Range Status   04/02/2018 130 (H) 60 - 100 mg/dL Final     Glucose, Arterial   Date Value Ref Range Status   03/04/2018 134 mmol/L Final     BUN   Date Value Ref Range Status   04/02/2018 27 (H) 7 - 21 mg/dL Final     Creatinine   Date Value Ref Range Status   04/02/2018 1.20 (H) 0.50 - 1.00 mg/dL Final     Sodium   Date Value Ref Range Status   04/02/2018 141 137 - 145 mmol/L Final     Potassium   Date Value Ref Range Status   04/02/2018 3.9 3.5 - 5.1 mmol/L Final     Chloride   Date Value Ref Range Status   04/02/2018 101 95 - 110 mmol/L Final     CO2   Date Value Ref Range Status   04/02/2018 24.0 22.0 - 31.0 mmol/L Final     Calcium   Date Value Ref Range Status   04/02/2018 8.8 8.4 - 10.2 mg/dL Final     Total Protein   Date Value Ref Range Status   03/07/2018 5.5 (L) 6.3 - 8.6 g/dL Final     Albumin   Date Value Ref Range Status   04/02/2018 4.00 3.40 - 4.80 g/dL Final     ALT (SGPT)   Date Value Ref Range Status   03/07/2018 47 9 - 52 U/L Final     AST (SGOT)   Date Value Ref Range Status   03/07/2018 20 14 - 36 U/L Final     Alkaline Phosphatase   Date Value Ref Range Status   03/07/2018 62 38 - 126 U/L Final     Total Bilirubin   Date Value Ref Range Status   03/07/2018 0.3 0.2 - 1.3 mg/dL Final     eGFR Non  Amer   Date Value Ref Range Status   04/02/2018 46 45 - 104 mL/min/1.73 Final     A/G Ratio   Date Value Ref Range Status   03/07/2018 1.0 (L) 1.1 - 1.8 g/dL Final     BUN/Creatinine Ratio   Date Value Ref Range Status   04/02/2018 22.5 7.0 - 25.0 Final     Anion Gap   Date Value Ref Range Status   04/02/2018 16.0 (H) 5.0 - 15.0 mmol/L Final     WBC   Date Value Ref Range Status   03/07/2018 9.57 3.20 - 9.80 10*3/mm3 Final     RBC   Date Value Ref Range Status   03/07/2018 4.24 3.77 - 5.16 10*6/mm3 Final     Hemoglobin   Date Value Ref Range Status   03/07/2018 12.4 12.0 - 15.5 g/dL Final     Hematocrit   Date Value Ref Range Status   03/07/2018 39.2  35.0 - 45.0 % Final     MCV   Date Value Ref Range Status   03/07/2018 92.5 80.0 - 98.0 fL Final     MCH   Date Value Ref Range Status   03/07/2018 29.2 26.5 - 34.0 pg Final     MCHC   Date Value Ref Range Status   03/07/2018 31.6 31.4 - 36.0 g/dL Final     RDW   Date Value Ref Range Status   03/07/2018 15.1 (H) 11.5 - 14.5 % Final     RDW-SD   Date Value Ref Range Status   03/07/2018 51.1 (H) 36.4 - 46.3 fl Final     MPV   Date Value Ref Range Status   03/07/2018 12.0 8.0 - 12.0 fL Final     Platelets   Date Value Ref Range Status   03/07/2018 153 150 - 450 10*3/mm3 Final     Neutrophil %   Date Value Ref Range Status   03/07/2018 89.1 (H) 37.0 - 80.0 % Final     Lymphocyte %   Date Value Ref Range Status   03/07/2018 6.6 (L) 10.0 - 50.0 % Final     Monocyte %   Date Value Ref Range Status   03/07/2018 3.8 0.0 - 12.0 % Final     Eosinophil %   Date Value Ref Range Status   03/07/2018 0.0 0.0 - 7.0 % Final     Basophil %   Date Value Ref Range Status   03/07/2018 0.1 0.0 - 2.0 % Final     Immature Grans %   Date Value Ref Range Status   03/07/2018 0.4 0.0 - 0.5 % Final     Neutrophils, Absolute   Date Value Ref Range Status   03/07/2018 8.53 2.00 - 8.60 10*3/mm3 Final     Lymphocytes, Absolute   Date Value Ref Range Status   03/07/2018 0.63 0.60 - 4.20 10*3/mm3 Final     Monocytes, Absolute   Date Value Ref Range Status   03/07/2018 0.36 0.00 - 0.90 10*3/mm3 Final     Eosinophils, Absolute   Date Value Ref Range Status   03/07/2018 0.00 0.00 - 0.70 10*3/mm3 Final     Basophils, Absolute   Date Value Ref Range Status   03/07/2018 0.01 0.00 - 0.20 10*3/mm3 Final     Immature Grans, Absolute   Date Value Ref Range Status   03/07/2018 0.04 (H) 0.00 - 0.02 10*3/mm3 Final     nRBC   Date Value Ref Range Status   12/10/2014 0.0 0.0 - 0.2 % Final   12/10/2014 0.000 x1000/uL Final       Lab Results   Component Value Date    CHOL 104 02/21/2018    CHOL 126 08/15/2017    CHOL 131 02/15/2017     Lab Results   Component Value  Date    TRIG 130 02/21/2018    TRIG 103 08/15/2017    TRIG 81 02/15/2017    TRIG 95 02/15/2017     Lab Results   Component Value Date    HDL 35 (L) 02/21/2018    HDL 44 (L) 08/15/2017    HDL 43 (L) 02/15/2017     Lab Results   Component Value Date    PROBNP 5,840.0 (H) 03/04/2018     The following portions of the patient's history were reviewed and updated as appropriate: allergies, current medications, problem list,  past medical history, surgical history, family history and social history.    Recent images independently reviewed.    Available laboratory values reviewed.    Assessment:      Diagnosis Plan   1. Pulmonary hypertension severe  WHO group II/III; NYHA Class III-IV     Refer below re: plan   Revatio 20 mg 3 times a day    2. RVF (right ventricular failure) (please refer below)  RVEF <40%;   AHA Class C; NYHA Class III-IV     Refer below re: plan       3. Cor pulmonale Refer below re: plan       4. Atherosclerosis of native coronary artery of native heart without angina pectoris      DAPT, beta blocker, statin therapy   5. Chronic obstructive pulmonary disease, unspecified COPD type, stable      Dr Jeni Thompson   6. Current tobacco user Please refer below       7. Type 2 diabetes mellitus with stage 3 chronic kidney disease, without long-term current use of insulin      As per PCP     8. Stage 3 chronic kidney disease, stable  Dr Cavanaugh        9. Hyperlipidemia, stable     Statin therapy   10. Class 3 obesity with BMI 44.0 Please refer below         AHA Stage C: ; NYHA Class III-IV  The patient presents without clinical evidence of mild hypervolemia in the setting of severe pulmonary hypertension/right ventricular failure/cor pulmonale; She is well perfused.  BETA-BLOCKER: Bisoprolol 5 mg daily  ACE/ARB: Not applicable/allergy to lisinopril  ENTRESTO: Not applicable  DIURETIC: Bumex 2 mg daily  ALDOSTERONT ANTAGONIST: Not applicable  IMDUR/HYDRALAZINE: Not applicable  DIGOXIN: Not applicable  Fluid  restriction: 7138-4069 cc daily  Sodium restriction: 2000 mg daily   6MWT: Performed today  Cardiac Rehab: Not applicable   Pulmonary Rehab: Declines referral based on transportation issues (see she refuses to use public transportation)  ICD: Not applicable  CardioMEMS: Not applicable  Advanced HF evaluation (Transplant/LVAD): Not applicable    Recommended daily weight monitoring.  Discussed patient action plan for heart failure;  Recommended avoiding NSAIDs use.  Discussed warning signs requiring additional medical attention for heart failure.    Activity: Approximately 150 minutes of moderate activity (such as walking program)  per week (20-30 minutes most days of the week)  Diet: Heart healthy foods - more fresh fruits and vegetables and whole grains; less red meat; more fish and poultry that is baked or grilled - not fried; less salt not to exceed 2000 mg daily - less processed food; No trans or saturated fat  Smoking cessation: I advised the patient of the risks in continuing to use tobacco, and I provided this patient with smoking cessation educational materials.  During this visit, I spent < 3 minutes counseling the patient regarding smoking cessation.  Diabetes management  Blood pressure management  Weight management: Discussed the patient's BMI with her. BMI is above normal parameters. Follow-up plan includes:  educational material, exercise counseling and nutrition counseling.  Stress management    Gilmer discussion with the patient regarding complex multiple medical problems for which we are limited in curative treatment however we will proceed with maximization of medical management/symptomatic management.  I have once again pleaded with the patient to please keep her appointments with Dr. Snowden, Dr. Thompson as well as repeat referral to Dr. Gaviria regarding most probable obstructive sleep apnea.    At least 16 minutes out of time spent face to face included:  - discussion of goals and preferences for  care;   - complex medical decision-making regarding life-threatening or life-limiting illness;  - explanation of relevant advance directives;   -  engaging patients, family members and/orsurrogate decision makers    Advanced directive not completed today, however with plans to return for additional conversation and completion of advanced directive          This document has been electronically signed by VERNON Alvarado on May 14, 2018 1:30 PM

## 2018-07-12 NOTE — PROGRESS NOTES
Subjective   Cameron Bowie is a 62 y.o. female.     COPD   This is a chronic problem. The current episode started more than 1 year ago. The problem occurs constantly. The problem has been waxing and waning. Associated symptoms include congestion and coughing. Pertinent negatives include no chest pain.   Hypertension   This is a new problem. The current episode started more than 1 year ago. The problem is unchanged. The problem is controlled. Associated symptoms include shortness of breath. Pertinent negatives include no chest pain, orthopnea, palpitations or peripheral edema. There are no associated agents to hypertension.   Chronic Kidney Disease   This is a chronic problem. The current episode started more than 1 year ago. The problem occurs constantly. The problem has been unchanged. Associated symptoms include congestion and coughing. Pertinent negatives include no chest pain.   Diabetes   She presents for her follow-up diabetic visit. She has type 2 diabetes mellitus. Her disease course has been fluctuating. Associated symptoms include polydipsia and polyuria. Pertinent negatives for diabetes include no chest pain, no foot paresthesias and no foot ulcerations. Her breakfast blood glucose is taken between 7-8 am. Her breakfast blood glucose range is generally  mg/dl. Her dinner blood glucose is taken between 7-8 pm. Her dinner blood glucose range is generally 140-180 mg/dl. An ACE inhibitor/angiotensin II receptor blocker is contraindicated. Eye exam is not current.   Hyperlipidemia   This is a chronic problem. The current episode started more than 1 year ago. The problem is controlled. Associated symptoms include shortness of breath. Pertinent negatives include no chest pain.        The following portions of the patient's history were reviewed and updated as appropriate: allergies, current medications, past family history, past medical history, past social history, past surgical history and problem  list.    Review of Systems   HENT: Positive for congestion.    Respiratory: Positive for cough and shortness of breath.    Cardiovascular: Negative for chest pain, palpitations and orthopnea.   Endocrine: Positive for polydipsia and polyuria.       Objective   Physical Exam   Constitutional: She is oriented to person, place, and time. She appears well-developed and well-nourished. No distress.   HENT:   Head: Normocephalic and atraumatic.   Cardiovascular: Normal rate and regular rhythm.  Exam reveals distant heart sounds.    Pulmonary/Chest: Effort normal. She has decreased breath sounds. She has no wheezes. She has rhonchi. She has no rales.   Musculoskeletal: She exhibits edema. She exhibits no tenderness.    Cmaeron had a diabetic foot exam performed (callus noted) today.   During the foot exam she had a monofilament test performed.  Vascular Status -  Her right foot exhibits abnormal foot edema. Her right foot exhibits normal foot vasculature . Her left foot exhibits abnormal foot edema. Her left foot exhibits normal foot vasculature .  Neurological: She is alert and oriented to person, place, and time.   Skin: Skin is warm and dry. She is not diaphoretic.   Psychiatric: She has a normal mood and affect. Her behavior is normal. Judgment and thought content normal.   Nursing note and vitals reviewed.      Assessment/Plan   Problems Addressed this Visit        Cardiovascular and Mediastinum    Essential hypertension    Relevant Medications    bisoprolol (ZEBeta) 5 MG tablet    Other Relevant Orders    Hepatic Function Panel    Coronary atherosclerosis of native coronary artery - Primary    Relevant Medications    bisoprolol (ZEBeta) 5 MG tablet    sildenafil (REVATIO) 20 MG tablet       Respiratory    COPD (chronic obstructive pulmonary disease) (CMS/Former[facility])    Relevant Medications    albuterol (VENTOLIN HFA) 108 (90 Base) MCG/ACT inhaler       Digestive    ALVAREZ (nonalcoholic steatohepatitis)       Endocrine    Type 2  diabetes mellitus with stage 3 chronic kidney disease, without long-term current use of insulin (CMS/formerly [facility])    Relevant Orders    Hemoglobin A1c    Microalbumin / Creatinine Urine Ratio - Urine, Clean Catch       Genitourinary    Stage 3 chronic kidney disease      Other Visit Diagnoses     Pure hypercholesterolemia        Relevant Medications    atorvastatin (LIPITOR) 80 MG tablet    Other Relevant Orders    Lipid Panel    Hepatic Function Panel

## 2018-07-13 NOTE — TELEPHONE ENCOUNTER
Pr Dr. Corley, Ms. Bowie has been called with her  recent lab results & recommendations.  Continue her current medications and follow-up as planned or sooner if any problems.      ----- Message from Roby Corley MD sent at 7/12/2018  3:04 PM CDT -----  Cholesterol okay, liver functions stable

## 2018-07-17 NOTE — PROGRESS NOTES
Subjective:     Chief Complaint   Patient presents with   • Congestive Heart Failure     chief complaint     PCP: Dr. Corley  Cardiologist: Dr. David Snowden  Nephrologist: Dr. Cavanaugh  Pulmonologist: Dr. Mariana Thompson  Vascular: Dr Beck    Congestive Heart Failure   Presents for follow-up visit. The disease course has been worsening. Associated symptoms include fatigue and shortness of breath (improved). Pertinent negatives include no abdominal pain, chest pain, chest pressure, claudication, edema (chronic (L) ankle), muscle weakness, near-syncope, nocturia, orthopnea, palpitations or paroxysmal nocturnal dyspnea. Unexpected weight change: unknown. The symptoms have been improving. Past treatments include salt and fluid restriction and oxygen. The treatment provided mild relief. Compliance with prior treatments has been poor. Prior compliance problems include difficulty with treatment plan. Her past medical history is significant for CAD, chronic lung disease, DM and HTN. Compliance with total regimen is 51-75%. Compliance problems include adherence to diet and adherence to exercise.  Compliance with diet is 51-75%. Compliance with exercise is 0-25%. Compliance with medications is %.     The patient is a 62-year-old female who received initial heart failure evaluation 03/13/2018.    The patient established with cardiologist, Dr. David Snowden in November 2017 with impression of:  PAH/PVH/HFpEF WHO Group Likely 2/3; FC: Class 3  At that time plan was for: Continuation of Revatio-discontinuation Opsumit with referral to sleep medicine as well as pulmonology;   The patient was initiated on bisoprolol for RV dysfunction with plans for repeat transthoracic echocardiogram in 1 year in follow-up office evaluation in 3 months.    The patient received pulmonology evaluation (initial) with Dr. Mariana Thompson on 02/21/2018 with impression: Mixed type COPD/severe obstruction.    The patient reports worsening of  "dyspnea beginning around the end of December 2017 with intermittent outpatient evaluations and attempts at stabilization.  The patient was hospitalized on March 4 with discharge on March 7 regarding \"recurrent pneumonia\".    The patient had recent evaluation with Dr. Thompson and indicates plan for follow-up CT of the chest in 3 months with most recent showing some improvement in abnormality.    The patient has history of coronary artery disease:  12/4/2007: 2.5 x 23 mm Pixel stent - mid RCA  10/04/2011: 3.0 mm x 28 mm Xience V stent - proximal to mid circumflex artery     PAD:  04/25/2012   A.. Aortobifemoral bypass (Garnavillo), 16 x 8 stretch PTFE graft  B. Repair of spleen (Amin)    The patient reports that secondary to deaths among friends close to her, she had to reschedule her sleep medicine appointment.    Review of Systems   Constitution: Positive for fatigue. Negative for chills, fever, malaise/fatigue and weight gain. Unexpected weight change: unknown.   HENT: Negative for congestion and nosebleeds.    Eyes: Negative for discharge and pain.   Cardiovascular: Positive for dyspnea on exertion. Negative for chest pain, claudication, cyanosis, irregular heartbeat, leg swelling, near-syncope, orthopnea, palpitations, paroxysmal nocturnal dyspnea and syncope.   Respiratory: Positive for shortness of breath (improved), sleep disturbances due to breathing, snoring and wheezing (improved). Negative for sputum production. Cough: chronic improved.    Endocrine: Negative for polydipsia, polyphagia and polyuria.   Hematologic/Lymphatic: Negative for bleeding problem. Does not bruise/bleed easily.   Skin: Negative for itching and rash.   Musculoskeletal: Negative for falls and muscle weakness.   Gastrointestinal: Negative for bloating and abdominal pain.   Genitourinary: Negative for dysuria, hematuria and nocturia.   Neurological: Positive for excessive daytime sleepiness. Negative for dizziness, light-headedness, loss of " balance and vertigo.   Psychiatric/Behavioral: Negative for altered mental status and suicidal ideas. The patient is not nervous/anxious.      Past Medical History:   Diagnosis Date   • Abnormal weight gain    • Asthenia    • Breast screening    • Carpal tunnel syndrome    • Chronic kidney disease     Stage 3   • COPD (chronic obstructive pulmonary disease) (CMS/HCC)    • Coronary arteriosclerosis     2007: PCI RCA 2011: PCI: LCX      • Coronary atherosclerosis of native coronary artery    • DJD (degenerative joint disease)     involving multiple joints   • Edema of lower extremity    • Encounter for screening for osteoporosis    • Encounter for screening mammogram for malignant neoplasm of breast    • Essential hypertension    • Foot pain    • Generalized anxiety disorder    • Gout    • History of tobacco use    • Hyperlipidemia    • Light tobacco smoker    • Obesity    • Peripheral edema    • Pulmonary hypertension    • PVD (peripheral vascular disease) (CMS/[facility]ly [facility])     LATOYA R .96 L 1.0 aortobifem 1/2012      • Strain of neck muscle    • Type 2 diabetes mellitus (CMS/Formerly [facility])     diet controlled     Past Surgical History:   Procedure Laterality Date   • AXILLARY SURGERY  06/03/2007    Right axillary cellulitis and abscess. Right axillary debridement   • BREAST BIOPSY  03/10/2008    Left breast mass. Left breast excisional biopsy   • CARDIAC CATHETERIZATION  10/04/2011    significant circumflex disease responsible for patient's symptoms. Patent right coronary artery stent. Peripheral arterial disease with completely occluded right common iliac and completely occluded left enternal iliac.   • CARDIAC CATHETERIZATION N/A 5/22/2017    Procedure: Right Heart Cath/ with vasodilator challenge;  Surgeon: Ignacio Connelly MD;  Location: [facility] CATH INVASIVE LOCATION;  Service:    • CARDIAC CATHETERIZATION N/A 5/22/2017    Procedure: Left ventriculography/ LV pressures only;  Surgeon: Ignacio Connelly MD;  Location: [facility] CATH INVASIVE  LOCATION;  Service:    • COLONOSCOPY  10/2015    Declined   • CORONARY ARTERY BYPASS GRAFT  2012    Aortobifemoral bypass. Repair of spleen   • CYST REMOVAL  1979    Right, recurrent   • DILATATION AND CURETTAGE  09/10/1976    Incomplete .   • ENDOSCOPY AND COLONOSCOPY  2005    Single small polyp in the rectum. The polyp was removed by snare cautery. Colonoscopy, otherwise normal.   • INCISION AND DRAINAGE ABSCESS  2007    Abdominal wall abscess. Incision and drainage and debridement of abdominal wall abscess   • INJECTION OF MEDICATION  2013    Kenalog   • SPLENECTOMY  2012    Lacerated spleen. Posterior laceration of the capsule with a Grade I injury   • TRANSESOPHAGEAL ECHOCARDIOGRAM (SRINIVASA)  2016    With color flow-Normal LV function with Ef of 65 to 70%.Severely dilated right ventricle with impaired systolic function.Impingement of LV cavity by the interventricular septum.Grade 1A diastolic dysfunction.Moderate tricuspid regurg   • TRANSESOPHAGEAL ECHOCARDIOGRAM (SRINIVASA)  2012    Without color flow-Mild right atrial enlargement with mild right ventricular enlargement with normal left atrial and left ventricular size. EF 55%   • TUBAL ABDOMINAL LIGATION  1987   • VAGINAL DELIVERY       Social History     Social History   • Marital status:      Social History Main Topics   • Smoking status: Current Every Day Smoker     Packs/day: 0.25     Years: 39.00     Types: Cigarettes   • Smokeless tobacco: Never Used   • Alcohol use No   • Drug use: No   • Sexual activity: No     Other Topics Concern   • Not on file     Lisinopril; Penicillins; Wellbutrin [bupropion]; Nsaids; and Tramadol    Current Outpatient Prescriptions   Medication Sig Dispense Refill   • albuterol (PROVENTIL) (2.5 MG/3ML) 0.083% nebulizer solution Take 2.5 mg by nebulization 4 (Four) Times a Day As Needed for Wheezing. 125 vial 11   • albuterol (VENTOLIN HFA) 108 (90 Base) MCG/ACT  inhaler Inhale 2 puffs 4 (Four) Times a Day. 18 g 11   • allopurinol (ZYLOPRIM) 100 MG tablet Take 1 tablet by mouth Daily. 30 tablet 11   • aspirin 81 MG tablet Take 1 tablet by mouth Daily. 30 tablet 11   • atorvastatin (LIPITOR) 80 MG tablet Take 1 tablet by mouth Every Night. 90 tablet 3   • azelastine (ASTELIN) 0.1 % nasal spray 2 sprays into each nostril 2 (Two) Times a Day. Use in each nostril as directed 30 mL 11   • bisoprolol (ZEBeta) 5 MG tablet Take 0.5 tablets by mouth Daily. 30 tablet 6   • Blood Glucose Monitoring Suppl (ONE TOUCH ULTRA MINI) w/Device kit      • bumetanide (BUMEX) 2 MG tablet Take 1 tablet by mouth Daily. Then 1.5 tablets every other day 45 tablet 6   • Calcium-Magnesium-Vitamin D (CALCIUM 500 PO) Take 500 mg by mouth 2 (two) times a day.     • clopidogrel (PLAVIX) 75 MG tablet Take 1 tablet by mouth Daily. 30 tablet 11   • glucose blood test strip Use as instructed 50 each 12   • GuaiFENesin ER (MUCINEX MAXIMUM STRENGTH) 1200 MG tablet sustained-release 12 hour Take 1 tablet BID as needed. 60 each 11   • hydrocortisone-bacitracin-zinc oxide-nystatin (MAGIC BARRIER) Apply 1 application topically As Needed (Rash). 60 g 0   • Respiratory Therapy Supplies (NEBULIZER/TUBING/MOUTHPIECE) kit Tubing 1 each 11   • senna (SENOKOT) 8.6 MG tablet tablet Take 1 tablet by mouth 2 (two) times a day.     • sildenafil (REVATIO) 20 MG tablet Take 1 tablet by mouth 3 (Three) Times a Day. 90 tablet 6   • TRUEPLUS LANCETS 33G misc 1 each Daily. 100 each 3   • umeclidinium-vilanterol (ANORO ELLIPTA) 62.5-25 MCG/INH aerosol powder  inhaler Inhale 1 puff Daily. 1 each 11   • vitamin D (ERGOCALCIFEROL) 32468 units capsule capsule Take 1 capsule by mouth Every 14 (Fourteen) Days. 2 capsule 11     No current facility-administered medications for this visit.      Objective:     Vitals:    07/17/18 1441   BP: 120/68   BP Location: Left arm   Patient Position: Sitting   Cuff Size: Adult   Pulse: 71   SpO2: 98%  "  Weight: 110 kg (242 lb 12.8 oz)   Height: 157.5 cm (62\")     Wt Readings from Last 3 Encounters:   07/17/18 110 kg (242 lb 12.8 oz)   07/12/18 111 kg (245 lb)   05/14/18 113 kg (248 lb 4.8 oz)      Physical Exam   Constitutional: She is oriented to person, place, and time. She appears well-developed and well-nourished. No distress.   HENT:   Head: Normocephalic and atraumatic.   Eyes: Right eye exhibits no discharge. Left eye exhibits no discharge.   Neck: JVD (9 cm@45°) present. No tracheal deviation present.   Cardiovascular: Normal rate and regular rhythm.    Murmur heard.   Systolic murmur of grade 2/6 is also present at the lower left sternal border.  Pulses:       Radial pulses are 2+ on the right side, and 2+ on the left side.   Pulmonary/Chest: Effort normal. No respiratory distress. She has wheezes (few expiratory wheezes bilateral). She has no rales.   Abdominal: Soft. Distention: exogenous obesity.   Musculoskeletal: She exhibits no edema (mild non pitting edema to (L) ankle (reported chronic; no edema to RLE).   Neurological: She is alert and oriented to person, place, and time.   Skin: Skin is warm and dry. She is not diaphoretic.   Psychiatric: She has a normal mood and affect. Her behavior is normal. Judgment and thought content normal.     Flowsheet Rows    Flowsheet Row First Filed Value   Admission Height 160 cm (63\") Documented at 03/13/2018 1451   Admission Weight 116 kg (255 lb 14.4 oz) Documented at 03/13/2018 1451        Data Reviewed:  Electrocardiogram:03/04/2018    Echocardiogram: 04/24/2017  · Left ventricular function is normal.  · Left ventricular diastolic dysfunction (grade I) consistent with impaired relaxation.  · Right ventricular cavity is severely dilated.  · Severely reduced right ventricular systolic function noted.  · Moderate tricuspid valve regurgitation is present.  · Estimated EF appears to be in the range of 56 - 60%.  · Systolic flattening of the interventricular septum " consistent with right ventricle pressure overload.    Echocardiogram: 10/30/2017  · Left Ventricle: Left ventricular systolic function is normal. Estimated EF appears to be in the range of 56 - 60%.  · Left ventricular wall thickness is consistent with mild concentric hypertrophy  · Right Ventricle: Normal right ventricular wall thickness noted. Right ventricular cavity is moderate-to-severely dilated  · Abnormal septal motion. Systolic flattening of interventricular septum consistent with right ventricle pressure overload.  · Tricuspid Valve: The tricuspid valve has poor leaflet coaptation and is abnormal  · Moderate to severe tricuspid valve regurgitation is present  · Estimated right ventricular systolic pressure from tricuspid regurgitation is markedly elevated (>55 mmHg)  · Severe pulmonary hypertension is present.  · There is no evidence of pericardial effusion    Right Heart Catheterization:05/22/2017 - Dr Connelly  Pulmonary artery pressure:  91/35 mmHg mean 56 mmHg.  Pulmonary capillary wedge pressure at end expiration: 25/23 mmHg mean: 21 mmHg  RV: 102/7 mmHg,  mean of 16 mmHg.  Right atrium: 21/18 , 16 mmHg  Cardiac output by thermodilution method: 4.47 L/m  Transpulmonary gradient: 35  Pulmonary vascular resistance: 7.82 with significant  Saturation: On room air  Right atrium 49%,   Right ventricle 47%  Pulmonary artery 52%  Aortic sat there appears to be artifact with 82%  Left heart catheterization:  /6 mmHg, LVEDP: 15 mmHg  No gradient across the aortic valve  VQ scan: Low probability for pulmonary embolism    Chest x-ray: 03/04/2018  IMPRESSION:  There is increasing opacity within the right middle lobe compared  to the examinations of January 11 and February 16, 2018. While in  the proper clinical setting this may represent consolidating  pneumonia, however, given the fact there is no resolution likely  with treatment recommend CT of the chest with contrast for  further evaluation to exclude the  possibility of underlying  Neoplasm.        CT chest: 03/04/2018  Moderate area of consolidation within the right middle lobe  corresponding to the radiographic finding. There is air  bronchograms present. No obvious central bronchial lesion  contributing this finding. Consideration is given to atelectasis  and/or consolidating pneumonia. Recommend treatment and follow-up  until clear. If there is no change on the follow-up study  pulmonary consultation would be recommended for possible  bronchoscopy.  Minimal reticulonodularity left upper lobe which may represent  pneumonitis.  There is a background of old granulomatous disease. There are  couple small 5 mm less noncalcified nodules that likely are  postinflammatory as well. These can be evaluated on follow-up  exams.  No suspicious mediastinal or hilar lymph nodes based on size or  morphologic criteria.  Cholelithiasis.  Upper midline abdominal hernia containing fat as well as  extension the left lobe the liver towards the mouth of the  defect. No findings of incarceration or obstruction.  Mid to lower midline abdominal wall hernia, larger in size  containing fat and bowel without findings of obstruction or  Incarceration.    PFT: 11/13/2017  Severe obstruction, moderate restriction and severely reduced diffusing capacity.    Labs:   Glucose   Date Value Ref Range Status   07/12/2018 117 (H) 60 - 100 mg/dL Final     Glucose, Arterial   Date Value Ref Range Status   03/04/2018 134 mmol/L Final     BUN   Date Value Ref Range Status   07/12/2018 33 (H) 7 - 21 mg/dL Final     Creatinine   Date Value Ref Range Status   07/12/2018 1.65 (H) 0.50 - 1.00 mg/dL Final     Sodium   Date Value Ref Range Status   07/12/2018 139 137 - 145 mmol/L Final     Potassium   Date Value Ref Range Status   07/12/2018 4.0 3.5 - 5.1 mmol/L Final     Chloride   Date Value Ref Range Status   07/12/2018 101 95 - 110 mmol/L Final     CO2   Date Value Ref Range Status   07/12/2018 27.0 22.0 -  31.0 mmol/L Final     Calcium   Date Value Ref Range Status   07/12/2018 8.6 8.4 - 10.2 mg/dL Final     Total Protein   Date Value Ref Range Status   07/12/2018 7.2 6.3 - 8.6 g/dL Final     Albumin   Date Value Ref Range Status   07/12/2018 4.00 3.40 - 4.80 g/dL Final     ALT (SGPT)   Date Value Ref Range Status   07/12/2018 21 9 - 52 U/L Final     AST (SGOT)   Date Value Ref Range Status   07/12/2018 57 (H) 14 - 36 U/L Final     Alkaline Phosphatase   Date Value Ref Range Status   07/12/2018 106 38 - 126 U/L Final     Total Bilirubin   Date Value Ref Range Status   07/12/2018 0.7 0.2 - 1.3 mg/dL Final     eGFR Non  Amer   Date Value Ref Range Status   07/12/2018 32 (L) 45 - 104 mL/min/1.73 Final     BUN/Creatinine Ratio   Date Value Ref Range Status   07/12/2018 20.0 7.0 - 25.0 Final     Anion Gap   Date Value Ref Range Status   07/12/2018 11.0 5.0 - 15.0 mmol/L Final     WBC   Date Value Ref Range Status   03/07/2018 9.57 3.20 - 9.80 10*3/mm3 Final     RBC   Date Value Ref Range Status   03/07/2018 4.24 3.77 - 5.16 10*6/mm3 Final     Hemoglobin   Date Value Ref Range Status   03/07/2018 12.4 12.0 - 15.5 g/dL Final     Hematocrit   Date Value Ref Range Status   03/07/2018 39.2 35.0 - 45.0 % Final     MCV   Date Value Ref Range Status   03/07/2018 92.5 80.0 - 98.0 fL Final     MCH   Date Value Ref Range Status   03/07/2018 29.2 26.5 - 34.0 pg Final     MCHC   Date Value Ref Range Status   03/07/2018 31.6 31.4 - 36.0 g/dL Final     RDW   Date Value Ref Range Status   03/07/2018 15.1 (H) 11.5 - 14.5 % Final     RDW-SD   Date Value Ref Range Status   03/07/2018 51.1 (H) 36.4 - 46.3 fl Final     MPV   Date Value Ref Range Status   03/07/2018 12.0 8.0 - 12.0 fL Final     Platelets   Date Value Ref Range Status   03/07/2018 153 150 - 450 10*3/mm3 Final     Neutrophil %   Date Value Ref Range Status   03/07/2018 89.1 (H) 37.0 - 80.0 % Final     Lymphocyte %   Date Value Ref Range Status   03/07/2018 6.6 (L) 10.0 -  50.0 % Final     Monocyte %   Date Value Ref Range Status   03/07/2018 3.8 0.0 - 12.0 % Final     Eosinophil %   Date Value Ref Range Status   03/07/2018 0.0 0.0 - 7.0 % Final     Basophil %   Date Value Ref Range Status   03/07/2018 0.1 0.0 - 2.0 % Final     Immature Grans %   Date Value Ref Range Status   03/07/2018 0.4 0.0 - 0.5 % Final     Neutrophils, Absolute   Date Value Ref Range Status   03/07/2018 8.53 2.00 - 8.60 10*3/mm3 Final     Lymphocytes, Absolute   Date Value Ref Range Status   03/07/2018 0.63 0.60 - 4.20 10*3/mm3 Final     Monocytes, Absolute   Date Value Ref Range Status   03/07/2018 0.36 0.00 - 0.90 10*3/mm3 Final     Eosinophils, Absolute   Date Value Ref Range Status   03/07/2018 0.00 0.00 - 0.70 10*3/mm3 Final     Basophils, Absolute   Date Value Ref Range Status   03/07/2018 0.01 0.00 - 0.20 10*3/mm3 Final     Immature Grans, Absolute   Date Value Ref Range Status   03/07/2018 0.04 (H) 0.00 - 0.02 10*3/mm3 Final     nRBC   Date Value Ref Range Status   12/10/2014 0.0 0.0 - 0.2 % Final   12/10/2014 0.000 x1000/uL Final       Lab Results   Component Value Date    CHOL 114 07/12/2018    CHOL 104 02/21/2018    CHOL 126 08/15/2017     Lab Results   Component Value Date    TRIG 112 07/12/2018    TRIG 130 02/21/2018    TRIG 103 08/15/2017     Lab Results   Component Value Date    HDL 33 (L) 07/12/2018    HDL 35 (L) 02/21/2018    HDL 44 (L) 08/15/2017     Lab Results   Component Value Date    PROBNP 5,840.0 (H) 03/04/2018     The following portions of the patient's history were reviewed and updated as appropriate: allergies, current medications, problem list,  past medical history, surgical history, family history and social history.    Recent images independently reviewed.    Available laboratory values reviewed.    Assessment:      Diagnosis Plan   1. Pulmonary hypertension severe  WHO group II/III; NYHA Class III-IV     Refer below re: plan   Revatio 20 mg 3 times a day    2. RVF (right  ventricular failure) (please refer below)  RVEF <40%;   AHA Class C; NYHA Class III-IV     Refer below re: plan       3. Cor pulmonale Refer below re: plan       4. Atherosclerosis of native coronary artery of native heart without angina pectoris      DAPT, beta blocker, statin therapy   5. Chronic obstructive pulmonary disease, unspecified COPD type, stable      Dr Jeni Thompson   6. Current tobacco user Please refer below       7. Stage 3 chronic kidney disease, stable  Dr Cavanaugh        8. Class 3 obesity with BMI 44.0 Please refer below         AHA Stage C: ; NYHA Class III-IV  The patient presents without clinical evidence of mild hypervolemia in the setting of severe pulmonary hypertension/right ventricular failure/cor pulmonale; She is well perfused.  BETA-BLOCKER: Bisoprolol 5 mg daily  ACE/ARB: Not applicable/allergy to lisinopril  ENTRESTO: Not applicable  DIURETIC: Bumex 2 mg daily  ALDOSTERONT ANTAGONIST: Not applicable  IMDUR/HYDRALAZINE: Not applicable  DIGOXIN: Not applicable  Fluid restriction: 4876-5442 cc daily  Sodium restriction: 2000 mg daily   6MWT: Performed today  Cardiac Rehab: Not applicable   Pulmonary Rehab: Declines referral based on transportation issues (see she refuses to use public transportation)  ICD: Not applicable  CardioMEMS: Not applicable  Advanced HF evaluation (Transplant/LVAD): Not applicable    Recommended daily weight monitoring.  Discussed patient action plan for heart failure;  Recommended avoiding NSAIDs use.  Discussed warning signs requiring additional medical attention for heart failure.    Activity: Approximately 150 minutes of moderate activity (such as walking program)  per week (20-30 minutes most days of the week)  Diet: Heart healthy foods - more fresh fruits and vegetables and whole grains; less red meat; more fish and poultry that is baked or grilled - not fried; less salt not to exceed 2000 mg daily - less processed food; No trans or saturated fat  Smoking  cessation: I advised the patient of the risks in continuing to use tobacco, and I provided this patient with smoking cessation educational materials.  During this visit, I spent < 3 minutes counseling the patient regarding smoking cessation.  Diabetes management  Blood pressure management  Weight management: Discussed the patient's BMI with her. BMI is above normal parameters. Follow-up plan includes:  educational material, exercise counseling and nutrition counseling.  Stress management    Gilmer discussion with the patient regarding complex multiple medical problems for which we are limited in curative treatment however we will proceed with maximization of medical management/symptomatic management.  I have once again pleaded with the patient to please keep her appointments with Dr. Snowden, Dr. Thompson as well as repeat referral to Dr. Gaviria regarding most probable obstructive sleep apnea.    Advanced care planning/advanced directive discussion has been completed on prior encounter,  The patient indicates that at this point she is not returned for notary signing of documents.  The patient is encouraged to do so.  The patient verbalizes understanding of the presence of complex multiple medical problems.    The patient has an appointment for follow-up with Dr. David Snowden 11/14/2018 with plans for echocardiogram prior; on 08/07/2018 she will undergo follow-up CT chest with follow-up with Dr. Mariana Thompson on August 10.          This document has been electronically signed by VERNON Alvarado on July 17, 2018 3:03 PM

## 2018-07-17 NOTE — PROGRESS NOTES
Cameron Bowie  Procedure: 6 Minute Walk Test   07/17/2018  Indication:RV Failure    Pretest: BP:120/68               HR:71               Sa02:98 (2 liters)               Dyspnea:3               Fatigue:2    Post Test: BP:146/78               HR:85               Sa02:94 (2 liters)               Dyspnea:8               Fatigue:6    First 6MWT: no    Supplemental oxygen during test:yes 2 liters    Stopped before 6 minutes:yes, 4 min    Pauses:1 (at 3 min 20 sec)    Results in distance walked:141.52 meters    Did individual experience any pain or discomfort:no    I was present for the above test and agree with the data.     NYHA Functional Class III          This document has been electronically signed by Pakr Saavedra MA on July 17, 2018 3:24 PM

## 2018-07-31 NOTE — PROGRESS NOTES
Insurance initially denied follow-up CT chest as ordered.  Performed P2P via phone to gain approval.  CT chest without contrast approved with approval number [id number], valid for 45 days.

## 2018-08-16 PROBLEM — K46.0 HERNIA WITH OBSTRUCTION: Status: ACTIVE | Noted: 2018-01-01

## 2018-08-17 PROBLEM — K46.0 INCARCERATED HERNIA: Status: ACTIVE | Noted: 2018-01-01

## 2018-08-17 NOTE — ANESTHESIA POSTPROCEDURE EVALUATION
Patient: Cameron Bowie    Procedure Summary     Date:  08/17/18 Room / Location:  [facility] OR  / [facility] OR    Anesthesia Start:  1114 Anesthesia Stop:  1528    Procedure:  VENTRAL/INCISIONAL HERNIA REPAIR  bowel resection,  colostomy,  g and j tube placement (N/A Abdomen) Diagnosis:       Incarcerated hernia      (Incarcerated hernia [K46.0])    Surgeon:  Rigoberto Cadena MD Provider:  Mark Duncan MD    Anesthesia Type:  general ASA Status:  4 - Emergent          Anesthesia Type: general  Last vitals  BP   97/60 (08/17/18 0952)   Temp   97.6 °F (36.4 °C) (08/17/18 0516)   Pulse   78 (08/17/18 1000)   Resp   18 (08/17/18 0638)     SpO2   94 % (08/17/18 1000)     Post Anesthesia Care and Evaluation    Patient location during evaluation: PACU  Patient participation: complete - patient participated  Level of consciousness: awake and alert and responsive to physical stimuli  Pain management: adequate  Airway patency: patent  Anesthetic complications: No anesthetic complications    Cardiovascular status: acceptable  Respiratory status: acceptable, ETT and ventilator  Hydration status: acceptable

## 2018-08-17 NOTE — ANESTHESIA PROCEDURE NOTES
Arterial Line    Patient location during procedure: OR  Start time: 8/17/2018 11:23 AM  Stop Time:8/17/2018 11:26 AM       Line placed for hemodynamic monitoring.  Performed By   Anesthesiologist: JASON LAMA  CRNA: NORA COX  Preanesthetic Checklist  Completed: patient identified, site marked, surgical consent, pre-op evaluation, timeout performed, IV checked, risks and benefits discussed and monitors and equipment checked  Arterial Line Prep   Sterile Tech: cap, gloves, mask, sterile barriers and gown  Prep: ChloraPrep  Patient monitoring: blood pressure monitoring, continuous pulse oximetry and EKG  Arterial Line Procedure   Laterality:right  Location:  radial artery  Catheter size: 20 G   Guidance: landmark technique and palpation technique  Number of attempts: 1  Successful placement: yes          Post Assessment   Dressing Type: biopatch applied, occlusive dressing applied and secured with tape.   Complications no  Circ/Move/Sens Assessment: normal.   Patient Tolerance: patient tolerated the procedure well with no apparent complications

## 2018-08-17 NOTE — ANESTHESIA PROCEDURE NOTES
Arterial Line    Patient location during procedure: OR  Start time: 8/17/2018 11:20 AM  Stop Time:8/17/2018 11:25 AM       Line placed for hemodynamic monitoring, ABGs/Labs/ISTAT and MD/Surgeon request.  Performed By   Anesthesiologist: JASON LAMA  Preanesthetic Checklist  Completed: patient identified, site marked, surgical consent, pre-op evaluation, timeout performed, IV checked, risks and benefits discussed and monitors and equipment checked  Arterial Line Prep   Sterile Tech: cap, gloves and mask  Prep: ChloraPrep  Patient monitoring: blood pressure monitoring, continuous pulse oximetry and EKG  Arterial Line Procedure   Laterality:right  Location:  radial artery  Catheter size: 20 G   Guidance: palpation technique  Number of attempts: 2  Successful placement: yes          Post Assessment   Dressing Type: occlusive dressing applied and secured with tape.   Complications no  Circ/Move/Sens Assessment: normal.   Patient Tolerance: patient tolerated the procedure well with no apparent complications

## 2018-08-17 NOTE — ANESTHESIA PROCEDURE NOTES
Airway  Urgency: elective    Airway not difficult    General Information and Staff    Patient location during procedure: OR  CRNA: NORA COX    Indications and Patient Condition  Indications for airway management: airway protection    Preoxygenated: yes  Mask difficulty assessment: 2 - vent by mask + OA or adjuvant +/- NMBA    Final Airway Details  Final airway type: endotracheal airway      Successful airway: ETT  Cuffed: yes   Successful intubation technique: direct laryngoscopy  Facilitating devices/methods: intubating stylet  Blade: Deyvi  Blade size: #3  ETT size: 7.5 mm  Cormack-Lehane Classification: grade I - full view of glottis  Placement verified by: chest auscultation and capnometry   Cuff volume (mL): 8  Measured from: lips  ETT to lips (cm): 20  Number of attempts at approach: 1

## 2018-08-17 NOTE — ANESTHESIA PREPROCEDURE EVALUATION
Anesthesia Evaluation     no history of anesthetic complications:  NPO Solid Status: > 8 hours  NPO Liquid Status: > 8 hours           Airway   Mallampati: II  TM distance: >3 FB  Neck ROM: full  Possible difficult intubation  Dental    (+) poor dentition        Pulmonary    (+) a smoker (0.5 PPD) Current Abstained day of surgery, COPD, decreased breath sounds,     ROS comment: pulm HTN  Cardiovascular   Exercise tolerance: poor (<4 METS)    ECG reviewed  PT is on anticoagulation therapy  Patient on routine beta blocker  Rhythm: regular  Rate: normal    (+) hypertension, CAD, cardiac stents more than 12 months ago PVD, hyperlipidemia,   (-) angina, CABG    ROS comment: Sinus rhythm with 1st degree AV block  Possible Left atrial enlargement  Incomplete right bundle branch block  Right axis deviation  ST &  T wave abnormality, consider inferior ischemia  ST &  T wave abnormality, consider anterolateral ischemia  Abnormal ECG    · Left Ventricle: Left ventricular systolic function is normal. Estimated EF appears to be in the range of 56 - 60%.  · Left ventricular wall thickness is consistent with mild concentric hypertrophy  · Right Ventricle: Normal right ventricular wall thickness noted. Right ventricular cavity is moderate-to-severely dilated  · Abnormal septal motion. Systolic flattening of interventricular septum consistent with right ventricle pressure overload.  · Tricuspid Valve: The tricuspid valve has poor leaflet coaptation and is abnormal  · Moderate to severe tricuspid valve regurgitation is present  · Estimated right ventricular systolic pressure from tricuspid regurgitation is markedly elevated (>55 mmHg)  · Severe pulmonary hypertension is present.  · There is no evidence of pericardial effusion      Neuro/Psych  GI/Hepatic/Renal/Endo    (+) morbid obesity, GERD,  renal disease CRI, diabetes mellitus type 2,     Musculoskeletal     (+) arthralgias,   Abdominal   (+) obese,    Substance History -  negative use     OB/GYN          Other   (+) arthritis (hands and left hip)     ROS/Med Hx Other: Septic with bowel obstruction                  Anesthesia Plan    ASA 4 - emergent     general   (Arterial line, central line, post op vent discussed)  intravenous induction   Anesthetic plan and risks discussed with patient.  Use of blood products discussed with patient  Consented to blood products.

## 2018-08-25 LAB
BACTERIA SPEC RESP CULT: NORMAL
GRAM STN SPEC: NORMAL

## 2018-08-27 LAB
LAB AP CASE REPORT: NORMAL
PATH REPORT.FINAL DX SPEC: NORMAL
PATH REPORT.GROSS SPEC: NORMAL

## 2023-02-03 NOTE — PLAN OF CARE
Problem: Inpatient Physical Therapy  Goal: Gait Training Goal STG- PT  Outcome: Unable to achieve outcome(s) by discharge Date Met: 03/08/18 03/05/18 1406 03/08/18 0801   Gait Training PT STG   Gait Training Goal PT STG, Date Established 03/05/18 --    Gait Training Goal PT STG, Time to Achieve 5 days --    Gait Training Goal PT STG, [place] Level independent --    Gait Training Goal PT STG, Assist Device cane, quad --    Gait Training Goal PT STG, Distance to Achieve 200 ft with SpO2 >/= 92% and without LOB with vertical/horizontal head turns --    Gait Training Goal PT STG, Additional Goal Pt will asc/desc ramp with modified indep --    Gait Training Goal PT STG, Date Goal Reviewed --  03/08/18   Gait Training Goal PT STG, Outcome --  goal not met   Gait Training Goal PT STG, Reason Goal Not Met --  discharged from facility     Goal: Physical Therapy Goal STG- PT  Outcome: Unable to achieve outcome(s) by discharge Date Met: 03/08/18         4034

## (undated) DEVICE — STERILE POLYISOPRENE POWDER-FREE SURGICAL GLOVES WITH EMOLLIENT COATING: Brand: PROTEXIS

## (undated) DEVICE — ELECTRODE,RT,STRESS,FOAM,50PK: Brand: MEDLINE

## (undated) DEVICE — WIPE BARR PROTECT SUREPREP NOSTING LF BX/50

## (undated) DEVICE — CATH MALECOT 4WING 24F

## (undated) DEVICE — HI-TORQUE VERSACORE MODIFIED J GUIDE WIRE SYSTEM 145 CM: Brand: HI-TORQUE VERSACORE

## (undated) DEVICE — CATH DIAG EXPO .052 PIG 6F 110CM

## (undated) DEVICE — GLV SURG TRIUMPH LT PF LTX 7 STRL

## (undated) DEVICE — SPNG LAP 18X18IN LF STRL PK/5

## (undated) DEVICE — TOWEL,OR,DSP,ST,BLUE,DLX,8/PK,10PK/CS: Brand: MEDLINE

## (undated) DEVICE — ARTERIAL NEEDLE: Brand: UNBRANDED

## (undated) DEVICE — GOWN,NON-REINFORCED,4XL: Brand: MEDLINE

## (undated) DEVICE — SWAN-GANZ THERMODILUTION CATHETER: Brand: SWAN-GANZ

## (undated) DEVICE — APPL CHLORAPREP W/TINT 26ML ORNG

## (undated) DEVICE — RESERVOIR,SUCTION,100CC,SILICONE: Brand: MEDLINE

## (undated) DEVICE — GLIDESHEATH BASIC HYDROPHILIC COATED INTRODUCER SHEATH: Brand: GLIDESHEATH

## (undated) DEVICE — A2000 MULTI-USE SYRINGE KIT, P/N 701277-003KIT CONTENTS: 100ML CONTRAST RESERVOIR AND TUBING WITH CONTRAST SPIKE AND CLAMP: Brand: A2000 MULTI-USE SYRINGE KIT

## (undated) DEVICE — MODEL BT2000 P/N 700287-012KIT CONTENTS: MANIFOLD WITH SALINE AND CONTRAST PORTS, SALINE TUBING WITH SPIKE AND HAND SYRINGE, TRANSDUCER: Brand: BT2000 AUTOMATED MANIFOLD KIT

## (undated) DEVICE — DRN WND FLT 90D HUBLSS FULL TROC 10MM LF

## (undated) DEVICE — 2-PIECE OSTOMY SKIN BARRIER, FORMAFLEX: Brand: NEW IMAGE

## (undated) DEVICE — STPLR SKIN VISISTAT WD 35CT

## (undated) DEVICE — PK MAJ PROC LF 60

## (undated) DEVICE — 2-PIECE DRAINABLE OSTOMY POUCH: Brand: NEW IMAGE

## (undated) DEVICE — SUT VIC 2/0 TIES 18IN J111T

## (undated) DEVICE — NDL ANGIOGR ADV THN SMOTH SGLWALL 21G 1.5

## (undated) DEVICE — MODEL AT P65, P/N 701554-001KIT CONTENTS: HAND CONTROLLER, 3-WAY HIGH-PRESSURE STOPCOCK WITH ROTATING END AND PREMIUM HIGH-PRESSURE TUBING: Brand: ANGIOTOUCH® KIT

## (undated) DEVICE — ENSEAL 20 CM SHAFT, LARGE JAW: Brand: ENSEAL X1

## (undated) DEVICE — MEDI-VAC YANKAUER SUCTION HANDLE W/BULBOUS TIP: Brand: CARDINAL HEALTH

## (undated) DEVICE — INTRO SHEATH ENGAGE W/50 GW .038 8F12

## (undated) DEVICE — GLV SURG SENSICARE GREEN W/ALOE PF LF 7 STRL

## (undated) DEVICE — GOWN,PREVENTION PLUS,XLNG/XXLARGE,STRL: Brand: MEDLINE

## (undated) DEVICE — CATH MALECOT 14FR

## (undated) DEVICE — GLV SURG TRIUMPH LT PF LTX 6.5 STRL

## (undated) DEVICE — SUT VICRYL 0 TIES J112T

## (undated) DEVICE — SUT PROLN 1 CTX 30IN 8455H

## (undated) DEVICE — GLV SURG TRIUMPH LT PF LTX 7.5 STRL

## (undated) DEVICE — STERILE POLYISOPRENE POWDER-FREE SURGICAL GLOVES: Brand: PROTEXIS

## (undated) DEVICE — PK CATH LAB 60

## (undated) DEVICE — SUT VIC 2/0 SH 27IN

## (undated) DEVICE — SUT VICRYL 3-0 SH-1 PO 18IN J772D

## (undated) DEVICE — SUT SILK 2/0 FS BLK 18IN 685G

## (undated) DEVICE — TR BAND RADIAL ARTERY COMPRESSION DEVICE: Brand: TR BAND

## (undated) DEVICE — SUT VICYL 2/0 CR8 18IN DYED J726D

## (undated) DEVICE — SOL IRR NACL 0.9PCT BT 1000ML

## (undated) DEVICE — GW PERIPH GUIDERIGHT STD/J/TP PTFE/PCOAT SS 0.038IN 5X150CM

## (undated) DEVICE — INTRO SHEATH ART/FEM ENGAGE .038 6F12CM

## (undated) DEVICE — ST CVR PROB PULLUP ULTRASND 5X48IN

## (undated) DEVICE — SUT VIC 3/0 TIES 18IN J110T

## (undated) DEVICE — SPLNT WRST RAD 3560

## (undated) DEVICE — GW PTFE EMERALD HC J TIP STD .025 3MM 150CM

## (undated) DEVICE — SUT VIC 0 CT 36IN J958H

## (undated) DEVICE — PLUG,CATHETER,DRAINAGE PROTECTOR,TUBE: Brand: MEDLINE

## (undated) DEVICE — MEDIUM VISCERA RETAINER: Brand: VISCERA RETAINER, FISH